# Patient Record
Sex: FEMALE | Race: WHITE | Employment: UNEMPLOYED | URBAN - METROPOLITAN AREA
[De-identification: names, ages, dates, MRNs, and addresses within clinical notes are randomized per-mention and may not be internally consistent; named-entity substitution may affect disease eponyms.]

---

## 2017-01-20 ENCOUNTER — OFFICE VISIT (OUTPATIENT)
Dept: FAMILY MEDICINE | Facility: CLINIC | Age: 52
End: 2017-01-20
Payer: COMMERCIAL

## 2017-01-20 VITALS
WEIGHT: 109.2 LBS | DIASTOLIC BLOOD PRESSURE: 64 MMHG | HEART RATE: 83 BPM | BODY MASS INDEX: 17.36 KG/M2 | SYSTOLIC BLOOD PRESSURE: 116 MMHG | TEMPERATURE: 98 F

## 2017-01-20 DIAGNOSIS — M79.604 PAIN OF RIGHT LOWER EXTREMITY: ICD-10-CM

## 2017-01-20 DIAGNOSIS — M54.89 OTHER BACK PAIN: Primary | ICD-10-CM

## 2017-01-20 DIAGNOSIS — M79.662 PAIN OF LEFT LOWER LEG: ICD-10-CM

## 2017-01-20 DIAGNOSIS — F54 PSYCHOSOMATIC FACTOR IN PHYSICAL CONDITION: ICD-10-CM

## 2017-01-20 PROCEDURE — 99354 ZZC PROLONGED SERV,OFFICE,1ST HR: CPT | Mod: 25 | Performed by: FAMILY MEDICINE

## 2017-01-20 PROCEDURE — 99215 OFFICE O/P EST HI 40 MIN: CPT | Performed by: FAMILY MEDICINE

## 2017-01-20 NOTE — MR AVS SNAPSHOT
After Visit Summary   1/20/2017    Amira Scherer    MRN: [id number]           Patient Information     Date Of Birth          1965        Visit Information        Provider Department      1/20/2017 4:30 PM Alana Carbajal MD [facility]        Today's Diagnoses     Other back pain    -  1     Pain of right lower extremity         Pain of left lower leg            Follow-ups after your visit        Additional Services     NEUROLOGY ADULT REFERRAL       Your provider has referred you to: [facility]: [facility] [place] [phone]   [email or web address]    Reason for Referral: Consult  Patient has had back pain for the last 2 years it has now changed . MRI is negative and pain is unusual.     Please be aware that coverage of these services is subject to the terms and limitations of your health insurance plan.  Call member services at your health plan with any benefit or coverage questions.      Please bring the following with you to your appointment:    (1) Any X-Rays, CTs or MRIs which have been performed.  Contact the facility where they were done to arrange for  prior to your scheduled appointment.    (2) List of current medications  (3) This referral request   (4) Any documents/labs given to you for this referral                  Who to contact     Normal or non-critical lab and imaging results will be communicated to you by MyChart, letter or phone within 4 business days after the clinic has received the results. If you do not hear from us within 7 days, please contact the clinic through MyChart or phone. If you have a critical or abnormal lab result, we will notify you by phone as soon as possible.  Submit refill requests through [employer] or call your pharmacy and they will forward the refill request to us. Please allow 3 business days for your refill to be completed.          If you need to speak with a  for additional  "information , please call: [phone]             Additional Information About Your Visit        MyChart Information     [employer]zinghart lets you send messages to your doctor, view your test results, renew your prescriptions, schedule appointments and more. To sign up, go to [email or web address] . Click on \"Log in\" on the left side of the screen, which will take you to the Welcome page. Then click on \"Sign up Now\" on the right side of the page.     You will be asked to enter the access code listed below, as well as some personal information. Please follow the directions to create your username and password.     Your access code is: [email or web address]  Expires: 2017  5:57 PM     Your access code will  in 90 days. If you need help or a new code, please call your [facility] clinic or [phone].        Care EveryWhere ID     This is your Care EveryWhere ID. This could be used by other organizations to access your [facility] medical records  [id number]        Your Vitals Were     Pulse Temperature                83 98  F (36.7  C) (Tympanic)           Blood Pressure from Last 3 Encounters:   17 116/64   08/18/15 98/65   04/07/15 103/67    Weight from Last 3 Encounters:   17 109 lb 3.2 oz (49.533 kg)   08/18/15 107 lb (48.535 kg)   04/07/15 109 lb (49.442 kg)              We Performed the Following     NEUROLOGY ADULT REFERRAL          Today's Medication Changes          These changes are accurate as of: 17  5:57 PM.  If you have any questions, ask your nurse or doctor.               These medicines have changed or have updated prescriptions.        Dose/Directions    adapalene 0.1 % cream   Commonly known as:  DIFFERIN   This may have changed:  Another medication with the same name was removed. Continue taking this medication, and follow the directions you see here.   Used for:  Acne   Changed by:  Soniya Quinonez PA-C        Apply topically at bedtime   Quantity:  45 g   Refills:  3       "   Stop taking these medicines if you haven't already. Please contact your care team if you have questions.     ciprofloxacin-dexamethasone otic suspension   Commonly known as:  CIPRODEX   Stopped by:  Alana Carbajal MD           DULoxetine 30 MG EC capsule   Commonly known as:  CYMBALTA   Stopped by:  Alana Carbajal MD           escitalopram 5 MG tablet   Commonly known as:  LEXAPRO   Stopped by:  Alana Carbajal MD           nicotine 14 MG/24HR 24 hr patch   Commonly known as:  NICODERM CQ   Stopped by:  Alana Carbajal MD           nicotine 21 MG/24HR 24 hr patch   Commonly known as:  NICODERM CQ   Stopped by:  Alana Carbajal MD           nicotine 7 MG/24HR 24 hr patch   Commonly known as:  NICODERM CQ   Stopped by:  Alana Carbajal MD           nicotine polacrilex 2 MG gum   Commonly known as:  NICORETTE   Stopped by:  Alana Carbajal MD           nicotine polacrilex 2 MG lozenge   Commonly known as:  EQL NICOTINE POLACRILEX   Stopped by:  Alana Carbajal MD           tiZANidine 2 MG tablet   Commonly known as:  ZANAFLEX   Stopped by:  Alana Carbajal MD           varenicline 0.5 MG X 11 & 1 MG X 42 tablet   Commonly known as:  CHANTIX STARTING MONTH MARKO   Stopped by:  Alana Carbajal MD                    Primary Care Provider Office Phone # Fax #    Alana Carbajal [phone]       [address] [address]        Thank you!     Thank you for choosing [facility]  for your care. Our goal is always to provide you with excellent care. Hearing back from our patients is one way we can continue to improve our services. Please take a few minutes to complete the written survey that you may receive in the mail after your visit with us. Thank you!             Your Updated Medication List - Protect others around you: Learn how to safely use, store and throw away your medicines at www.disposemymeds.org.          This list is accurate as  of: 1/20/17  5:57 PM.  Always use your most recent med list.                   Brand Name Dispense Instructions for use    adapalene 0.1 % cream    DIFFERIN    45 g    Apply topically at bedtime

## 2017-01-20 NOTE — PROGRESS NOTES
SUBJECTIVE:                                                    Amira Scherer is a 52 year old female who presents to clinic today for the following health issues:    Back Pain      Duration: 2 years        Specific cause: Sleeping on air bed for several months and shoveling 2 years ago     Description:   Location of pain: low back, middle of back  and upper back   Character of pain: sharp and stabbing  Pain radiation:radiates into the right leg and radiates into the left leg  New numbness or weakness in legs, not attributed to pain:  YES- Bilateral legs    Intensity: severe    History:   Pain interferes with job: Not working at this time  History of back problems: no prior back problems before 2 years ago  Any previous MRI or X-rays: Yes- at [place].  Date 8/2015  Sees a specialist for back pain:  No  Therapies tried without relief: Physical Therapy    Alleviating factors:   Improved by: Nothing      Precipitating factors:  Worsened by: Lifting, Bending, Standing, Sitting, Lying Flat, Walking and Coughing    Functional and Psychosocial Screen (Nickolas STarT Back):      Not performed today       Accompanying Signs & Symptoms:  Risk of Fracture:    Risk of Cauda Equina:    Risk of Infection:    Risk of Cancer:    Risk of Ankylosing Spondylitis:  Onset at age <35, male, AND morning back stiffness.                SUBJECTIVE:                                                    Amira Scherer is a 52 year old female who presents to clinic today for the following health issues:    Back Pain      Duration: 2 years        Specific cause: Sleeping on air bed for several months and shoveling 2 years ago     Description:   Location of pain: low back, middle of back  and upper back   Character of pain: sharp and stabbing  Pain radiation:radiates into the right leg and radiates into the left leg  New numbness or weakness in legs, not attributed to pain:  YES- Bilateral legs    Intensity: severe    History:   Pain interferes  "with job: Not working at this time  History of back problems: no prior back problems before 2 years ago  Any previous MRI or X-rays: Yes- at [place].  Date 8/2015  Sees a specialist for back pain:  No  Therapies tried without relief: Physical Therapy    Alleviating factors:   Improved by: Nothing      Precipitating factors:  Worsened by: Lifting, Bending, Standing, Sitting, Lying Flat, Walking and Coughing    Functional and Psychosocial Screen (Nickolas STarT Back):      Not performed today       Accompanying Signs & Symptoms:  Risk of Fracture:    Risk of Cauda Equina:    Risk of Infection:    Risk of Cancer:    Risk of Ankylosing Spondylitis:  Onset at age <35, male, AND morning back stiffness.                    Problem list and histories reviewed & adjusted, as indicated.  Additional history: she has the same back pain for 2 years.  She had an MRI that showed no abnormality in the spine except for the mild curviture of the spine. She is al over the placewith complaints about how she saw Dr. Brooks and how the physical therapy she did there and the adjustments made her worse. She kept talking about how she is unable to travel anywhere and because his office is near she went to him instead. She talks about her weakness and then procedes to pace about the room. She attributes everything to the time that she spent sleeping on the air mattress on the floor a few years back. She states her back has never been the same since that time and then she shoveled and she is now nearly disabled due to the pain.    Problem list and histories reviewed & adjusted, as indicated.  Additional history: she has the same back pain for 2 years.  She had an MRI that showed no abnormality in the spine except for the mild curviture of the spine. She has been to Dr. Allen and she spent 10 minutes where I could not even get a word in talking about how his adjustments had \"ruined her back\" she went there because it is easy for her to " "get there. She states that her told her that he was a back specialist. She has been doing physical therapy there and she is worse instead of better. She emphatically stated a minimum of 10 times through out the 75 minute office visit that she had \"done everything we had asked her to do and I am even worse than I was\". She neglected to say that she has not followed up in the office, nor has she seen the consultants that were recommended and she decided to see a chiropracter . She denies depression or any form of mental illness but in review of her chart she has a history of  Focusing on health issues to the point of obsession. She refuses any type of psychological assessment and will not take any antidepressants or any medication that is for nerve pain. Her MRI does not reveal much nor does her physical exam  Her level of psychological distress is quite high.   She states she has stopped leaving thte house or doing any social activities. Her friend drove her today and she stated she felt bad about inconvieniencing her but every time I tried tp pwrap up the visit and brought up her waiting friend she completely ignored this.   I asked her about what she does for a livign and she did not answer and changed the subject.      BP Readings from Last 3 Encounters:   01/20/17 116/64   08/18/15 98/65   04/07/15 103/67    Wt Readings from Last 3 Encounters:   01/20/17 109 lb 3.2 oz (49.533 kg)   08/18/15 107 lb (48.535 kg)   04/07/15 109 lb (49.442 kg)                    ROS:  She answers yes to everything chest pain  Shortness of breath change in bowel habits she has a long story about a history of  Urinary frequency in the past she does not have vaginal bleeding yes t to hot flashes weight loss chills undocumented fever nausea , leg swelling no one else can see, intermittent change in vision. The list goes on. All of these have been positive for greater than 2 years     OBJECTIVE:                                              "       /64 mmHg  Pulse 83  Temp(Src) 98  F (36.7  C) (Tympanic)  Ht   Wt 109 lb 3.2 oz (49.533 kg) Body mass index is 17.36 kg/(m^2).   GENERAL APPEARANCE: alert, mild distress and Nourishment underweight   NECK: no adenopathy, no asymmetry, masses, or scars and thyroid normal to palpation  RESP: lungs clear to auscultation - no rales, rhonchi or wheezes  CV: regular rates and rhythm, normal S1 S2, no S3 or S4 and no murmur, click or rub  ABDOMEN: soft, nontender, without hepatosplenomegaly or masses and bowel sounds normal  MS: extremities normal- no gross deformities noted  ORTHO: Lumber/Thoracic Spine Exam: Tender:  right para lumbar muscles  Non-tender:  thoracic spinous processes, thoracic facet joints, lumbar facet joints, left pars articularis , right pars articularis , left SI joint, right SI joint  Range of Motion:  rom normal but painful dramtically so   Strength:  able to heel walk, able to toe walk  Special tests:  negative straight leg raises, normal alignment on Boris's forward bending test    Hip Exam: Hip ROM full           ASSESSMENT/PLAN:                                                    1. Other back pain  - NEUROLOGY ADULT REFERRAL    2. Pain of right lower extremity    - NEUROLOGY ADULT REFERRAL    3. Pain of left lower leg  She is experiencing exyreme distress from this. Will r/o neurological causes. Did not discuss MS suspect  - NEUROLOGY ADULT REFERRAL    4. Psychosomatic factor in physical condition  That this is a large part of her illness but will pursue other diagnoses. She so rarely comes in that it is hard to make any accurate diagnosis she was instructed to stop smoking as this would not be beneficial for any of her disease states.   Her extreme inability to solve any of her own issues an her need for reassurance suggest that there is a deeper psychological issue that unless this is dealth with , she will not be free of her perceived health problems.      reports that she has  been smoking Cigarettes.  She has a 10 pack-year smoking history. She has never used smokeless tobacco.  Tobacco Cessation Action Plan: Information offered: Patient not interested at this time        Alana Carbajal M.D.  I spent 75 minutes face to face from 4:55 pm to 6:10pm with this patient greater that 50 % in review care planning and counseling.  [facility]

## 2017-01-20 NOTE — NURSING NOTE
"Chief Complaint   Patient presents with     Asthma     Initial /64 mmHg  Pulse 83  Temp(Src) 98  F (36.7  C) (Tympanic)  Ht   Wt 109 lb 3.2 oz (49.533 kg) Estimated body mass index is 17.36 kg/(m^2) as calculated from the following:    Height as of 8/18/15: 5' 6.5\" (1.689 m).    Weight as of this encounter: 109 lb 3.2 oz (49.533 kg).  BP completed using cuff size: regular - right arm.  Becky Benites LPN  "

## 2017-01-23 ENCOUNTER — TELEPHONE (OUTPATIENT)
Dept: FAMILY MEDICINE | Facility: CLINIC | Age: 52
End: 2017-01-23

## 2017-01-23 NOTE — TELEPHONE ENCOUNTER
Dr. Carbajal: I am pretty sure that Amira is going to ask where, who, and how far?   Thank you.  Frank Verduzco RN

## 2017-01-23 NOTE — TELEPHONE ENCOUNTER
Amira mauro.  She states that she had an appointment with Dr. Carbajal on 1/20/17 and forgot to tell her a few things and did to tell her what's happening to her now.  Please call and assess. Thank you..Annamarie Dias

## 2017-01-23 NOTE — TELEPHONE ENCOUNTER
"FYI to Dr. Carbajal: Amira reports, \"The soonest available is February 22, 2017. Amira said that if there is anything that can be done for her to be  seen sooner; I would appreciate that.\" I did reassure her as to the issues below and that you were informed of each one and gave the following info:  1. Liver spots---benign  2. Menopause symptoms were normal for menopause  3. Leg pain is the reason to see neurology.   Frank Verduzco RN    "

## 2017-01-23 NOTE — TELEPHONE ENCOUNTER
"Dr. Carbajal:   1. Spots on the liver: Patient called and said that she this really bugging me , 2 things. When she saw me on Friday, she had pulled up on the screen that I had MRI 2-3  years ago in . ; not the one that I brought one. Years ago I had my colon removed and she know that I have intestinal issues. I remember him telling me that I have spots on my liver; something he saw on the MRI. The intestinal thing -I am able to control myself. He told me that I needed to have ultrasound which I did. I forgot that this happened with Dr. Merritt----it scares me. It may or may not mean anything.  2. Menopause. My legs and back are awful but back MRI says that I have a great back. The last few years were night sweats and I felt frozen--I mean extreme tummy bloatting and extreme heavy periods for me. The last couple of years were torture. I just thought it was menopause. Not intestinal ----abdominal; Cramping with my period to the point of shakes.  It got to be irregular periods. The extreme pain and belly bloating. I got to thinking to my self and wondered if there is something going on there. Uterine cancer or ovarian cancer? I don't have those issues anymore.  3. Leg pain.  She said that \"she did not have leg pain until she tried Physical Therapy starting in January. Compression socks to the the thighs help.\" She said that legs were swelling on  but did not hurt until 2016.   Frank Verduzco RN    "

## 2017-01-24 NOTE — TELEPHONE ENCOUNTER
Patient notified of other neuro referral offer. She declined for now and said that she would keep calling to [place] to see if there is a cancel to be seen sooner than Feb, 22, 2017.  Frank Verduzco RN

## 2017-02-14 ENCOUNTER — TELEPHONE (OUTPATIENT)
Dept: FAMILY MEDICINE | Facility: CLINIC | Age: 52
End: 2017-02-14

## 2017-02-14 DIAGNOSIS — R20.2 PARESTHESIAS: Primary | ICD-10-CM

## 2017-02-14 NOTE — TELEPHONE ENCOUNTER
"Dr. Carbajal: Patient notified of lab orders to address her concern for protein deficiency. \"I still want to talk to her at a telephone encounter. Can I talk to her even before I get the lab?  I will get the lab on the same day that I see the neurologist on Feb. 22nd.\"  I advised her to get the labs and then make the  telephone encounter so that Dr. Carbajal would have the results of the lab work so that there would be some definite results rather than just speculating. Amira said, \"I understand but I want her to know that I have bloating that has been going on since before I injured my back.\" \"I know that I am being stubborn but I really want to talk to her. Can I make a telephone appointment?\" Again I tried to get her to get the lab first and then have the telephone appointment. I told her that Dr. Carbajal would not have much advice to give until she had some lab result numbers to go by. Amira insists on wanting to talk to Dr. Carbajal on a telephone appointment. How do you advise? Thank you.  Frank Verduzco, RN    "

## 2017-02-14 NOTE — TELEPHONE ENCOUNTER
"Amira says. \"this is driving me crazy; I have so many questions. I was really hoping to speak with Dr. Carbajal; possible to have a telephone appointment?\" \"I went to chiropractor Alejandro --and he said that it could be a protein deficiency.\" Telephone call made for this afternoon. She wants to discuss protein deficiency.   Frank Verduzco RN    "

## 2017-02-14 NOTE — TELEPHONE ENCOUNTER
Amira has a visit with Dr. Carbajal on 1/20/17 and she has a few more questions that she wanted to speak to Dr. Carbajal about.  What she would like to do is have a 'telephone' appointment that would be an extension of her visit of 1/20/17 if possible.  Doesn't really  Want another appointment to actually come in.  Would like to speak with RN about it.  Please call and assess. Thank you..Annamarie Dias

## 2017-02-16 ENCOUNTER — TELEPHONE (OUTPATIENT)
Dept: FAMILY MEDICINE | Facility: CLINIC | Age: 52
End: 2017-02-16

## 2017-02-16 DIAGNOSIS — N95.0 POST-MENOPAUSAL BLEEDING: Primary | ICD-10-CM

## 2017-02-16 NOTE — TELEPHONE ENCOUNTER
Amira left message:    She would like a telphone call!  Question:  Pain from not enough protein?  Ok'd to leave a message.  Wanting a telephone visit.  Please call and assess. Thank you..Annamarie Dias

## 2017-02-16 NOTE — TELEPHONE ENCOUNTER
"Patient wants to know:  1) x-ray vs. MRI; would the xray that was taken on 8/18/15 show if there was a curvature that could have caused her pain. Would she have needed an MRI? Are the 2 images showing the same thing?   2) can the pain she is feeling in her back be caused from a lack of protein.  3) Patient would like to know if she can get a \"finger stick\" for blood work for a lab to check just her protein.  4) requesting thi high compression stockings, not knee high.    Spent 43 minutes trying to talk with patient. She continues to talk in circles and cannot get a word in edgewise. Explained lab draws and foods she can eat to increase her protein, Explained prices for telephone visits and what could be talked about vs an office visit due to patient's insurance. Patient would like \"simple\" answers for above questions.  Yeimy Mcnamara RN    "

## 2017-02-17 NOTE — TELEPHONE ENCOUNTER
Patient called back again demanding that I call her back TODAY. She is expecting me to call her before I leave for the weekend. Spoke with Dr. Carbajal. She advised that I not call her back as she herself will be contacting her later today. Patient continues to cross personal boundaries, and is non compliant with numerous treatment plans that have been laid out for her. After every plan she has verbalized understanding, but does not follow through.   Yeimy Mcnamara RN

## 2017-02-20 ENCOUNTER — TELEPHONE (OUTPATIENT)
Dept: NURSING | Facility: CLINIC | Age: 52
End: 2017-02-20

## 2017-02-20 NOTE — TELEPHONE ENCOUNTER
Call Type: Triage Call    Presenting Problem: Patient calling requesting to know list of foods  that contain Vitamin B12. Denies symptoms.  Reviewed per [employer] list  of foods that contain Vit B12. Patient requesting further  information on percentage contained in each food. Per [employer] Vitamin  B12 is an essential vitamin. This means that the body requires  vitamin B12 to work properly. Vitamin B12 can be found in foods such  as meat, fish, and dairy products.  Triage Note:  Guideline Title: Information Only Call; No Symptom Triage (Adult)  Recommended Disposition: Provide Information or Advice Only  Original Inclination: Did not know what to do  Override Disposition:  Intended Action: Follow advice given  Physician Contacted: No  Health information question; person denies any symptoms, no triage required.  Information provided from approved references or clinical experience. ?  YES  Requesting regular office appointment ? NO  Sign(s) or symptom(s) associated with a diagnosed condition or with a new illness  ? NO  Requesting information about provider, services or community resources ? NO  Call back to complete assessment/clarification of information from prior caller to  complete triage ? NO  Requesting information and provider is best resource; no triage required. ? NO  Caller not with patient and is unable to provide clinical information about  patient to facilitate triage. ? NO  Requesting provider information for recently scheduled test, procedure; no triage  required. Needed information not available per approved resources or clinical  experience. ? NO  Requesting information not available per approved reference or clinical  experience; no triage required. ? NO  Requesting information regarding scheduled exam, test or procedure; no triage  required. Information provided from approved resources or clinical experience. ?  NO  Physician Instructions:  Care Advice:

## 2017-02-21 NOTE — TELEPHONE ENCOUNTER
Call to patient. She has had some light menses has not had any bleeding for 18 months.  Now has had a 3 day period. She has not had any other bleeding. Will get u/s she needs to get her labs and follow up with neurology Alana Carbajal M.D.

## 2017-02-22 ENCOUNTER — OFFICE VISIT (OUTPATIENT)
Dept: NEUROLOGY | Facility: CLINIC | Age: 52
End: 2017-02-22
Payer: COMMERCIAL

## 2017-02-22 ENCOUNTER — HOSPITAL ENCOUNTER (OUTPATIENT)
Dept: ULTRASOUND IMAGING | Facility: CLINIC | Age: 52
Discharge: HOME | End: 2017-02-22
Attending: FAMILY MEDICINE | Admitting: FAMILY MEDICINE
Payer: COMMERCIAL

## 2017-02-22 ENCOUNTER — TELEPHONE (OUTPATIENT)
Dept: NEUROLOGY | Facility: CLINIC | Age: 52
End: 2017-02-22

## 2017-02-22 VITALS
BODY MASS INDEX: 17.27 KG/M2 | WEIGHT: 110 LBS | DIASTOLIC BLOOD PRESSURE: 74 MMHG | SYSTOLIC BLOOD PRESSURE: 107 MMHG | HEIGHT: 67 IN

## 2017-02-22 DIAGNOSIS — G89.29 CHRONIC MIDLINE LOW BACK PAIN WITH SCIATICA, SCIATICA LATERALITY UNSPECIFIED: ICD-10-CM

## 2017-02-22 DIAGNOSIS — N95.0 POST-MENOPAUSAL BLEEDING: ICD-10-CM

## 2017-02-22 DIAGNOSIS — M79.605 BILATERAL LOWER EXTREMITY PAIN: Primary | ICD-10-CM

## 2017-02-22 DIAGNOSIS — M79.604 BILATERAL LOWER EXTREMITY PAIN: Primary | ICD-10-CM

## 2017-02-22 DIAGNOSIS — M54.40 CHRONIC MIDLINE LOW BACK PAIN WITH SCIATICA, SCIATICA LATERALITY UNSPECIFIED: ICD-10-CM

## 2017-02-22 DIAGNOSIS — R20.2 PARESTHESIAS: ICD-10-CM

## 2017-02-22 LAB
ALBUMIN SERPL-MCNC: 4 G/DL (ref 3.4–5)
ALP SERPL-CCNC: 64 U/L (ref 40–150)
ALT SERPL W P-5'-P-CCNC: 23 U/L (ref 0–50)
ANION GAP SERPL CALCULATED.3IONS-SCNC: 8 MMOL/L (ref 3–14)
AST SERPL W P-5'-P-CCNC: 17 U/L (ref 0–45)
BILIRUB SERPL-MCNC: 0.7 MG/DL (ref 0.2–1.3)
BUN SERPL-MCNC: 11 MG/DL (ref 7–30)
CALCIUM SERPL-MCNC: 9.1 MG/DL (ref 8.5–10.1)
CHLORIDE SERPL-SCNC: 101 MMOL/L (ref 94–109)
CO2 SERPL-SCNC: 29 MMOL/L (ref 20–32)
CREAT SERPL-MCNC: 0.55 MG/DL (ref 0.52–1.04)
ERYTHROCYTE [DISTWIDTH] IN BLOOD BY AUTOMATED COUNT: 14.2 % (ref 10–15)
GFR SERPL CREATININE-BSD FRML MDRD: ABNORMAL ML/MIN/1.7M2
GLUCOSE SERPL-MCNC: 78 MG/DL (ref 70–99)
HCT VFR BLD AUTO: 50.2 % (ref 35–47)
HGB BLD-MCNC: 16.8 G/DL (ref 11.7–15.7)
MCH RBC QN AUTO: 33.8 PG (ref 26.5–33)
MCHC RBC AUTO-ENTMCNC: 33.5 G/DL (ref 31.5–36.5)
MCV RBC AUTO: 101 FL (ref 78–100)
PLATELET # BLD AUTO: 209 10E9/L (ref 150–450)
POTASSIUM SERPL-SCNC: 3.3 MMOL/L (ref 3.4–5.3)
PROT SERPL-MCNC: 7.8 G/DL (ref 6.8–8.8)
RBC # BLD AUTO: 4.97 10E12/L (ref 3.8–5.2)
SODIUM SERPL-SCNC: 138 MMOL/L (ref 133–144)
TSH SERPL DL<=0.005 MIU/L-ACNC: 0.89 MU/L (ref 0.4–4)
VIT B12 SERPL-MCNC: 210 PG/ML (ref 193–986)
WBC # BLD AUTO: 8.2 10E9/L (ref 4–11)

## 2017-02-22 PROCEDURE — 84443 ASSAY THYROID STIM HORMONE: CPT | Performed by: FAMILY MEDICINE

## 2017-02-22 PROCEDURE — 82607 VITAMIN B-12: CPT | Performed by: FAMILY MEDICINE

## 2017-02-22 PROCEDURE — 80053 COMPREHEN METABOLIC PANEL: CPT | Performed by: FAMILY MEDICINE

## 2017-02-22 PROCEDURE — 85027 COMPLETE CBC AUTOMATED: CPT | Performed by: FAMILY MEDICINE

## 2017-02-22 PROCEDURE — 99204 OFFICE O/P NEW MOD 45 MIN: CPT | Performed by: PSYCHIATRY & NEUROLOGY

## 2017-02-22 PROCEDURE — 36415 COLL VENOUS BLD VENIPUNCTURE: CPT | Performed by: FAMILY MEDICINE

## 2017-02-22 PROCEDURE — 76830 TRANSVAGINAL US NON-OB: CPT

## 2017-02-22 NOTE — PROGRESS NOTES
INITIAL NEUROLOGY CONSULTATION    DATE OF VISIT: 2/22/2017  MRN: [id number]  PATIENT NAME: Amira Scherer  YOB: 1965    REFERRING PROVIDER: No ref. provider found    Chief Complaint   Patient presents with     Consult     Back Pain       SUBJECTIVE:                                                      HPI:   Amira Scherer is a 52 year old female who presents for back pain and leg pain.     In October of 2015, the patient awoke with back pain one morning. She had been sleeping on an air mattress prior to this. A few days later she was shoveling snow and felt an increased pain in the back. She has since done physical therapy and chiropractic care with fluctuating pain but then she developed leg swelling and weakness with swelling in 2016. The patient tells me that she can hardly walk anymore. Her back and legs hurt constantly. She says that she now has trouble with her right arm because of aggressive physical therapy. She does not really have shooting pain, but just general pain in the legs that radiates from the back. She is not sure if the legs are weak. The patient did have a lumbar MRI done at [facility] and shows me the report. We will have to upload the disc into our system to view it. The study was reportedly unremarkable. Her primary care provider sent her to neurology for possible neurologic condition to explain her symptoms. It is noted that the provider feels that her symptoms are largely psychosomatic.     Patient talks the entire time of the visit, leaving little room for questions. I am not sure I have an accurate or complete history.    No past medical history on file.  Past Surgical History   Procedure Laterality Date     Colon surgery  1987 and 89     Colon removed          Current Outpatient Prescriptions on File Prior to Visit:  adapalene (DIFFERIN) 0.1 % cream Apply topically at bedtime     No current facility-administered medications on file prior to visit.   No Known Allergies      "Problem (# of Occurrences) Relation (Name,Age of Onset)    Arthritis (1) Mother    Breast Cancer (1) Sister    Hypertension (1) Mother    Thyroid Disease (1) Sister        Social History   Substance Use Topics     Smoking status: Current Some Day Smoker     Packs/day: 0.50     Years: 20.00     Types: Cigarettes     Smokeless tobacco: Never Used     Alcohol use Yes      Comment: Red wine 2-3 times per week       REVIEW OF SYSTEMS:                                                      10-point review of systems is negative except as mentioned above in HPI.     EXAM:                                                      Physical Exam:   Vitals: /74 (BP Location: Left arm, Cuff Size: Adult Regular)  Ht 5' 6.5\" (1.689 m)  Wt 110 lb (49.9 kg)  BMI 17.49 kg/m2  BMI= Body mass index is 17.49 kg/(m^2).  GENERAL: NAD.   Neurologic:  MENTAL STATUS: Alert, attentive. Speech is fluent. Pressured at times, tangential. Normal comprehension. Normal concentration. Adequate fund of knowledge.   CRANIAL NERVES: Visual fields intact to confrontation. Pupils equally, round and reactive to light. Facial sensation and movement normal. EOM full. Hearing intact to conversation. Trapezius strength intact. Palate moves symmetrically. Tongue midline.  MOTOR: Give-way weakness and limited exam due to pain. Objectively, strength appears full. Tone and bulk normal.   DTRs: Intact and symmetric. Trace ankle jerks. Babinski down-going bilaterally.   SENSATION: Normal light touch and pinprick throughout. Intact proprioception. Vibration: Normal at both ankles.   COORDINATION: Normal finger nose finger. Finger tapping normal. Knee heel shin normal.  STATION AND GAIT: Romberg negative. Tandem normal.  CV: RRR. S1, S2.   NECK: No bruits.    ASSESSMENT and PLAN:                                                      Assessment and Plan:     ICD-10-CM    1. Bilateral lower extremity pain M79.604 EMG    M79.605    2. Chronic midline low back pain with " sciatica, sciatica laterality unspecified M54.40 EMG    G89.29        Ms. Scherer is a 51 yo woman with back and leg pain for 1-2 years duration. It seems that the patient was going to be referred to orthopedics, however she was redirected to me instead. Certainly some of the pain does sound consistent with radicular pain, however the lumbar MRI report from [facility] does not indicate foraminal stenosis. One way to double check for radiculopathy would be electrodiagnostic testing. If this does not provide an explanation, the patient might benefit from pain consultation for a multidisciplinary approach. Given my discussion with the patient and her overall demeanor (and without an alternative explanation), I do think that it is very likely that her symptoms are psychogenic.This would be best addressed through a mental health provider. I would not do additional imaging in this patient, though if there is question of MS, one could check brain/cervical spine MRI. This I will defer to her primary care provider.     Patient Instructions:  Nerve conduction studies / electromyogram   I recommend a referral to Orthopedics if the electromyogram is abnormal, but will defer this decision to Dr. Carbajal.  If the nerve conduction studies are normal, I would recommend you consult with pain clinic.     Total Time: 45 minutes were spent with the patient. More than 50% of the time spent on counseling (as described above in Assessment and Plan) /coordinating the care.    Debo Heck MD  Neurology    CC: Alana Carbajal MD

## 2017-02-22 NOTE — NURSING NOTE
"Chief Complaint   Patient presents with     Consult     Back Pain       Initial /74 (BP Location: Left arm, Cuff Size: Adult Regular)  Ht 5' 6.5\" (1.689 m)  Wt 110 lb (49.9 kg)  BMI 17.49 kg/m2 Estimated body mass index is 17.49 kg/(m^2) as calculated from the following:    Height as of this encounter: 5' 6.5\" (1.689 m).    Weight as of this encounter: 110 lb (49.9 kg).  Medication Reconciliation: complete   Genie Rice LPN   2/22/2017       "

## 2017-02-22 NOTE — MR AVS SNAPSHOT
After Visit Summary   2/22/2017    Amira Scherer    MRN: [id number]           Patient Information     Date Of Birth          1965        Visit Information        Provider Department      2/22/2017 2:15 PM Debo Heck MD [facility]        Today's Diagnoses     Bilateral lower extremity pain    -  1    Chronic midline low back pain with sciatica, sciatica laterality unspecified          Care Instructions    Plan:  Nerve conduction studies / electromyogram   I recommend a referral to Orthopedics if the electromyogram is abnormal, but will defer this decision to Dr. Carbajal.  If the nerve conduction studies are normal, I would recommend you consult with pain clinic.         Follow-ups after your visit        Your next 10 appointments already scheduled     Feb 22, 2017  4:20 PM CST   US PELVIC COMPLETE W TRANSVAGINAL with [id number]   [facility]ound ([facility])    [address] [address]   [phone]           Please bring a list of your medicines (including vitamins, minerals and over-the-counter drugs). Also, tell your doctor about any allergies you may have. Wear comfortable clothes and leave your valuables at home.  Adults: Drink four 8-ounce glasses of fluid one hour before your exam. Do NOT empty your bladder.  If you need to empty your bladder before your exam, try to release only a little bit of urine. Then, drink another 8oz glass of fluid.  Children: Children who are potty trained should drink at least 4 cups (32 oz) of liquid 45 minutes to one hour prior to the exam. The child s bladder must be full in order to achieve a diagnostic exam. If your child is very uncomfortable or has an urgent need to pee, please notify a technologist; they will try to find out how much longer the wait may be and provide instructions to help relieve the pressure. Occasionally it is medically necessary to insert a urinary catheter to fill the  "bladder.  Please call the Imaging Department at your exam site with any questions.              Future tests that were ordered for you today     Open Future Orders        Priority Expected Expires Ordered    EMG Routine  2018            Who to contact     If you have questions or need follow up information about today's clinic visit or your schedule please contact [facility] directly at [phone].  Normal or non-critical lab and imaging results will be communicated to you by MyChart, letter or phone within 4 business days after the clinic has received the results. If you do not hear from us within 7 days, please contact the clinic through [employer] or phone. If you have a critical or abnormal lab result, we will notify you by phone as soon as possible.  Submit refill requests through [employer] or call your pharmacy and they will forward the refill request to us. Please allow 3 business days for your refill to be completed.          Additional Information About Your Visit        [employer] Information     [employer] lets you send messages to your doctor, view your test results, renew your prescriptions, schedule appointments and more. To sign up, go to [email or web address] . Click on \"Log in\" on the left side of the screen, which will take you to the Welcome page. Then click on \"Sign up Now\" on the right side of the page.     You will be asked to enter the access code listed below, as well as some personal information. Please follow the directions to create your username and password.     Your access code is: [email or web address]  Expires: 2017  5:57 PM     Your access code will  in 90 days. If you need help or a new code, please call your [place] clinic or [phone].        Care EveryWhere ID     This is your Care EveryWhere ID. This could be used by other organizations to access your [place] medical records  [id number]        Your Vitals Were     Height BMI (Body Mass Index)       " "         5' 6.5\" (1.689 m) 17.49 kg/m2           Blood Pressure from Last 3 Encounters:   02/22/17 107/74   01/20/17 116/64   08/18/15 98/65    Weight from Last 3 Encounters:   02/22/17 110 lb (49.9 kg)   01/20/17 109 lb 3.2 oz (49.5 kg)   08/18/15 107 lb (48.5 kg)               Primary Care Provider Office Phone # Fax #    Alana Carbajal [phone]       [facility] [address] [facility] [address] [facility] [address]        Thank you!     Thank you for choosing [facility]  for your care. Our goal is always to provide you with excellent care. Hearing back from our patients is one way we can continue to improve our services. Please take a few minutes to complete the written survey that you may receive in the mail after your visit with us. Thank you!             Your Updated Medication List - Protect others around you: Learn how to safely use, store and throw away your medicines at www.disposemymeds.org.          This list is accurate as of: 2/22/17  3:21 PM.  Always use your most recent med list.                   Brand Name Dispense Instructions for use    adapalene 0.1 % cream    DIFFERIN    45 g    Apply topically at bedtime         "

## 2017-02-22 NOTE — TELEPHONE ENCOUNTER
Ms. Scherer asked that Dr. Heck be given additional information about her condition.  She states that she is having continued feeling of fullness and swelling of her legs bilaterally, from hips to toes.  She feels pain that is worse when she tries to sit in regular chairs; instead must sit on the edge of something, or even must cross her right leg over the left to relieve the pain somewhat.  The pain can sometimes be alleviated by laying flat, but that doesn't always cause relief.      She wanted to ask if you've seen anything like this before or have any ideas what's going on.

## 2017-02-22 NOTE — PATIENT INSTRUCTIONS
Plan:  Nerve conduction studies / electromyogram   I recommend a referral to Orthopedics if the electromyogram is abnormal, but will defer this decision to Dr. Carbajal.  If the nerve conduction studies are normal, I would recommend you consult with pain clinic.

## 2017-02-28 ENCOUNTER — TELEPHONE (OUTPATIENT)
Dept: FAMILY MEDICINE | Facility: CLINIC | Age: 52
End: 2017-02-28

## 2017-02-28 NOTE — TELEPHONE ENCOUNTER
Amira notified of 1000 mcg vitamin b 12 daily; not 1000 mg vitamin b 12 daily.  Frank Verduzco RN

## 2017-03-01 ENCOUNTER — TELEPHONE (OUTPATIENT)
Dept: OTHER | Facility: CLINIC | Age: 52
End: 2017-03-01

## 2017-03-01 NOTE — TELEPHONE ENCOUNTER
3/1/2017    Call Regarding Onboarding [employer]    Attempt 1    Message     Comments: patient has declined to onboard at this time and states that she has everything she needs.          Outreach   joseph

## 2017-03-07 ENCOUNTER — TELEPHONE (OUTPATIENT)
Dept: FAMILY MEDICINE | Facility: CLINIC | Age: 52
End: 2017-03-07

## 2017-03-07 NOTE — TELEPHONE ENCOUNTER
S-(situation): Patient would like to know if provider would like her to do the EMG that the neurologist ordered on 2/22/17    B-(background): Patient injured her back a couple of years ago and has not been the same since.    A-(assessment): Patient reports the the pain is getting worse in her legs.  Patient reports the it takes all her might to just stand and hold herself up.  Patient also reports she has been taking her vitamins and increased fish in her diet.      R-(recommendations): Patient's EMG is scheduled for 3/16/17 she will try to make it there if her provider thinks it is necessary.  Please review and advise.  Thank you  Karin TURNER RN      OV notes:  Patient Instructions:  Nerve conduction studies / electromyogram   I recommend a referral to Orthopedics if the electromyogram is abnormal, but will defer this decision to Dr. Carbajal.  If the nerve conduction studies are normal, I would recommend you consult with pain clinic.

## 2017-03-07 NOTE — TELEPHONE ENCOUNTER
With her many complaints, this is what the neurologist ordered. I would like for her to follow through and have the test. Alana Carbajal M.D.'

## 2017-03-07 NOTE — TELEPHONE ENCOUNTER
Patient would like me to make sure you are aware that it is very difficult for her to get to the test.  Patient would like to know if you would like any other tests or labs done because of her symptoms.    Thank you  Karin TURNER RN

## 2017-03-23 NOTE — TELEPHONE ENCOUNTER
I called pt with the message.  She said that she has been doing Vitamin B 12 1000 mcg daily.  She has been this for 3 weeks now.  Her legs are having a little less pain but she is not sure.  She will continue the Vitamin B 12.  The US was over $500.00 out of her pocket.  Any questions about the Vitamin B 12 or anything else that she should be doing to let her know.  Petrona Ortiz

## 2017-03-28 ENCOUNTER — TELEPHONE (OUTPATIENT)
Dept: FAMILY MEDICINE | Facility: CLINIC | Age: 52
End: 2017-03-28

## 2017-03-28 NOTE — TELEPHONE ENCOUNTER
Patient calling Mon 3/27 to state she has been taking Vit B-12 for a month and is not feeling that it is working. She is wondering if she should up the dosage. Please advise.  Meseret Chavez  CSS

## 2017-03-28 NOTE — TELEPHONE ENCOUNTER
Dr. Carbajal: Please read Meseret's note below. Do you want me to triage Amira or do you have enough information? Thank you.  Frank Verduzco RN

## 2017-03-28 NOTE — TELEPHONE ENCOUNTER
Patient calling regarding her B12 again, I informed her of her your note, to go a head and double, I did read back to her, what she told me yesterday, and she did not like how I worded it.  She stated to me that she doesn't think its working, not that it does not work. If she chooses to double it, should she take half in the morning and half in the evening. Currently she takes her  1,000 mg in the am. Also she has been eating a lot of [place] or fish, for the B 12 , can you possibly eat too much fish. She eats a smaller portion like 3 oz. Just wondering if eating fish daily or a lot,   Is bad or good?  pls call and advise regarding the medication issue please.

## 2017-03-29 NOTE — TELEPHONE ENCOUNTER
Patient advised to take vitamin B12 twice a day and to eat salmon/fish in moderation. She had further questions regarding fish intake. I offered her a referral to the nutritionist. She declined. She said that she would make a lab only appointment to have her Vitamin B12 checked.  Frank Verduzco RN

## 2017-04-04 ENCOUNTER — TELEPHONE (OUTPATIENT)
Dept: FAMILY MEDICINE | Facility: CLINIC | Age: 52
End: 2017-04-04

## 2017-04-04 NOTE — TELEPHONE ENCOUNTER
Reason for Call:  Other call back    Detailed comments: She is taking Vitamin B 12 1000 mcg BID.  She is wondering if she can take 2000 mcg BID?  Please advise.    Phone Number Patient can be reached at: Home number on file [phone] (home)    Best Time: any    Can we leave a detailed message on this number? YES    Call taken on 4/4/2017 at 1:55 PM by Petrona Ortiz

## 2017-04-05 NOTE — TELEPHONE ENCOUNTER
Patient notified of OK to take vitamin B12; 2000 mcg daily or 1000 mcg twice a day.   Frank Verduzco RN

## 2017-04-13 ENCOUNTER — TELEPHONE (OUTPATIENT)
Dept: NEUROLOGY | Facility: CLINIC | Age: 52
End: 2017-04-13

## 2017-04-13 NOTE — TELEPHONE ENCOUNTER
I called Imaging to see where the CD went after I sent it on 2/22/17.  I was told that CD just arrived at their department.  Unsure why the delay.  But I was able to ask them to be sure to return the CD to the patient after being uploaded.  Ms Scherer also wanted reassurance that she would not be charged for the upload and re-read, if there was one.      She asked where Dr. Heck was as far as follow-up.  I reminded Ms Scherer that she is to complete the EMG, and we would go from there.  She has that scheduled.

## 2017-04-13 NOTE — TELEPHONE ENCOUNTER
Reason for Call:  Patient is calling in states that she was in and requested that her CD from [facility] be returned to her via US Mail as of today she hasn;t received it     Detailed comments: please advise and return call to patient    Phone Number Patient can be reached at: Home number on file [phone] (home)    Best Time: any    Can we leave a detailed message on this number? YES    Call taken on 4/13/2017 at 1:43 PM by Nilam Morales

## 2017-04-26 ENCOUNTER — TELEPHONE (OUTPATIENT)
Dept: FAMILY MEDICINE | Facility: CLINIC | Age: 52
End: 2017-04-26

## 2017-04-26 NOTE — TELEPHONE ENCOUNTER
Panel Management Review      Patient has the following on her problem list:     Anxiety review  PHQ-9 SCORE 5/21/2012 10/6/2014   Total Score 3 4      Patient is due for:  CARLA      Composite cancer screening  Chart review shows that this patient is due/due soon for the following Pap Smear and Mammogram  Summary:    Patient is due/failing the following:   MAMMOGRAM, PAP and PHYSICAL    Action needed:   Patient needs office visit for PEPAP. and Patient needs referral/order: Mammogram     Type of outreach:    Sent letter.    Questions for provider review:    None                                                                                                                                    Maddie Long CMA

## 2017-04-26 NOTE — LETTER
[facility]  [address] [place]  [address]  Phone: [phone]      April 26, 2017      Amira Scherer  [address] [facility] [address]              Dear Amira,    In order to ensure we are providing the best quality care, Dr. Carbajal and I have reviewed your chart and see that you are due for a yearly Physical including a Pap and a Mammogram.     Please call the clinic to set up an office visit at [phone] at your earliest convenience.    Please call one of the following numbers to schedule your mammogram:  [facility] [phone]  [facility] [phone]  [facility] [phone]    We greatly appreciate the opportunity to serve you. Thank you for trusting us with your health care.    Sincerely,    CURTIS García M.D.

## 2017-05-09 ENCOUNTER — TELEPHONE (OUTPATIENT)
Dept: FAMILY MEDICINE | Facility: CLINIC | Age: 52
End: 2017-05-09

## 2017-05-09 NOTE — TELEPHONE ENCOUNTER
Amira is calling as she just now received a bill from [facility] for her 1/20/17 office visit.  She doesn't understand why it took that long to receive a bill.  She wanted an explanation as to why it was over $300.00 and I spoke with Dr. Carbajal and received the details, which I passed on to her.  She doesn't think it's fair that she was billed for a longer appointment.  Dr. Carbajal spent 1 hour and 10 minutes with her.  I recommended that she call Alana Harris to discuss further, as we do not have anything to do with when the Central Business Office sends out the bills.  She believes that something happened between January 20th appointment and when she received the bill.  I spend 11 minutes on the phone with Amira, trying to recommend her to call Alana Harris to discuss.  Number was given to her.  Thank you..Annamarie Dias

## 2017-06-15 ENCOUNTER — TELEPHONE (OUTPATIENT)
Dept: FAMILY MEDICINE | Facility: CLINIC | Age: 52
End: 2017-06-15

## 2017-06-15 DIAGNOSIS — N95.0 POSTMENOPAUSAL BLEEDING: Primary | ICD-10-CM

## 2017-06-15 NOTE — TELEPHONE ENCOUNTER
Ultrasound done 4 months ago had normal endometrial stripe. With her lack of menses before this for 18 months would recommend gyn consult at [place] for EMB. Alana Carbajal M.D.;'

## 2017-06-15 NOTE — TELEPHONE ENCOUNTER
Reason for call:  Patient reporting a symptom    Symptom or request: Amira is reporting that she got her menstrual period again on 6/13/17.  Still has it today.  It started out pretty light and today it's still light, but a bit heavier.  What is her next step?  She also wanted you to know that the B12 seems to be working.  She can tell the difference.  It took 3 months of taking it, but she can tell the different and is feeling better.  Please review and advise. Thank you..Annamarie Dias      Duration (how long have symptoms been present): Since 6/13/17    Have you been treated for this before? Yes    Additional comments: none    Phone Number patient can be reached at:  Home number on file [phone] (home)    Best Time:  anytime    Can we leave a detailed message on this number:  YES    Call taken on 6/15/2017 at 2:20 PM by Annamarie Dias

## 2017-06-16 ENCOUNTER — TELEPHONE (OUTPATIENT)
Dept: OBGYN | Facility: CLINIC | Age: 52
End: 2017-06-16

## 2017-06-16 NOTE — TELEPHONE ENCOUNTER
Return call to patient.  Reviewed procedure for EMB and indications for.   Patient has appointment scheduled for 7/18.  Offered patient sooner appointment.  Patient is considering and will call back if desired.    All questions answered.  Patient reports understanding.    Laura Hawkins   [facility]yn Clinic  RN

## 2017-06-16 NOTE — TELEPHONE ENCOUNTER
Patient notified and referral place. Patient was given phone number to make an appointment.  Yeimy Mcnamara RN

## 2017-06-16 NOTE — TELEPHONE ENCOUNTER
New patient coming in on 7/16/17 for a EMB.  Pt is calling to discuss what this will entail?  Why does she need to have it?  Dr. Carbajal referred her to have it.    Please call-     Cathleen Heredia  Clinic Station

## 2017-07-14 DIAGNOSIS — E53.8 VITAMIN B12 DEFICIENCY (NON ANEMIC): ICD-10-CM

## 2017-07-14 LAB — VIT B12 SERPL-MCNC: 3812 PG/ML (ref 193–986)

## 2017-07-14 PROCEDURE — 82607 VITAMIN B-12: CPT | Performed by: FAMILY MEDICINE

## 2017-07-14 PROCEDURE — 36415 COLL VENOUS BLD VENIPUNCTURE: CPT | Performed by: FAMILY MEDICINE

## 2017-07-15 ENCOUNTER — TELEPHONE (OUTPATIENT)
Dept: FAMILY MEDICINE | Facility: CLINIC | Age: 52
End: 2017-07-15

## 2017-07-15 NOTE — TELEPHONE ENCOUNTER
Patient called requesting results for a lab recently done at [place]. She also has some concerns as she took the vitamin pills a month longer than what she was told. She would like a call back on Monday, 7/17/17.    Víctor Chaudhari

## 2017-07-17 NOTE — TELEPHONE ENCOUNTER
"Patient called and told to stop the Vit B12 per note below. States she will not stop taking the Vit B 12 until she hears from Dr. Carbajal tomorrow, advised again to stop it per note below and patient is adamant and wants to hear what PCP will say. Have spoken to Arina Wood to notify that patient is refusing provider's recommendation and told it was \"ok\". Routing to Dr. Carbajal to review and patient would like to hear from PCP tomorrow when the provider returns.  ROXANNE Sanchez    "

## 2017-07-17 NOTE — TELEPHONE ENCOUNTER
Patient calling this morning to let Dr. Carbajal know that she went over the 3 month grant for recheck of the Vit B12 lab. Patient states she has been taking the VIt B12 for four months twice a day and could not get in sooner than recently due to difficulty with getting a ride, wondering what the next steps will be for her. Told the patient PCP out today so will have another provider review this result. Routing to provider for review. Please see current Vit B12 Lab and advise.  ROXANNE Sanchez

## 2017-07-17 NOTE — TELEPHONE ENCOUNTER
Vitamin B12 is high, can stop vitamin B12 supplement for now.  With her symptoms will see if Dr. Carbajal has further recommendations when she is in the office for going forward.  Arina Wood PA-C

## 2017-07-21 ENCOUNTER — TELEPHONE (OUTPATIENT)
Dept: FAMILY MEDICINE | Facility: CLINIC | Age: 52
End: 2017-07-21

## 2017-07-21 NOTE — TELEPHONE ENCOUNTER
Per Dr. Carbajal,  it is ok to take the dose she was taking before, b12 2000mcg. Patient notified and is in agreement.  Yeimy Mcnamara RN

## 2017-07-21 NOTE — TELEPHONE ENCOUNTER
Reason for call:  Patient reporting a symptom    Symptom or request: Amira is reporting that since she cut back on the B12 (Monday 7/17/17) her legs have been aching so bad.  She states that she is nearly in tears every day.  What can she do?  Does she need to increase the B12 or what?  She has been in pain all week.  Please call and assess. Thank you..Annamarie Dias    Duration (how long have symptoms been present): all week    Have you been treated for this before? Yes    Additional comments: none    Phone Number patient can be reached at:  Home number on file [phone] (home)    Best Time:  anytime    Can we leave a detailed message on this number:  YES    Call taken on 7/21/2017 at 3:13 PM by Annamarie Dias

## 2017-07-21 NOTE — TELEPHONE ENCOUNTER
Patient reports that that her leg pain is worse since Wednesday. She has decreased her vit b12 to 1000 mcg daily starting last Monday. She thinks that this may have something to do with her increase in pain. How do you advise?  Yeimy Mcnamara RN

## 2017-08-12 ENCOUNTER — HEALTH MAINTENANCE LETTER (OUTPATIENT)
Age: 52
End: 2017-08-12

## 2017-10-30 ENCOUNTER — TELEPHONE (OUTPATIENT)
Dept: FAMILY MEDICINE | Facility: CLINIC | Age: 52
End: 2017-10-30

## 2017-10-30 DIAGNOSIS — L70.0 ACNE VULGARIS: ICD-10-CM

## 2017-10-30 RX ORDER — ADAPALENE 0.1 G/100G
CREAM TOPICAL
Qty: 45 G | Refills: 1 | Status: SHIPPED | OUTPATIENT
Start: 2017-10-30 | End: 2018-02-23

## 2017-10-30 NOTE — TELEPHONE ENCOUNTER
Happy to hear she is moving forward. The b12 is good. I send in the adapalene. Alana Carbajal M.D.;

## 2017-10-30 NOTE — TELEPHONE ENCOUNTER
Pt left msg requesting to speak with nurse regarding medication and pt states she has an update. No additional info given.    Chantell Warner, [place]

## 2017-10-30 NOTE — TELEPHONE ENCOUNTER
"1. FYI to Dr. Carbajal:  \"I wanted to let Dr. Carbajal know that I have made baby step improvement since I saw her 2017. I can still really do nothing. I try to do the 90 to 90 position thing twice a day and walk twice a day. I could not even walk in January but now I can.  I am trying hard every day. I continue to take 1000 mcg twice a day. It hurt worse when I took it only once a day. I think the vitamin b 12 has helped. Right leg pain is still there but it is better since taking the vitamin B12.   2. Amira is requesting refill of adapolene for \"my acne; I only use it very sparingly. She said that she only uses it  occasionally because I sometimes get acne on my face. I have only used one tube since it was ordered about 2 years ago. I picked up a 2nd tube but it has .\" How do you advise adapolene refill request?   rFank Verduzco RN    "

## 2017-10-31 ENCOUNTER — TELEPHONE (OUTPATIENT)
Dept: FAMILY MEDICINE | Facility: CLINIC | Age: 52
End: 2017-10-31

## 2017-10-31 NOTE — TELEPHONE ENCOUNTER
Message left on patient's vm with all of Dr. Carbajal's message as noted below.  Frank Verduzco RN

## 2018-02-23 ENCOUNTER — TELEPHONE (OUTPATIENT)
Dept: FAMILY MEDICINE | Facility: CLINIC | Age: 53
End: 2018-02-23

## 2018-02-23 DIAGNOSIS — L70.0 ACNE VULGARIS: ICD-10-CM

## 2018-02-23 NOTE — TELEPHONE ENCOUNTER
Routing refill request to provider for review/approval because:  Patient needs to be seen because it has been more than 1 year since last office visit.    Yady Wilkins RN

## 2018-03-02 NOTE — TELEPHONE ENCOUNTER
Pt calling frustrated that she hasn't heard from anyone. Would like acne med refilled to [facility].    Chantell Warner, [place]

## 2018-03-05 ENCOUNTER — TELEPHONE (OUTPATIENT)
Dept: FAMILY MEDICINE | Facility: CLINIC | Age: 53
End: 2018-03-05

## 2018-03-05 RX ORDER — ADAPALENE 0.1 G/100G
CREAM TOPICAL
Qty: 45 G | Refills: 0 | Status: SHIPPED | OUTPATIENT
Start: 2018-03-05 | End: 2022-02-06

## 2018-03-05 NOTE — TELEPHONE ENCOUNTER
Patient needed a refill. See refill encounter. Patient spent 50+ minutes talking about her back, leg pain, and personal issues. Explained that she needs to come in for an office visit as it has been well over one year since being seen.  Yeimy Mcnamara RN

## 2018-03-05 NOTE — TELEPHONE ENCOUNTER
Reason for Call:  Other call back    Detailed comments: Amira LEFT MESSAGE:  Please call back.  No reason left on message, just to call her back.  Please call and assess.  Thank you..Annamarie Dias    Phone Number Patient can be reached at: Home number on file [phone] (home)    Call taken on 3/5/2018 at 8:01 AM by Annamarie Dias

## 2018-05-18 ENCOUNTER — TELEPHONE (OUTPATIENT)
Dept: FAMILY MEDICINE | Facility: CLINIC | Age: 53
End: 2018-05-18

## 2018-05-18 NOTE — TELEPHONE ENCOUNTER
Patient has been taking 2 advil in the evenings for the past few months for leg pain. She is wondering if she is taking too much. She does not want to be constipated. Advised she could try to switch it up with aleeve and could alternate. Will route to Dr. Carbajal for FYI.  Yeimy Mcnamara RN

## 2018-05-18 NOTE — TELEPHONE ENCOUNTER
Reason for call:  Patient reporting a symptom    Symptom or request:   Amira has a few questions about medication she is taking.  Please call at your convenience.  Thank you..Annamarie Dias    Duration (how long have symptoms been present): not known    Have you been treated for this before? Not known    Phone Number patient can be reached at:  Home number on file [phone] (home)    Best Time:  Any time    Can we leave a detailed message on this number:  YES    Call taken on 5/18/2018 at 2:41 PM by Annamarie Dias

## 2018-07-20 ENCOUNTER — TELEPHONE (OUTPATIENT)
Dept: FAMILY MEDICINE | Facility: CLINIC | Age: 53
End: 2018-07-20

## 2018-07-20 DIAGNOSIS — L70.0 ACNE VULGARIS: ICD-10-CM

## 2018-07-20 RX ORDER — ADAPALENE 0.1 G/100G
CREAM TOPICAL
Qty: 45 G | Refills: 0 | Status: CANCELLED | OUTPATIENT
Start: 2018-07-20

## 2018-07-20 NOTE — TELEPHONE ENCOUNTER
Reason for Call:  Other call back    Detailed comments: Amira LEFT MESSAGE:  She would like to speak to RN and give an update from last conservation as how she is doing.  Please call and assess. Thank you..Annamarie Dias    Phone Number Patient can be reached at: Home number on file [phone] (home)    Best Time: any time    Can we leave a detailed message on this number? YES    Call taken on 7/20/2018 at 1:06 PM by Annamarie Dias       Patient

## 2018-07-20 NOTE — TELEPHONE ENCOUNTER
Since she has such a hard time getting in have her try the otc differin. This may work as well for her and then she will not need to involve the office at all. Alana Carbajal M.D.;

## 2018-07-20 NOTE — TELEPHONE ENCOUNTER
Patient calls wondering if she can get her Differin cream refilled as she cannot get to the clinic to be seen due to her horrible back and leg pain that she suffers from. She cannot leave her house and [facility] delivers her meds to her. Or if you will not refill if the Differin OTC gel would be ok to use? She prefers the cream but understands if you will not fill because it has been over a year since seen in clinic. She also wanted Dr. Carbajal to know that her back has kind of turned a corner and her legs are getting better to the point she can stand better now. Thank you!    Alayna Quinn RN

## 2021-05-28 ENCOUNTER — RECORDS - HEALTHEAST (OUTPATIENT)
Dept: ADMINISTRATIVE | Facility: CLINIC | Age: 56
End: 2021-05-28

## 2021-05-29 ENCOUNTER — RECORDS - HEALTHEAST (OUTPATIENT)
Dept: ADMINISTRATIVE | Facility: CLINIC | Age: 56
End: 2021-05-29

## 2021-07-21 ENCOUNTER — RECORDS - HEALTHEAST (OUTPATIENT)
Dept: ADMINISTRATIVE | Facility: CLINIC | Age: 56
End: 2021-07-21

## 2022-02-06 ENCOUNTER — APPOINTMENT (OUTPATIENT)
Dept: GENERAL RADIOLOGY | Facility: CLINIC | Age: 57
DRG: 956 | End: 2022-02-06
Attending: EMERGENCY MEDICINE
Payer: COMMERCIAL

## 2022-02-06 ENCOUNTER — HOSPITAL ENCOUNTER (INPATIENT)
Facility: CLINIC | Age: 57
LOS: 7 days | Discharge: HOME HEALTH | DRG: 956 | End: 2022-02-13
Attending: EMERGENCY MEDICINE | Admitting: ORTHOPAEDIC SURGERY
Payer: COMMERCIAL

## 2022-02-06 DIAGNOSIS — S72.091A OTH FRACTURE OF HEAD AND NECK OF RIGHT FEMUR, INIT (H): ICD-10-CM

## 2022-02-06 DIAGNOSIS — R20.2 PARESTHESIAS: Primary | ICD-10-CM

## 2022-02-06 DIAGNOSIS — S72.001A CLOSED FRACTURE OF RIGHT HIP, INITIAL ENCOUNTER (H): ICD-10-CM

## 2022-02-06 DIAGNOSIS — T79.6XXA: ICD-10-CM

## 2022-02-06 DIAGNOSIS — Z11.52 ENCOUNTER FOR SCREENING LABORATORY TESTING FOR SEVERE ACUTE RESPIRATORY SYNDROME CORONAVIRUS 2 (SARS-COV-2): ICD-10-CM
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
## 2022-02-06 DIAGNOSIS — T79.6XXA TRAUMATIC RHABDOMYOLYSIS, INITIAL ENCOUNTER (H): ICD-10-CM

## 2022-02-06 LAB
ALBUMIN SERPL-MCNC: 3.6 G/DL (ref 3.4–5)
ALBUMIN UR-MCNC: 100 MG/DL
ALP SERPL-CCNC: 78 U/L (ref 40–150)
ALT SERPL W P-5'-P-CCNC: 98 U/L (ref 0–50)
ANION GAP SERPL CALCULATED.3IONS-SCNC: 5 MMOL/L (ref 3–14)
APPEARANCE UR: ABNORMAL
AST SERPL W P-5'-P-CCNC: 282 U/L (ref 0–45)
BACTERIA #/AREA URNS HPF: ABNORMAL /HPF
BASOPHILS # BLD AUTO: 0 10E3/UL (ref 0–0.2)
BASOPHILS NFR BLD AUTO: 0 %
BILIRUB SERPL-MCNC: 1.5 MG/DL (ref 0.2–1.3)
BILIRUB UR QL STRIP: NEGATIVE
BUN SERPL-MCNC: 18 MG/DL (ref 7–30)
CALCIUM SERPL-MCNC: 9.4 MG/DL (ref 8.5–10.1)
CHLORIDE BLD-SCNC: 104 MMOL/L (ref 94–109)
CK SERPL-CCNC: 5966 U/L (ref 30–225)
CK SERPL-CCNC: 6443 U/L (ref 30–225)
CO2 SERPL-SCNC: 29 MMOL/L (ref 20–32)
COLOR UR AUTO: ABNORMAL
CREAT SERPL-MCNC: 0.52 MG/DL (ref 0.52–1.04)
EOSINOPHIL # BLD AUTO: 0 10E3/UL (ref 0–0.7)
EOSINOPHIL NFR BLD AUTO: 0 %
ERYTHROCYTE [DISTWIDTH] IN BLOOD BY AUTOMATED COUNT: 12.1 % (ref 10–15)
ETHANOL SERPL-MCNC: <0.01 G/DL
GFR SERPL CREATININE-BSD FRML MDRD: >90 ML/MIN/1.73M2
GLUCOSE BLD-MCNC: 161 MG/DL (ref 70–99)
GLUCOSE UR STRIP-MCNC: NEGATIVE MG/DL
HCT VFR BLD AUTO: 41.5 % (ref 35–47)
HGB BLD-MCNC: 13.9 G/DL (ref 11.7–15.7)
HGB UR QL STRIP: ABNORMAL
HOLD SPECIMEN: NORMAL
HYALINE CASTS: 33 /LPF
IMM GRANULOCYTES # BLD: 0.1 10E3/UL
IMM GRANULOCYTES NFR BLD: 1 %
KETONES UR STRIP-MCNC: 160 MG/DL
LACTATE SERPL-SCNC: 2 MMOL/L (ref 0.7–2)
LEUKOCYTE ESTERASE UR QL STRIP: NEGATIVE
LIPASE SERPL-CCNC: 42 U/L (ref 73–393)
LYMPHOCYTES # BLD AUTO: 0.6 10E3/UL (ref 0.8–5.3)
LYMPHOCYTES NFR BLD AUTO: 5 %
MCH RBC QN AUTO: 34.7 PG (ref 26.5–33)
MCHC RBC AUTO-ENTMCNC: 33.5 G/DL (ref 31.5–36.5)
MCV RBC AUTO: 104 FL (ref 78–100)
MONOCYTES # BLD AUTO: 1.7 10E3/UL (ref 0–1.3)
MONOCYTES NFR BLD AUTO: 13 %
MUCOUS THREADS #/AREA URNS LPF: PRESENT /LPF
NEUTROPHILS # BLD AUTO: 10.1 10E3/UL (ref 1.6–8.3)
NEUTROPHILS NFR BLD AUTO: 81 %
NITRATE UR QL: NEGATIVE
NRBC # BLD AUTO: 0 10E3/UL
NRBC BLD AUTO-RTO: 0 /100
PH UR STRIP: 6 [PH] (ref 5–7)
PLATELET # BLD AUTO: 216 10E3/UL (ref 150–450)
POTASSIUM BLD-SCNC: 2.9 MMOL/L (ref 3.4–5.3)
POTASSIUM BLD-SCNC: 3 MMOL/L (ref 3.4–5.3)
PROT SERPL-MCNC: 7.7 G/DL (ref 6.8–8.8)
RBC # BLD AUTO: 4.01 10E6/UL (ref 3.8–5.2)
RBC URINE: 6 /HPF
SARS-COV-2 RNA RESP QL NAA+PROBE: NEGATIVE
SODIUM SERPL-SCNC: 138 MMOL/L (ref 133–144)
SP GR UR STRIP: 1.03 (ref 1–1.03)
SQUAMOUS EPITHELIAL: 1 /HPF
UROBILINOGEN UR STRIP-MCNC: NORMAL MG/DL
WBC # BLD AUTO: 12.4 10E3/UL (ref 4–11)
WBC URINE: 5 /HPF

## 2022-02-06 PROCEDURE — 73030 X-RAY EXAM OF SHOULDER: CPT | Mod: RT

## 2022-02-06 PROCEDURE — 99285 EMERGENCY DEPT VISIT HI MDM: CPT | Performed by: EMERGENCY MEDICINE

## 2022-02-06 PROCEDURE — 99222 1ST HOSP IP/OBS MODERATE 55: CPT | Mod: AI | Performed by: FAMILY MEDICINE

## 2022-02-06 PROCEDURE — 96376 TX/PRO/DX INJ SAME DRUG ADON: CPT | Performed by: EMERGENCY MEDICINE

## 2022-02-06 PROCEDURE — 82550 ASSAY OF CK (CPK): CPT | Performed by: FAMILY MEDICINE

## 2022-02-06 PROCEDURE — 258N000003 HC RX IP 258 OP 636: Performed by: EMERGENCY MEDICINE

## 2022-02-06 PROCEDURE — 250N000013 HC RX MED GY IP 250 OP 250 PS 637: Performed by: FAMILY MEDICINE

## 2022-02-06 PROCEDURE — 36415 COLL VENOUS BLD VENIPUNCTURE: CPT | Performed by: EMERGENCY MEDICINE

## 2022-02-06 PROCEDURE — 83605 ASSAY OF LACTIC ACID: CPT | Performed by: EMERGENCY MEDICINE

## 2022-02-06 PROCEDURE — 96375 TX/PRO/DX INJ NEW DRUG ADDON: CPT | Performed by: EMERGENCY MEDICINE

## 2022-02-06 PROCEDURE — 250N000011 HC RX IP 250 OP 636: Performed by: FAMILY MEDICINE

## 2022-02-06 PROCEDURE — 82550 ASSAY OF CK (CPK): CPT | Performed by: EMERGENCY MEDICINE

## 2022-02-06 PROCEDURE — C9803 HOPD COVID-19 SPEC COLLECT: HCPCS | Performed by: EMERGENCY MEDICINE

## 2022-02-06 PROCEDURE — 80053 COMPREHEN METABOLIC PANEL: CPT | Performed by: EMERGENCY MEDICINE

## 2022-02-06 PROCEDURE — 96365 THER/PROPH/DIAG IV INF INIT: CPT | Performed by: EMERGENCY MEDICINE

## 2022-02-06 PROCEDURE — 85025 COMPLETE CBC W/AUTO DIFF WBC: CPT | Performed by: EMERGENCY MEDICINE

## 2022-02-06 PROCEDURE — 36415 COLL VENOUS BLD VENIPUNCTURE: CPT | Performed by: FAMILY MEDICINE

## 2022-02-06 PROCEDURE — 250N000011 HC RX IP 250 OP 636: Performed by: EMERGENCY MEDICINE

## 2022-02-06 PROCEDURE — 84132 ASSAY OF SERUM POTASSIUM: CPT | Performed by: FAMILY MEDICINE

## 2022-02-06 PROCEDURE — 72100 X-RAY EXAM L-S SPINE 2/3 VWS: CPT

## 2022-02-06 PROCEDURE — 51702 INSERT TEMP BLADDER CATH: CPT | Performed by: EMERGENCY MEDICINE

## 2022-02-06 PROCEDURE — 82077 ASSAY SPEC XCP UR&BREATH IA: CPT | Performed by: FAMILY MEDICINE

## 2022-02-06 PROCEDURE — 96361 HYDRATE IV INFUSION ADD-ON: CPT | Performed by: EMERGENCY MEDICINE

## 2022-02-06 PROCEDURE — 81001 URINALYSIS AUTO W/SCOPE: CPT | Performed by: EMERGENCY MEDICINE

## 2022-02-06 PROCEDURE — 96366 THER/PROPH/DIAG IV INF ADDON: CPT | Performed by: EMERGENCY MEDICINE

## 2022-02-06 PROCEDURE — 87635 SARS-COV-2 COVID-19 AMP PRB: CPT | Performed by: EMERGENCY MEDICINE

## 2022-02-06 PROCEDURE — 99285 EMERGENCY DEPT VISIT HI MDM: CPT | Mod: 25 | Performed by: EMERGENCY MEDICINE

## 2022-02-06 PROCEDURE — 120N000001 HC R&B MED SURG/OB

## 2022-02-06 PROCEDURE — 73502 X-RAY EXAM HIP UNI 2-3 VIEWS: CPT

## 2022-02-06 PROCEDURE — 83690 ASSAY OF LIPASE: CPT | Performed by: EMERGENCY MEDICINE

## 2022-02-06 PROCEDURE — HZ2ZZZZ DETOXIFICATION SERVICES FOR SUBSTANCE ABUSE TREATMENT: ICD-10-PCS | Performed by: FAMILY MEDICINE

## 2022-02-06 RX ORDER — FENTANYL CITRATE 50 UG/ML
100 INJECTION, SOLUTION INTRAMUSCULAR; INTRAVENOUS ONCE
Status: DISCONTINUED | OUTPATIENT
Start: 2022-02-06 | End: 2022-02-06

## 2022-02-06 RX ORDER — POTASSIUM CHLORIDE 7.45 MG/ML
10 INJECTION INTRAVENOUS ONCE
Status: COMPLETED | OUTPATIENT
Start: 2022-02-06 | End: 2022-02-06

## 2022-02-06 RX ORDER — POTASSIUM CHLORIDE 1.5 G/1.58G
40 POWDER, FOR SOLUTION ORAL ONCE
Status: COMPLETED | OUTPATIENT
Start: 2022-02-06 | End: 2022-02-06

## 2022-02-06 RX ORDER — LORAZEPAM 1 MG/1
1-2 TABLET ORAL EVERY 30 MIN PRN
Status: DISCONTINUED | OUTPATIENT
Start: 2022-02-06 | End: 2022-02-07

## 2022-02-06 RX ORDER — POLYETHYLENE GLYCOL 3350 17 G/17G
17 POWDER, FOR SOLUTION ORAL DAILY PRN
Status: DISCONTINUED | OUTPATIENT
Start: 2022-02-06 | End: 2022-02-13 | Stop reason: HOSPADM

## 2022-02-06 RX ORDER — NALOXONE HYDROCHLORIDE 0.4 MG/ML
0.4 INJECTION, SOLUTION INTRAMUSCULAR; INTRAVENOUS; SUBCUTANEOUS
Status: DISCONTINUED | OUTPATIENT
Start: 2022-02-06 | End: 2022-02-13 | Stop reason: HOSPADM

## 2022-02-06 RX ORDER — HYDROMORPHONE HCL IN WATER/PF 6 MG/30 ML
0.3 PATIENT CONTROLLED ANALGESIA SYRINGE INTRAVENOUS ONCE
Status: COMPLETED | OUTPATIENT
Start: 2022-02-06 | End: 2022-02-06

## 2022-02-06 RX ORDER — AMOXICILLIN 250 MG
1 CAPSULE ORAL 2 TIMES DAILY PRN
Status: DISCONTINUED | OUTPATIENT
Start: 2022-02-06 | End: 2022-02-13 | Stop reason: HOSPADM

## 2022-02-06 RX ORDER — LORAZEPAM 2 MG/ML
1-2 INJECTION INTRAMUSCULAR EVERY 30 MIN PRN
Status: DISCONTINUED | OUTPATIENT
Start: 2022-02-06 | End: 2022-02-07

## 2022-02-06 RX ORDER — NALOXONE HYDROCHLORIDE 0.4 MG/ML
0.2 INJECTION, SOLUTION INTRAMUSCULAR; INTRAVENOUS; SUBCUTANEOUS
Status: DISCONTINUED | OUTPATIENT
Start: 2022-02-06 | End: 2022-02-13 | Stop reason: HOSPADM

## 2022-02-06 RX ORDER — MULTIPLE VITAMINS W/ MINERALS TAB 9MG-400MCG
1 TAB ORAL DAILY
Status: DISCONTINUED | OUTPATIENT
Start: 2022-02-07 | End: 2022-02-11

## 2022-02-06 RX ORDER — ONDANSETRON 4 MG/1
4 TABLET, ORALLY DISINTEGRATING ORAL EVERY 6 HOURS PRN
Status: DISCONTINUED | OUTPATIENT
Start: 2022-02-06 | End: 2022-02-13

## 2022-02-06 RX ORDER — FENTANYL CITRATE 50 UG/ML
100 INJECTION, SOLUTION INTRAMUSCULAR; INTRAVENOUS ONCE
Status: COMPLETED | OUTPATIENT
Start: 2022-02-06 | End: 2022-02-06

## 2022-02-06 RX ORDER — SODIUM CHLORIDE 9 MG/ML
1000 INJECTION, SOLUTION INTRAVENOUS CONTINUOUS
Status: DISCONTINUED | OUTPATIENT
Start: 2022-02-06 | End: 2022-02-06

## 2022-02-06 RX ORDER — ONDANSETRON 2 MG/ML
4 INJECTION INTRAMUSCULAR; INTRAVENOUS EVERY 6 HOURS PRN
Status: DISCONTINUED | OUTPATIENT
Start: 2022-02-06 | End: 2022-02-13

## 2022-02-06 RX ORDER — FOLIC ACID 1 MG/1
1 TABLET ORAL DAILY
Status: DISCONTINUED | OUTPATIENT
Start: 2022-02-07 | End: 2022-02-11

## 2022-02-06 RX ORDER — IBUPROFEN 600 MG/1
600 TABLET, FILM COATED ORAL EVERY 6 HOURS PRN
Status: DISCONTINUED | OUTPATIENT
Start: 2022-02-06 | End: 2022-02-13 | Stop reason: HOSPADM

## 2022-02-06 RX ORDER — BISACODYL 10 MG
10 SUPPOSITORY, RECTAL RECTAL DAILY PRN
Status: DISCONTINUED | OUTPATIENT
Start: 2022-02-06 | End: 2022-02-13

## 2022-02-06 RX ORDER — SODIUM CHLORIDE 9 MG/ML
1000 INJECTION, SOLUTION INTRAVENOUS CONTINUOUS
Status: DISCONTINUED | OUTPATIENT
Start: 2022-02-06 | End: 2022-02-08

## 2022-02-06 RX ORDER — ACETAMINOPHEN 325 MG/1
650 TABLET ORAL EVERY 4 HOURS PRN
Status: DISCONTINUED | OUTPATIENT
Start: 2022-02-06 | End: 2022-02-06

## 2022-02-06 RX ORDER — FLUMAZENIL 0.1 MG/ML
0.2 INJECTION, SOLUTION INTRAVENOUS
Status: DISCONTINUED | OUTPATIENT
Start: 2022-02-06 | End: 2022-02-12

## 2022-02-06 RX ORDER — HYDROMORPHONE HCL IN WATER/PF 6 MG/30 ML
.3-.5 PATIENT CONTROLLED ANALGESIA SYRINGE INTRAVENOUS
Status: DISCONTINUED | OUTPATIENT
Start: 2022-02-06 | End: 2022-02-09

## 2022-02-06 RX ORDER — KETOROLAC TROMETHAMINE 15 MG/ML
15 INJECTION, SOLUTION INTRAMUSCULAR; INTRAVENOUS ONCE
Status: COMPLETED | OUTPATIENT
Start: 2022-02-06 | End: 2022-02-06

## 2022-02-06 RX ADMIN — KETOROLAC TROMETHAMINE 15 MG: 15 INJECTION, SOLUTION INTRAMUSCULAR; INTRAVENOUS at 09:56

## 2022-02-06 RX ADMIN — HYDROMORPHONE HYDROCHLORIDE 0.5 MG: 0.2 INJECTION, SOLUTION INTRAMUSCULAR; INTRAVENOUS; SUBCUTANEOUS at 21:01

## 2022-02-06 RX ADMIN — SODIUM CHLORIDE 500 ML: 9 INJECTION, SOLUTION INTRAVENOUS at 16:06

## 2022-02-06 RX ADMIN — POTASSIUM CHLORIDE 40 MEQ: 1.5 POWDER, FOR SOLUTION ORAL at 21:00

## 2022-02-06 RX ADMIN — SODIUM CHLORIDE 1000 ML: 9 INJECTION, SOLUTION INTRAVENOUS at 17:52

## 2022-02-06 RX ADMIN — HYDROMORPHONE HYDROCHLORIDE 0.5 MG: 0.2 INJECTION, SOLUTION INTRAMUSCULAR; INTRAVENOUS; SUBCUTANEOUS at 16:07

## 2022-02-06 RX ADMIN — SODIUM CHLORIDE 500 ML: 9 INJECTION, SOLUTION INTRAVENOUS at 09:05

## 2022-02-06 RX ADMIN — FENTANYL CITRATE 100 MCG: 50 INJECTION, SOLUTION INTRAMUSCULAR; INTRAVENOUS at 12:08

## 2022-02-06 RX ADMIN — POTASSIUM CHLORIDE 10 MEQ: 7.46 INJECTION, SOLUTION INTRAVENOUS at 09:59

## 2022-02-06 RX ADMIN — HYDROMORPHONE HYDROCHLORIDE 0.3 MG: 0.2 INJECTION, SOLUTION INTRAMUSCULAR; INTRAVENOUS; SUBCUTANEOUS at 09:03

## 2022-02-06 RX ADMIN — HYDROMORPHONE HYDROCHLORIDE 0.3 MG: 0.2 INJECTION, SOLUTION INTRAMUSCULAR; INTRAVENOUS; SUBCUTANEOUS at 09:35

## 2022-02-06 RX ADMIN — SODIUM CHLORIDE 1000 ML: 9 INJECTION, SOLUTION INTRAVENOUS at 16:39

## 2022-02-06 ASSESSMENT — ACTIVITIES OF DAILY LIVING (ADL)
WEAR_GLASSES_OR_BLIND: NO
DRESSING/BATHING_DIFFICULTY: YES
DOING_ERRANDS_INDEPENDENTLY_DIFFICULTY: YES
ADLS_ACUITY_SCORE: 9
HEARING_DIFFICULTY_OR_DEAF: NO
WALKING_OR_CLIMBING_STAIRS_DIFFICULTY: YES
FALL_HISTORY_WITHIN_LAST_SIX_MONTHS: NO
ADLS_ACUITY_SCORE: 9
DIFFICULTY_EATING/SWALLOWING: NO
TOILETING_ISSUES: YES
ADLS_ACUITY_SCORE: 9
ADLS_ACUITY_SCORE: 9
DIFFICULTY_COMMUNICATING: NO
CONCENTRATING,_REMEMBERING_OR_MAKING_DECISIONS_DIFFICULTY: NO
ADLS_ACUITY_SCORE: 9

## 2022-02-06 ASSESSMENT — MIFFLIN-ST. JEOR: SCORE: 1098.75

## 2022-02-06 NOTE — H&P
[facility] History and Physical    Amira Scherer MRN# [id number]   Age: 57 year old YOB: 1965     Date of Admission:  2/6/2022      Primary care provider: Alana Carbajal          Assessment and Plan:   Assessment & Plan         Closed fracture of right hip, initial encounter (H)  Unusual history, with no reported fall and then of being down for a couple of days but being clean when EMS arrived.  However, does have elevated CK as below which fits.  Regardless, has what appears to be an acute fracture with an unknown mechanism.  Patient has dilaudid and ibuprofen prn for pain.     NPO at midnight.  Patient anxious about constipation with pain medications, prn constipation medications ordered.    Chronic back/leg pain  Patient reports a history of chronic back and leg pain, which she thinks has been the hip fracture, but has been present since 2015.  No acute change in this besides the hip pain.  Patient to follow-up with primary care provider prn once recovered from her hip fracture.        Traumatic rhabdomyolysis, initial encounter (H)  This is consistent with her story of being down for a couple of days.  Given bolus in ER, CK improving, creatinine normal, follow.       Hypokalemia  Suspect due to poor intake, perhaps due to being down as above   Started on replacement protocol on admission.      ? Alcohol  Use/abuse  Patient adamantly reports drinking 1.5 glasses of wine daily, never more.  However, hepatic panel looks like injury due to alcohol and patient is quite defensive and focused on this.   Started on CIWAs, although if patient report is correct of having been down since Fri can likely stop this in 1-2 days if doing well.      Abnormal liver enzymes/bili  Pattern fits with alcohol abuse -see above.  Following, if remains up would recommend cutting back on alcohol even if only drinking as reported.  Consider US, but patient anxious and does not want this today.      Surgery  Clearance and RCRI Risk Assessment:   Anesthesia issues: no  Baseline Activity: 4  METS (1 self-care, 4 flight of stairs, >4 heavy house work)  Chest Pain: no  Shortness of breath: no  Cardiac Risk Factors/Assessment:                High Risk Surgery: no (Ex: vascular, open intraperitoneal, intrathoracic)              History Ischemic Heart Disease: no (MI, +stress, nitrate use, current CP thought to be ischemia, ECG with pathological Qs)              History of Congestive Heart Failure: no              History of CVA: no              Preoperative Treatment with Insulin: no              Preoperative Creatinine greater than 2.0: no              Total Number of Points: 0 = 0.5% risk of major cardiac event  0 = 0.5; 1 = 1.3%; 2 = 3.6%; 3+ = 9.1%.    - EKG ordered and pending     No modifiable risks for likely surgery tomorrow         Anxiety  Patient is quite anxious, not on any medications.        S/P colon resection  For prolapse.  At baseline.       Asymptomatic COVID swab negative   Does not appear vaccinated, not yet addressed     Prophylaxis  None for now- post-op per ortho    Lines  PIV    Diet  Orders Placed This Encounter      NPO per Anesthesia Guidelines for Procedure/Surgery Except for: Meds      Code Status  Full         Disposition  Admit to inpatient, anticipate at least 3 days, likely will need TCU on discharge.                Chief Complaint:   Hip pain - right   History is obtained from the patient          History of Present Illness:   This patient is a 57 year old  female with a significant past medical history of anxiety, colon resection for prolapse and long-standing leg/back pain who presents with right sided leg/hip pain.  She's a rather challenging historian, being fairly tangential and hard to pin down.  She says she's had chronic pain in her legs and back since 2015 and initially said this was just the same, but then on further questioning said her developed pain in her right hip 2  days ago that was severe enough that she had to ease herself to the ground and has not been able to get around since then.  She has been lying on the floor and today was finally able to reach her house phone and to call EMS.  She says currently she feels somewhat achy all over, but the hip pain isn't too bad as long as she doesn't move it.    No other focal areas of pain including no chest pain, dyspnea or other extremity pain.  Just the right hip and her long-standing back pain.  Which by report appears baseline although again hard to pin down.    No numbness no weakness.      Not on any medications prior to admission per her report.    Says she does drink alcohol weekly and is a wine aficionado - but says she always drinks 1 glass with dinner and then 1/2 glass later at night.  No history of withdrawal but doesn't miss any days.    Patient rather adamantly and defensively says she is not an alcoholic well before any concern was expressed about her drinking.    Denies any illicit drug use.      Good exercise tolerance beyond her pain.  No changes in this.  No chest pain.  No other changes.  No trouble with anesthesia.     Overall her story is unusual, she was apparently clean despite report of being down for > 2 days.    Was discussed with Dr. Mariee by ER provider who says [facility] will see her tomorrow with plan for likely surgery tomorrow.                  Past Medical History:   I have reviewed this patient's past medical history.  Patient Active Problem List    Diagnosis Date Noted     Closed right hip fracture (H) 02/06/2022     Priority: Medium     Closed fracture of right hip, initial encounter (H) 02/06/2022     Priority: Medium     Traumatic rhabdomyolysis, initial encounter (H) 02/06/2022     Priority: Medium     Other plastic surgery for unacceptable cosmetic appearance 04/07/2015     Priority: Medium     CARDIOVASCULAR SCREENING; LDL GOAL LESS THAN 160 01/22/2014     Priority: Medium     S/P colon  "resection 2013     Priority: Medium     Had colon prolapse - had at least partial removal of her colon done at that time.         Liver lesion 2013     Priority: Medium     Anxiety 2012     Priority: Medium     Paresthesias 2012     Priority: Medium     Acne vulgaris 2012     Priority: Medium     Health Care Home 2013     Priority: Low     *See Letters for [facility] Care Plan: My Access Plan                      Past Surgical History:   I have reviewed this patient's past surgical history   Past Surgical History:   Procedure Laterality Date     COLECTOMY WITHOUT COLOSTOMY       COLON SURGERY   and     Colon removed              Social History:   I have reviewed this patient's social history   Social History     Tobacco Use     Smoking status: Current Some Day Smoker     Packs/day: 0.50     Years: 20.00     Pack years: 10.00     Types: Cigarettes     Smokeless tobacco: Never Used   Substance Use Topics     Alcohol use: Yes     Comment: Red wine 2-3 times per week             Family History:   I have reviewed this patient's family history   Family History   Problem Relation Age of Onset     Hypertension Mother      Arthritis Mother      Breast Cancer Sister      Thyroid Disease Sister              Immunizations:   Immunizations are current          Allergies:   No Known Allergies          Medications:     No medications prior to admission.             Review of Systems:    ROS: 10 point ROS neg other than the symptoms noted above in the HPI.           Physical Exam:   Blood pressure 101/63, pulse 67, temperature 98.9  F (37.2  C), temperature source Oral, resp. rate 16, height 1.676 m (5' 6\"), weight 50.2 kg (110 lb 10.7 oz), SpO2 98 %.  Temperatures:  Current - Temp: 98.1  F (36.7  C); Max - Temp  Av.1  F (36.7  C)  Min: 98.1  F (36.7  C)  Max: 98.1  F (36.7  C)  Respiration range: No data recorded  Pulse range: Pulse  Av.3  Min: 63  Max: 84  Blood pressure range: " Systolic (24hrs), Av , Min:109 , Max:154   ; Diastolic (24hrs), Av, Min:71, Max:117    Pulse oximetry range: SpO2  Av.9 %  Min: 94 %  Max: 99 %    Intake/Output Summary (Last 24 hours) at 2022 1542  Last data filed at 2022 1219  Gross per 24 hour   Intake 600 ml   Output --   Net 600 ml     EXAM:  General: awake and alert, NAD, oriented x 3  Head: normocephalic  Neck: unremarkable, no lymphadenopathy   HEENT: oropharynx pink and moist    Heart: Regular rate and rhythm, no murmurs, rubs, or gallops  Lungs: clear to auscultation bilaterally with good air movement throughout  Abdomen: soft, non-tender, no masses or organomegaly  Extremities: no edema in lower extremities   Skin unremarkable.             Data:     Results for orders placed or performed during the hospital encounter of 22 (from the past 24 hour(s))   Alcohol ethyl   Result Value Ref Range    Alcohol ethyl <0.01 <=0.01 g/dL   [place] Draw    Narrative    The following orders were created for panel order [place] Draw.  Procedure                               Abnormality         Status                     ---------                               -----------         ------                     Extra Blue Top Tube[[id number]]                              Final result               Extra Red Top Tube[[id number]]                               Final result               Extra Green Top (Lithium...[[id number]]                      Final result               Extra Purple Top Tube[[id number]]                            Final result                 Please view results for these tests on the individual orders.   Extra Blue Top Tube   Result Value Ref Range    Hold Specimen JIC    Extra Red Top Tube   Result Value Ref Range    Hold Specimen JIC    Extra Green Top (Lithium Heparin) Tube   Result Value Ref Range    Hold Specimen JIC    Extra Purple Top Tube   Result Value Ref Range    Hold Specimen JIC    CBC with platelets differential     Narrative    The following orders were created for panel order CBC with platelets differential.  Procedure                               Abnormality         Status                     ---------                               -----------         ------                     CBC with platelets and d...[[id number]]  Abnormal            Final result                 Please view results for these tests on the individual orders.   Comprehensive metabolic panel   Result Value Ref Range    Sodium 138 133 - 144 mmol/L    Potassium 3.0 (L) 3.4 - 5.3 mmol/L    Chloride 104 94 - 109 mmol/L    Carbon Dioxide (CO2) 29 20 - 32 mmol/L    Anion Gap 5 3 - 14 mmol/L    Urea Nitrogen 18 7 - 30 mg/dL    Creatinine 0.52 0.52 - 1.04 mg/dL    Calcium 9.4 8.5 - 10.1 mg/dL    Glucose 161 (H) 70 - 99 mg/dL    Alkaline Phosphatase 78 40 - 150 U/L     (H) 0 - 45 U/L    ALT 98 (H) 0 - 50 U/L    Protein Total 7.7 6.8 - 8.8 g/dL    Albumin 3.6 3.4 - 5.0 g/dL    Bilirubin Total 1.5 (H) 0.2 - 1.3 mg/dL    GFR Estimate >90 >60 mL/min/1.73m2   Lipase   Result Value Ref Range    Lipase 42 (L) 73 - 393 U/L   CK total   Result Value Ref Range    CK 6,443 (HH) 30 - 225 U/L   CBC with platelets and differential   Result Value Ref Range    WBC Count 12.4 (H) 4.0 - 11.0 10e3/uL    RBC Count 4.01 3.80 - 5.20 10e6/uL    Hemoglobin 13.9 11.7 - 15.7 g/dL    Hematocrit 41.5 35.0 - 47.0 %     (H) 78 - 100 fL    MCH 34.7 (H) 26.5 - 33.0 pg    MCHC 33.5 31.5 - 36.5 g/dL    RDW 12.1 10.0 - 15.0 %    Platelet Count 216 150 - 450 10e3/uL    % Neutrophils 81 %    % Lymphocytes 5 %    % Monocytes 13 %    % Eosinophils 0 %    % Basophils 0 %    % Immature Granulocytes 1 %    NRBCs per 100 WBC 0 <1 /100    Absolute Neutrophils 10.1 (H) 1.6 - 8.3 10e3/uL    Absolute Lymphocytes 0.6 (L) 0.8 - 5.3 10e3/uL    Absolute Monocytes 1.7 (H) 0.0 - 1.3 10e3/uL    Absolute Eosinophils 0.0 0.0 - 0.7 10e3/uL    Absolute Basophils 0.0 0.0 - 0.2 10e3/uL    Absolute Immature  Granulocytes 0.1 <=0.4 10e3/uL    Absolute NRBCs 0.0 10e3/uL   Lactic acid whole blood   Result Value Ref Range    Lactic Acid 2.0 0.7 - 2.0 mmol/L   Lumbar spine XR, 2-3 views    Narrative    EXAM: XR LUMBAR SPINE 2-3 VIEWS  LOCATION: [facility]  DATE/TIME: 02/06/2022, 9:48 AM    INDICATION: Lower back pain.  COMPARISON: MRI lumbar spine 10/31/2016.  TECHNIQUE: CR Lumbar Spine.      Impression    IMPRESSION: Five nonrib-bearing lumbar-type vertebral bodies. Mild convex left lumbar curvature. Normal lumbar lordosis. Vertebral body heights and intervertebral disc space heights are preserved. Facets are unremarkable. Visualized osseous pelvis is   unremarkable. Normal bowel gas pattern. Mild aortic atherosclerotic calcifications.     XR Pelvis w Hip Right 1 View    Narrative    EXAM: XR PELVIS AND HIP RIGHT 1 VIEW  LOCATION: [facility]  DATE/TIME: 2/6/2022 9:53 AM    INDICATION: R hip pain  COMPARISON: None.      Impression    IMPRESSION: Acute moderately displaced subcapital fracture of the right femur with anterior superior displacement of the femoral shaft.   Asymptomatic COVID-19 Virus (Coronavirus) by PCR Nasopharyngeal    Specimen: Nasopharyngeal; Swab   Result Value Ref Range    SARS CoV2 PCR Negative Negative    Narrative    Testing was performed using the will  SARS-CoV-2 & Influenza A/B Assay on the will  [employer]  System.  This test should be ordered for the detection of SARS-COV-2 in individuals who meet SARS-CoV-2 clinical and/or epidemiological criteria. Test performance is unknown in asymptomatic patients.  This test is for in vitro diagnostic use under the FDA EUA for laboratories certified under CLIA to perform moderate and/or high complexity testing. This test has not been FDA cleared or approved.  A negative test does not rule out the presence of PCR inhibitors in the specimen or target RNA in concentration below the limit of detection for the  assay. The possibility of a false negative should be considered if the patient's recent exposure or clinical presentation suggests COVID-19.  [facility] are certified under the Clinical Laboratory Improvement Amendments of 1988 (CLIA-88) as qualified to perform moderate and/or high complexity laboratory testing.   XR Shoulder Right Port G/E 2 Views    Narrative    EXAM: XR SHOULDER RIGHT PORT G/E 2 VIEWS  LOCATION: [facility]  DATE/TIME: 2/6/2022 11:22 AM    INDICATION: R shoulder pain  COMPARISON: None.      Impression    IMPRESSION: No fracture. Osteopenia. No degenerative changes.    UA with Microscopic reflex to Culture    Specimen: Urine, Catheter   Result Value Ref Range    Color Urine Dark Yellow (A) Colorless, Straw, Light Yellow, Yellow    Appearance Urine Slightly Cloudy (A) Clear    Glucose Urine Negative Negative mg/dL    Bilirubin Urine Negative Negative    Ketones Urine 160  (A) Negative mg/dL    Specific Gravity Urine 1.030 1.003 - 1.035    Blood Urine Large (A) Negative    pH Urine 6.0 5.0 - 7.0    Protein Albumin Urine 100  (A) Negative mg/dL    Urobilinogen Urine Normal Normal, 2.0 mg/dL    Nitrite Urine Negative Negative    Leukocyte Esterase Urine Negative Negative    Bacteria Urine Moderate (A) None Seen /HPF    Mucus Urine Present (A) None Seen /LPF    RBC Urine 6 (H) <=2 /HPF    WBC Urine 5 <=5 /HPF    Squamous Epithelials Urine 1 <=1 /HPF    Hyaline Casts Urine 33 (H) <=2 /LPF    Narrative    Urine Culture not indicated   CK total   Result Value Ref Range    CK 5,966 (HH) 30 - 225 U/L       All imaging studies reviewed by me.    Attestation:  I have reviewed today's vital signs, notes, medications, labs and imaging.  Amount of time performed on this history and physical: 70.        Golden Merritt MD

## 2022-02-06 NOTE — ED NOTES
Assumed care of pt, report received.  Pt with c/o pain and was medicated for comfort.  Pt with many, many, requests and concerns.  Pt informed that we have all the personal supplies that she will need, but not her special requests.  Pt also informed that she will meed Ortho and they will be able to address all her surgical questions and concerns.      Admitting orders released as we wait for bed placement.  Lopes discontinue, and will use Purewick when needed.   IV fluids started per orders.    PLAN:  Continue to monitor, reassesses pain, and wait for bed placement.

## 2022-02-06 NOTE — ED TRIAGE NOTES
"Pt has been having lower back pain since 2015 and has been seen by PT. About 6-8 months after back pain, pt started feeling BLE pain. She states that her legs started swelling and was seen by MD but nothing was done for this.     Today pt's BLE pain primary concern. Pt rates extreme leg pain 10/10, with RLE worse. Pt states that pain is hard to describe. Pt denies falling but states that she lowered herself to the ground because of \"having no strength whatsoever\". She reports having been on the floor since Friday afternoon. Pt denies hitting head. Pt crawled today to get house phone to call EMS.      "

## 2022-02-06 NOTE — ED PROVIDER NOTES
History     Chief Complaint   Patient presents with     Leg Pain     primarily RLE     HPI  Amira Scherer is a 57 year old female with a past medical history significant for colonic prolapse with colonic resection section anxiety paresthesias who presents emergency department complaining of right-sided leg pain.  Patient states she has chronic pain dating back to 2015 and has been dealing with it on her own a lot.  She states on Friday morning around 11:00 occur back and leg began to hurt significantly and she eased herself to the ground and has not been able to get around her house since that time.  She finally was able to get to her house phone this morning and call for help.  She denies any fevers or chills has not had any significant trauma.  She denies any headache visual changes has not had neck pain she denies any chest pain or shortness of breath.  She is not had any abdominal pain or back pain denies any bowel or bladder dysfunction.  She rates her pain an 8 out of 10 with any movements in her right leg pain radiates from her right posterior lower back into the right groin region and the lateral aspect of the leg she has significant pain with any movements denies any focal numbness in extremity she has not had calf swelling or pain denies rash.    Allergies:  No Known Allergies    Problem List:    Patient Active Problem List    Diagnosis Date Noted     Closed right hip fracture (H) 02/06/2022     Priority: Medium     Closed fracture of right hip, initial encounter (H) 02/06/2022     Priority: Medium     Traumatic rhabdomyolysis, initial encounter (H) 02/06/2022     Priority: Medium     Other plastic surgery for unacceptable cosmetic appearance 04/07/2015     Priority: Medium     CARDIOVASCULAR SCREENING; LDL GOAL LESS THAN 160 01/22/2014     Priority: Medium     S/P colon resection 04/25/2013     Priority: Medium     Had colon prolapse - had at least partial removal of her colon done at that time.          Liver lesion 04/25/2013     Priority: Medium     Anxiety 06/29/2012     Priority: Medium     Paresthesias 05/21/2012     Priority: Medium     Acne vulgaris 05/21/2012     Priority: Medium     Health Care Home 04/09/2013     Priority: Low     *See Letters for [facility] Care Plan: My Access Plan                Past Medical History:    No past medical history on file.    Past Surgical History:    Past Surgical History:   Procedure Laterality Date     COLECTOMY WITHOUT COLOSTOMY       COLON SURGERY  1987 and 91    Colon removed        Family History:    Family History   Problem Relation Age of Onset     Hypertension Mother      Arthritis Mother      Breast Cancer Sister      Thyroid Disease Sister        Social History:  Marital Status:   [4]  Social History     Tobacco Use     Smoking status: Current Some Day Smoker     Packs/day: 0.50     Years: 20.00     Pack years: 10.00     Types: Cigarettes     Smokeless tobacco: Never Used   Substance Use Topics     Alcohol use: Yes     Comment: Red wine 2-3 times per week     Drug use: No        Medications:    No current outpatient medications on file.        Review of Systems  All systems reviewed and other than pertinent positives and negatives in HPI all other systems are negative.  Physical Exam   BP: (!) 140/93  Pulse: 84  Temp: 98.1  F (36.7  C)  Weight: 47.6 kg (105 lb) (stated)  SpO2: 97 %      Physical Exam  Vitals and nursing note reviewed.   Constitutional:       Appearance: Normal appearance. She is ill-appearing. She is not diaphoretic.      Comments: Thin frail female mild distress secondary to hip pain.   HENT:      Head: Normocephalic and atraumatic.      Nose: Nose normal.      Mouth/Throat:      Mouth: Mucous membranes are moist.      Pharynx: Oropharynx is clear.   Eyes:      Conjunctiva/sclera: Conjunctivae normal.   Cardiovascular:      Rate and Rhythm: Regular rhythm. Tachycardia present.      Pulses: Normal pulses.      Heart sounds: Normal heart  sounds. No murmur heard.      Pulmonary:      Effort: Pulmonary effort is normal.      Breath sounds: Normal breath sounds. No wheezing or rhonchi.   Abdominal:      General: Abdomen is flat. Bowel sounds are normal. There is no distension.      Palpations: Abdomen is soft.      Tenderness: There is no abdominal tenderness. There is no right CVA tenderness or left CVA tenderness.   Musculoskeletal:      Cervical back: Normal range of motion and neck supple.      Right lower leg: No edema.      Comments: There is no midline back pain.  Tenderness with palpation to the anterior right hip region and lateral and posterior aspects of the right hip.  Patient has pain with any movements of the right leg and prefers to keep the leg bent when I try to straighten she does not want this done.  No distal thigh tenderness knee pain calf pain.  Pulses sensation symmetrical.   Skin:     General: Skin is warm and dry.      Capillary Refill: Capillary refill takes less than 2 seconds.      Findings: No rash.   Neurological:      General: No focal deficit present.      Mental Status: She is alert and oriented to person, place, and time. Mental status is at baseline.      Cranial Nerves: No cranial nerve deficit.      Coordination: Coordination normal.   Psychiatric:         Mood and Affect: Mood normal.         ED Course                 Procedures              Critical Care time:  none               Results for orders placed or performed during the hospital encounter of 02/06/22 (from the past 24 hour(s))   [place] Draw    Narrative    The following orders were created for panel order [place] Draw.  Procedure                               Abnormality         Status                     ---------                               -----------         ------                     Extra Blue Top Tube[[id number]]                              Final result               Extra Red Top Tube[[id number]]                               Final result                Extra Green Top (Lithium...[[id number]]                      Final result               Extra Purple Top Tube[[id number]]                            Final result                 Please view results for these tests on the individual orders.   Extra Blue Top Tube   Result Value Ref Range    Hold Specimen JIC    Extra Red Top Tube   Result Value Ref Range    Hold Specimen JIC    Extra Green Top (Lithium Heparin) Tube   Result Value Ref Range    Hold Specimen JIC    Extra Purple Top Tube   Result Value Ref Range    Hold Specimen JIC    CBC with platelets differential    Narrative    The following orders were created for panel order CBC with platelets differential.  Procedure                               Abnormality         Status                     ---------                               -----------         ------                     CBC with platelets and d...[[id number]]  Abnormal            Final result                 Please view results for these tests on the individual orders.   Comprehensive metabolic panel   Result Value Ref Range    Sodium 138 133 - 144 mmol/L    Potassium 3.0 (L) 3.4 - 5.3 mmol/L    Chloride 104 94 - 109 mmol/L    Carbon Dioxide (CO2) 29 20 - 32 mmol/L    Anion Gap 5 3 - 14 mmol/L    Urea Nitrogen 18 7 - 30 mg/dL    Creatinine 0.52 0.52 - 1.04 mg/dL    Calcium 9.4 8.5 - 10.1 mg/dL    Glucose 161 (H) 70 - 99 mg/dL    Alkaline Phosphatase 78 40 - 150 U/L     (H) 0 - 45 U/L    ALT 98 (H) 0 - 50 U/L    Protein Total 7.7 6.8 - 8.8 g/dL    Albumin 3.6 3.4 - 5.0 g/dL    Bilirubin Total 1.5 (H) 0.2 - 1.3 mg/dL    GFR Estimate >90 >60 mL/min/1.73m2   Lipase   Result Value Ref Range    Lipase 42 (L) 73 - 393 U/L   CK total   Result Value Ref Range    CK 6,443 (HH) 30 - 225 U/L   CBC with platelets and differential   Result Value Ref Range    WBC Count 12.4 (H) 4.0 - 11.0 10e3/uL    RBC Count 4.01 3.80 - 5.20 10e6/uL    Hemoglobin 13.9 11.7 - 15.7 g/dL    Hematocrit 41.5 35.0 - 47.0 %    MCV  104 (H) 78 - 100 fL    MCH 34.7 (H) 26.5 - 33.0 pg    MCHC 33.5 31.5 - 36.5 g/dL    RDW 12.1 10.0 - 15.0 %    Platelet Count 216 150 - 450 10e3/uL    % Neutrophils 81 %    % Lymphocytes 5 %    % Monocytes 13 %    % Eosinophils 0 %    % Basophils 0 %    % Immature Granulocytes 1 %    NRBCs per 100 WBC 0 <1 /100    Absolute Neutrophils 10.1 (H) 1.6 - 8.3 10e3/uL    Absolute Lymphocytes 0.6 (L) 0.8 - 5.3 10e3/uL    Absolute Monocytes 1.7 (H) 0.0 - 1.3 10e3/uL    Absolute Eosinophils 0.0 0.0 - 0.7 10e3/uL    Absolute Basophils 0.0 0.0 - 0.2 10e3/uL    Absolute Immature Granulocytes 0.1 <=0.4 10e3/uL    Absolute NRBCs 0.0 10e3/uL   Lactic acid whole blood   Result Value Ref Range    Lactic Acid 2.0 0.7 - 2.0 mmol/L   Lumbar spine XR, 2-3 views    Narrative    EXAM: XR LUMBAR SPINE 2-3 VIEWS  LOCATION: [facility]  DATE/TIME: 02/06/2022, 9:48 AM    INDICATION: Lower back pain.  COMPARISON: MRI lumbar spine 10/31/2016.  TECHNIQUE: CR Lumbar Spine.      Impression    IMPRESSION: Five nonrib-bearing lumbar-type vertebral bodies. Mild convex left lumbar curvature. Normal lumbar lordosis. Vertebral body heights and intervertebral disc space heights are preserved. Facets are unremarkable. Visualized osseous pelvis is   unremarkable. Normal bowel gas pattern. Mild aortic atherosclerotic calcifications.     XR Pelvis w Hip Right 1 View    Narrative    EXAM: XR PELVIS AND HIP RIGHT 1 VIEW  LOCATION: [facility]  DATE/TIME: 2/6/2022 9:53 AM    INDICATION: R hip pain  COMPARISON: None.      Impression    IMPRESSION: Acute moderately displaced subcapital fracture of the right femur with anterior superior displacement of the femoral shaft.   Asymptomatic COVID-19 Virus (Coronavirus) by PCR Nasopharyngeal    Specimen: Nasopharyngeal; Swab   Result Value Ref Range    SARS CoV2 PCR Negative Negative    Narrative    Testing was performed using the will  SARS-CoV-2 & Influenza A/B  Assay on the will  Susana  System.  This test should be ordered for the detection of SARS-COV-2 in individuals who meet SARS-CoV-2 clinical and/or epidemiological criteria. Test performance is unknown in asymptomatic patients.  This test is for in vitro diagnostic use under the FDA EUA for laboratories certified under CLIA to perform moderate and/or high complexity testing. This test has not been FDA cleared or approved.  A negative test does not rule out the presence of PCR inhibitors in the specimen or target RNA in concentration below the limit of detection for the assay. The possibility of a false negative should be considered if the patient's recent exposure or clinical presentation suggests COVID-19.  [facility] are certified under the Clinical Laboratory Improvement Amendments of 1988 (CLIA-88) as qualified to perform moderate and/or high complexity laboratory testing.   XR Shoulder Right Port G/E 2 Views    Narrative    EXAM: XR SHOULDER RIGHT PORT G/E 2 VIEWS  LOCATION: [facility]  DATE/TIME: 2/6/2022 11:22 AM    INDICATION: R shoulder pain  COMPARISON: None.      Impression    IMPRESSION: No fracture. Osteopenia. No degenerative changes.    UA with Microscopic reflex to Culture    Specimen: Urine, Catheter   Result Value Ref Range    Color Urine Dark Yellow (A) Colorless, Straw, Light Yellow, Yellow    Appearance Urine Slightly Cloudy (A) Clear    Glucose Urine Negative Negative mg/dL    Bilirubin Urine Negative Negative    Ketones Urine 160  (A) Negative mg/dL    Specific Gravity Urine 1.030 1.003 - 1.035    Blood Urine Large (A) Negative    pH Urine 6.0 5.0 - 7.0    Protein Albumin Urine 100  (A) Negative mg/dL    Urobilinogen Urine Normal Normal, 2.0 mg/dL    Nitrite Urine Negative Negative    Leukocyte Esterase Urine Negative Negative    Bacteria Urine Moderate (A) None Seen /HPF    Mucus Urine Present (A) None Seen /LPF    RBC Urine 6 (H) <=2 /HPF    WBC  Urine 5 <=5 /HPF    Squamous Epithelials Urine 1 <=1 /HPF    Hyaline Casts Urine 33 (H) <=2 /LPF    Narrative    Urine Culture not indicated     Results for orders placed or performed during the hospital encounter of 02/06/22   Lumbar spine XR, 2-3 views    Narrative    EXAM: XR LUMBAR SPINE 2-3 VIEWS  LOCATION: [facility]  DATE/TIME: 02/06/2022, 9:48 AM    INDICATION: Lower back pain.  COMPARISON: MRI lumbar spine 10/31/2016.  TECHNIQUE: CR Lumbar Spine.      Impression    IMPRESSION: Five nonrib-bearing lumbar-type vertebral bodies. Mild convex left lumbar curvature. Normal lumbar lordosis. Vertebral body heights and intervertebral disc space heights are preserved. Facets are unremarkable. Visualized osseous pelvis is   unremarkable. Normal bowel gas pattern. Mild aortic atherosclerotic calcifications.     XR Pelvis w Hip Right 1 View    Narrative    EXAM: XR PELVIS AND HIP RIGHT 1 VIEW  LOCATION: [facility]  DATE/TIME: 2/6/2022 9:53 AM    INDICATION: R hip pain  COMPARISON: None.      Impression    IMPRESSION: Acute moderately displaced subcapital fracture of the right femur with anterior superior displacement of the femoral shaft.   XR Shoulder Right Port G/E 2 Views    Narrative    EXAM: XR SHOULDER RIGHT PORT G/E 2 VIEWS  LOCATION: [facility]  DATE/TIME: 2/6/2022 11:22 AM    INDICATION: R shoulder pain  COMPARISON: None.      Impression    IMPRESSION: No fracture. Osteopenia. No degenerative changes.        Medications   0.9% sodium chloride BOLUS (has no administration in time range)   sodium chloride 0.9% infusion (has no administration in time range)   acetaminophen (TYLENOL) tablet 650 mg (has no administration in time range)   naloxone (NARCAN) injection 0.2 mg (has no administration in time range)     Or   naloxone (NARCAN) injection 0.4 mg (has no administration in time range)     Or   naloxone (NARCAN) injection 0.2 mg  (has no administration in time range)     Or   naloxone (NARCAN) injection 0.4 mg (has no administration in time range)   HYDROmorphone (DILAUDID) injection 0.3-0.5 mg (has no administration in time range)   ondansetron (ZOFRAN-ODT) ODT tab 4 mg (has no administration in time range)     Or   ondansetron (ZOFRAN) injection 4 mg (has no administration in time range)   0.9% sodium chloride BOLUS (0 mLs Intravenous Stopped 2/6/22 1011)   HYDROmorphone (DILAUDID) injection 0.3 mg (0.3 mg Intravenous Given 2/6/22 0903)   potassium chloride 10 mEq in 100 mL sterile water intermittent infusion (premix) (0 mEq Intravenous Stopped 2/6/22 1219)   HYDROmorphone (DILAUDID) injection 0.3 mg (0.3 mg Intravenous Given 2/6/22 0935)   ketorolac (TORADOL) injection 15 mg (15 mg Intravenous Given 2/6/22 0956)   fentaNYL (PF) (SUBLIMAZE) injection 100 mcg (100 mcg Intravenous Given 2/6/22 1208)       Assessments & Plan (with Medical Decision Making) records were reviewed.  Labs were obtained.  Patient was given IV fluids and Dilaudid for pain.  His white count was 12.4 hemoglobin 13.9 platelet count 216.  Comprehensive metabolic panel significant for potassium of 3.0 glucose was 161 AST and ALT were elevated at 282 and 98 respectively with a bilirubin 1.5 her CK total was significantly elevated at 6443.   lactic acid was 2.0 Covid negative UA was eventually obtained and revealed 160 ketones large blood 100 protein moderate bacteria 6 RBCs present.  IV fluids were continued.  Complained of right shoulder pain now and on examination she had tenderness to palpation without erythema or swelling.  X-rays of the lumbar spine pelvis and shoulder were obtained shoulder x-ray was unremarkable.  Pelvic x-ray revealed a acute moderately subcapital fracture of the right femur with anterior superior displacement of the femoral shaft.  X-ray lumbar spine without significant abnormality.  Patient again denies falling.  She states she did not hit her  head or lose consciousness.  She states she drinks Pino[place] noir  1 glass night and is not an alcoholic.  She denies any history of breast cancer or other cancer denies any known history of osteoporosis or osteopenia.  Case was discussed with Dr. Prince with [facility] and he is agree with having the patient admitted for further evaluation and he advised me to have patient admitted to the hospital service made n.p.o. at night for surgery tomorrow. Discussed with Dr. Golden Aguirre with hospitalist service and he is in agreement with excepting the patient in admission. Orders are written for the patient.     I have reviewed the nursing notes.    I have reviewed the findings, diagnosis, plan and need for follow up with the patient.       There are no discharge medications for this patient.  Diagnosis  R hip fracture  Rhabdomyalisis          2/6/2022   [facility] EMERGENCY DEPT     Sage Peralta MD  02/07/22 1041

## 2022-02-07 ENCOUNTER — APPOINTMENT (OUTPATIENT)
Dept: GENERAL RADIOLOGY | Facility: CLINIC | Age: 57
DRG: 956 | End: 2022-02-07
Attending: ORTHOPAEDIC SURGERY
Payer: COMMERCIAL

## 2022-02-07 ENCOUNTER — ANESTHESIA EVENT (OUTPATIENT)
Dept: SURGERY | Facility: CLINIC | Age: 57
DRG: 956 | End: 2022-02-07
Payer: COMMERCIAL

## 2022-02-07 ENCOUNTER — ANESTHESIA (OUTPATIENT)
Dept: SURGERY | Facility: CLINIC | Age: 57
DRG: 956 | End: 2022-02-07
Payer: COMMERCIAL
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
## 2022-02-07 LAB
ABO/RH(D): NORMAL
ANION GAP SERPL CALCULATED.3IONS-SCNC: 7 MMOL/L (ref 3–14)
ANTIBODY SCREEN: NEGATIVE
BASOPHILS # BLD AUTO: 0 10E3/UL (ref 0–0.2)
BASOPHILS NFR BLD AUTO: 0 %
BUN SERPL-MCNC: 15 MG/DL (ref 7–30)
CALCIUM SERPL-MCNC: 8 MG/DL (ref 8.5–10.1)
CHLORIDE BLD-SCNC: 111 MMOL/L (ref 94–109)
CK SERPL-CCNC: 5669 U/L (ref 30–225)
CO2 SERPL-SCNC: 23 MMOL/L (ref 20–32)
CREAT SERPL-MCNC: 0.44 MG/DL (ref 0.52–1.04)
EOSINOPHIL # BLD AUTO: 0 10E3/UL (ref 0–0.7)
EOSINOPHIL NFR BLD AUTO: 0 %
ERYTHROCYTE [DISTWIDTH] IN BLOOD BY AUTOMATED COUNT: 12.1 % (ref 10–15)
GFR SERPL CREATININE-BSD FRML MDRD: >90 ML/MIN/1.73M2
GLUCOSE BLD-MCNC: 89 MG/DL (ref 70–99)
HCT VFR BLD AUTO: 32.4 % (ref 35–47)
HGB BLD-MCNC: 10.7 G/DL (ref 11.7–15.7)
IMM GRANULOCYTES # BLD: 0 10E3/UL
IMM GRANULOCYTES NFR BLD: 0 %
LYMPHOCYTES # BLD AUTO: 1.3 10E3/UL (ref 0.8–5.3)
LYMPHOCYTES NFR BLD AUTO: 17 %
MAGNESIUM SERPL-MCNC: 1.8 MG/DL (ref 1.8–2.6)
MCH RBC QN AUTO: 34.5 PG (ref 26.5–33)
MCHC RBC AUTO-ENTMCNC: 33 G/DL (ref 31.5–36.5)
MCV RBC AUTO: 105 FL (ref 78–100)
MONOCYTES # BLD AUTO: 1.1 10E3/UL (ref 0–1.3)
MONOCYTES NFR BLD AUTO: 14 %
NEUTROPHILS # BLD AUTO: 5.1 10E3/UL (ref 1.6–8.3)
NEUTROPHILS NFR BLD AUTO: 69 %
NRBC # BLD AUTO: 0 10E3/UL
NRBC BLD AUTO-RTO: 0 /100
PHOSPHATE SERPL-MCNC: 2.2 MG/DL (ref 2.5–4.5)
PLATELET # BLD AUTO: 159 10E3/UL (ref 150–450)
POTASSIUM BLD-SCNC: 3.4 MMOL/L (ref 3.4–5.3)
POTASSIUM BLD-SCNC: 3.5 MMOL/L (ref 3.4–5.3)
RBC # BLD AUTO: 3.1 10E6/UL (ref 3.8–5.2)
SODIUM SERPL-SCNC: 141 MMOL/L (ref 133–144)
SPECIMEN EXPIRATION DATE: NORMAL
WBC # BLD AUTO: 7.5 10E3/UL (ref 4–11)

## 2022-02-07 PROCEDURE — 250N000011 HC RX IP 250 OP 636: Performed by: NURSE ANESTHETIST, CERTIFIED REGISTERED

## 2022-02-07 PROCEDURE — 258N000003 HC RX IP 258 OP 636: Performed by: ORTHOPAEDIC SURGERY

## 2022-02-07 PROCEDURE — 73502 X-RAY EXAM HIP UNI 2-3 VIEWS: CPT

## 2022-02-07 PROCEDURE — 82550 ASSAY OF CK (CPK): CPT | Performed by: FAMILY MEDICINE

## 2022-02-07 PROCEDURE — 272N000001 HC OR GENERAL SUPPLY STERILE: Performed by: ORTHOPAEDIC SURGERY

## 2022-02-07 PROCEDURE — 85025 COMPLETE CBC W/AUTO DIFF WBC: CPT | Performed by: FAMILY MEDICINE

## 2022-02-07 PROCEDURE — 278N000051 HC OR IMPLANT GENERAL: Performed by: ORTHOPAEDIC SURGERY

## 2022-02-07 PROCEDURE — 86850 RBC ANTIBODY SCREEN: CPT | Performed by: PHYSICIAN ASSISTANT

## 2022-02-07 PROCEDURE — 80048 BASIC METABOLIC PNL TOTAL CA: CPT | Performed by: FAMILY MEDICINE

## 2022-02-07 PROCEDURE — 250N000011 HC RX IP 250 OP 636: Performed by: ORTHOPAEDIC SURGERY

## 2022-02-07 PROCEDURE — 250N000011 HC RX IP 250 OP 636: Performed by: PHYSICIAN ASSISTANT

## 2022-02-07 PROCEDURE — 250N000011 HC RX IP 250 OP 636: Performed by: FAMILY MEDICINE

## 2022-02-07 PROCEDURE — 86901 BLOOD TYPING SEROLOGIC RH(D): CPT | Performed by: PHYSICIAN ASSISTANT

## 2022-02-07 PROCEDURE — 250N000013 HC RX MED GY IP 250 OP 250 PS 637: Performed by: ORTHOPAEDIC SURGERY

## 2022-02-07 PROCEDURE — 36415 COLL VENOUS BLD VENIPUNCTURE: CPT | Performed by: PHYSICIAN ASSISTANT

## 2022-02-07 PROCEDURE — 258N000003 HC RX IP 258 OP 636: Performed by: FAMILY MEDICINE

## 2022-02-07 PROCEDURE — 710N000009 HC RECOVERY PHASE 1, LEVEL 1, PER MIN: Performed by: ORTHOPAEDIC SURGERY

## 2022-02-07 PROCEDURE — 370N000017 HC ANESTHESIA TECHNICAL FEE, PER MIN: Performed by: ORTHOPAEDIC SURGERY

## 2022-02-07 PROCEDURE — 120N000001 HC R&B MED SURG/OB

## 2022-02-07 PROCEDURE — 250N000009 HC RX 250: Performed by: NURSE ANESTHETIST, CERTIFIED REGISTERED

## 2022-02-07 PROCEDURE — 258N000001 HC RX 258: Performed by: ORTHOPAEDIC SURGERY

## 2022-02-07 PROCEDURE — 250N000009 HC RX 250: Performed by: ORTHOPAEDIC SURGERY

## 2022-02-07 PROCEDURE — 250N000011 HC RX IP 250 OP 636

## 2022-02-07 PROCEDURE — 360N000077 HC SURGERY LEVEL 4, PER MIN: Performed by: ORTHOPAEDIC SURGERY

## 2022-02-07 PROCEDURE — 250N000013 HC RX MED GY IP 250 OP 250 PS 637: Performed by: PHYSICIAN ASSISTANT

## 2022-02-07 PROCEDURE — 36415 COLL VENOUS BLD VENIPUNCTURE: CPT | Performed by: FAMILY MEDICINE

## 2022-02-07 PROCEDURE — 93005 ELECTROCARDIOGRAM TRACING: CPT

## 2022-02-07 PROCEDURE — 83735 ASSAY OF MAGNESIUM: CPT | Performed by: FAMILY MEDICINE

## 2022-02-07 PROCEDURE — 84100 ASSAY OF PHOSPHORUS: CPT | Performed by: FAMILY MEDICINE

## 2022-02-07 PROCEDURE — 99233 SBSQ HOSP IP/OBS HIGH 50: CPT

## 2022-02-07 PROCEDURE — 250N000013 HC RX MED GY IP 250 OP 250 PS 637: Performed by: FAMILY MEDICINE

## 2022-02-07 PROCEDURE — 84132 ASSAY OF SERUM POTASSIUM: CPT | Performed by: FAMILY MEDICINE

## 2022-02-07 PROCEDURE — C1776 JOINT DEVICE (IMPLANTABLE): HCPCS | Performed by: ORTHOPAEDIC SURGERY

## 2022-02-07 PROCEDURE — 0SRR019 REPLACEMENT OF RIGHT HIP JOINT, FEMORAL SURFACE WITH METAL SYNTHETIC SUBSTITUTE, CEMENTED, OPEN APPROACH: ICD-10-PCS | Performed by: ORTHOPAEDIC SURGERY

## 2022-02-07 PROCEDURE — 999N000141 HC STATISTIC PRE-PROCEDURE NURSING ASSESSMENT: Performed by: ORTHOPAEDIC SURGERY

## 2022-02-07 DEVICE — IMP HEAD FEMORAL DEPUY COBALT 28MM +5MM 1365-12-000: Type: IMPLANTABLE DEVICE | Site: HIP | Status: FUNCTIONAL

## 2022-02-07 DEVICE — IMPLANTABLE DEVICE: Type: IMPLANTABLE DEVICE | Site: HIP | Status: FUNCTIONAL

## 2022-02-07 DEVICE — BONE CEMENT SIMPLEX W/TOBRAMYCIN 6197-9-001: Type: IMPLANTABLE DEVICE | Site: HIP | Status: FUNCTIONAL

## 2022-02-07 DEVICE — IMP STEM CENTRALIZER DEPUY 8.5MM 1376-46-000: Type: IMPLANTABLE DEVICE | Site: HIP | Status: FUNCTIONAL

## 2022-02-07 RX ORDER — CEFAZOLIN SODIUM 2 G/100ML
2 INJECTION, SOLUTION INTRAVENOUS SEE ADMIN INSTRUCTIONS
Status: DISCONTINUED | OUTPATIENT
Start: 2022-02-07 | End: 2022-02-07 | Stop reason: HOSPADM

## 2022-02-07 RX ORDER — ONDANSETRON 2 MG/ML
4 INJECTION INTRAMUSCULAR; INTRAVENOUS EVERY 30 MIN PRN
Status: DISCONTINUED | OUTPATIENT
Start: 2022-02-07 | End: 2022-02-07 | Stop reason: HOSPADM

## 2022-02-07 RX ORDER — ONDANSETRON 2 MG/ML
INJECTION INTRAMUSCULAR; INTRAVENOUS PRN
Status: DISCONTINUED | OUTPATIENT
Start: 2022-02-07 | End: 2022-02-07

## 2022-02-07 RX ORDER — ONDANSETRON 4 MG/1
4 TABLET, ORALLY DISINTEGRATING ORAL EVERY 6 HOURS PRN
Status: DISCONTINUED | OUTPATIENT
Start: 2022-02-07 | End: 2022-02-13 | Stop reason: HOSPADM

## 2022-02-07 RX ORDER — PROPOFOL 10 MG/ML
INJECTION, EMULSION INTRAVENOUS CONTINUOUS PRN
Status: DISCONTINUED | OUTPATIENT
Start: 2022-02-07 | End: 2022-02-07

## 2022-02-07 RX ORDER — LIDOCAINE 40 MG/G
CREAM TOPICAL
Status: DISCONTINUED | OUTPATIENT
Start: 2022-02-07 | End: 2022-02-13 | Stop reason: HOSPADM

## 2022-02-07 RX ORDER — HYDROMORPHONE HCL IN WATER/PF 6 MG/30 ML
0.4 PATIENT CONTROLLED ANALGESIA SYRINGE INTRAVENOUS EVERY 5 MIN PRN
Status: DISCONTINUED | OUTPATIENT
Start: 2022-02-07 | End: 2022-02-07 | Stop reason: HOSPADM

## 2022-02-07 RX ORDER — LIDOCAINE HYDROCHLORIDE 10 MG/ML
INJECTION, SOLUTION INFILTRATION; PERINEURAL PRN
Status: DISCONTINUED | OUTPATIENT
Start: 2022-02-07 | End: 2022-02-07

## 2022-02-07 RX ORDER — CEFAZOLIN SODIUM 1 G/50ML
1 INJECTION, SOLUTION INTRAVENOUS EVERY 8 HOURS
Status: COMPLETED | OUTPATIENT
Start: 2022-02-07 | End: 2022-02-08

## 2022-02-07 RX ORDER — SODIUM CHLORIDE, SODIUM LACTATE, POTASSIUM CHLORIDE, CALCIUM CHLORIDE 600; 310; 30; 20 MG/100ML; MG/100ML; MG/100ML; MG/100ML
INJECTION, SOLUTION INTRAVENOUS CONTINUOUS
Status: DISCONTINUED | OUTPATIENT
Start: 2022-02-07 | End: 2022-02-10

## 2022-02-07 RX ORDER — MAGNESIUM SULFATE HEPTAHYDRATE 40 MG/ML
INJECTION, SOLUTION INTRAVENOUS PRN
Status: DISCONTINUED | OUTPATIENT
Start: 2022-02-07 | End: 2022-02-07

## 2022-02-07 RX ORDER — BISACODYL 10 MG
10 SUPPOSITORY, RECTAL RECTAL DAILY PRN
Status: DISCONTINUED | OUTPATIENT
Start: 2022-02-07 | End: 2022-02-13 | Stop reason: HOSPADM

## 2022-02-07 RX ORDER — OXYCODONE HYDROCHLORIDE 5 MG/1
5 TABLET ORAL EVERY 4 HOURS PRN
Status: DISCONTINUED | OUTPATIENT
Start: 2022-02-07 | End: 2022-02-08

## 2022-02-07 RX ORDER — GLYCOPYRROLATE 0.2 MG/ML
INJECTION, SOLUTION INTRAMUSCULAR; INTRAVENOUS PRN
Status: DISCONTINUED | OUTPATIENT
Start: 2022-02-07 | End: 2022-02-07

## 2022-02-07 RX ORDER — POLYETHYLENE GLYCOL 3350 17 G/17G
17 POWDER, FOR SOLUTION ORAL DAILY
Status: DISCONTINUED | OUTPATIENT
Start: 2022-02-08 | End: 2022-02-11

## 2022-02-07 RX ORDER — LORAZEPAM 0.5 MG/1
0.5 TABLET ORAL EVERY 4 HOURS PRN
Status: DISCONTINUED | OUTPATIENT
Start: 2022-02-07 | End: 2022-02-12

## 2022-02-07 RX ORDER — DIMENHYDRINATE 50 MG/ML
25 INJECTION, SOLUTION INTRAMUSCULAR; INTRAVENOUS
Status: DISCONTINUED | OUTPATIENT
Start: 2022-02-07 | End: 2022-02-07 | Stop reason: HOSPADM

## 2022-02-07 RX ORDER — HYDROMORPHONE HCL IN WATER/PF 6 MG/30 ML
0.2 PATIENT CONTROLLED ANALGESIA SYRINGE INTRAVENOUS
Status: DISCONTINUED | OUTPATIENT
Start: 2022-02-07 | End: 2022-02-09

## 2022-02-07 RX ORDER — TRANEXAMIC ACID 650 MG/1
1950 TABLET ORAL ONCE
Status: COMPLETED | OUTPATIENT
Start: 2022-02-07 | End: 2022-02-07

## 2022-02-07 RX ORDER — METHOCARBAMOL 750 MG/1
750 TABLET, FILM COATED ORAL EVERY 6 HOURS PRN
Status: DISCONTINUED | OUTPATIENT
Start: 2022-02-07 | End: 2022-02-09

## 2022-02-07 RX ORDER — DEXAMETHASONE SODIUM PHOSPHATE 4 MG/ML
INJECTION, SOLUTION INTRA-ARTICULAR; INTRALESIONAL; INTRAMUSCULAR; INTRAVENOUS; SOFT TISSUE PRN
Status: DISCONTINUED | OUTPATIENT
Start: 2022-02-07 | End: 2022-02-07

## 2022-02-07 RX ORDER — AMOXICILLIN 250 MG
1 CAPSULE ORAL 2 TIMES DAILY
Status: DISCONTINUED | OUTPATIENT
Start: 2022-02-07 | End: 2022-02-11

## 2022-02-07 RX ORDER — PROCHLORPERAZINE MALEATE 5 MG
10 TABLET ORAL EVERY 6 HOURS PRN
Status: DISCONTINUED | OUTPATIENT
Start: 2022-02-07 | End: 2022-02-13 | Stop reason: HOSPADM

## 2022-02-07 RX ORDER — ACETAMINOPHEN 325 MG/1
650 TABLET ORAL EVERY 4 HOURS PRN
Status: DISCONTINUED | OUTPATIENT
Start: 2022-02-10 | End: 2022-02-13 | Stop reason: HOSPADM

## 2022-02-07 RX ORDER — CEFAZOLIN SODIUM 2 G/100ML
2 INJECTION, SOLUTION INTRAVENOUS
Status: DISCONTINUED | OUTPATIENT
Start: 2022-02-07 | End: 2022-02-07 | Stop reason: HOSPADM

## 2022-02-07 RX ORDER — ONDANSETRON 2 MG/ML
4 INJECTION INTRAMUSCULAR; INTRAVENOUS EVERY 6 HOURS PRN
Status: DISCONTINUED | OUTPATIENT
Start: 2022-02-07 | End: 2022-02-13 | Stop reason: HOSPADM

## 2022-02-07 RX ORDER — HYDROXYZINE HYDROCHLORIDE 25 MG/1
25 TABLET, FILM COATED ORAL EVERY 6 HOURS PRN
Status: DISCONTINUED | OUTPATIENT
Start: 2022-02-07 | End: 2022-02-07 | Stop reason: HOSPADM

## 2022-02-07 RX ORDER — OXYCODONE HYDROCHLORIDE 5 MG/1
10 TABLET ORAL EVERY 4 HOURS PRN
Status: DISCONTINUED | OUTPATIENT
Start: 2022-02-07 | End: 2022-02-08

## 2022-02-07 RX ORDER — HYDROXYZINE HYDROCHLORIDE 25 MG/1
25 TABLET, FILM COATED ORAL EVERY 6 HOURS PRN
Status: DISCONTINUED | OUTPATIENT
Start: 2022-02-07 | End: 2022-02-08

## 2022-02-07 RX ORDER — ACETAMINOPHEN 325 MG/1
975 TABLET ORAL EVERY 8 HOURS
Status: COMPLETED | OUTPATIENT
Start: 2022-02-07 | End: 2022-02-10

## 2022-02-07 RX ORDER — HYDROMORPHONE HCL IN WATER/PF 6 MG/30 ML
0.4 PATIENT CONTROLLED ANALGESIA SYRINGE INTRAVENOUS
Status: DISCONTINUED | OUTPATIENT
Start: 2022-02-07 | End: 2022-02-09

## 2022-02-07 RX ORDER — SODIUM CHLORIDE, SODIUM LACTATE, POTASSIUM CHLORIDE, CALCIUM CHLORIDE 600; 310; 30; 20 MG/100ML; MG/100ML; MG/100ML; MG/100ML
INJECTION, SOLUTION INTRAVENOUS CONTINUOUS
Status: DISCONTINUED | OUTPATIENT
Start: 2022-02-07 | End: 2022-02-07 | Stop reason: HOSPADM

## 2022-02-07 RX ORDER — KETAMINE HYDROCHLORIDE 10 MG/ML
INJECTION, SOLUTION INTRAMUSCULAR; INTRAVENOUS PRN
Status: DISCONTINUED | OUTPATIENT
Start: 2022-02-07 | End: 2022-02-07

## 2022-02-07 RX ORDER — FENTANYL CITRATE 50 UG/ML
50 INJECTION, SOLUTION INTRAMUSCULAR; INTRAVENOUS EVERY 5 MIN PRN
Status: DISCONTINUED | OUTPATIENT
Start: 2022-02-07 | End: 2022-02-07 | Stop reason: HOSPADM

## 2022-02-07 RX ORDER — KETOROLAC TROMETHAMINE 30 MG/ML
INJECTION, SOLUTION INTRAMUSCULAR; INTRAVENOUS PRN
Status: DISCONTINUED | OUTPATIENT
Start: 2022-02-07 | End: 2022-02-07

## 2022-02-07 RX ORDER — ONDANSETRON 4 MG/1
4 TABLET, ORALLY DISINTEGRATING ORAL EVERY 30 MIN PRN
Status: DISCONTINUED | OUTPATIENT
Start: 2022-02-07 | End: 2022-02-07 | Stop reason: HOSPADM

## 2022-02-07 RX ORDER — PROPOFOL 10 MG/ML
INJECTION, EMULSION INTRAVENOUS PRN
Status: DISCONTINUED | OUTPATIENT
Start: 2022-02-07 | End: 2022-02-07

## 2022-02-07 RX ORDER — FENTANYL CITRATE 50 UG/ML
INJECTION, SOLUTION INTRAMUSCULAR; INTRAVENOUS PRN
Status: DISCONTINUED | OUTPATIENT
Start: 2022-02-07 | End: 2022-02-07

## 2022-02-07 RX ORDER — OXYCODONE HYDROCHLORIDE 5 MG/1
5 TABLET ORAL EVERY 4 HOURS PRN
Status: DISCONTINUED | OUTPATIENT
Start: 2022-02-07 | End: 2022-02-07 | Stop reason: HOSPADM

## 2022-02-07 RX ADMIN — GLYCOPYRROLATE 0.2 MG: 0.2 INJECTION, SOLUTION INTRAMUSCULAR; INTRAVENOUS at 14:49

## 2022-02-07 RX ADMIN — HYDROMORPHONE HYDROCHLORIDE 0.5 MG: 1 INJECTION, SOLUTION INTRAMUSCULAR; INTRAVENOUS; SUBCUTANEOUS at 14:24

## 2022-02-07 RX ADMIN — FOLIC ACID 1 MG: 1 TABLET ORAL at 08:43

## 2022-02-07 RX ADMIN — DEXAMETHASONE SODIUM PHOSPHATE 10 MG: 4 INJECTION, SOLUTION INTRA-ARTICULAR; INTRALESIONAL; INTRAMUSCULAR; INTRAVENOUS; SOFT TISSUE at 13:56

## 2022-02-07 RX ADMIN — SODIUM CHLORIDE, POTASSIUM CHLORIDE, SODIUM LACTATE AND CALCIUM CHLORIDE: 600; 310; 30; 20 INJECTION, SOLUTION INTRAVENOUS at 12:42

## 2022-02-07 RX ADMIN — OXYCODONE HYDROCHLORIDE 5 MG: 5 TABLET ORAL at 18:34

## 2022-02-07 RX ADMIN — SODIUM CHLORIDE, POTASSIUM CHLORIDE, SODIUM LACTATE AND CALCIUM CHLORIDE: 600; 310; 30; 20 INJECTION, SOLUTION INTRAVENOUS at 15:52

## 2022-02-07 RX ADMIN — PROPOFOL 100 MCG/KG/MIN: 10 INJECTION, EMULSION INTRAVENOUS at 14:00

## 2022-02-07 RX ADMIN — ROCURONIUM BROMIDE 50 MG: 50 INJECTION, SOLUTION INTRAVENOUS at 13:51

## 2022-02-07 RX ADMIN — HYDROMORPHONE HYDROCHLORIDE 0.5 MG: 0.2 INJECTION, SOLUTION INTRAMUSCULAR; INTRAVENOUS; SUBCUTANEOUS at 01:25

## 2022-02-07 RX ADMIN — FENTANYL CITRATE 50 MCG: 50 INJECTION, SOLUTION INTRAMUSCULAR; INTRAVENOUS at 16:50

## 2022-02-07 RX ADMIN — HYDROMORPHONE HYDROCHLORIDE 0.2 MG: 0.2 INJECTION, SOLUTION INTRAMUSCULAR; INTRAVENOUS; SUBCUTANEOUS at 21:17

## 2022-02-07 RX ADMIN — SODIUM CHLORIDE 1000 ML: 9 INJECTION, SOLUTION INTRAVENOUS at 08:18

## 2022-02-07 RX ADMIN — CEFAZOLIN SODIUM 2 G: 2 INJECTION, SOLUTION INTRAVENOUS at 13:45

## 2022-02-07 RX ADMIN — OXYCODONE HYDROCHLORIDE 5 MG: 5 TABLET ORAL at 22:51

## 2022-02-07 RX ADMIN — KETOROLAC TROMETHAMINE 30 MG: 30 INJECTION, SOLUTION INTRAMUSCULAR at 15:26

## 2022-02-07 RX ADMIN — ONDANSETRON 4 MG: 2 INJECTION INTRAMUSCULAR; INTRAVENOUS at 15:19

## 2022-02-07 RX ADMIN — ACETAMINOPHEN 975 MG: 325 TABLET, FILM COATED ORAL at 18:34

## 2022-02-07 RX ADMIN — FENTANYL CITRATE 50 MCG: 50 INJECTION, SOLUTION INTRAMUSCULAR; INTRAVENOUS at 14:15

## 2022-02-07 RX ADMIN — SODIUM CHLORIDE 1000 ML: 9 INJECTION, SOLUTION INTRAVENOUS at 01:23

## 2022-02-07 RX ADMIN — FENTANYL CITRATE 50 MCG: 50 INJECTION, SOLUTION INTRAMUSCULAR; INTRAVENOUS at 16:22

## 2022-02-07 RX ADMIN — FENTANYL CITRATE 50 MCG: 50 INJECTION, SOLUTION INTRAMUSCULAR; INTRAVENOUS at 16:31

## 2022-02-07 RX ADMIN — HYDROMORPHONE HYDROCHLORIDE 0.5 MG: 0.2 INJECTION, SOLUTION INTRAMUSCULAR; INTRAVENOUS; SUBCUTANEOUS at 10:18

## 2022-02-07 RX ADMIN — HYDROMORPHONE HYDROCHLORIDE 0.5 MG: 0.2 INJECTION, SOLUTION INTRAMUSCULAR; INTRAVENOUS; SUBCUTANEOUS at 06:30

## 2022-02-07 RX ADMIN — CEFAZOLIN SODIUM 1 G: 1 INJECTION, SOLUTION INTRAVENOUS at 21:16

## 2022-02-07 RX ADMIN — TRANEXAMIC ACID 1950 MG: 650 TABLET ORAL at 12:07

## 2022-02-07 RX ADMIN — HYDROMORPHONE HYDROCHLORIDE 1 MG: 1 INJECTION, SOLUTION INTRAMUSCULAR; INTRAVENOUS; SUBCUTANEOUS at 14:40

## 2022-02-07 RX ADMIN — THIAMINE HCL TAB 100 MG 100 MG: 100 TAB at 08:43

## 2022-02-07 RX ADMIN — KETAMINE HYDROCHLORIDE 10 MG: 10 INJECTION INTRAMUSCULAR; INTRAVENOUS at 14:15

## 2022-02-07 RX ADMIN — LIDOCAINE HYDROCHLORIDE 50 MG: 10 INJECTION, SOLUTION INFILTRATION; PERINEURAL at 13:51

## 2022-02-07 RX ADMIN — KETAMINE HYDROCHLORIDE 20 MG: 10 INJECTION INTRAMUSCULAR; INTRAVENOUS at 13:59

## 2022-02-07 RX ADMIN — FENTANYL CITRATE 50 MCG: 50 INJECTION, SOLUTION INTRAMUSCULAR; INTRAVENOUS at 13:49

## 2022-02-07 RX ADMIN — MAGNESIUM SULFATE HEPTAHYDRATE 2 G: 40 INJECTION, SOLUTION INTRAVENOUS at 14:05

## 2022-02-07 RX ADMIN — HYDROMORPHONE HYDROCHLORIDE 0.5 MG: 1 INJECTION, SOLUTION INTRAMUSCULAR; INTRAVENOUS; SUBCUTANEOUS at 14:56

## 2022-02-07 RX ADMIN — MIDAZOLAM 4 MG: 1 INJECTION INTRAMUSCULAR; INTRAVENOUS at 13:49

## 2022-02-07 RX ADMIN — Medication 1 TABLET: at 08:43

## 2022-02-07 RX ADMIN — KETAMINE HYDROCHLORIDE 20 MG: 10 INJECTION INTRAMUSCULAR; INTRAVENOUS at 14:30

## 2022-02-07 RX ADMIN — PROPOFOL 100 MG: 10 INJECTION, EMULSION INTRAVENOUS at 13:51

## 2022-02-07 RX ADMIN — SENNOSIDES AND DOCUSATE SODIUM 1 TABLET: 50; 8.6 TABLET ORAL at 20:18

## 2022-02-07 ASSESSMENT — ACTIVITIES OF DAILY LIVING (ADL)
ADLS_ACUITY_SCORE: 11
ADLS_ACUITY_SCORE: 11
ADLS_ACUITY_SCORE: 12
ADLS_ACUITY_SCORE: 11
ADLS_ACUITY_SCORE: 12
ADLS_ACUITY_SCORE: 11
ADLS_ACUITY_SCORE: 12
ADLS_ACUITY_SCORE: 11
ADLS_ACUITY_SCORE: 12
ADLS_ACUITY_SCORE: 12
ADLS_ACUITY_SCORE: 11

## 2022-02-07 ASSESSMENT — LIFESTYLE VARIABLES: TOBACCO_USE: 1

## 2022-02-07 NOTE — ANESTHESIA PREPROCEDURE EVALUATION
Anesthesia Pre-Procedure Evaluation    Patient: Amira Scherer   MRN: [id number] : 1965        Preoperative Diagnosis: Hip fracture (H) [S72.009A]    Procedure : Procedure(s):  Bipolar Hemiarthroplasty Right Hip          No past medical history on file.   Past Surgical History:   Procedure Laterality Date     COLECTOMY WITHOUT COLOSTOMY       COLON SURGERY   and     Colon removed       No Known Allergies   Social History     Tobacco Use     Smoking status: Current Some Day Smoker     Packs/day: 0.50     Years: 20.00     Pack years: 10.00     Types: Cigarettes     Smokeless tobacco: Never Used   Substance Use Topics     Alcohol use: Yes     Comment: Red wine 2-3 times per week      Wt Readings from Last 1 Encounters:   22 50.2 kg (110 lb 10.7 oz)        Anesthesia Evaluation   Pt has had prior anesthetic.         ROS/MED HX  ENT/Pulmonary:     (+) tobacco use, Past use,     Neurologic:     (+) peripheral neuropathy,  : Parasthesias.   Cardiovascular:  - neg cardiovascular ROS   (+) -----Previous cardiac testing   Echo: Date: Results:    Stress Test: Date: Results:    ECG Reviewed: Date: 2022 Results:  Sinus Rhythm   Low voltage in limb leads.  Cath: Date: Results:      METS/Exercise Tolerance:     Hematologic:  - neg hematologic  ROS     Musculoskeletal: Comment: Traumatic rhabdomyolysis  (+) fracture, Fracture location: RLE,     GI/Hepatic: Comment: Liver lesion, possible d/t ETOH.  S/p colon resection    (+) liver disease,     Renal/Genitourinary:  - neg Renal ROS     Endo:  - neg endo ROS     Psychiatric/Substance Use: Comment: Pt states 1 drink 2-3 times/week; liver enzymes suggest possible alcohol injury    (+) psychiatric history anxiety alcohol abuse     Infectious Disease:  - neg infectious disease ROS     Malignancy:  - neg malignancy ROS     Other:      (+) , H/O Chronic Pain,        Physical Exam    Airway  airway exam normal      Mallampati: III   TM distance: > 3 FB   Neck  ROM: full   Mouth opening: > 3 cm    Respiratory Devices and Support         Dental  no notable dental history     (+) chipped    B=Bridge, C=Chipped, L=Loose, M=Missing    Cardiovascular   cardiovascular exam normal          Pulmonary   pulmonary exam normal                OUTSIDE LABS:  CBC:   Lab Results   Component Value Date    WBC 7.5 02/07/2022    WBC 12.4 (H) 02/06/2022    HGB 10.7 (L) 02/07/2022    HGB 13.9 02/06/2022    HCT 32.4 (L) 02/07/2022    HCT 41.5 02/06/2022     02/07/2022     02/06/2022     BMP:   Lab Results   Component Value Date     02/07/2022     02/06/2022    POTASSIUM 3.4 02/07/2022    POTASSIUM 3.5 02/07/2022    CHLORIDE 111 (H) 02/07/2022    CHLORIDE 104 02/06/2022    CO2 23 02/07/2022    CO2 29 02/06/2022    BUN 15 02/07/2022    BUN 18 02/06/2022    CR 0.44 (L) 02/07/2022    CR 0.52 02/06/2022    GLC 89 02/07/2022     (H) 02/06/2022     COAGS: No results found for: PTT, INR, FIBR  POC: No results found for: BGM, HCG, HCGS  HEPATIC:   Lab Results   Component Value Date    ALBUMIN 3.6 02/06/2022    PROTTOTAL 7.7 02/06/2022    ALT 98 (H) 02/06/2022     (H) 02/06/2022    ALKPHOS 78 02/06/2022    BILITOTAL 1.5 (H) 02/06/2022     OTHER:   Lab Results   Component Value Date    LACT 2.0 02/06/2022    MAIRA 8.0 (L) 02/07/2022    PHOS 2.2 (L) 02/07/2022    MAG 1.8 02/07/2022    LIPASE 42 (L) 02/06/2022    AMYLASE 81 03/12/2013    TSH 0.89 02/22/2017       Anesthesia Plan    ASA Status:  3   NPO Status:  NPO Appropriate    Anesthesia Type: General.     - Airway: ETT   Induction: Intravenous.   Maintenance: Balanced.   Techniques and Equipment:     - Airway: Video-Laryngoscope         Consents    Anesthesia Plan(s) and associated risks, benefits, and realistic alternatives discussed. Questions answered and patient/representative(s) expressed understanding.     - Discussed: Risks, Benefits and Alternatives for BOTH SEDATION and the PROCEDURE were discussed     -  Discussed with:  Patient      - Extended Intubation/Ventilatory Support Discussed: No.      - Patient is DNR/DNI Status: No    Use of blood products discussed: No .     Postoperative Care    Pain management: IV analgesics, Oral pain medications, Multi-modal analgesia.   PONV prophylaxis: Ondansetron (or other 5HT-3), Dexamethasone or Solumedrol, Background Propofol Infusion     Comments:                Denny Danielle

## 2022-02-07 NOTE — PLAN OF CARE
Alert to self.  CIWA have been 0-4.  VSS.  Denies pain.  Attempted to get out of bed a few times, bed alarm on.  Yelling out for her mom and daughter at times, and says that they are just across the [place].  Otherwise has been cooperative.  Zyprexa given once.  Complained of heart burn and PRN tums given.    Up with SBA to bathroom.

## 2022-02-07 NOTE — ANESTHESIA CARE TRANSFER NOTE
Patient: Amira Scherer    Procedure: Procedure(s):  Bipolar Hemiarthroplasty Right Hip       Diagnosis: Hip fracture (H) [S72.009A]  Diagnosis Additional Information: No value filed.    Anesthesia Type:   General     Note:    Oropharynx: oropharynx clear of all foreign objects and spontaneously breathing  Level of Consciousness: drowsy  Oxygen Supplementation: face mask  Level of Supplemental Oxygen (L/min / FiO2): 6  Independent Airway: airway patency satisfactory and stable  Dentition: dentition unchanged  Vital Signs Stable: post-procedure vital signs reviewed and stable  Report to RN Given: handoff report given  Patient transferred to: PACU    Handoff Report: Identifed the Patient, Identified the Reponsible Provider, Reviewed the pertinent medical history, Discussed the surgical course, Reviewed Intra-OP anesthesia mangement and issues during anesthesia, Set expectations for post-procedure period and Allowed opportunity for questions and acknowledgement of understanding      Vitals:  Vitals Value Taken Time   BP     Temp     Pulse     Resp     SpO2         Electronically Signed By: VONDA Malhotra CRNA  February 7, 2022  4:09 PM

## 2022-02-07 NOTE — PROGRESS NOTES
"[facility] Medicine Progress Note  Date of Service: 02/07/2022    Assessment & Plan            Closed fracture of right hip, initial encounter (H)  Unusual history: Adamantly denies that she fell, instead describing that she had a right hip pain two days prior to admission that she had to ease herself to the floor, and was not able to get up again.  X-ray pelvis and right hip showed an acute, moderately displaced subcapital fracture of the right femur with anterior superior displacement of the femoral shaft.  She has been evaluated by orthopedics, and operative repair is planned this afternoon at 1300.  She reports inadequate pain control  from hydromorphone 0.5 mg IV that she is receiving roughly every 4 hours.  Her right hip not only hurts, but she also describes \"head to toe\" pain, including left elbow, left lateral chest, shoulder girdle muscles, and the muscles of both legs.  -Remains NPO.  -Will defer analgesia to the orthopedic team.  -DVT prophylaxis will be deferred to the orthopedic team.    Surgery Clearance and RCRI Risk Assessment:   Anesthesia issues: no  Baseline Activity: 4  METS (1 self-care, 4 flight of stairs, >4 heavy house work)  Chest Pain: no  Shortness of breath: no  Cardiac Risk Factors/Assessment:                High Risk Surgery: no (Ex: vascular, open intraperitoneal, intrathoracic)              History Ischemic Heart Disease: no (MI, +stress, nitrate use, current CP thought to be ischemia, ECG with pathological Qs)              History of Congestive Heart Failure: no              History of CVA: no              Preoperative Treatment with Insulin: no              Preoperative Creatinine greater than 2.0: no              Total Number of Points: 0 = 0.5% risk of major cardiac event.  I do not find an EKG; will order.  Providing EKG is unremarkable, the patient has no modifiable risks for planned surgery, and she will be cleared for the " OR.     Chronic back/leg pain  Patient reports a history of chronic back and leg pain since 2015.    She is not able to describe the nature of the pain very well, and is unable to describe what work-up, if any, has been performed.  -She may benefit from pain evaluation after discharge.        Traumatic rhabdomyolysis, initial encounter (H)  Total CK on ED arrival 6443 --> 5966 --> 5669 (0218 this morning).  This finding is consistent with her report of being down on the floor for nearly two continuous days.  She received a total of 1 L IV normal saline in ED, and has been on normal saline continuous infusion at 150 mL/h since admission.  Renal function is normal despite rhabdomyolysis.   -Continue maintenance IV fluid at 150 mL/h until total CK is normal or nearly so.  -Follow renal function.        Hypokalemia  Admission potassium 3.0, then 2.9 on recheck.  The patient had no oral intake for nearly 2 days, which is the likely etiology.  KCl was replaced orally, K now 3.5.  -Resolved.        Possible alcohol use/abuse    Elevated LFTs  On admission, the patient reported drinking 1.5 glasses of wine daily, never more.  However, hepatic panel shows transaminitis, and she has an unexplained macrocytic anemia, both of which could be secondary to excess alcohol intake.  When this topic was explored on admission by Dr. Merritt, the patient became defensive and perseverated on the topic.  As result, I did not bring this topic up on rounds today.  The patient was started on CIWA protocol; scores have been 3, 2, and 3, mostly because of anxiety which may be a chronic problem entirely unrelated to alcohol use.    -I am going to discontinue CIWA monitoring, but will make lorazepam 0.5 mg p.o. every 4 hours as needed available to treat anxiety.  We may need to use a higher dose if anxiety is poorly controlled.  The patient has pressured speech and tangentiality which makes it difficult to obtain a history from her, and she is  having a difficult time relaxing, expressing fear about any possible eventuality of this hospital stay, surgery, her postoperative course, and even what might happen at discharge.   -We will follow LFTs over time.  Consider ultrasound if LFTs rise.       Anxiety   The patient denies any prior history of mood disorder, including anxiety.  She does not consider herself an anxious individual.  She was taking no medications prior to admission.  However, her mood has been highly anxious here, with pressured speech, tangentiality, and emotional lability.  -Lorazepam 0.5 mg p.o. every 4 hours as needed anxiety, increase dose as needed.       S/P colon resection for prolapse  The patient says that she has no problems or symptoms residual to this as long as she adheres to a very specific diet, focusing on fish and raw vegetables.  She says that she does not want to stool softeners to be used here, though also expresses concern that she will become constipated off of her home diet.  -I suggested senna as a natural option for a laxative.  She would be willing to use this.     Asymptomatic COVID swab negative   Unvaccinated.     Prophylaxis  Deferred to orthopedics.     Lines  Peripheral.    Urinary catheter  PeriWick devices in place.  The patient has the sensation that she is unable to void, and requests a Lopes catheter.     Diet   NPO per Anesthesia Guidelines for Procedure/Surgery Except for: Meds        Code Status  Full       Discussion: Pending EKG, she is cleared for OR.  I think that we are going to need to offer her some anxiolytics, as I think it will otherwise be hard for her to tolerate some of the uncertainties related to this hospital stay.    Disposition: Anticipate discharge on POD #2 or 3.     Attestation:  I have reviewed today's vital signs, notes, medications, labs and imaging.  Amount of time performed on this hospital visit: 40 minutes.    Dayron Vang MD   [facility]      Interval History  "  History is difficult to obtain, as the patient has pressured speech and rapidly changes topics.  She has no respiratory problems.  She has had no exertional chest pain, and has had no chest pain here.  Bringing up pain allowed her to tell me about her chronic musculoskeletal pain, which has been worked up in the past, details not provided, but no etiology has been found.  She says that she has \"head to toe pain\" right now, poorly treated with hydromorphone IV.  I asked her if she could narrow down the locations of the majority of her pain, at which point she said that her left elbow hurts, and her left lateral chest wall.    Physical Exam   Temp:  [98  F (36.7  C)-98.9  F (37.2  C)] 98.3  F (36.8  C)  Pulse:  [63-81] 76  Resp:  [16-18] 16  BP: ()/() 112/65  SpO2:  [94 %-99 %] 96 %    Weights:   Vitals:    02/06/22 0802 02/06/22 1713   Weight: 47.6 kg (105 lb) 50.2 kg (110 lb 10.7 oz)    Body mass index is 17.86 kg/m .    GENERAL: Slender woman, lying in bed with a hooded sweatshirt since around her face, and wearing a paper mask.  She appears anxious but in no distress.  EYES: Eyes grossly normal to inspection, extraocular movements intact  HENT: Exam deferred as the patient was wearing a surgical mask.  NECK: Trachea midline, no stridor.    RESP: No accessory muscle use.  There is some, mild reproducible chest discomfort to palpation of the left lateral thoracic wall.  Lungs clear throughout on inspiration and expiration.  Expiration not prolonged, no wheeze.  CV: Regular rate and rhythm, non-tachycardic.  Normal S1 S2, no murmur or extra sound.  No lower extremity edema.  ABDOMEN: Flat, soft, non-tender, no guarding.  Liver and spleen not enlarged, no masses palpable.  Bowel sounds positive.  MS: No bony deformities noted.  No red or inflamed joints.  Wrapping not removed from the right lower extremity.  SKIN: Warm and dry, no rashes.  NEURO: Alert, oriented, conversant.  Cranial nerves III - XII " grossly intact.  No gross motor or sensory deficits.   PSYCH: Alert, conversant.  Able to articulate logical thoughts, but had some flight of ideas and tangentiality.  There were no expressed hallucinations or delusions.  Affect overtly anxious.        Data   Recent Labs   Lab 02/07/22 0218 02/06/22 2022 02/06/22  0832   WBC 7.5  --  12.4*   HGB 10.7*  --  13.9   *  --  104*     --  216     --  138   POTASSIUM 3.5  3.4 2.9* 3.0*   CHLORIDE 111*  --  104   CO2 23  --  29   BUN 15  --  18   CR 0.44*  --  0.52   ANIONGAP 7  --  5   MAIRA 8.0*  --  9.4   GLC 89  --  161*   ALBUMIN  --   --  3.6   PROTTOTAL  --   --  7.7   BILITOTAL  --   --  1.5*   ALKPHOS  --   --  78   ALT  --   --  98*   AST  --   --  282*   LIPASE  --   --  42*       Recent Labs   Lab 02/07/22 0218 02/06/22  0832   GLC 89 161*        Unresulted Labs Ordered in the Past 30 Days of this Admission     No orders found for last 31 day(s).           Imaging  Recent Results (from the past 24 hour(s))   Lumbar spine XR, 2-3 views    Narrative    EXAM: XR LUMBAR SPINE 2-3 VIEWS  LOCATION: [facility]  DATE/TIME: 02/06/2022, 9:48 AM    INDICATION: Lower back pain.  COMPARISON: MRI lumbar spine 10/31/2016.  TECHNIQUE: CR Lumbar Spine.      Impression    IMPRESSION: Five nonrib-bearing lumbar-type vertebral bodies. Mild convex left lumbar curvature. Normal lumbar lordosis. Vertebral body heights and intervertebral disc space heights are preserved. Facets are unremarkable. Visualized osseous pelvis is   unremarkable. Normal bowel gas pattern. Mild aortic atherosclerotic calcifications.     XR Pelvis w Hip Right 1 View    Narrative    EXAM: XR PELVIS AND HIP RIGHT 1 VIEW  LOCATION: [facility]  DATE/TIME: 2/6/2022 9:53 AM    INDICATION: R hip pain  COMPARISON: None.      Impression    IMPRESSION: Acute moderately displaced subcapital fracture of the right femur with anterior superior  displacement of the femoral shaft.   XR Shoulder Right Port G/E 2 Views    Narrative    EXAM: XR SHOULDER RIGHT PORT G/E 2 VIEWS  LOCATION: [facility]  DATE/TIME: 2/6/2022 11:22 AM    INDICATION: R shoulder pain  COMPARISON: None.      Impression    IMPRESSION: No fracture. Osteopenia. No degenerative changes.         I reviewed all new labs and imaging results over the last 24 hours. I personally reviewed no images or EKG's today.    Medications     sodium chloride 1,000 mL (02/07/22 0818)       folic acid  1 mg Oral Daily     multivitamin w/minerals  1 tablet Oral Daily     thiamine  100 mg Oral Daily       Dayron Vang MD    [facility]

## 2022-02-07 NOTE — ANESTHESIA POSTPROCEDURE EVALUATION
Patient: Amira Scherer    Procedure: Procedure(s):  Bipolar Hemiarthroplasty Right Hip       Diagnosis:Hip fracture (H) [S72.009A]  Diagnosis Additional Information: No value filed.    Anesthesia Type:  General    Note:  Disposition: Inpatient   Postop Pain Control: Uneventful            Sign Out: Well controlled pain   PONV: No   Neuro/Psych: Uneventful            Sign Out: Acceptable/Baseline neuro status   Airway/Respiratory: Uneventful            Sign Out: Acceptable/Baseline resp. status   CV/Hemodynamics: Uneventful            Sign Out: Acceptable CV status; No obvious hypovolemia; No obvious fluid overload   Other NRE: NONE   DID A NON-ROUTINE EVENT OCCUR? No           Last vitals:  Vitals Value Taken Time   /78 02/07/22 1700   Temp 36.6  C (97.9  F) 02/07/22 1601   Pulse 64 02/07/22 1710   Resp 8 02/07/22 1710   SpO2 97 % 02/07/22 1710   Vitals shown include unvalidated device data.    Electronically Signed By: Lit Luis CRNA, APRN CRNA  February 7, 2022  5:49 PM

## 2022-02-07 NOTE — CONSULTS
[facility] Consultation    Consultation - [facility]  Level of consult: Consult, follow and place orders    Amira Scherer,  1965, MRN [id number]     Admitting Dx: Fall, initial encounter [W19.XXXA]  Closed fracture of right hip, initial encounter (H) [S72.001A]  Traumatic rhabdomyolysis, initial encounter (H) [T79.6XXA]     PCP: No Ref-Primary, Physician, None     Code status:  Full Code     No emergency contact information on file.     Assessment: Right femoral neck hip fracture    Plan:  Bipolar hemiarthroplasty    The patient consented to proceed with the above procedure.  They understand the potential for pain, incomplete healing, decreased range of motion, infection, wound healing problems, limited ambulatory ability, and a decreased quality of life.  We discussed the alternatives.  They consented to proceed with surgery.    Principal Problem:    Closed fracture of right hip, initial encounter (H)  Active Problems:    Anxiety    S/P colon resection    Closed right hip fracture (H)    Traumatic rhabdomyolysis, initial encounter (H)       Chief Complaint  Right hip pain     HPI  We have been requested by Dr. Merritt to evaluate Amira Scherer who is a 57 year old year old female for evaluation of right hip pain.  The patient complains of severe achy pain in the right hip aggravated with motion and improved at rest.  The pain started Saturday.  The patient denies falling and says she has had 6 years of back pain.    History is obtained from the patient     Past Medical History  No past medical history on file.    Surgical History  Past Surgical History:   Procedure Laterality Date     COLECTOMY WITHOUT COLOSTOMY       COLON SURGERY   and     Colon removed         Social History  Social History     Socioeconomic History     Marital status:      Spouse name: Tanner Scherer     Number of children: 2     Years of education: 12     Highest education level: Not on  file   Occupational History     Employer: [employer]   Tobacco Use     Smoking status: Current Some Day Smoker     Packs/day: 0.50     Years: 20.00     Pack years: 10.00     Types: Cigarettes     Smokeless tobacco: Never Used   Substance and Sexual Activity     Alcohol use: Yes     Comment: Red wine 2-3 times per week     Drug use: No     Sexual activity: Yes     Partners: Male   Other Topics Concern     Parent/sibling w/ CABG, MI or angioplasty before 65F 55M? No   Social History Narrative     Not on file     Social Determinants of Health     Financial Resource Strain: Not on file   Food Insecurity: Not on file   Transportation Needs: Not on file   Physical Activity: Not on file   Stress: Not on file   Social Connections: Not on file   Intimate Partner Violence: Not on file   Housing Stability: Not on file       Family History  Family History   Problem Relation Age of Onset     Hypertension Mother      Arthritis Mother      Breast Cancer Sister      Thyroid Disease Sister         Allergies:  Patient has no known allergies.      Current Medications:  Current Facility-Administered Medications   Medication     [Auto Hold] bisacodyl (DULCOLAX) Suppository 10 mg     ceFAZolin (ANCEF) intermittent infusion 2 g in 100 mL dextrose PRE-MIX     [Auto Hold] flumazenil (ROMAZICON) injection 0.2 mg     [Auto Hold] folic acid (FOLVITE) tablet 1 mg     [Auto Hold] HYDROmorphone (DILAUDID) injection 0.3-0.5 mg     [Auto Hold] ibuprofen (ADVIL/MOTRIN) tablet 600 mg     lactated ringers infusion     [Auto Hold] LORazepam (ATIVAN) tablet 0.5 mg     [Auto Hold] melatonin tablet 5 mg     [Auto Hold] multivitamin w/minerals (THERA-VIT-M) tablet 1 tablet     [Auto Hold] naloxone (NARCAN) injection 0.2 mg    Or     [Auto Hold] naloxone (NARCAN) injection 0.4 mg    Or     [Auto Hold] naloxone (NARCAN) injection 0.2 mg    Or     [Auto Hold] naloxone (NARCAN) injection 0.4 mg     [Auto Hold] ondansetron (ZOFRAN-ODT) ODT tab 4 mg    Or      [Auto Hold] ondansetron (ZOFRAN) injection 4 mg     [Auto Hold] polyethylene glycol (MIRALAX) Packet 17 g     povidone-iodine (Betadine) 0.28% in NaCl 0.9% 500 mL irrigation     [Auto Hold] senna-docusate (SENOKOT-S/PERICOLACE) 8.6-50 MG per tablet 1 tablet     sodium chloride 0.9% (bag) irrigation     sodium chloride 0.9% infusion     [Auto Hold] thiamine (B-1) tablet 100 mg     Facility-Administered Medications Ordered in Other Encounters   Medication     dexamethasone (DECADRON) injection     fentaNYL (PF) (SUBLIMAZE) injection     glycopyrrolate (ROBINUL) injection     HYDROmorphone (DILAUDID) injection     ketamine (KETALAR) injection     ketorolac (TORADOL) injection     lidocaine 1 % injection     magnesium sulfate 2 g in water intermittent infusion     midazolam (VERSED) injection     ondansetron (ZOFRAN) injection     propofol (DIPRIVAN) injection 10 mg/mL vial     propofol (DIPRIVAN) injection 10 mg/mL vial     rocuronium injection       Review of Systems:  CONSTITUTIONAL: NEGATIVE for fever, chills, change in weight  ENT/MOUTH: NEGATIVE for ear, mouth and throat problems  RESP: NEGATIVE for significant cough or SOB  CV: NEGATIVE for chest pain, palpitations or peripheral edema    Physical Exam:  Temp:  [98  F (36.7  C)-98.9  F (37.2  C)] 98.3  F (36.8  C)  Pulse:  [67-81] 76  Resp:  [16-18] 16  BP: (101-133)/(63-83) 109/68  SpO2:  [96 %-99 %] 97 %    The patient lying in the hospital bed in no acute distress.  The patient's breathing in unlabored.  The patient is awake.    Examination of the right hip reveals:   Pain with attempted range of motion   The skin over the hip is intact   Range of motion in flexion and rotation is severely limited due to pain   Strength of hip flexion is limited by pain    Examination of the left hip reveals   No pain with attempted range of motion   The skin over the hip is intact   Range of motion is essentially normal, although examination is limited by the patient's  condition.   Strength is essentially normal, although the examination is limited by the patient's condition.    Dorsalis pedis pulses are palpable bilaterally  Ankle flexion and extension and EHL and FHL are intact bilaterally       Pertinent Labs  Lab Results: personally reviewed.  Lab Results   Component Value Date    WBC 7.5 02/07/2022    HGB 10.7 (L) 02/07/2022    HCT 32.4 (L) 02/07/2022     (H) 02/07/2022     02/07/2022     No results for input(s): INR in the last 168 hours.    Pertinent Radiology  Radiology Results: images and radiology report reviewed  There is a displace femoral neck fracture of the right hip.    Attestation:  I have reviewed today's vital signs, notes, medications, labs and imaging.     Kb Lizarraga MD

## 2022-02-07 NOTE — PLAN OF CARE
"[place] [facility] [place]    Patient admitted to room 2304 at approximately 1713 via cart from emergency room. Patient was accompanied by transport tech.     Verbal SBAR report received from ROXANNE Marie prior to patient arrival.     Patient trasferred to bed via air brandi. Patient alert and oriented X 3. Pain is controlled with current analgesics.  Medication(s) being used: narcotic analgesics including hydromorphone (Dilaudid).  . Admission vital signs: Blood pressure 101/63, pulse 67, temperature 98.9  F (37.2  C), temperature source Oral, resp. rate 16, height 1.676 m (5' 6\"), weight 50.2 kg (110 lb 10.7 oz), SpO2 98 %. Patient was oriented to plan of care, call light, bed controls, tv, telephone, bathroom, and visiting hours.     Risk Assessment    The following safety risks were identified during admission: fall and skin. Yellow risk band applied: YES.     Skin Initial Assessment    This writer admitted this patient and completed a full skin assessment and Brando score in the Adult PCS flowsheet. Appropriate interventions initiated as needed.     Secondary skin check completed by Justine Thomas RN.         Education    Patient has a Trail to Observation order: No  Observation education completed and documented: No      Shantell Thomason RN      "

## 2022-02-07 NOTE — PLAN OF CARE
[facility] TRANSPORT NOTE  Data:   Reason for Transport:  To surgery.    Amira Shcerer was transported from Med/Surg Room 2304 via bed at 1150.  Patient was accompanied by Registered Nurse and Nursing Assistant. Equipment used for transport: None. Family was aware of reason for transport: Patient declines.    Action:  Report: given to Nuris QUEVEDO RN.    Response:  Patient's condition when transferred off unit was stable.    Patient was bladder scanned at 1030 for 447 ml.  Straight catheterized at 1130 for 400 ml.  Patient is very anxious; have provided reassurance and active listening.  Patient declined to take ativan when offered for anxiety.  Did agree to take dilaudid 0.5 mg IV at 1018 for pain.  Patient is alert and oriented x 4.    Arielle Torrez RN

## 2022-02-07 NOTE — PLAN OF CARE
Pure wick external catheter in place.  Bladder scan for 278 at 0630.  No void.  PRN order to straight cath when scan is greater than 400.

## 2022-02-07 NOTE — ANESTHESIA PROCEDURE NOTES
Airway       Patient location during procedure: OR       Procedure Start/Stop Times: 2/7/2022 1:53 PM  Staff -        CRNA: Gina Magaña APRN CRNA       Other Anesthesia Staff: Denny Danielle       Performed By: ELLE and CRNA  Consent for Airway        Urgency: elective  Indications and Patient Condition       Indications for airway management: sulaiman-procedural       Induction type:intravenous       Mask difficulty assessment: 0 - not attempted    Final Airway Details       Final airway type: endotracheal airway       Successful airway: ETT - single  Endotracheal Airway Details        ETT size (mm): 7.0       Cuffed: yes       Cuff volume (mL): 6       Successful intubation technique: video laryngoscopy       VL Blade Size: Banks 3       Grade View of Cords: 1       Position: Right       Measured from: lips       Secured at (cm): 20       Bite block used: None    Post intubation assessment        Placement verified by: capnometry, equal breath sounds and chest rise        Number of attempts at approach: 1       Number of other approaches attempted: 0       Secured with: silk tape       Ease of procedure: easy       Dentition: Intact and Unchanged

## 2022-02-07 NOTE — BRIEF OP NOTE
Orthopaedics  Brief Operative Note      Pre-operative diagnosis: Hip fracture (H) [S72.009A]   Post-operative diagnosis: Same   Procedure: Bipolar hemiarthroplasty of right hip   Surgeon: Kb Lizarraga MD     Assistant(s): DAVIDA Peres   Anesthesia: General endotracheal anesthesia   Estimated blood loss: Minimal       Drains: None   Specimens: None   Findings: See full dictated operative note for details   Complications: None known     Condition: Stable   Weight bearing status: NWB on operative extremity     Follow up plan                          Follow up in clinic in 10-14 days

## 2022-02-07 NOTE — PLAN OF CARE
Alert and oriented.  VSS.  Complains of right groin pain, IV Dilaudid given for comfort.  Have gently repositioned and weight shifted over night.  I able to move hips independently.  Has been NPO since MN.

## 2022-02-08 ENCOUNTER — APPOINTMENT (OUTPATIENT)
Dept: OCCUPATIONAL THERAPY | Facility: CLINIC | Age: 57
DRG: 956 | End: 2022-02-08
Payer: COMMERCIAL

## 2022-02-08 ENCOUNTER — APPOINTMENT (OUTPATIENT)
Dept: PHYSICAL THERAPY | Facility: CLINIC | Age: 57
DRG: 956 | End: 2022-02-08
Payer: COMMERCIAL

## 2022-02-08 LAB
ALBUMIN SERPL-MCNC: 2.3 G/DL (ref 3.4–5)
ALP SERPL-CCNC: 47 U/L (ref 40–150)
ALT SERPL W P-5'-P-CCNC: 99 U/L (ref 0–50)
ANION GAP SERPL CALCULATED.3IONS-SCNC: 5 MMOL/L (ref 3–14)
AST SERPL W P-5'-P-CCNC: 270 U/L (ref 0–45)
BILIRUB SERPL-MCNC: 0.8 MG/DL (ref 0.2–1.3)
BUN SERPL-MCNC: 12 MG/DL (ref 7–30)
CALCIUM SERPL-MCNC: 7.9 MG/DL (ref 8.5–10.1)
CHLORIDE BLD-SCNC: 106 MMOL/L (ref 94–109)
CK SERPL-CCNC: 4786 U/L (ref 30–225)
CO2 SERPL-SCNC: 25 MMOL/L (ref 20–32)
CREAT SERPL-MCNC: 0.45 MG/DL (ref 0.52–1.04)
ERYTHROCYTE [DISTWIDTH] IN BLOOD BY AUTOMATED COUNT: 11.9 % (ref 10–15)
GFR SERPL CREATININE-BSD FRML MDRD: >90 ML/MIN/1.73M2
GLUCOSE BLD-MCNC: 100 MG/DL (ref 70–99)
HCT VFR BLD AUTO: 28.2 % (ref 35–47)
HGB BLD-MCNC: 9.3 G/DL (ref 11.7–15.7)
MCH RBC QN AUTO: 34.7 PG (ref 26.5–33)
MCHC RBC AUTO-ENTMCNC: 33 G/DL (ref 31.5–36.5)
MCV RBC AUTO: 105 FL (ref 78–100)
PLATELET # BLD AUTO: 153 10E3/UL (ref 150–450)
POTASSIUM BLD-SCNC: 3.6 MMOL/L (ref 3.4–5.3)
PROT SERPL-MCNC: 5.5 G/DL (ref 6.8–8.8)
RBC # BLD AUTO: 2.68 10E6/UL (ref 3.8–5.2)
SARS-COV-2 RNA RESP QL NAA+PROBE: NEGATIVE
SODIUM SERPL-SCNC: 136 MMOL/L (ref 133–144)
WBC # BLD AUTO: 3.2 10E3/UL (ref 4–11)

## 2022-02-08 PROCEDURE — 120N000001 HC R&B MED SURG/OB

## 2022-02-08 PROCEDURE — 85027 COMPLETE CBC AUTOMATED: CPT

## 2022-02-08 PROCEDURE — 250N000011 HC RX IP 250 OP 636: Performed by: ORTHOPAEDIC SURGERY

## 2022-02-08 PROCEDURE — 80053 COMPREHEN METABOLIC PANEL: CPT

## 2022-02-08 PROCEDURE — 82550 ASSAY OF CK (CPK): CPT

## 2022-02-08 PROCEDURE — 97161 PT EVAL LOW COMPLEX 20 MIN: CPT | Mod: GP | Performed by: PHYSICAL THERAPIST

## 2022-02-08 PROCEDURE — 97535 SELF CARE MNGMENT TRAINING: CPT | Mod: GO

## 2022-02-08 PROCEDURE — 250N000013 HC RX MED GY IP 250 OP 250 PS 637: Performed by: ORTHOPAEDIC SURGERY

## 2022-02-08 PROCEDURE — 99232 SBSQ HOSP IP/OBS MODERATE 35: CPT

## 2022-02-08 PROCEDURE — 36415 COLL VENOUS BLD VENIPUNCTURE: CPT

## 2022-02-08 PROCEDURE — 250N000013 HC RX MED GY IP 250 OP 250 PS 637: Performed by: FAMILY MEDICINE

## 2022-02-08 PROCEDURE — 97165 OT EVAL LOW COMPLEX 30 MIN: CPT | Mod: GO

## 2022-02-08 PROCEDURE — 87635 SARS-COV-2 COVID-19 AMP PRB: CPT | Performed by: INTERNAL MEDICINE

## 2022-02-08 PROCEDURE — 250N000013 HC RX MED GY IP 250 OP 250 PS 637: Performed by: PHYSICIAN ASSISTANT

## 2022-02-08 PROCEDURE — 97530 THERAPEUTIC ACTIVITIES: CPT | Mod: GP | Performed by: PHYSICAL THERAPIST

## 2022-02-08 PROCEDURE — 97116 GAIT TRAINING THERAPY: CPT | Mod: GP | Performed by: PHYSICAL THERAPIST

## 2022-02-08 RX ORDER — CYCLOBENZAPRINE HCL 5 MG
5-10 TABLET ORAL 3 TIMES DAILY
Status: DISCONTINUED | OUTPATIENT
Start: 2022-02-08 | End: 2022-02-13 | Stop reason: HOSPADM

## 2022-02-08 RX ORDER — AMOXICILLIN 250 MG
1 CAPSULE ORAL 2 TIMES DAILY PRN
Qty: 30 TABLET | Refills: 0 | Status: SHIPPED | OUTPATIENT
Start: 2022-02-08 | End: 2022-07-26

## 2022-02-08 RX ORDER — POLYETHYLENE GLYCOL 3350 17 G/17G
17 POWDER, FOR SOLUTION ORAL DAILY PRN
Qty: 510 G | Refills: 0 | Status: SHIPPED | OUTPATIENT
Start: 2022-02-08 | End: 2022-03-31

## 2022-02-08 RX ORDER — HYDROXYZINE HYDROCHLORIDE 25 MG/1
25-50 TABLET, FILM COATED ORAL EVERY 6 HOURS PRN
Status: DISCONTINUED | OUTPATIENT
Start: 2022-02-08 | End: 2022-02-13 | Stop reason: HOSPADM

## 2022-02-08 RX ORDER — OXYCODONE HYDROCHLORIDE 5 MG/1
5 TABLET ORAL
Status: DISCONTINUED | OUTPATIENT
Start: 2022-02-08 | End: 2022-02-13 | Stop reason: HOSPADM

## 2022-02-08 RX ORDER — OXYCODONE HYDROCHLORIDE 5 MG/1
5-10 TABLET ORAL EVERY 4 HOURS PRN
Qty: 40 TABLET | Refills: 0 | Status: SHIPPED | OUTPATIENT
Start: 2022-02-08 | End: 2022-03-31

## 2022-02-08 RX ORDER — ACETAMINOPHEN 325 MG/1
975 TABLET ORAL EVERY 8 HOURS
Qty: 90 TABLET | Refills: 0 | Status: SHIPPED | OUTPATIENT
Start: 2022-02-09 | End: 2022-03-18

## 2022-02-08 RX ORDER — HYDROXYZINE HYDROCHLORIDE 25 MG/1
25-50 TABLET, FILM COATED ORAL EVERY 6 HOURS PRN
Qty: 30 TABLET | Refills: 1 | Status: SHIPPED | OUTPATIENT
Start: 2022-02-08 | End: 2022-03-31

## 2022-02-08 RX ORDER — OXYCODONE HYDROCHLORIDE 5 MG/1
10 TABLET ORAL EVERY 4 HOURS PRN
Status: DISCONTINUED | OUTPATIENT
Start: 2022-02-08 | End: 2022-02-13 | Stop reason: HOSPADM

## 2022-02-08 RX ORDER — ASPIRIN 325 MG
325 TABLET, DELAYED RELEASE (ENTERIC COATED) ORAL DAILY
Qty: 42 TABLET | Refills: 0 | Status: SHIPPED | OUTPATIENT
Start: 2022-02-08 | End: 2022-03-31

## 2022-02-08 RX ORDER — CYCLOBENZAPRINE HCL 5 MG
5-10 TABLET ORAL 3 TIMES DAILY PRN
Qty: 30 TABLET | Refills: 0 | Status: SHIPPED | OUTPATIENT
Start: 2022-02-08 | End: 2022-03-31

## 2022-02-08 RX ADMIN — ACETAMINOPHEN 975 MG: 325 TABLET, FILM COATED ORAL at 10:18

## 2022-02-08 RX ADMIN — ACETAMINOPHEN 975 MG: 325 TABLET, FILM COATED ORAL at 03:03

## 2022-02-08 RX ADMIN — OXYCODONE HYDROCHLORIDE 10 MG: 5 TABLET ORAL at 23:38

## 2022-02-08 RX ADMIN — CEFAZOLIN SODIUM 1 G: 1 INJECTION, SOLUTION INTRAVENOUS at 04:49

## 2022-02-08 RX ADMIN — ACETAMINOPHEN 975 MG: 325 TABLET, FILM COATED ORAL at 18:57

## 2022-02-08 RX ADMIN — OXYCODONE HYDROCHLORIDE 10 MG: 5 TABLET ORAL at 11:27

## 2022-02-08 RX ADMIN — OXYCODONE HYDROCHLORIDE 10 MG: 5 TABLET ORAL at 15:26

## 2022-02-08 RX ADMIN — CYCLOBENZAPRINE HYDROCHLORIDE 5 MG: 5 TABLET, FILM COATED ORAL at 19:31

## 2022-02-08 RX ADMIN — CYCLOBENZAPRINE HYDROCHLORIDE 5 MG: 5 TABLET, FILM COATED ORAL at 14:53

## 2022-02-08 RX ADMIN — OXYCODONE HYDROCHLORIDE 5 MG: 5 TABLET ORAL at 04:19

## 2022-02-08 RX ADMIN — ENOXAPARIN SODIUM 40 MG: 100 INJECTION SUBCUTANEOUS at 10:19

## 2022-02-08 RX ADMIN — OXYCODONE HYDROCHLORIDE 10 MG: 5 TABLET ORAL at 19:32

## 2022-02-08 RX ADMIN — OXYCODONE HYDROCHLORIDE 10 MG: 5 TABLET ORAL at 07:36

## 2022-02-08 RX ADMIN — SENNOSIDES AND DOCUSATE SODIUM 1 TABLET: 50; 8.6 TABLET ORAL at 19:32

## 2022-02-08 RX ADMIN — OXYCODONE HYDROCHLORIDE 5 MG: 5 TABLET ORAL at 03:03

## 2022-02-08 RX ADMIN — CYCLOBENZAPRINE HYDROCHLORIDE 5 MG: 5 TABLET, FILM COATED ORAL at 09:11

## 2022-02-08 RX ADMIN — IBUPROFEN 600 MG: 600 TABLET ORAL at 14:53

## 2022-02-08 ASSESSMENT — ACTIVITIES OF DAILY LIVING (ADL)
ADLS_ACUITY_SCORE: 12
ADLS_ACUITY_SCORE: 8
ADLS_ACUITY_SCORE: 14
ADLS_ACUITY_SCORE: 14
ADLS_ACUITY_SCORE: 12
ADLS_ACUITY_SCORE: 8
ADLS_ACUITY_SCORE: 10
ADLS_ACUITY_SCORE: 8
ADLS_ACUITY_SCORE: 14
ADLS_ACUITY_SCORE: 8
ADLS_ACUITY_SCORE: 8
ADLS_ACUITY_SCORE: 12
ADLS_ACUITY_SCORE: 14
ADLS_ACUITY_SCORE: 12
ADLS_ACUITY_SCORE: 12
ADLS_ACUITY_SCORE: 10
DEPENDENT_IADLS:: INDEPENDENT
ADLS_ACUITY_SCORE: 8
ADLS_ACUITY_SCORE: 10
ADLS_ACUITY_SCORE: 8
ADLS_ACUITY_SCORE: 14
ADLS_ACUITY_SCORE: 8
ADLS_ACUITY_SCORE: 14

## 2022-02-08 NOTE — OP NOTE
Procedure Date: 02/07/2022    PREOPERATIVE DIAGNOSIS:  Right hip femoral neck fracture.    POSTOPERATIVE DIAGNOSIS:  Right hip femoral neck fracture.    PROCEDURE PERFORMED:  Bipolar hemiarthroplasty, right hip.    SURGEON:  Kb Lizarraga MD.    ASSISTANTS:  Jackie Head PA-C.  A first assistant was necessary in this case to assist with patient positioning, maintaining leg positioning during surgery, surgical exposure, incision, irrigation and closure, dressing application, and to assure safe discharge of the patient from the operating room following the procedure.    ANESTHESIA:  General anesthesia.    SPECIMENS:  None.    DRAINS:  None.    COMPLICATIONS:  None known.    INDICATION:  The patient had very osteoporotic and eggshell thin cortical bone that belied her age.    PROCEDURE NOTE:  After discussing risks, benefits and alternatives to the procedure with the patient, the patient consented to proceed with the surgery described below.  She understands the potential for neurovascular injury, infection, wound healing problems, hardware failure, decreased range of motion, persistent pain and decreased quality of life.    We brought the patient to the operating room and placed the patient on the operating table in the left lateral decubitus position after induction of anesthesia. We padded bony prominences and placed an axillary roll with within the left axillary region and elevated the beanbag to hold her in the left lateral decubitus position.  We prepped the right leg with DuraPrep and draped it in the usual sterile fashion.    We performed a posterior approach to the right hip through a curvilinear incision centered over the tip of the greater trochanter.  We dissected subcutaneous tissue and incised the tensor fascia jd.  We elevated the short external rotators off the posterior greater trochanter and made a T-shaped incision through the joint capsule.    We identified the patient's fracture and removed  the humeral head.  It measured 46 mm.  We then cut the neck 1 fingerbreadth proximal to the lesser trochanter, using a cutting template for the DePuy Wellston stem system.    We performed a reaming and sequential broaching.  The patient had very thin cortical bone, even within the calcar region.  I decided to stop at a size 2 broach, given my intention to cement the stem, in order to prevent further damage to the cortical bone and avoid potential splitting of the proximal femur.    We inserted a size 2 broach and templated +1.5 and a +0.5 neck with a 46 mm bipolar head.  The latter neck appeared to provide the best fit, leg length, and stability when testing in position of sleep and extension and external rotation.    We removed the components and inserted a cement restrictor.  We thoroughly irrigated the canal and inserted a size 2 cemented DePuy Wellston stem.  We inserted a +5 head and a size 46 mm bipolar head.  We confirmed satisfactory reduction and stability following implant insertion.    We again thoroughly irrigated the incision, reapproximated the joint capsule with #1 Ethibond suture and reapproximated the short external rotators to the greater trochanter with #1 Ethibond suture.  We reapproximated the tensor fascia jd with #1 Ethibond suture and reapproximated the skin with 2-0 Monocryl suture and skin staples.  We applied an Aquacel dressing to the right hip.  We applied an abduction pillow and the patient was awakened from anesthesia and was taken to the recovery room in good condition.    The patient may begin weightbearing immediately.  She will be on Lovenox until discharge from the hospital, at which time we will discharge her on aspirin 325 mg for 6 weeks.  She will return in 2 weeks for dressing removal and staple removal.    Kb Lizarraga MD        D: 2022   T: 2022   MT: [id number]    Name:     HOMA MATSON  MRN:      [id number]        Account:        [id number]   :       1965           Procedure Date: 02/07/2022     Document: [id number]

## 2022-02-08 NOTE — PROGRESS NOTES
Patient refused to have Capnography on and oxygen saturation monitor.  Both were educated on to patient and then removed.  Patient was complaining of discomfort in operative leg, requested for the wedge to be removed for a short time.  Patient was educated on the reason we have it in place, wedge removed but was being observed.  Patient then dangled legs and ambulated to the commode to void with walker and assist x2.  Did complain of pain, but it was manageable.

## 2022-02-08 NOTE — PROGRESS NOTES
"[facility] Medicine Progress Note  Date of Service: 02/08/2022    Assessment & Plan            Closed fracture of right hip, initial encounter (H)  Unusual history: Adamantly denies that she fell, instead describing that she had a right hip pain two days prior to admission that she had to ease herself to the floor, and was not able to get up again.  X-ray pelvis and right hip showed an acute, moderately displaced subcapital fracture of the right femur with anterior superior displacement of the femoral shaft.  The patient was taken to the OR 2/7/2022 by Dr. Lizarraga, who performed a bipolar hemiarthroplasty of the right hip under general anesthesia.  Today, POD #1, the patient's only complaints are with regard to right hip and leg pain.  Despite my telling her that I am not the physician that is managing her pain medications, she thoroughly explained that oxycodone 5 mg and acetaminophen 975 mg is insufficient analgesia.  About an hour ago, she was given oxycodone 10 mg as a single dose, still says that she is having \"excruciating\" pain, but also told me that she is getting drowsy.  -I explained again that only one caregiver team should be prescribing pain medications, and here that is going to be the orthopedic team.  She expressed understanding.  -DVT prophylaxis per orthopedic team is enoxaparin 40 mg subcu daily for the duration of the hospital stay, then aspirin 325 mg daily at discharge for 42 days.    -The patient is permitted to weight-bear as tolerated on the right leg.    -Orthopedics has ordered PT and OT.       Perioperative anemia  Admission hemoglobin was normal at 13.9 --> 10.7 pre-op --> 9.3 on POD#1.  MCV is macrocytic, and has been for at least the last 8 years.  I suspect that the initial hemoglobin drop was due to some blood loss into the fracture site, and the further decline probably relates to perioperative blood loss.  -We will follow " hemoglobin.     Chronic back/leg pain  On admission patient reported a history of chronic back and leg pain since 2015.    She does not describe what work-up, if any, has been performed.  -She may benefit from pain evaluation after discharge.        Traumatic rhabdomyolysis, initial encounter (H)  Total CK on ED arrival 6443 --> 5966 --> 5669 --> 4786 this morning.  This finding is consistent with her report of being down on the floor for nearly two continuous days.  She received a total of 1 L IV normal saline in ED, and is now on LR continuous infusion at 100 mL/h.  Renal function is normal despite rhabdomyolysis.   -Continue  mL/h until CK is closer to normal.  -Follow renal function.        Hypokalemia  Admission potassium 3.0, then 2.9 on recheck.  The patient had no oral intake for nearly 2 days, which was the likely etiology.  KCl was replaced orally, K normal since then.  -Resolved.        Possible alcohol use/abuse    Elevated LFTs  On admission, the patient reported drinking 1.5 glasses of wine daily, never more.  However, hepatic panel shows transaminitis, and she has an unexplained macrocytic anemia, both of which could be secondary to excess alcohol intake.  When this topic was explored on admission by Dr. Merritt, the patient became defensive and perseverated on the topic.  As result, I did not bring this topic up on rounds today.  The patient was started on CIWA protocol; initial scores were 3, 2, and 3, mostly because of anxiety which may be a chronic problem entirely unrelated to alcohol use.  As result, I discontinued CIWA monitoring, but may lorazepam 0.5 mg p.o. every 4 hours as needed available to treat anxiety.  She has not received any doses thus far.  There is no clinical evidence to suggest acute ethanol withdrawal.  ALT 98 --> 99   --> 270  -Continue to monitor for signs/symptoms of withdrawal.  -We will follow LFTs over time.  Consider ultrasound if LFTs rise.       Anxiety    "The patient denies any prior history of mood disorder, including anxiety.  She does not consider herself an anxious individual.  She was taking no medications prior to admission.  However, her mood has been highly anxious here, with pressured speech, tangentiality, and emotional lability.  This anxiety persists even after operative repair of her fracture  -Lorazepam 0.5 mg p.o. every 4 hours as needed anxiety is available, increase dose as needed.       S/P colon resection for prolapse  The patient says that she has no problems or symptoms residual to this as long as she adheres to a very specific diet, focusing on fish and raw vegetables.  She says that she does not want to stool softeners to be used here, though also expresses concern that she will become constipated off of her home diet.  -I suggested senna as a natural option for a laxative.  She would be willing to use this.  Senna/docusate 1 p.o. twice daily is ordered.     Asymptomatic COVID swab negative   Unvaccinated.     Prophylaxis  Per orthopedics, enoxaparin 40 mg subcu daily until hospital discharge, then aspirin 325 mg daily for 42 days     Lines  Peripheral.    Urinary catheter  None.     Diet   Regular diet.        Code Status  Full       Discussion: Objectively she is doing well on POD #1.  Her only complaints and questions surround the topic of analgesia.    Disposition: Anticipate discharge on POD #2 or 3.     Attestation:  I have reviewed today's vital signs, notes, medications, labs and imaging.  Amount of time performed on this hospital visit: 25 minutes.    Dayron Vang MD   [facility] Medicine      Interval History   \"Excruciating, excruciating pain\".  Her only pain is from the area of the right femur, radiating distally toward the foot but not including the foot.  About an hour before my visit she received oxycodone is a 10 mg dose for the first time; although she says that it is in adequate analgesia, she also says that she is " getting drowsy.  She denies dyspnea or cough.  Denies nausea, vomiting, or diarrhea.    Physical Exam   Temp:  [97.4  F (36.3  C)-99.2  F (37.3  C)] 99.2  F (37.3  C)  Pulse:  [62-97] 80  Resp:  [10-20] 16  BP: (102-160)/() 113/70  SpO2:  [87 %-99 %] 98 %    Weights:   Vitals:    02/06/22 0802 02/06/22 1713   Weight: 47.6 kg (105 lb) 50.2 kg (110 lb 10.7 oz)    Body mass index is 17.86 kg/m .    GENERAL: Slender woman, lying in bed, appears very anxious.  EYES: Eyes grossly normal to inspection, extraocular movements intact  HENT: Exam deferred as the patient was wearing a surgical mask.  NECK: Trachea midline, no stridor.    RESP: No accessory muscle use.  Lungs clear throughout on inspiration and expiration.  Expiration not prolonged, no wheeze.  CV: Regular rate and rhythm, non-tachycardic.  Normal S1 S2, no murmur or extra sound.  No lower extremity edema.  ABDOMEN: Flat, soft, non-tender, no guarding.  Liver and spleen not enlarged, no masses palpable.  Bowel sounds positive.  MS: No bony deformities noted.  No red or inflamed joints.  Wrapping not removed from the right lower extremity.  SKIN: Warm and dry, no rashes.  NEURO: Alert, oriented, conversant.  Cranial nerves III - XII grossly intact.  No gross motor or sensory deficits.   PSYCH: Alert, conversant.  Able to articulate logical thoughts, but continues to have some tangentiality.  Speech remains rapid to the point of being pressured.  There were no expressed hallucinations or delusions.  Affect overtly anxious.        Data   Recent Labs   Lab 02/08/22  0543 02/07/22  0218 02/06/22 2022 02/06/22  0832   WBC 3.2* 7.5  --  12.4*   HGB 9.3* 10.7*  --  13.9   * 105*  --  104*    159  --  216    141  --  138   POTASSIUM 3.6 3.5  3.4 2.9* 3.0*   CHLORIDE 106 111*  --  104   CO2 25 23  --  29   BUN 12 15  --  18   CR 0.45* 0.44*  --  0.52   ANIONGAP 5 7  --  5   MAIRA 7.9* 8.0*  --  9.4   * 89  --  161*   ALBUMIN 2.3*  --   --   3.6   PROTTOTAL 5.5*  --   --  7.7   BILITOTAL 0.8  --   --  1.5*   ALKPHOS 47  --   --  78   ALT 99*  --   --  98*   *  --   --  282*   LIPASE  --   --   --  42*       Recent Labs   Lab 02/08/22  0543 02/07/22  0218 02/06/22  0832   * 89 161*        Unresulted Labs Ordered in the Past 30 Days of this Admission     No orders found from 1/7/2022 to 2/7/2022.           Imaging  Recent Results (from the past 24 hour(s))   XR Pelvis w Hip Port Right 1 View    Narrative    PELVIS AND HIP PORTABLE RIGHT ONE VIEW   2/7/2022 4:36 PM     HISTORY: Status post hip replacement.    COMPARISON: X-ray from 2/6/2022.      Impression    IMPRESSION: Right hip hemiarthroplasty in place. No evidence of  complication. There are multiple nondilated gas-filled loops of small  bowel. This is slightly more prominent than that seen preoperatively.    EFE GAMBLE MD         SYSTEM ID:  [id number]        I reviewed all new labs and imaging results over the last 24 hours. I personally reviewed no images or EKG's today.    Medications     lactated ringers 100 mL/hr at 02/07/22 1740     sodium chloride Stopped (02/07/22 1138)       acetaminophen  975 mg Oral Q8H     cyclobenzaprine  5-10 mg Oral TID     enoxaparin ANTICOAGULANT  40 mg Subcutaneous Q24H     folic acid  1 mg Oral Daily     multivitamin w/minerals  1 tablet Oral Daily     polyethylene glycol  17 g Oral Daily     senna-docusate  1 tablet Oral BID     sodium chloride (PF)  3 mL Intracatheter Q8H     thiamine  100 mg Oral Daily       Dayron Vang MD    [facility] Medicine

## 2022-02-08 NOTE — PROGRESS NOTES
02/08/22 1254   Quick Adds   Type of Visit Initial Occupational Therapy Evaluation   Living Environment   People in home alone   Current Living Arrangements house   Home Accessibility stairs to enter home   Living Environment Comments stairs to enter rambler. walk-in shower    Self-Care   Equipment Currently Used at Home none   Disability/Function   Hearing Difficulty or Deaf no   Wear Glasses or Blind no   Concentrating, Remembering or Making Decisions Difficulty no   Difficulty Communicating no   Difficulty Eating/Swallowing no   Walking or Climbing Stairs Difficulty no   Dressing/Bathing Difficulty no   Toileting issues no   Doing Errands Independently Difficulty (such as shopping) no   Fall history within last six months yes   General Information   Onset of Illness/Injury or Date of Surgery 02/06/22   Referring Physician Kb Lizarraga MD   Patient/Family Therapy Goal Statement (OT) to go to TCU    Additional Occupational Profile Info/Pertinent History of Current Problem Bipolar Hemiarthroplasty Right Hip   Right Lower Extremity (Weight-bearing Status) weight-bearing as tolerated (WBAT)   Cognitive Status Examination   Orientation Status orientation to person, place and time   Affect/Mental Status (Cognitive) anxious   Cognitive Status Comments Very anxious needs cues to redirect.    Pain Assessment   Patient Currently in Pain Yes, see Vital Sign flowsheet   Range of Motion Comprehensive   Comment, General Range of Motion B UE ROM: WNL   Strength Comprehensive (MMT)   Comment, General Manual Muscle Testing (MMT) Assessment B UE strength; WNL   Transfers   Transfer Comments standing with PT upon arrival. Declines further OOB activity. stands talking with OT for ~3 minutes while PT provided CGA and FWW    Activities of Daily Living   BADL Assessment upper body dressing;lower body dressing;toileting   Upper Body Dressing Assessment   [place] Level (Upper Body Dressing) set up   Lower Body Dressing  Assessment   [place] Level (Lower Body Dressing) moderate assist (50% patient effort)   Toileting   Comment (Toileting) Pt just returned from commode- see PT note for details.    Clinical Impression   Criteria for Skilled Therapeutic Interventions Met (OT) yes;skilled treatment is necessary   OT Diagnosis decreased independence with ADLs and functional mobility    OT Problem List-Impairments impacting ADL balance;pain;post-surgical precautions   Assessment of Occupational Performance 5 or more Performance Deficits   Identified Performance Deficits dressing, toileting, showering, functional mobility, home mangement tasks    Planned Therapy Interventions (OT) ADL retraining;strengthening;progressive activity/exercise   Clinical Decision Making Complexity (OT) low complexity   Therapy Frequency (OT) Daily   Predicted Duration of Therapy 2-3 days   Anticipated Equipment Needs Upon Discharge (OT)   (TBD at TCU)   Risk & Benefits of therapy have been explained evaluation/treatment results reviewed;care plan/treatment goals reviewed;risks/benefits reviewed;current/potential barriers reviewed;participants voiced agreement with care plan;participants included;patient   OT Discharge Planning    OT Discharge Recommendation (DC Rec) Transitional Care Facility   OT Rationale for DC Rec decreased independence with ADLs and functional mobility    OT Brief overview of current status  needs assist x1 for all ADLs    Total Evaluation Time (Minutes)   Total Evaluation Time (Minutes) 8

## 2022-02-08 NOTE — PROGRESS NOTES
"[facility] Orthopaedics Progress Note      Post-operative Day: 1 Day Post-Op    Procedure(s):  Bipolar Hemiarthroplasty Right Hip      Subjective:  No past medical history on file.  Pain: moderate .  Denies shooting pain, parasthesias, numbness and weakness.   denies Chest pain, SOB, O2 required: None  denies nausea/ emesis  denies lightheadedness, dizziness and weakness  BM -, passing gas +. Urinating +, Lpoes in place: no.       Objective:  Blood pressure 113/63, pulse 74, temperature 99.2  F (37.3  C), temperature source Oral, resp. rate 20, height 1.676 m (5' 6\"), weight 50.2 kg (110 lb 10.7 oz), SpO2 99 %.    Patient Vitals for the past 24 hrs:   BP Temp Temp src Pulse Resp SpO2   02/08/22 0732 -- -- -- -- 20 --   02/08/22 0716 113/63 99.2  F (37.3  C) Oral 74 20 99 %   02/08/22 0303 110/63 97.4  F (36.3  C) Oral 69 16 99 %   02/07/22 2217 102/58 98.4  F (36.9  C) Oral 75 18 97 %   02/07/22 1900 106/63 -- -- 64 -- --   02/07/22 1845 129/73 -- -- 62 -- --   02/07/22 1830 116/66 -- -- 64 10 96 %   02/07/22 1815 (!) 148/81 -- -- 63 -- --   02/07/22 1800 131/74 98.2  F (36.8  C) Oral 64 10 98 %   02/07/22 1745 129/76 -- -- 63 -- 98 %   02/07/22 1730 134/78 -- -- 62 -- 98 %   02/07/22 1729 -- -- -- -- -- 97 %   02/07/22 1726 -- -- -- -- -- (!) 87 %   02/07/22 1721 129/78 -- -- 64 -- 96 %   02/07/22 1710 -- -- -- -- -- 96 %   02/07/22 1700 130/78 -- -- 75 13 96 %   02/07/22 1645 122/76 -- -- 80 14 98 %   02/07/22 1630 (!) 160/96 -- -- 97 13 98 %   02/07/22 1615 (!) 157/104 -- -- 86 16 99 %   02/07/22 1601 (!) 151/94 97.9  F (36.6  C) Axillary -- 14 98 %   02/07/22 1119 109/68 -- -- 76 16 97 %       Wt Readings from Last 4 Encounters:   02/06/22 50.2 kg (110 lb 10.7 oz)   02/22/17 49.9 kg (110 lb)   01/20/17 49.5 kg (109 lb 3.2 oz)   08/18/15 48.5 kg (107 lb)     General: Alert/Oriented x3. No apparent distress. Non-labored breathing. Appropriate affect.     MSK: RLE: dressing Clean, dry, and intact. Skin intact, yes " ecchymosis, no erythema. nontender to palpation to incision site. ROM appropriate for post op.     NV: Distal neurovascularly intact. Compartments soft and non-tender. No calf pain. Homans negative. PF/DF and EHL intact.       Pertinent Labs   Lab Results: personally reviewed.     Recent Labs   Lab Test 02/08/22  0543 02/07/22  0218 02/06/22  0832   HGB 9.3* 10.7* 13.9   HCT 28.2* 32.4* 41.5   * 105* 104*    159 216    141 138         Procedure(s):  Bipolar Hemiarthroplasty Right Hip  Plan: Anticoagulation protocol: scoanticoagulationlist2: Lovenox inpatient and then  mg daily at discharge  x 42  days            Pain medications: oxycodone/will change freq, tylenol and vistaril, add cyclobenzaprine            Weight bearing status:  WBAT            Dressing Change: Aquacel dressing to be left intact until follow up.            Disposition:   It is medically necessary for this patient to remain hospitalized due to inadequately controlled pain post operatively.  Inpatient therapy is medically necessary, the patient has not yet met goals for a safe discharge.            Follow up: 2 week with FRANKLIN            Continue cares and rehabilitation. May need TCU pending progress with PT. Lives independently in [place].  to consult.     Report completed by:  Joy Mcdermott PA-C, [facility]  Date: 2/8/2022  Time: 8:01 AM

## 2022-02-08 NOTE — PLAN OF CARE
[facility] TRANSPORT NOTE  Data:   Reason for Transport:  Return to Med/Surg Room 2304.    Amira Scherer was transported from PACU via bed at 1715.  Patient was accompanied by Registered Nurse X 2. Equipment used for transport: None. Family was aware of reason for transport: N/A per patient.    Action:  Report: received from [facility] PACU.    Response:  Patient's condition upon return was stable.    Patient is alert and oriented x 4; sleepy, arouses easily.  Very hesitant for any activity.  Patient did log roll for skin assessment.  Aquacel on Right hip is clean, dry and intact.  SCD on LEFT LOWER EXTREMITY; Abductor wedge in place, ice to right hip.  Patient took 2 bites of applesauce and then agreed to take scheduled tylenol and as needed oxycodone.  Capnography is on.  Room air oxygen saturation went down to 87 % when asleep and snoring.  Placed on oxygen 2 LPM via NC; oxygen saturation up to 98 %.  Refused to do Incentive Spirometer.    Arielle Torrez RN

## 2022-02-08 NOTE — PLAN OF CARE
Patient stated right hip pain 10/10, Ortho revised and added alternative medications, which have decreased hip pain slightly.  Ice to area, dressing dry and intact, up with 2 assist to bedside commode.  Patient very anxious with any interaction or movement,  multiple requests, declined Ativan.  Will monitor.

## 2022-02-08 NOTE — CONSULTS
Care Management Initial Consult    General Information  Assessment completed with: Patient,    Type of CM/SW Visit: Initial Assessment    Primary Care Provider verified and updated as needed: Yes   Readmission within the last 30 days: no previous admission in last 30 days   Reason for Consult: discharge planning        Communication Assessment  Patient's communication style: spoken language (English or Bilingual)    Hearing Difficulty or Deaf: no   Wear Glasses or Blind: no    Cognitive  Cognitive/Neuro/Behavioral: mood/behavior,.WDL except  Level of Consciousness: alert  Arousal Level: opens eyes spontaneously  Orientation: oriented x 4  Mood/Behavior: anxious  Best Language: 0 - No aphasia  Speech: clear    Living Environment:   People in home: alone     Current living Arrangements: house      Able to return to prior arrangements: yes       Family/Social Support:  Care provided by: self  Provides care for: no one  Marital Status:              Description of Support System: Uninvolved    Support Assessment: Limited social contact and support    Current Resources:   Patient receiving home care services: No     Community Resources: None  Equipment currently used at home: none  Supplies currently used at home: None    Employment/Financial:  Employment Status: unemployed        Financial Concerns: No concerns identified           Lifestyle & Psychosocial Needs:  Social Determinants of Health     Tobacco Use: High Risk     Smoking Tobacco Use: Current Some Day Smoker     Smokeless Tobacco Use: Never Used   Alcohol Use: Not on file   Financial Resource Strain: Not on file   Food Insecurity: Not on file   Transportation Needs: Not on file   Physical Activity: Not on file   Stress: Not on file   Social Connections: Not on file   Intimate Partner Violence: Not on file   Depression: Not on file   Housing Stability: Not on file       Functional Status:  Prior to admission patient needed assistance:   Dependent ADLs::  Independent  Dependent IADLs:: Independent  Assesssment of Functional Status: Not at baseline with ADL Functioning,Not at baseline with mobility,Not at  functional baseline,Needs placement in a SNF/TCF for rehabilitation    Mental Health Status:  Mental Health Status: No Current Concerns       Chemical Dependency Status:  Patient adamantly reports drinking 1.5 glasses of wine daily, never more.             Additional Information:    Writer briefly met w/ patient via bedside and introduced self and role. Per Amira, she lives independently in [place]. She exercises daily. She has no family/friend/neighbor support. She has not worked since an injury that happened in 2015. Per therapy, TCU is recommended.     TCU referrals sent:    [facility] Phone (Main Phone:[phone] Admissions Phone:[phone] Fax: [phone])    [facility] (Phone: [phone] Fax: [phone])    [facility] (Main: [phone] Admissions: [phone] Fax: [phone])    [facility] TCU Phone: (Admissions: [phone] RN Report: [phone] Fax: [phone])     [facility] (Main phone: [phone] Fax: [phone])    [facility] (Main phone: [phone] Fax: [phone])    PLAN: TCU referrals pending.       DONAL Magana  Care Management, [facility]  [phone]

## 2022-02-08 NOTE — PROGRESS NOTES
02/08/22 1034   Quick Adds   Type of Visit Initial PT Evaluation   Living Environment   People in home alone   Current Living Arrangements house   Home Accessibility stairs to enter home   Living Environment Comments stairs to enter, liam   Self-Care   Equipment Currently Used at Home none   General Information   Onset of Illness/Injury or Date of Surgery 02/06/22   Referring Physician Kb Lizarraga MD   Patient/Family Therapy Goals Statement (PT) pt tearful as she knows she can't go home, aware needs TCU   Pertinent History of Current Problem (include personal factors and/or comorbidities that impact the POC) no fall reported, was down a few days then called EMS, R hip Fx s/p bipolar hemiarthroplasty, chronic back pain, anxiety. Pt reports pain since 2015 and cannot sit for any time, will sit on floor at home   Weight-Bearing Status - RLE weight-bearing as tolerated   Cognition   Orientation Status (Cognition) oriented x 4   Affect/Mental Status (Cognition) agitated;anxious;emotionally labile   Follows Commands (Cognition) follows one-step commands   Pain Assessment   Patient Currently in Pain Yes, see Vital Sign flowsheet   Range of Motion (ROM)   ROM Comment not able to fully assess due to pain and pt only wanting to move the way she wants to   Strength   Manual Muscle Testing Quick Adds Deficits observed during functional mobility   Bed Mobility   Comment (Bed Mobility) mod A x1 sit to supine, assist to move and support R LE and at trunk, used rail   Transfers   Transfer Safety Comments min A x 1 with FWW sit<>stand, WBAT R LE   Gait/Stairs (Locomotion)   Comment (Gait/Stairs) min A x 1 with FWW 3 ft x2, decreased R LE stance time, decreased step length, flexed posture   Clinical Impression   Criteria for Skilled Therapeutic Intervention yes, treatment indicated   PT Diagnosis (PT) R hip Fx aftercare   Influenced by the following impairments pain, decreased ROM tolerated, decreased muscle  contraction   Functional limitations due to impairments impaired gait/transfers   Clinical Presentation Stable/Uncomplicated   Clinical Presentation Rationale clinical judgement   Clinical Decision Making (Complexity) low complexity   Therapy Frequency (PT) Daily   Predicted Duration of Therapy Intervention (days/wks) 1 week   Planned Therapy Interventions (PT) balance training;bed mobility training;gait training;strengthening;stair training;transfer training;progressive activity/exercise   Anticipated Equipment Needs at Discharge (PT) walker, rolling   Risk & Benefits of therapy have been explained evaluation/treatment results reviewed;care plan/treatment goals reviewed;risks/benefits reviewed;current/potential barriers reviewed;participants voiced agreement with care plan;patient   PT Discharge Planning    PT Discharge Recommendation (DC Rec) Transitional Care Facility;home with assist;home with home care physical therapy   PT Rationale for DC Rec TCU, to return home needs to be able to do stairs which today would not be able to try due to high pain and axiety with minimal movement, would need assist for all mobility at home, likely a commode and walker   PT Brief overview of current status  3 ft x 2 with walker min A x 1, mod A for bed mobility, directs her care   Total Evaluation Time   Total Evaluation Time (Minutes) 10

## 2022-02-08 NOTE — PROGRESS NOTES
Per request by patient order for covid swab received from Vidya Burroughs.  Patient complained of pain consistently overnight.  !0 mg oxycodone given with the most relief.  Patient was able to dangle legs and ambulate x 2 to bedside commode.  Voiding well and passing flatus.  No BM post surgery as of yet.  Tolerating IV fluids and IV ABX.  Patient is non compliant with wedge inbetween legs and refused to put oxygen sensor and Capno back on for monitoring.  When adjusting in bed, patient requires slow and assist x2 for both moving top of body and the lower half.

## 2022-02-09 LAB
GLUCOSE BLDC GLUCOMTR-MCNC: 59 MG/DL (ref 70–99)
GLUCOSE BLDC GLUCOMTR-MCNC: 99 MG/DL (ref 70–99)

## 2022-02-09 PROCEDURE — 250N000013 HC RX MED GY IP 250 OP 250 PS 637: Performed by: PHYSICIAN ASSISTANT

## 2022-02-09 PROCEDURE — 120N000001 HC R&B MED SURG/OB

## 2022-02-09 PROCEDURE — 250N000011 HC RX IP 250 OP 636: Performed by: ORTHOPAEDIC SURGERY

## 2022-02-09 PROCEDURE — 250N000011 HC RX IP 250 OP 636: Performed by: PHYSICIAN ASSISTANT

## 2022-02-09 PROCEDURE — 99232 SBSQ HOSP IP/OBS MODERATE 35: CPT | Performed by: INTERNAL MEDICINE

## 2022-02-09 PROCEDURE — 250N000013 HC RX MED GY IP 250 OP 250 PS 637: Performed by: ORTHOPAEDIC SURGERY

## 2022-02-09 RX ORDER — KETOROLAC TROMETHAMINE 15 MG/ML
15 INJECTION, SOLUTION INTRAMUSCULAR; INTRAVENOUS ONCE
Status: COMPLETED | OUTPATIENT
Start: 2022-02-09 | End: 2022-02-09

## 2022-02-09 RX ADMIN — ACETAMINOPHEN 975 MG: 325 TABLET, FILM COATED ORAL at 02:31

## 2022-02-09 RX ADMIN — OXYCODONE HYDROCHLORIDE 10 MG: 5 TABLET ORAL at 16:38

## 2022-02-09 RX ADMIN — OXYCODONE HYDROCHLORIDE 10 MG: 5 TABLET ORAL at 08:37

## 2022-02-09 RX ADMIN — KETOROLAC TROMETHAMINE 15 MG: 15 INJECTION, SOLUTION INTRAMUSCULAR; INTRAVENOUS at 07:47

## 2022-02-09 RX ADMIN — HYDROXYZINE HYDROCHLORIDE 25 MG: 25 TABLET, FILM COATED ORAL at 07:50

## 2022-02-09 RX ADMIN — ACETAMINOPHEN 975 MG: 325 TABLET, FILM COATED ORAL at 18:46

## 2022-02-09 RX ADMIN — CYCLOBENZAPRINE HYDROCHLORIDE 10 MG: 5 TABLET, FILM COATED ORAL at 14:02

## 2022-02-09 RX ADMIN — OXYCODONE HYDROCHLORIDE 10 MG: 5 TABLET ORAL at 04:26

## 2022-02-09 RX ADMIN — ENOXAPARIN SODIUM 40 MG: 100 INJECTION SUBCUTANEOUS at 10:57

## 2022-02-09 RX ADMIN — CYCLOBENZAPRINE HYDROCHLORIDE 5 MG: 5 TABLET, FILM COATED ORAL at 20:39

## 2022-02-09 RX ADMIN — OXYCODONE HYDROCHLORIDE 10 MG: 5 TABLET ORAL at 12:30

## 2022-02-09 RX ADMIN — BENZOCAINE AND MENTHOL 1 LOZENGE: 15; 3.6 LOZENGE ORAL at 18:45

## 2022-02-09 RX ADMIN — OXYCODONE HYDROCHLORIDE 10 MG: 5 TABLET ORAL at 20:39

## 2022-02-09 RX ADMIN — ACETAMINOPHEN 975 MG: 325 TABLET, FILM COATED ORAL at 10:56

## 2022-02-09 RX ADMIN — CYCLOBENZAPRINE HYDROCHLORIDE 10 MG: 5 TABLET, FILM COATED ORAL at 07:49

## 2022-02-09 RX ADMIN — SENNOSIDES AND DOCUSATE SODIUM 1 TABLET: 50; 8.6 TABLET ORAL at 20:39

## 2022-02-09 RX ADMIN — SENNOSIDES AND DOCUSATE SODIUM 1 TABLET: 50; 8.6 TABLET ORAL at 07:50

## 2022-02-09 ASSESSMENT — ACTIVITIES OF DAILY LIVING (ADL)
ADLS_ACUITY_SCORE: 5
ADLS_ACUITY_SCORE: 6
ADLS_ACUITY_SCORE: 5
ADLS_ACUITY_SCORE: 5
ADLS_ACUITY_SCORE: 6
ADLS_ACUITY_SCORE: 5
ADLS_ACUITY_SCORE: 6
ADLS_ACUITY_SCORE: 5

## 2022-02-09 NOTE — PROGRESS NOTES
"Care Management Follow Up    Length of Stay (days): 3    Expected Discharge Date: 02/10/2022     Concerns to be Addressed: discharge planning    Patient plan of care discussed at interdisciplinary rounds: Yes    Anticipated Discharge Disposition: Transitional Care vs Home Care     Anticipated Discharge Services: RN, PT/OT  Anticipated Discharge DME: None    Patient/family educated on Medicare website which has current facility and service quality ratings: yes  Education Provided on the Discharge Plan:  yes  Patient/Family in Agreement with the Plan: yes    Referrals Placed by CM/SW: Internal Clinic Care Coordination,Post Acute Facilities  Private pay costs discussed: insurance costs out of pocket expenses    Additional Information:  Update received during IDT rounds. Informed that pt is medically stable to transfer to a TCU once a bed is secured. CM unable to facilitate placement into a TCU today.   Avoidable days started 2/9/22    She has no family/friend/neighbor support. She has not worked since an injury that happened in 2015. Per therapy, TCU is recommended.     Received update on Pt's TCU insurance coverage:  For In-network facilities: has a 70/30 plan. Out-of-network facilities would be covered at 50/50. Pt could be looking at paying roughly $300-$400/day private pay for out of pocket expenses for TCU daily charges.     CM met with the Pt to discuss the anticipated out of pocket expenses that are planned should she admit to a TCU.     Pt was very upset with her insurance benefits. Pt stated she is NOT able to afford to pay out of pocket.   CM discussed options to return home with home care services. Pt became very upset-\"I am not strong enough to return home. I have no one to help me, I am all alone.\" Pt is convinced she is not safe to discharge home with services.    CM offered to reach out to the  Financial Counselor-Yola Edwards to discuss options to apply for Medical Assistance. Pt agreed to talk with " Yola.       TCU referrals sent:     [facility] referral to Carrie Webb Patient Transition Liaison, (phone: [phone]/Fax: [phone]) - serves as referral for 15 TCU facilites in the [facility] area. 2/9-CM spoke with Tracey to assess referral. Would like to know if Pt is willing to accept out of expense knowing limited insurance coverage.     [facility] Phone (Main Phone:[phone] Admissions Phone:[phone] Fax: [phone])     [facility] (Phone: [phone] Fax: [phone])     [facility] TCU Phone: (Admissions: [phone] RN Report: [phone] Fax: [phone])        PLAN: TCU referrals pending vs Return home with Home Care    CM to reach out to  Financial Counselor to speak with Pt regarding insurance coverage for TCU      DONAL Kendrick

## 2022-02-09 NOTE — PROGRESS NOTES
Patient allowed NST to try glucose test, not enough blood sample produced.  Refused a second check and is continuing to refuse lab draws

## 2022-02-09 NOTE — PROGRESS NOTES
"[facility]    Hospitalist Progress Note    Date of Service (when I saw the patient): 02/09/2022    Assessment & Plan   Amira Scherer is a 57 year old female who was admitted on 2/6/2022 with a right hip fracture after a fall at home.    Closed fracture of right hip, initial encounter  Unusual history: Adamantly denies that she fell, instead describing that she had a right hip pain two days prior to admission that she had to ease herself to the floor, and was not able to get up again.  X-ray pelvis and right hip showed an acute, moderately displaced subcapital fracture of the right femur with anterior superior displacement of the femoral shaft.  The patient was taken to the OR 2/7/2022 by Dr. Lizarraga, who performed a bipolar hemiarthroplasty of the right hip under general anesthesia.  Today, POD #1, the patient's only complaints are with regard to right hip and leg pain.  Despite my telling her that I am not the physician that is managing her pain medications, she thoroughly explained that oxycodone 5 mg and acetaminophen 975 mg is insufficient analgesia.  About an hour ago, she was given oxycodone 10 mg as a single dose, still says that she is having \"excruciating\" pain, but also told me that she is getting drowsy.  -I explained again that only one caregiver team should be prescribing pain medications, and here that is going to be the orthopedic team.  She expressed understanding.  -DVT prophylaxis per orthopedic team is enoxaparin 40 mg subcu daily for the duration of the hospital stay, then aspirin 325 mg daily at discharge for 42 days.    -The patient is permitted to weight-bear as tolerated on the right leg.    -Orthopedics has ordered PT and OT, recommending TCU, awaiting bed opening     Perioperative anemia  Admission hemoglobin was normal at 13.9 --> 10.7 pre-op --> 9.3 on POD#1.  MCV is macrocytic, and has been for at least the last 8 years.  I suspect that the initial " hemoglobin drop was due to some blood loss into the fracture site, and the further decline probably relates to perioperative blood loss.  -We will follow hemoglobin.     Chronic back/leg pain  On admission patient reported a history of chronic back and leg pain since 2015.    She does not describe what work-up, if any, has been performed.  -She may benefit from pain evaluation after discharge.      Traumatic rhabdomyolysis, initial encounter (H)  Total CK on ED arrival 6443 --> 5966 --> 5669 --> 4786 this morning.  This finding is consistent with her report of being down on the floor for nearly two continuous days.  She received a total of 1 L IV normal saline in ED, and is now on LR continuous infusion at 100 mL/h.  Renal function is normal despite rhabdomyolysis.   -Continue  mL/h until CK is closer to normal.  -Follow renal function.      Hypokalemia  Admission potassium 3.0, then 2.9 on recheck.  The patient had no oral intake for nearly 2 days, which was the likely etiology.  KCl was replaced orally, K normal since then.  -Resolved.      Possible alcohol use/abuse  Elevated LFTs  On admission, the patient reported drinking 1.5 glasses of wine daily, never more.  However, hepatic panel shows transaminitis, and she has an unexplained macrocytic anemia, both of which could be secondary to excess alcohol intake.  When this topic was explored on admission by Dr. Merritt, the patient became defensive and perseverated on the topic.  As result, I did not bring this topic up on rounds today.  The patient was started on CIWA protocol; initial scores were 3, 2, and 3, mostly because of anxiety which may be a chronic problem entirely unrelated to alcohol use.  As result, I discontinued CIWA monitoring, but may lorazepam 0.5 mg p.o. every 4 hours as needed available to treat anxiety.  She has not received any doses thus far.  There is no clinical evidence to suggest acute ethanol withdrawal.  ALT 98 --> 99   -->  270  -Continue to monitor for signs/symptoms of withdrawal.  -We will follow LFTs over time.  Consider ultrasound if LFTs rise.     Anxiety   The patient denies any prior history of mood disorder, including anxiety.  She does not consider herself an anxious individual.  She was taking no medications prior to admission.  However, her mood has been highly anxious here, with pressured speech, tangentiality, and emotional lability.  This anxiety persists even after operative repair of her fracture  -Lorazepam 0.5 mg p.o. every 4 hours as needed anxiety is available, increase dose as needed.     S/P colon resection for prolapse  The patient says that she has no problems or symptoms residual to this as long as she adheres to a very specific diet, focusing on fish and raw vegetables.  She says that she does not want to stool softeners to be used here, though also expresses concern that she will become constipated off of her home diet.  -I suggested senna as a natural option for a laxative.  She would be willing to use this.  Senna/docusate 1 p.o. twice daily is ordered.     Asymptomatic COVID swab negative   Unvaccinated.     Prophylaxis  Per orthopedics, enoxaparin 40 mg subcu daily until hospital discharge, then aspirin 325 mg daily for 42 days     Lines  Peripheral.     Urinary catheter  None.     Diet  Regular diet.        Code Status  Full     Rich Zamora    Interval History   The patient states that she had a spell of severe pain earlier this AM, which is now controlled after taking oxycodone.    -Data reviewed today: I reviewed all new labs and imaging results over the last 24 hours. I personally reviewed no images or EKG's today.    Physical Exam   Temp: 98.7  F (37.1  C) Temp src: Oral BP: 112/62 Pulse: 72   Resp: 16 SpO2: 97 % O2 Device: None (Room air)    Vitals:    02/06/22 0802 02/06/22 1713   Weight: 47.6 kg (105 lb) 50.2 kg (110 lb 10.7 oz)     Vital Signs with Ranges  Temp:  [97.7  F (36.5  C)-98.7   F (37.1  C)] 98.7  F (37.1  C)  Pulse:  [69-80] 72  Resp:  [16-18] 16  BP: (103-115)/(62-69) 112/62  SpO2:  [95 %-97 %] 97 %  I/O last 3 completed shifts:  In: -   Out: 600 [Urine:600]    Gen: Well nourished, well developed, alert and oriented x 3, anxious  HEENT: Atraumatic, normocephalic; sclera non-injected, anicterric; oral mucosa moist, no lesion, no exudate  Lungs: Clear to ausculation, no wheezes, no rhonchi, no rales  Heart: Regular rate, regular rhythm, no gallops, no rubs, no murmurs  GI: Bowel sound normal, no hepatosplenomegaly, no masses, non-tender, non-distended, no guarding, no rebound tenderness  Lymph: No lymphadenopathy, no edema  Skin: No rashes, no chronic venous stasis     Medications     lactated ringers 100 mL/hr at 02/07/22 1740       acetaminophen  975 mg Oral Q8H     cyclobenzaprine  5-10 mg Oral TID     enoxaparin ANTICOAGULANT  40 mg Subcutaneous Q24H     folic acid  1 mg Oral Daily     multivitamin w/minerals  1 tablet Oral Daily     polyethylene glycol  17 g Oral Daily     senna-docusate  1 tablet Oral BID     sodium chloride (PF)  3 mL Intracatheter Q8H     thiamine  100 mg Oral Daily       Data   Recent Labs   Lab 02/09/22  0910 02/09/22  0653 02/08/22  0543 02/07/22  0218 02/06/22 2022 02/06/22  0832   WBC  --   --  3.2* 7.5  --  12.4*   HGB  --   --  9.3* 10.7*  --  13.9   MCV  --   --  105* 105*  --  104*   PLT  --   --  153 159  --  216   NA  --   --  136 141  --  138   POTASSIUM  --   --  3.6 3.5  3.4 2.9* 3.0*   CHLORIDE  --   --  106 111*  --  104   CO2  --   --  25 23  --  29   BUN  --   --  12 15  --  18   CR  --   --  0.45* 0.44*  --  0.52   ANIONGAP  --   --  5 7  --  5   MAIRA  --   --  7.9* 8.0*  --  9.4   GLC 99 59* 100* 89  --  161*   ALBUMIN  --   --  2.3*  --   --  3.6   PROTTOTAL  --   --  5.5*  --   --  7.7   BILITOTAL  --   --  0.8  --   --  1.5*   ALKPHOS  --   --  47  --   --  78   ALT  --   --  99*  --   --  98*   AST  --   --  270*  --   --  282*   LIPASE  --    --   --   --   --  42*       No results found for this or any previous visit (from the past 24 hour(s)).

## 2022-02-09 NOTE — PLAN OF CARE
Receiving multiple medications for right hip discomfort.  Appears to moving better with one assist and walker.  Dressing dry and intact.

## 2022-02-09 NOTE — PROGRESS NOTES
"[facility] Orthopaedics Progress Note      Post-operative Day: 2 Days Post-Op    Procedure(s):  Bipolar Hemiarthroplasty Right Hip      Subjective:  Pain had been improving and now worse again this am. Refusing lab draws. Denies calf pain,  numbness or tingling. Voiding, no BM, + flatus. No CP, SOB, n/v, f/c.      Objective:  Blood pressure 112/62, pulse 72, temperature 98.7  F (37.1  C), temperature source Oral, resp. rate 16, height 1.676 m (5' 6\"), weight 50.2 kg (110 lb 10.7 oz), SpO2 97 %.    Patient Vitals for the past 24 hrs:   BP Temp Temp src Pulse Resp SpO2   02/09/22 0625 112/62 98.7  F (37.1  C) Oral 72 16 97 %   02/08/22 2350 115/69 97.7  F (36.5  C) Oral 69 18 96 %   02/08/22 1856 -- -- -- -- 16 --   02/08/22 1826 103/64 97.8  F (36.6  C) Oral 80 16 95 %   02/08/22 1505 -- -- -- -- 18 --   02/08/22 1123 -- -- -- -- 16 --   02/08/22 1117 113/70 -- -- 80 16 98 %   02/08/22 1015 -- -- -- -- 18 --   02/08/22 0732 -- -- -- -- 20 --   02/08/22 0716 113/63 99.2  F (37.3  C) Oral 74 20 99 %       Wt Readings from Last 4 Encounters:   02/06/22 50.2 kg (110 lb 10.7 oz)   02/22/17 49.9 kg (110 lb)   01/20/17 49.5 kg (109 lb 3.2 oz)   08/18/15 48.5 kg (107 lb)     General: Alert/Oriented x3. No apparent distress. Non-labored breathing. Appropriate affect.     MSK: RLE: dressing Clean, dry, and intact. Skin intact, no ecchymosis, no erythema. nontender to palpation to incision site. ROM appropriate for post op.     NV: Distal neurovascularly intact. Compartments soft and non-tender. No calf pain. Homans negative. PF/DF and EHL intact.       Pertinent Labs   Lab Results: personally reviewed.     Recent Labs   Lab Test 02/08/22  0543 02/07/22  0218 02/06/22  0832   HGB 9.3* 10.7* 13.9   HCT 28.2* 32.4* 41.5   * 105* 104*    159 216    141 138       Procedure(s):  Bipolar Hemiarthroplasty Right Hip  Plan: Anticoagulation protocol: scoanticoagulationlist2: Lovenox inpatient and then  mg " daily at discharge  x 42  days            Pain medications: oxycodone, tylenol, vistaril and flexeril. Does not appear to be utilizing vistaril and wound encourage. Discussed risks and benefits of toradol at this stage. Cr has not been elevated, but CK had been trending down as of yesterday, but now refusing any labs. I discussed with hospitalist and patient risks and benefits. She understands it is preferable to know Cr and CK at this stage in order to best treat her. She understands and still wishes to try half dose of toradol with knowledge it could cause further injury. Will hold ibuprofen in meantime.             Weight bearing status:  WBAT            Dressing Change:  Aquacel dressing to be left intact until follow up.            Disposition:   Inpatient therapy is medically necessary, the patient has not yet met goals for a safe discharge. Appears TCU recommended yesterday by PT, but would see if home care appropriate based on her progress. Ok to discharge when appropriate from PT standpoint and approved by hospitalist.             Follow up: 2 week with FRANKLIN            Continue cares and rehabilitation.    Report completed by:  Joy Mcdermott PA-C  Date: 2/9/2022  Time: 7:00 AM

## 2022-02-09 NOTE — PROGRESS NOTES
"DATE & TIME: 7P-7A   2/8-2/9                 Cognitive Concerns/ Orientation : A/O x4    BEHAVIOR & AGGRESSION: Some anxiousness on shift, refused lab draws even with reaproach    ABNL VS/O2: VSS on RA    MOBILITY: Assist x1 to commode and to toilet.  Able to ambulate to commode x3 and to bathroom x1    PAIN MANAGEMENT: PRN oxycodone Q4, scheduled flexeril, PRN Ibuprophen.  Staggered seems to be helping control pain and allows her to ambulate and get out of bed    DIET: regular, wants to have prune juice added for bowel health    BOWEL/BLADDER: Continent of Bladder and bowel.  No BM on shift, passing flatus     ABNL LAB/BG: Refusing lab draws     DRAIN/DEVICES: PIV patent and saline locked    Temp: 97.7  F (36.5  C) Temp src: Oral BP: 115/69 Pulse: 69   Resp: 18 SpO2: 96 % Height: 167.6 cm (5' 6\") Weight: 50.2 kg (110 lb 10.7 oz)  O2 Device: None (Room air) at    "

## 2022-02-10 ENCOUNTER — APPOINTMENT (OUTPATIENT)
Dept: PHYSICAL THERAPY | Facility: CLINIC | Age: 57
DRG: 956 | End: 2022-02-10
Payer: COMMERCIAL

## 2022-02-10 ENCOUNTER — APPOINTMENT (OUTPATIENT)
Dept: OCCUPATIONAL THERAPY | Facility: CLINIC | Age: 57
DRG: 956 | End: 2022-02-10
Payer: COMMERCIAL

## 2022-02-10 LAB
ALBUMIN SERPL-MCNC: 2.3 G/DL (ref 3.4–5)
ALP SERPL-CCNC: 52 U/L (ref 40–150)
ALT SERPL W P-5'-P-CCNC: 85 U/L (ref 0–50)
ANION GAP SERPL CALCULATED.3IONS-SCNC: 3 MMOL/L (ref 3–14)
AST SERPL W P-5'-P-CCNC: 169 U/L (ref 0–45)
BILIRUB SERPL-MCNC: 0.5 MG/DL (ref 0.2–1.3)
BUN SERPL-MCNC: 7 MG/DL (ref 7–30)
CALCIUM SERPL-MCNC: 8.2 MG/DL (ref 8.5–10.1)
CHLORIDE BLD-SCNC: 107 MMOL/L (ref 94–109)
CO2 SERPL-SCNC: 29 MMOL/L (ref 20–32)
CREAT SERPL-MCNC: 0.45 MG/DL (ref 0.52–1.04)
ERYTHROCYTE [DISTWIDTH] IN BLOOD BY AUTOMATED COUNT: 11.7 % (ref 10–15)
GFR SERPL CREATININE-BSD FRML MDRD: >90 ML/MIN/1.73M2
GLUCOSE BLD-MCNC: 91 MG/DL (ref 70–99)
HCT VFR BLD AUTO: 28 % (ref 35–47)
HGB BLD-MCNC: 9.1 G/DL (ref 11.7–15.7)
MCH RBC QN AUTO: 34.3 PG (ref 26.5–33)
MCHC RBC AUTO-ENTMCNC: 32.5 G/DL (ref 31.5–36.5)
MCV RBC AUTO: 106 FL (ref 78–100)
PLATELET # BLD AUTO: 195 10E3/UL (ref 150–450)
POTASSIUM BLD-SCNC: 3.6 MMOL/L (ref 3.4–5.3)
PROT SERPL-MCNC: 5.5 G/DL (ref 6.8–8.8)
RBC # BLD AUTO: 2.65 10E6/UL (ref 3.8–5.2)
SODIUM SERPL-SCNC: 139 MMOL/L (ref 133–144)
WBC # BLD AUTO: 4.8 10E3/UL (ref 4–11)

## 2022-02-10 PROCEDURE — 85027 COMPLETE CBC AUTOMATED: CPT | Performed by: INTERNAL MEDICINE

## 2022-02-10 PROCEDURE — 97530 THERAPEUTIC ACTIVITIES: CPT | Mod: GP | Performed by: PHYSICAL THERAPIST

## 2022-02-10 PROCEDURE — 250N000013 HC RX MED GY IP 250 OP 250 PS 637: Performed by: ORTHOPAEDIC SURGERY

## 2022-02-10 PROCEDURE — 97116 GAIT TRAINING THERAPY: CPT | Mod: GP | Performed by: PHYSICAL THERAPIST

## 2022-02-10 PROCEDURE — 80053 COMPREHEN METABOLIC PANEL: CPT | Performed by: INTERNAL MEDICINE

## 2022-02-10 PROCEDURE — 82040 ASSAY OF SERUM ALBUMIN: CPT | Performed by: INTERNAL MEDICINE

## 2022-02-10 PROCEDURE — 250N000013 HC RX MED GY IP 250 OP 250 PS 637: Performed by: FAMILY MEDICINE

## 2022-02-10 PROCEDURE — 120N000001 HC R&B MED SURG/OB

## 2022-02-10 PROCEDURE — 99232 SBSQ HOSP IP/OBS MODERATE 35: CPT | Performed by: FAMILY MEDICINE

## 2022-02-10 PROCEDURE — 250N000013 HC RX MED GY IP 250 OP 250 PS 637: Performed by: PHYSICIAN ASSISTANT

## 2022-02-10 PROCEDURE — 97535 SELF CARE MNGMENT TRAINING: CPT | Mod: GO

## 2022-02-10 PROCEDURE — 36415 COLL VENOUS BLD VENIPUNCTURE: CPT | Performed by: INTERNAL MEDICINE

## 2022-02-10 RX ORDER — ASPIRIN 325 MG
325 TABLET ORAL DAILY
Status: DISCONTINUED | OUTPATIENT
Start: 2022-02-10 | End: 2022-02-10

## 2022-02-10 RX ORDER — ASPIRIN 325 MG
325 TABLET ORAL DAILY
Status: DISCONTINUED | OUTPATIENT
Start: 2022-02-10 | End: 2022-02-13 | Stop reason: HOSPADM

## 2022-02-10 RX ADMIN — OXYCODONE HYDROCHLORIDE 10 MG: 5 TABLET ORAL at 17:34

## 2022-02-10 RX ADMIN — OXYCODONE HYDROCHLORIDE 10 MG: 5 TABLET ORAL at 22:21

## 2022-02-10 RX ADMIN — HYDROXYZINE HYDROCHLORIDE 50 MG: 25 TABLET, FILM COATED ORAL at 10:27

## 2022-02-10 RX ADMIN — HYDROXYZINE HYDROCHLORIDE 25 MG: 25 TABLET, FILM COATED ORAL at 22:21

## 2022-02-10 RX ADMIN — ACETAMINOPHEN 650 MG: 325 TABLET, FILM COATED ORAL at 19:59

## 2022-02-10 RX ADMIN — CYCLOBENZAPRINE HYDROCHLORIDE 10 MG: 5 TABLET, FILM COATED ORAL at 19:59

## 2022-02-10 RX ADMIN — OXYCODONE HYDROCHLORIDE 10 MG: 5 TABLET ORAL at 13:14

## 2022-02-10 RX ADMIN — CYCLOBENZAPRINE HYDROCHLORIDE 10 MG: 5 TABLET, FILM COATED ORAL at 13:16

## 2022-02-10 RX ADMIN — OXYCODONE HYDROCHLORIDE 10 MG: 5 TABLET ORAL at 04:49

## 2022-02-10 RX ADMIN — ASPIRIN 325 MG ORAL TABLET 325 MG: 325 PILL ORAL at 10:27

## 2022-02-10 RX ADMIN — ACETAMINOPHEN 975 MG: 325 TABLET, FILM COATED ORAL at 03:02

## 2022-02-10 RX ADMIN — CYCLOBENZAPRINE HYDROCHLORIDE 10 MG: 5 TABLET, FILM COATED ORAL at 08:37

## 2022-02-10 RX ADMIN — ACETAMINOPHEN 975 MG: 325 TABLET, FILM COATED ORAL at 10:27

## 2022-02-10 RX ADMIN — OXYCODONE HYDROCHLORIDE 10 MG: 5 TABLET ORAL at 09:03

## 2022-02-10 RX ADMIN — OXYCODONE HYDROCHLORIDE 10 MG: 5 TABLET ORAL at 00:51

## 2022-02-10 ASSESSMENT — ACTIVITIES OF DAILY LIVING (ADL)
ADLS_ACUITY_SCORE: 8
ADLS_ACUITY_SCORE: 6
ADLS_ACUITY_SCORE: 8
ADLS_ACUITY_SCORE: 6
ADLS_ACUITY_SCORE: 5
ADLS_ACUITY_SCORE: 6
ADLS_ACUITY_SCORE: 5
ADLS_ACUITY_SCORE: 6
ADLS_ACUITY_SCORE: 5
ADLS_ACUITY_SCORE: 8
ADLS_ACUITY_SCORE: 5
ADLS_ACUITY_SCORE: 8
ADLS_ACUITY_SCORE: 8
ADLS_ACUITY_SCORE: 5
ADLS_ACUITY_SCORE: 8
ADLS_ACUITY_SCORE: 8
ADLS_ACUITY_SCORE: 5
ADLS_ACUITY_SCORE: 5
ADLS_ACUITY_SCORE: 6
ADLS_ACUITY_SCORE: 5
ADLS_ACUITY_SCORE: 6
ADLS_ACUITY_SCORE: 5

## 2022-02-10 NOTE — PROGRESS NOTES
"Patient alert and oriented x 4, cooperative but anxious. Assist x 1 with gait belt and walker for ambulation. Patient reports pain in right hip is well controlled with PRN Oxycodone. Patient reports stress and anxiety over the cost of care and possible TCU placement.     /72 (BP Location: Left arm)   Pulse 75   Temp 98.5  F (36.9  C) (Oral)   Resp 17   Ht 1.676 m (5' 6\")   Wt 50.2 kg (110 lb 10.7 oz)   SpO2 98%   BMI 17.86 kg/m      Minerva Hendricks RN on 2/9/2022 at 10:56 PM    "

## 2022-02-10 NOTE — PLAN OF CARE
Right hip discomfort appears to be slightly improved with Oxycodone 10 mg every 4 hours, scheduled Tylenol and scheduled Flexeril.  Up with one assist and walker, lives alone and has no one to assist with cares at home.  Wanting TCU placement.

## 2022-02-10 NOTE — PROGRESS NOTES
[facility]    Hospitalist Progress Note    Date of Service (when I saw the patient): 02/10/2022    Assessment & Plan   Amira Scherer is a 57 year old female who was admitted on 2/6/2022 with a right hip fracture after a fall at home.    Closed fracture of right hip, initial encounter  Unusual history: Adamantly denies that she fell, instead describing that she had a right hip pain two days prior to admission that she had to ease herself to the floor, and was not able to get up again.  X-ray pelvis and right hip showed an acute, moderately displaced subcapital fracture of the right femur with anterior superior displacement of the femoral shaft. The patient was taken to the OR 2/7/2022 by Dr. Lizarraga, who performed a bipolar hemiarthroplasty of the right hip under general anesthesia. POD #1, the patient's only complaints were with regard to right hip and leg pain.  Long, complex discussion with patient about pain management options at that time, though we agreed that orthopedics would be primarily managing them  -DVT prophylaxis per orthopedic team is enoxaparin 40 mg subcu daily for the duration of the hospital stay, then aspirin 325 mg daily at discharge for 42 days.    -The patient is permitted to weight-bear as tolerated on the right leg.    -Orthopedics has ordered PT and OT, recommending TCU, awaiting bed opening  -Complex discharge situation, as patient is unable to afford TCU and also has no social support     Perioperative anemia -stable  Admission hemoglobin was normal at 13.9 --> 10.7 pre-op --> 9.3 on POD#1.  MCV is macrocytic, and has been for at least the last 8 years.  I suspect that the initial hemoglobin drop was due to some blood loss into the fracture site, and the further decline probably relates to perioperative blood loss.  -Discontinue hemoglobin trend     Chronic back/leg pain  On admission patient reported a history of chronic back and leg pain since 2015. She  does not describe what work-up, if any, has been performed.  -Recommend consideration of outpatient chronic pain evaluation     Traumatic rhabdomyolysis, initial encounter (H) -downtrending ck  Total CK on ED arrival 6443 --> 5966 --> 5669 --> 4786 this morning.  This finding is consistent with her report of being down on the floor for nearly two continuous days.  She received a total of 1 L IV normal saline in ED, and is now on LR continuous infusion at 100 mL/h.  Renal function is normal despite rhabdomyolysis.   -Aggressive IV hydration, okay to discontinue  -Can discontinue trend of CK and CR     Hypokalemia -resolved  Admission potassium 3.0, then 2.9 on recheck.  The patient had no oral intake for nearly 2 days, which was the likely etiology.  KCl was replaced orally, K normal since then.     Possible alcohol use/abuse  Elevated LFTs  ALT 98 --> 99   --> 270  On admission, the patient reported drinking 1.5 glasses of wine daily, never more. However, hepatic panel showed transaminitis, and she has an unexplained macrocytic anemia, both of which could be secondary to excess alcohol intake. Patient unable to tolerate discussion of this topic.   -CIWA protocol discontinued due to consistent low numbers  -liver panel trend discontinued  -Continue to monitor for signs/symptoms of withdrawal.     Anxiety   The patient denies any prior history of mood disorder, including anxiety.  She does not consider herself an anxious individual.  She was taking no medications prior to admission.  However, her mood has been highly anxious here, with pressured speech, tangentiality, and emotional lability.  This anxiety persists even after operative repair of her fracture  -Lorazepam 0.5 mg p.o. every 4 hours as needed anxiety is available, increase dose as needed.     S/P colon resection for prolapse  The patient says that she has no problems or symptoms residual to this as long as she adheres to a very specific diet,  focusing on fish and raw vegetables.  She says that she does not want to stool softeners to be used here, though also expresses concern that she will become constipated off of her home diet.  -I suggested senna as a natural option for a laxative.  She would be willing to use this.  Senna/docusate 1 p.o. twice daily is ordered.     Asymptomatic COVID swab negative   Unvaccinated.     Prophylaxis  Per orthopedics, enoxaparin 40 mg subQ daily until hospital discharge, then aspirin 325 mg daily for 42 days     Lines  Peripheral.     Urinary catheter  None.     Diet  Regular diet.     Code Status  Full     Anticipated discharge is 2/11/2022 to home with home cares, however, situation is complicated by patient with no social support and citing inability to care for herself in basic ADLs without 24/7 help    Chantell Waters MD        Interval History   The patient is very upset upon my visit, reporting that she is unable to care for herself at home, and also unable to afford TCU.  Perseverating on unfairness of situation, reporting pain into the system and now not being covered.    Data reviewed today: I reviewed all new labs and imaging results over the last 24 hours. I personally reviewed no images or EKG's today.    Physical Exam   Temp: 98  F (36.7  C) Temp src: Oral BP: 120/72 Pulse: 75   Resp: 18 SpO2: 98 % O2 Device: None (Room air)    Vitals:    02/06/22 0802 02/06/22 1713   Weight: 47.6 kg (105 lb) 50.2 kg (110 lb 10.7 oz)     Vital Signs with Ranges  Temp:  [98  F (36.7  C)-98.5  F (36.9  C)] 98.2  F (36.8  C)  Pulse:  [70-75] 70  Resp:  [17-18] 18  BP: (106-120)/(70-72) 118/70  SpO2:  [98 %] 98 %  I/O last 3 completed shifts:  In: -   Out: 1900 [Urine:550; Other:800; Stool:550]    Gen: Well nourished, well developed, alert and oriented x 3, anxious  HEENT: Atraumatic, normocephalic; sclera non-injected, anicterric; oral mucosa moist, no lesion, no exudate  Lungs: Clear to ausculation, no wheezes, no rhonchi, no  rales  Heart: Regular rate, regular rhythm, no gallops, no rubs, no murmurs  GI: Bowel sound normal, no hepatosplenomegaly, no masses, non-tender, non-distended, no guarding, no rebound tenderness  Lymph: No lymphadenopathy, no edema  Skin: No rashes, no chronic venous stasis     Medications     lactated ringers 100 mL/hr at 02/07/22 1740       aspirin  325 mg Oral Daily     cyclobenzaprine  5-10 mg Oral TID     folic acid  1 mg Oral Daily     multivitamin w/minerals  1 tablet Oral Daily     polyethylene glycol  17 g Oral Daily     senna-docusate  1 tablet Oral BID     sodium chloride (PF)  3 mL Intracatheter Q8H     thiamine  100 mg Oral Daily       Data   Recent Labs   Lab 02/10/22  0530 02/09/22  0910 02/09/22  0653 02/08/22  0543 02/07/22  0218 02/06/22 2022 02/06/22  0832   WBC 4.8  --   --  3.2* 7.5  --  12.4*   HGB 9.1*  --   --  9.3* 10.7*  --  13.9   *  --   --  105* 105*  --  104*     --   --  153 159  --  216     --   --  136 141  --  138   POTASSIUM 3.6  --   --  3.6 3.5  3.4   < > 3.0*   CHLORIDE 107  --   --  106 111*  --  104   CO2 29  --   --  25 23  --  29   BUN 7  --   --  12 15  --  18   CR 0.45*  --   --  0.45* 0.44*  --  0.52   ANIONGAP 3  --   --  5 7  --  5   MAIRA 8.2*  --   --  7.9* 8.0*  --  9.4   GLC 91 99 59* 100* 89  --  161*   ALBUMIN 2.3*  --   --  2.3*  --   --  3.6   PROTTOTAL 5.5*  --   --  5.5*  --   --  7.7   BILITOTAL 0.5  --   --  0.8  --   --  1.5*   ALKPHOS 52  --   --  47  --   --  78   ALT 85*  --   --  99*  --   --  98*   *  --   --  270*  --   --  282*   LIPASE  --   --   --   --   --   --  42*    < > = values in this interval not displayed.

## 2022-02-11 ENCOUNTER — APPOINTMENT (OUTPATIENT)
Dept: PHYSICAL THERAPY | Facility: CLINIC | Age: 57
DRG: 956 | End: 2022-02-11
Payer: COMMERCIAL

## 2022-02-11 ENCOUNTER — APPOINTMENT (OUTPATIENT)
Dept: OCCUPATIONAL THERAPY | Facility: CLINIC | Age: 57
DRG: 956 | End: 2022-02-11
Payer: COMMERCIAL

## 2022-02-11 PROCEDURE — 97116 GAIT TRAINING THERAPY: CPT | Mod: GP | Performed by: PHYSICAL THERAPIST

## 2022-02-11 PROCEDURE — 120N000001 HC R&B MED SURG/OB

## 2022-02-11 PROCEDURE — 97535 SELF CARE MNGMENT TRAINING: CPT | Mod: GO

## 2022-02-11 PROCEDURE — 250N000013 HC RX MED GY IP 250 OP 250 PS 637: Performed by: FAMILY MEDICINE

## 2022-02-11 PROCEDURE — 99232 SBSQ HOSP IP/OBS MODERATE 35: CPT | Performed by: FAMILY MEDICINE

## 2022-02-11 PROCEDURE — 97530 THERAPEUTIC ACTIVITIES: CPT | Mod: GP | Performed by: PHYSICAL THERAPIST

## 2022-02-11 PROCEDURE — 250N000013 HC RX MED GY IP 250 OP 250 PS 637: Performed by: PHYSICIAN ASSISTANT

## 2022-02-11 PROCEDURE — 250N000013 HC RX MED GY IP 250 OP 250 PS 637: Performed by: ORTHOPAEDIC SURGERY

## 2022-02-11 RX ADMIN — OXYCODONE HYDROCHLORIDE 10 MG: 5 TABLET ORAL at 06:42

## 2022-02-11 RX ADMIN — HYDROXYZINE HYDROCHLORIDE 25 MG: 25 TABLET, FILM COATED ORAL at 06:42

## 2022-02-11 RX ADMIN — Medication 1 TABLET: at 08:52

## 2022-02-11 RX ADMIN — OXYCODONE HYDROCHLORIDE 10 MG: 5 TABLET ORAL at 02:48

## 2022-02-11 RX ADMIN — THIAMINE HCL TAB 100 MG 100 MG: 100 TAB at 08:53

## 2022-02-11 RX ADMIN — CYCLOBENZAPRINE HYDROCHLORIDE 10 MG: 5 TABLET, FILM COATED ORAL at 14:42

## 2022-02-11 RX ADMIN — FOLIC ACID 1 MG: 1 TABLET ORAL at 08:53

## 2022-02-11 RX ADMIN — SENNOSIDES AND DOCUSATE SODIUM 1 TABLET: 50; 8.6 TABLET ORAL at 08:53

## 2022-02-11 RX ADMIN — HYDROXYZINE HYDROCHLORIDE 25 MG: 25 TABLET, FILM COATED ORAL at 02:48

## 2022-02-11 RX ADMIN — ASPIRIN 325 MG ORAL TABLET 325 MG: 325 PILL ORAL at 08:52

## 2022-02-11 RX ADMIN — ACETAMINOPHEN 650 MG: 325 TABLET, FILM COATED ORAL at 19:54

## 2022-02-11 RX ADMIN — HYDROXYZINE HYDROCHLORIDE 25 MG: 25 TABLET, FILM COATED ORAL at 12:42

## 2022-02-11 RX ADMIN — CYCLOBENZAPRINE HYDROCHLORIDE 10 MG: 5 TABLET, FILM COATED ORAL at 21:02

## 2022-02-11 RX ADMIN — OXYCODONE HYDROCHLORIDE 10 MG: 5 TABLET ORAL at 20:58

## 2022-02-11 RX ADMIN — OXYCODONE HYDROCHLORIDE 10 MG: 5 TABLET ORAL at 16:36

## 2022-02-11 RX ADMIN — OXYCODONE HYDROCHLORIDE 10 MG: 5 TABLET ORAL at 11:10

## 2022-02-11 RX ADMIN — CYCLOBENZAPRINE HYDROCHLORIDE 10 MG: 5 TABLET, FILM COATED ORAL at 08:52

## 2022-02-11 ASSESSMENT — ACTIVITIES OF DAILY LIVING (ADL)
ADLS_ACUITY_SCORE: 8

## 2022-02-11 NOTE — PLAN OF CARE
Patient alert and orientated, but anxious and rambles. Vital signs stable. On room air. Afebrile.     POD #4. Dressing to right hip is clean, dry, and intact. CMS intact. Pain managed with PRN oxycodone, atarax, and scheduled flexeril. Patient up with stand by assist and a walker; patient asking for assistance with lifting right leg in and out of bed. Tolerating diet and denied any nausea. Voiding spontaneously without issues.    Plan to discharge to TCU.

## 2022-02-11 NOTE — PLAN OF CARE
A&O x4. PIV saline locked. Moods labile. Patient having concerns about what to do at discharge and reports will have no one to help her at home. Assist of 1 with a walker and gait belt, ambulating well to the bathroom. Transferring in and out of bed with minimal assist. Oxycodone 10 mg, Tylenol and Atarax for pain management per MAR and ice applied to right hip.

## 2022-02-11 NOTE — PLAN OF CARE
Right hip pain managed with oxycodone and vistaril.  VSS.  Moving well with walker and gait belt.  Up to bathroom with assist of 1, voiding well.    Continues to verbalized anxiety about discharging home alone.

## 2022-02-11 NOTE — PROGRESS NOTES
Care Management Follow Up    Length of Stay (days): 5    Expected Discharge Date: 2/12/22  Expected Time of Departure: 2/12/22  Concerns to be Addressed: discharge planning     Patient plan of care discussed at interdisciplinary rounds: Yes    Anticipated Discharge Disposition: Transitional Care     Anticipated Discharge Services: Transportation Services  Anticipated Discharge DME: Wheelchair    Patient/family educated on Medicare website which has current facility and service quality ratings: yes  Education Provided on the Discharge Plan:  Yes  Patient/Family in Agreement with the Plan:  N/A    Referrals Placed by CM/SW: Internal Clinic Care Coordination,Post Acute Facilities,Transportation  Private pay costs discussed: transportation costs    Additional Information:    Met with the Patient for discharge planning.  The Patient would like to go to a TCU.  Referral had been placed for [facility], [phone].  Per [facility] The Patient is eligible for MA and has been approved for MA.  MA Case Number: [id number].    Per chart review the Patient would be safe to return home with home care.  She has no family/friend support system.    Pending TCU Referrals:    [facility] -  Carrie Webb Patient Transition Liaison, (phone: [phone]/Fax: [phone]) - No bed available today or over the weekend.     [facility] Phone (Main Phone:[phone] Admissions Phone:[phone] Fax: [phone]) - No bed available today or over the weekend.     [facility] (Phone: [phone] Fax: [phone]) - No bed available today or over the weekend.     [facility] Phone: (Admissions: [phone] RN Report: [phone] Fax: [phone]) - No bed available today or over the weekend.    Plan:  TCU        Brianna Cook RN

## 2022-02-11 NOTE — PROGRESS NOTES
2/11/22 1627-Patient is up to the bathroom, patient stated that she was having pain to her right hip 8/10. Patient is alert and oriented X4 able to make needs known. Patient is a SBA assit X1 with walker for ambulation. Patient has a saline lock to her right wrist, there is no infiltration and erythema. Op site to dry and intact. Patient stated that getting into the bed is easier than getting out of the bed, patient was able to get into the bed with no assist.

## 2022-02-11 NOTE — PROGRESS NOTES
[facility]    Hospitalist Progress Note    Date of Service (when I saw the patient): 02/11/2022    Assessment & Plan   Amira Scherer is a 57 year old female who was admitted on 2/6/2022 with a right hip fracture after a fall at home.    Closed fracture of right hip, initial encounter  Unusual history: Adamantly denies that she fell, instead describing that she had a right hip pain two days prior to admission that she had to ease herself to the floor, and was not able to get up again.  X-ray pelvis and right hip showed an acute, moderately displaced subcapital fracture of the right femur with anterior superior displacement of the femoral shaft. The patient was taken to the OR 2/7/2022 by Dr. Lizarraga, who performed a bipolar hemiarthroplasty of the right hip under general anesthesia. POD #1, the patient's only complaints were with regard to right hip and leg pain.  Long, complex discussion with patient about pain management options at that time, though we agreed that orthopedics would be primarily managing them  -DVT prophylaxis per orthopedic team is enoxaparin 40 mg subcu daily for the duration of the hospital stay, then aspirin 325 mg daily at discharge for 42 days.    -The patient is permitted to weight-bear as tolerated on the right leg.    -Orthopedics has ordered PT and OT, recommending TCU, awaiting bed opening  -Complex discharge situation, as patient is unable to afford TCU and also has no social support, see under dispo  -Removed scheduled bowel meds per pt request     Perioperative anemia -stable  Admission hemoglobin was normal at 13.9 --> 10.7 pre-op --> 9.3 on POD#1.  MCV is macrocytic, and has been for at least the last 8 years.  I suspect that the initial hemoglobin drop was due to some blood loss into the fracture site, and the further decline probably relates to perioperative blood loss.  -Discontinue hemoglobin trend     Chronic back/leg pain  On admission patient  reported a history of chronic back and leg pain since 2015. She does not describe what work-up, if any, has been performed.  -Recommend consideration of outpatient chronic pain evaluation     Traumatic rhabdomyolysis, initial encounter (H) -downtrending ck  Total CK on ED arrival 6443 --> 5966 --> 5669 --> 4786 this morning.  This finding is consistent with her report of being down on the floor for nearly two continuous days.  She received a total of 1 L IV normal saline in ED, and is now on LR continuous infusion at 100 mL/h.  Renal function is normal despite rhabdomyolysis.   -Aggressive IV hydration, okay to discontinue  -Can discontinue trend of CK and CR     Hypokalemia -resolved  Admission potassium 3.0, then 2.9 on recheck.  The patient had no oral intake for nearly 2 days, which was the likely etiology.  KCl was replaced orally, K normal since then.     Possible alcohol use/abuse  Elevated LFTs  ALT 98 --> 99   --> 270  On admission, the patient reported drinking 1.5 glasses of wine daily, never more. However, hepatic panel showed transaminitis, and she has an unexplained macrocytic anemia, both of which could be secondary to excess alcohol intake. Patient unable to tolerate discussion of this topic.   -CIWA protocol discontinued due to consistent low numbers  -liver panel trend discontinued  -Removed thiamine, multivitmain, folate per patient request     Anxiety   The patient denies any prior history of mood disorder, including anxiety.  She does not consider herself an anxious individual.  She was taking no medications prior to admission.  However, her mood has been highly anxious here, with pressured speech, tangentiality, and emotional lability.  This anxiety persists even after operative repair of her fracture  -Lorazepam 0.5 mg p.o. every 4 hours as needed anxiety is available, increase dose as needed.     S/P colon resection for prolapse  The patient says that she has no problems or symptoms  residual to this as long as she adheres to a very specific diet, focusing on fish and raw vegetables.  She says that she does not want to stool softeners to be used here, though also expresses concern that she will become constipated off of her home diet.  -removed scheduled laxatives per pt request - she still has PRNs    Asymptomatic COVID swab negative   Unvaccinated.     Prophylaxis  Per orthopedics, enoxaparin 40 mg subQ daily until hospital discharge, then aspirin 325 mg daily for 42 days     Lines  Peripheral.     Urinary catheter  None.     Diet  Regular diet.     Code Status  Full     Disposition Patient is medically stable for discharge but needs ongoing PT. Anticipated discharge is TBD, 2/12/2022 or later, due to need for TCU due to zero social support - could go home with home services but cites literally zero social contacts. Patient was unable to afford TCU but they are arranging MA for her which will cover it    Chantell Waters MD        Interval History   Patient is difficult to interview due to perseveration on unfairness of things including not knowing previously about the services that our staff have been figuring out for her. wants various meds removed and I did this - see A/P. No fevers, chills, nausea, vomiting, diarrhea, constipation. Still reports a lot of pain from her hip and feels unable to care for self    Data reviewed today: I reviewed all new labs and imaging results over the last 24 hours. I personally reviewed no images or EKG's today.    Physical Exam   Temp: 98.4  F (36.9  C) Temp src: Oral BP: 112/73 Pulse: 76   Resp: 18 SpO2: 97 % O2 Device: None (Room air)    Vitals:    02/06/22 0802 02/06/22 1713   Weight: 47.6 kg (105 lb) 50.2 kg (110 lb 10.7 oz)     Vital Signs with Ranges  Temp:  [98.2  F (36.8  C)-98.4  F (36.9  C)] 98.4  F (36.9  C)  Pulse:  [70-76] 76  Resp:  [16-18] 18  BP: (100-118)/(58-73) 112/73  SpO2:  [95 %-98 %] 97 %  I/O last 3 completed shifts:  In: 400  [P.O.:400]  Out: 800 [Other:800]    Gen: Overall well, alert and oriented x 3, sitting at bedside armchair  HEENT: Atraumatic, normocephalic; sclera non-injected, anicterric; oral mucosa moist, no lesion, no exudate  Lungs: Clear to ausculation, no wheezes, no rhonchi, no rales  Heart: Regular rate, regular rhythm, no gallops, no rubs, no murmurs  GI: Bowel sound normal, no hepatosplenomegaly, no masses, non-tender, non-distended, no guarding, no rebound tenderness  Lymph: No lymphadenopathy, no edema  Skin: No rashes, no chronic venous stasis     Medications       aspirin  325 mg Oral Daily     cyclobenzaprine  5-10 mg Oral TID     sodium chloride (PF)  3 mL Intracatheter Q8H       Data   Recent Labs   Lab 02/10/22  0530 02/09/22  0910 02/09/22  0653 02/08/22  0543 02/07/22  0218 02/06/22 2022 02/06/22  0832   WBC 4.8  --   --  3.2* 7.5  --  12.4*   HGB 9.1*  --   --  9.3* 10.7*  --  13.9   *  --   --  105* 105*  --  104*     --   --  153 159  --  216     --   --  136 141  --  138   POTASSIUM 3.6  --   --  3.6 3.5  3.4   < > 3.0*   CHLORIDE 107  --   --  106 111*  --  104   CO2 29  --   --  25 23  --  29   BUN 7  --   --  12 15  --  18   CR 0.45*  --   --  0.45* 0.44*  --  0.52   ANIONGAP 3  --   --  5 7  --  5   MAIRA 8.2*  --   --  7.9* 8.0*  --  9.4   GLC 91 99 59* 100* 89  --  161*   ALBUMIN 2.3*  --   --  2.3*  --   --  3.6   PROTTOTAL 5.5*  --   --  5.5*  --   --  7.7   BILITOTAL 0.5  --   --  0.8  --   --  1.5*   ALKPHOS 52  --   --  47  --   --  78   ALT 85*  --   --  99*  --   --  98*   *  --   --  270*  --   --  282*   LIPASE  --   --   --   --   --   --  42*    < > = values in this interval not displayed.

## 2022-02-12 ENCOUNTER — APPOINTMENT (OUTPATIENT)
Dept: PHYSICAL THERAPY | Facility: CLINIC | Age: 57
DRG: 956 | End: 2022-02-12
Payer: COMMERCIAL

## 2022-02-12 ENCOUNTER — APPOINTMENT (OUTPATIENT)
Dept: OCCUPATIONAL THERAPY | Facility: CLINIC | Age: 57
DRG: 956 | End: 2022-02-12
Payer: COMMERCIAL

## 2022-02-12 PROBLEM — F60.9 PERSONALITY DISORDER IN ADULT (H): Status: ACTIVE | Noted: 2022-02-12

## 2022-02-12 LAB
ALBUMIN UR-MCNC: NEGATIVE MG/DL
APPEARANCE UR: CLEAR
BILIRUB UR QL STRIP: NEGATIVE
COLOR UR AUTO: YELLOW
GLUCOSE UR STRIP-MCNC: NEGATIVE MG/DL
HGB UR QL STRIP: NEGATIVE
KETONES UR STRIP-MCNC: NEGATIVE MG/DL
LEUKOCYTE ESTERASE UR QL STRIP: NEGATIVE
NITRATE UR QL: NEGATIVE
PH UR STRIP: 7 [PH] (ref 5–7)
RBC URINE: <1 /HPF
SP GR UR STRIP: 1.01 (ref 1–1.03)
SQUAMOUS EPITHELIAL: 1 /HPF
UROBILINOGEN UR STRIP-MCNC: NORMAL MG/DL
WBC URINE: 1 /HPF

## 2022-02-12 PROCEDURE — 97535 SELF CARE MNGMENT TRAINING: CPT | Mod: GO

## 2022-02-12 PROCEDURE — 97116 GAIT TRAINING THERAPY: CPT | Mod: GP

## 2022-02-12 PROCEDURE — 250N000013 HC RX MED GY IP 250 OP 250 PS 637: Performed by: FAMILY MEDICINE

## 2022-02-12 PROCEDURE — 120N000001 HC R&B MED SURG/OB

## 2022-02-12 PROCEDURE — 99232 SBSQ HOSP IP/OBS MODERATE 35: CPT | Performed by: FAMILY MEDICINE

## 2022-02-12 PROCEDURE — 250N000013 HC RX MED GY IP 250 OP 250 PS 637: Performed by: PHYSICIAN ASSISTANT

## 2022-02-12 PROCEDURE — 99207 PR CDG-MDM COMPONENT: MEETS MODERATE - DOWN CODED: CPT | Performed by: FAMILY MEDICINE

## 2022-02-12 PROCEDURE — 97110 THERAPEUTIC EXERCISES: CPT | Mod: GP

## 2022-02-12 PROCEDURE — 81001 URINALYSIS AUTO W/SCOPE: CPT | Performed by: INTERNAL MEDICINE

## 2022-02-12 RX ADMIN — OXYCODONE HYDROCHLORIDE 10 MG: 5 TABLET ORAL at 11:07

## 2022-02-12 RX ADMIN — CYCLOBENZAPRINE HYDROCHLORIDE 10 MG: 5 TABLET, FILM COATED ORAL at 08:28

## 2022-02-12 RX ADMIN — CYCLOBENZAPRINE HYDROCHLORIDE 10 MG: 5 TABLET, FILM COATED ORAL at 14:31

## 2022-02-12 RX ADMIN — OXYCODONE HYDROCHLORIDE 10 MG: 5 TABLET ORAL at 06:24

## 2022-02-12 RX ADMIN — HYDROXYZINE HYDROCHLORIDE 25 MG: 25 TABLET, FILM COATED ORAL at 00:40

## 2022-02-12 RX ADMIN — OXYCODONE HYDROCHLORIDE 10 MG: 5 TABLET ORAL at 20:01

## 2022-02-12 RX ADMIN — OXYCODONE HYDROCHLORIDE 10 MG: 5 TABLET ORAL at 00:39

## 2022-02-12 RX ADMIN — OXYCODONE HYDROCHLORIDE 10 MG: 5 TABLET ORAL at 15:47

## 2022-02-12 RX ADMIN — ASPIRIN 325 MG ORAL TABLET 325 MG: 325 PILL ORAL at 08:28

## 2022-02-12 RX ADMIN — CYCLOBENZAPRINE HYDROCHLORIDE 10 MG: 5 TABLET, FILM COATED ORAL at 20:01

## 2022-02-12 ASSESSMENT — ACTIVITIES OF DAILY LIVING (ADL)
ADLS_ACUITY_SCORE: 6
ADLS_ACUITY_SCORE: 6
ADLS_ACUITY_SCORE: 8
ADLS_ACUITY_SCORE: 6
ADLS_ACUITY_SCORE: 8
ADLS_ACUITY_SCORE: 6
ADLS_ACUITY_SCORE: 8
ADLS_ACUITY_SCORE: 6
ADLS_ACUITY_SCORE: 8
ADLS_ACUITY_SCORE: 6
ADLS_ACUITY_SCORE: 8
ADLS_ACUITY_SCORE: 6
ADLS_ACUITY_SCORE: 6
ADLS_ACUITY_SCORE: 8

## 2022-02-12 NOTE — DISCHARGE SUMMARY
[facility] [place]  Hospitalist Discharge Summary      Date of Admission:  2/6/2022  Date of Discharge:  2/13/2022  Discharging Provider: Chantell Waters MD  Discharge Service: Hospitalist Service    Discharge Diagnoses   Principal Problem:    Closed fracture of right hip, initial encounter (H)  Active Problems:    Anxiety    S/P colon resection    Closed right hip fracture (H)    Traumatic rhabdomyolysis, initial encounter (H)    Fall, initial encounter    Personality disorder in adult, unspecified    Follow-ups Needed After Discharge   Follow-up Appointments     Follow-up and recommended labs and tests       Follow up with Dr. Lizarraga in 2 weeks. [phone] for appointment.         Follow-up and recommended labs and tests       Follow up with primary care provider, Alana Carbajal, within 7 days for   hospital follow- up.  No follow up labs or test are needed.             Discharge Disposition   Discharged to home  Condition at discharge: Good, stable, and continuing to improve    Hospital Course   Amira Scherer is a 57 year old female who was admitted on 2/6/2022 with a right hip fracture after a fall at home.     Closed fracture of right hip, initial encounter  s/p bipolar hemiarthroplasty of the right hip 2/7  Unusual history: Adamantly denied that she fell, instead describing that she had a right hip pain two days prior to admission that she had to ease herself to the floor, and was not able to get up again.  X-ray pelvis and right hip showed an acute, moderately displaced subcapital fracture of the right femur with anterior superior displacement of the femoral shaft.   -DVT prophylaxis per orthopedic team is enoxaparin 40 mg subQ daily for the duration of the hospital stay, then aspirin 325 mg daily at discharge for 42 days.    -The patient is permitted to weight-bear as tolerated on the right leg.    -Orthopedics has ordered PT and OT, recommending TCU, awaiting bed opening  -Complex  discharge situation due to mental health - see under Dispo  -Removed scheduled bowel meds per pt request     Personality disorder NOS  Anxiety  Denies any history of mood disorder, denies worry nor anxiety. On no medications prior to admission and not interested in more medications. Persistent anxiety and worry observed here, in addition to absolutely immutable belief that she cannot thrive at home, and persistent oppositional thinking. Unable to maintain social relationships and has no friends and no family. Denies ability to meet the most basic physical needs at home, despite objectively observed ability by therapies, RN/NA staff, and provider.   - Not amenable to treatment  - Has added exceedingly to complexity of care and staff labor burden due to barriers in participation and cooperation with care.   - lorazepam offered by previous provider - has not been used for days so discontinued.  - hydroxyzine prn     Stable or Chronic problems:  Acute blood loss anemia - stable  blood loss into the fracture site, stabilized after surgery, hemoglobin trend discontinued     Chronic back/leg pain  On admission patient reported a history of chronic back and leg pain since 2015 but very defensive with mentions of these to PT, consider outpatient chronic pain evaluation     Traumatic rhabdomyolysis, initial encounter (H) - improved  Total CK on ED arrival 6443 --> 5966 --> 5669 --> 4786 this morning.  This finding is consistent with her report of being down on the floor for nearly two continuous days.  She received a total of 1 L IV normal saline in ED, and is now on LR continuous infusion at 100 mL/h.  Renal function is normal despite rhabdomyolysis.   -Aggressive IV hydration, okay to discontinue  -Can discontinue trend of CK and CR     Hypokalemia -resolved, 2/2 to being down and poor PO. was replaced and resolved.  Excessive alcohol use - admitted to 1.5 glasses of wine without hx of withdrawal but strongly denied etoh  use disorder. Observed here and did not withdraw.  Alcoholic Transaminitis - trended, stable, liver panel trend discontinued, Removed thiamine, multivitmain, folate per patient request  S/P colon resection for prolapse -removed scheduled laxatives per pt request - she still has PRNs     Asymptomatic COVID swab negative Unvaccinated by choice      Disposition: Discharge has been delayed and complicated by severe patient mental health factors. She repeatedly demonstrated the ability to discharge home with home cares, but we could not find home care service to accept - ultimately she continued to improve and on 2/13 we recommended discharge home with outpatient PT referral. An extradorinary amount of care coordination and staff time was expended in ensuring that this patient could receive all the services she needs to access care, though she continued to insist she would be unable to move at home. I have asked for hospital leadership to advise this situation if patient refuses to leave.    Consultations This Hospital Stay   CARE MANAGEMENT / SOCIAL WORK IP CONSULT  PHYSICAL THERAPY ADULT IP CONSULT  OCCUPATIONAL THERAPY ADULT IP CONSULT    Code Status   Full Code    Time Spent on this Encounter   I, Chantell Waters MD, personally saw the patient today and spent greater than 30 minutes discharging this patient.       Chantell Waters MD  [facility] SURGICAL  [address] [address]  Phone: [phone]  Fax: [phone]  ______________________________________________________________________    Physical Exam   Vital Signs: Temp: 98.4  F (36.9  C) Temp src: Oral BP: 122/80 Pulse: 102   Resp: 14 SpO2: 98 % O2 Device: None (Room air)    Weight: 110 lbs 10.73 oz  Gen: Overall well, alert and oriented x 3, walking through the hallways using walker without assistance  HEENT: Atraumatic, normocephalic; sclera non-injected, anicterric; oral mucosa moist, no lesion, no exudate  Lungs: Clear to  ausculation, no wheezes, no rhonchi, no rales  Heart: Regular rate, regular rhythm, no gallops, no rubs, no murmurs  GI: Bowel sound normal, no hepatosplenomegaly, no masses, non-tender, non-distended, no guarding, no rebound tenderness  Lymph: No lymphadenopathy, no edema  Skin: No rashes, no chronic venous stasis   Psych: anxious affect, psychomotor agitation, pressured speech. Severely oppositional in her reasoning       Primary Care Physician   Alana Carbajal    Discharge Orders      Physical Therapy Referral      Follow-up and recommended labs and tests     Follow up with Dr. Lizarraga in 2 weeks. [phone] for appointment.     Reason for your hospital stay    S/p right hip bipolar hemiarthroplasty, Rhabdomyolysis.     Activity    Your activity upon discharge: activity as tolerated and no driving while on analgesics and cleared by orthopedics.   Weight bear as tolerated right lower extremity.     When to contact your care team    Call [facility] at [phone] if experiencing fever 101F or greater increasing pain, incision changes including redness, streaking or drainage, calf pain, injury, worsening functional limitations or any other concerns.     Wound care and dressings    Instructions to care for your wound at home: Aquacel dressing should be left intact until postop appointment and only changed if >50% saturated. Ok to get dressing wet in shower. Call with instruction for dressing change or any other concerns. [phone]  .     Reason for your hospital stay    Right hip fracture     Follow-up and recommended labs and tests     Follow up with primary care provider, Alana Carbajal, within 7 days for hospital follow- up.  No follow up labs or test are needed.     Activity    Your activity upon discharge: activity as tolerated     Crutches DME    DME Documentation: Describe the reason for need to support medical necessity: Impaired gait status post hip surgery. I, the undersigned,  certify that the above prescribed supplies are medically necessary for this patient and is both reasonable and necessary in reference to accepted standards of medical practice in the treatment of this patient's condition and is not prescribed as a convenience.     Cane DME    DME Documentation: Describe the reason for need to support medical necessity: Impaired gait status post hip surgery. I, the undersigned, certify that the above prescribed supplies are medically necessary for this patient and is both reasonable and necessary in reference to accepted standards of medical practice in the treatment of this patient's condition and is not prescribed as a convenience.     Walker DME    : DME Documentation: Describe the reason for need to support medical necessity: Impaired gait status post hip surgery. I, the undersigned, certify that the above prescribed supplies are medically necessary for this patient and is both reasonable and necessary in reference to accepted standards of medical practice in the treatment of this patient's condition and is not prescribed as a convenience.     Diet    Follow this diet upon discharge: Orders Placed This Encounter      Advance Diet as Tolerated: Regular Diet Adult     Diet    Follow this diet upon discharge: Orders Placed This Encounter      Advance Diet as Tolerated: Regular Diet Adult      Diet       Significant Results and Procedures   Most Recent 3 CBC's:  Recent Labs   Lab Test 02/10/22  0530 02/08/22  0543 02/07/22  0218   WBC 4.8 3.2* 7.5   HGB 9.1* 9.3* 10.7*   * 105* 105*    153 159     Most Recent 3 BMP's:  Recent Labs   Lab Test 02/10/22  0530 02/09/22  0910 02/09/22  0653 02/08/22  0543 02/07/22  0218     --   --  136 141   POTASSIUM 3.6  --   --  3.6 3.5  3.4   CHLORIDE 107  --   --  106 111*   CO2 29  --   --  25 23   BUN 7  --   --  12 15   CR 0.45*  --   --  0.45* 0.44*   ANIONGAP 3  --   --  5 7   MAIRA 8.2*  --   --  7.9* 8.0*   GLC 91 99 59*  100* 89     Most Recent 2 LFT's:  Recent Labs   Lab Test 02/10/22  0530 02/08/22  0543   * 270*   ALT 85* 99*   ALKPHOS 52 47   BILITOTAL 0.5 0.8   ,   Results for orders placed or performed during the hospital encounter of 02/06/22   Lumbar spine XR, 2-3 views    Narrative    EXAM: XR LUMBAR SPINE 2-3 VIEWS  LOCATION: [facility]  DATE/TIME: 02/06/2022, 9:48 AM    INDICATION: Lower back pain.  COMPARISON: MRI lumbar spine 10/31/2016.  TECHNIQUE: CR Lumbar Spine.      Impression    IMPRESSION: Five nonrib-bearing lumbar-type vertebral bodies. Mild convex left lumbar curvature. Normal lumbar lordosis. Vertebral body heights and intervertebral disc space heights are preserved. Facets are unremarkable. Visualized osseous pelvis is   unremarkable. Normal bowel gas pattern. Mild aortic atherosclerotic calcifications.     XR Pelvis w Hip Right 1 View    Narrative    EXAM: XR PELVIS AND HIP RIGHT 1 VIEW  LOCATION: [facility]  DATE/TIME: 2/6/2022 9:53 AM    INDICATION: R hip pain  COMPARISON: None.      Impression    IMPRESSION: Acute moderately displaced subcapital fracture of the right femur with anterior superior displacement of the femoral shaft.   XR Shoulder Right Port G/E 2 Views    Narrative    EXAM: XR SHOULDER RIGHT PORT G/E 2 VIEWS  LOCATION: [facility]  DATE/TIME: 2/6/2022 11:22 AM    INDICATION: R shoulder pain  COMPARISON: None.      Impression    IMPRESSION: No fracture. Osteopenia. No degenerative changes.    XR Pelvis w Hip Port Right 1 View    Narrative    PELVIS AND HIP PORTABLE RIGHT ONE VIEW   2/7/2022 4:36 PM     HISTORY: Status post hip replacement.    COMPARISON: X-ray from 2/6/2022.      Impression    IMPRESSION: Right hip hemiarthroplasty in place. No evidence of  complication. There are multiple nondilated gas-filled loops of small  bowel. This is slightly more prominent than that seen  preoperatively.    EFE GAMBLE MD         SYSTEM ID:  [id number]       Discharge Medications   Current Discharge Medication List      START taking these medications    Details   acetaminophen (TYLENOL) 325 MG tablet Take 3 tablets (975 mg) by mouth every 8 hours  Qty: 90 tablet, Refills: 0    Associated Diagnoses: Traumatic rhabdomyolysis, initial encounter (H); Oth fracture of head and neck of right femur, init (H)      aspirin (ASA) 325 MG EC tablet Take 1 tablet (325 mg) by mouth daily  Qty: 42 tablet, Refills: 0    Associated Diagnoses: Traumatic rhabdomyolysis, initial encounter (H); Oth fracture of head and neck of right femur, init (H)      cyclobenzaprine (FLEXERIL) 5 MG tablet Take 1-2 tablets (5-10 mg) by mouth 3 times daily as needed for muscle spasms  Qty: 30 tablet, Refills: 0    Associated Diagnoses: Traumatic rhabdomyolysis, initial encounter (H); Oth fracture of head and neck of right femur, init (H)      hydrOXYzine (ATARAX) 25 MG tablet Take 1-2 tablets (25-50 mg) by mouth every 6 hours as needed for itching or other (adjuvant pain)  Qty: 30 tablet, Refills: 1    Associated Diagnoses: Traumatic rhabdomyolysis, initial encounter (H); Oth fracture of head and neck of right femur, init (H)      oxyCODONE (ROXICODONE) 5 MG tablet Take 1-2 tablets (5-10 mg) by mouth every 4 hours as needed for moderate to severe pain  Qty: 40 tablet, Refills: 0    Associated Diagnoses: Closed fracture of right hip, initial encounter (H)      polyethylene glycol (MIRALAX) 17 GM/Dose powder Take 17 g by mouth daily as needed for constipation (while on narcotics)  Qty: 510 g, Refills: 0    Associated Diagnoses: Traumatic rhabdomyolysis, initial encounter (H); Oth fracture of head and neck of right femur, init (H)      senna-docusate (SENOKOT-S/PERICOLACE) 8.6-50 MG tablet Take 1 tablet by mouth 2 times daily as needed for constipation  Qty: 30 tablet, Refills: 0    Associated Diagnoses: Traumatic rhabdomyolysis, initial  encounter (H); Oth fracture of head and neck of right femur, init (H)           Allergies   No Known Allergies

## 2022-02-12 NOTE — PROGRESS NOTES
Care Management Follow Up    Length of Stay (days): 6    Expected Discharge Date: 02/14/2022  Expected Time of Departure: 2/12/22  Concerns to be Addressed: discharge planning     Patient plan of care discussed at interdisciplinary rounds: Yes    Anticipated Discharge Disposition: Home Care,Transitional Care     Anticipated Discharge Services: Transportation Services  Anticipated Discharge DME: Eladio    Patient/family educated on Medicare website which has current facility and service quality ratings: yes  Education Provided on the Discharge Plan: yes   Patient/Family in Agreement with the Plan: yes    Referrals Placed by CM/SW: Internal Clinic Care Coordination,Post Acute Facilities,Transportation  Private pay costs discussed: transportation costs    Additional Information:    Per IDT rounds, MD team states that pt is medically stable for discharge today pending safe discharge disposition.     Discharge plan has been TCU, however per therapy notes patient is ambulating 180'  And able to go up 10 steps with CGA. Per therapy she would would be safe to discharge home with home care services.     CM placed referrals to the following HC agencies:    -[facility] (Phone: [phone])- PT unable to accept new patients until the week of 2/21.    -[facility] (phone: [phone] Fax: [phone])- Spoke with Arsalan, they do not accept patients insurance.     -[facility] (phone: [phone] fax: [phone])- Message left    - [facility]  (phone: [phone] Fax: [phone])- Awaiting call back from weekend intake.     - [facility] (Phone: [phone] Fax: [phone])- PT services are out 4 weeks    -[facility] (P: [phone]/ F: [phone])- faxed referral info, intake will review on Monday    -[facility] (Phone: [phone] Fax: [phone])- faxed referral info, intake to review on Monday      -[facility] (Phone: [phone] Fax: [phone])- Faxed referral, intake to review on Monday    TCU referrals remain pending:    [facility] -  Carrie Webb Patient Transition Liaison, (phone: [phone]/Fax: [phone]) - No bed available today or over the weekend.     [facility] Phone (Main Phone:[phone] Admissions Phone:[phone] Fax: [phone]) - No bed available today or over the weekend.     [facility] (Phone: [phone] Fax: [phone]) - No bed available today or over the weekend.     [facility] Phone: (Admissions: [phone] RN Report: [phone] Fax: [phone]) - No bed available today or over the weekend.     Plan:  Home w/HC vs TCU    KAT ALLAN, RN

## 2022-02-12 NOTE — PROGRESS NOTES
[facility]    Hospitalist Progress Note    Date of Service (when I saw the patient): 02/12/2022    Assessment & Plan   Amira Scherer is a 57 year old female who was admitted on 2/6/2022 with a right hip fracture after a fall at home.    Closed fracture of right hip, initial encounter  Unusual history: Adamantly denies that she fell, instead describing that she had a right hip pain two days prior to admission that she had to ease herself to the floor, and was not able to get up again.  X-ray pelvis and right hip showed an acute, moderately displaced subcapital fracture of the right femur with anterior superior displacement of the femoral shaft. The patient was taken to the OR 2/7/2022 by Dr. Lizarraga, who performed a bipolar hemiarthroplasty of the right hip under general anesthesia. POD #1, the patient's only complaints were with regard to right hip and leg pain.  Long, complex discussion with patient about pain management options at that time, though we agreed that orthopedics would be primarily managing them  -DVT prophylaxis per orthopedic team is enoxaparin 40 mg subQ daily for the duration of the hospital stay, then aspirin 325 mg daily at discharge for 42 days.    -The patient is permitted to weight-bear as tolerated on the right leg.    -Orthopedics has ordered PT and OT, recommending TCU, awaiting bed opening  -Complex discharge situation due to mental health - see under Dispo  -Removed scheduled bowel meds per pt request     Personality disorder NOS  Anxiety  denies prior history of mood disorder, including anxiety. On no medications prior to admission. Persistent anxiety and worry observed here. Pt also demonstrated persistent obstinance and oppositionality, and attitude of severe helplessness not consistent with objective ability. Patient unable to maintain social relationships and has no friends and no family.  - Not amenable to treatment due to denying any problem  -  Contributes to complexity of care and staff labor due to barriers in participation and cooperation with care.   - She participates in PT/OT to get better but claims to be unable to do any of the physical activity she is demonstrably able to do  - lorazepam offered by previous provider - has not been used for days so discontinued.  - hydroxyzine prn    Stable or Chronic problems:  Acute blood loss anemia - stable  blood loss into the fracture site, stabilized after surgery, hemoglobin trend discontinued     Chronic back/leg pain  On admission patient reported a history of chronic back and leg pain since 2015 but very defensive with mentions of these to PT, consider outpatient chronic pain evaluation     Traumatic rhabdomyolysis, initial encounter (H) - improved  Total CK on ED arrival 6443 --> 5966 --> 5669 --> 4786 this morning.  This finding is consistent with her report of being down on the floor for nearly two continuous days.  She received a total of 1 L IV normal saline in ED, and is now on LR continuous infusion at 100 mL/h.  Renal function is normal despite rhabdomyolysis.   -Aggressive IV hydration, okay to discontinue  -Can discontinue trend of CK and CR     Hypokalemia -resolved, was replaced  Excessive alcohol use - admitted to 1.5 glasses of wine without hx of withdrawal but strongly denied etoh use disorder  Alcoholic Transaminitis - trended, stable, liver panel trend discontinued, Removed thiamine, multivitmain, folate per patient request  S/P colon resection for prolapse  -removed scheduled laxatives per pt request - she still has PRNs    Asymptomatic COVID swab negative Unvaccinated by choice     Prophylaxis Per orthopedics, enoxaparin 40 mg subQ daily until hospital discharge, then aspirin 325 mg daily for 42 days  Lines Peripheral to be removed  Urinary catheter None  Diet Regular diet.  Code Status Full     Disposition Patient is medically stable for discharge and can go home with home PT. Was  able to discharge 2/12 but no home care service could be found for her that could see her within 1 week. Discharge has been complicated by patient demonstrating ability to perform all her needs at home but denying the ability to care for herself. Unfortunately when home care service can be found, she needs to be told firmly that she is discharged. If patient continues to improve 2/13, may need to discharge home with recommendation for outpatient PT    Chantell Waters MD        Interval History   Patient is exceedingly difficult to interview due to oppositionality around all recommendations and statements of fact. However, she admits to doing better and being significantly improved today. Stronger and able to walk to the bathroom without assistance. But says she can't sit down and get up which is demonstrably false.    Data reviewed today: I reviewed all new labs and imaging results over the last 24 hours. I personally reviewed no images or EKG's today.    Physical Exam   Temp: 98.2  F (36.8  C) Temp src: Oral BP: 105/67 Pulse: 76   Resp: 18 SpO2: 97 % O2 Device: None (Room air)    Vitals:    02/06/22 0802 02/06/22 1713   Weight: 47.6 kg (105 lb) 50.2 kg (110 lb 10.7 oz)     Vital Signs with Ranges  Temp:  [97.5  F (36.4  C)-98.2  F (36.8  C)] 98.2  F (36.8  C)  Pulse:  [64-86] 76  Resp:  [18] 18  BP: (104-122)/(53-73) 105/67  Cuff Mean (mmHg):  [69] 69  SpO2:  [97 %-98 %] 97 %  I/O last 3 completed shifts:  In: 170 [P.O.:170]  Out: 350 [Urine:350]  Gen: Overall well, alert and oriented x 3, sitting upright in bed  HEENT: Atraumatic, normocephalic; sclera non-injected, anicterric; oral mucosa moist, no lesion, no exudate  Lungs: Clear to ausculation, no wheezes, no rhonchi, no rales  Heart: Regular rate, regular rhythm, no gallops, no rubs, no murmurs  GI: Bowel sound normal, no hepatosplenomegaly, no masses, non-tender, non-distended, no guarding, no rebound tenderness  Lymph: No lymphadenopathy, no edema  Skin: No  rashes, no chronic venous stasis   Psych: anxious affect, psychomotor agitation, pressured speech    Medications       aspirin  325 mg Oral Daily     cyclobenzaprine  5-10 mg Oral TID       Data   Recent Labs   Lab 02/10/22  0530 02/09/22  0910 02/09/22  0653 02/08/22  0543 02/07/22  0218 02/06/22 2022 02/06/22  0832   WBC 4.8  --   --  3.2* 7.5  --  12.4*   HGB 9.1*  --   --  9.3* 10.7*  --  13.9   *  --   --  105* 105*  --  104*     --   --  153 159  --  216     --   --  136 141  --  138   POTASSIUM 3.6  --   --  3.6 3.5  3.4   < > 3.0*   CHLORIDE 107  --   --  106 111*  --  104   CO2 29  --   --  25 23  --  29   BUN 7  --   --  12 15  --  18   CR 0.45*  --   --  0.45* 0.44*  --  0.52   ANIONGAP 3  --   --  5 7  --  5   MAIRA 8.2*  --   --  7.9* 8.0*  --  9.4   GLC 91 99 59* 100* 89  --  161*   ALBUMIN 2.3*  --   --  2.3*  --   --  3.6   PROTTOTAL 5.5*  --   --  5.5*  --   --  7.7   BILITOTAL 0.5  --   --  0.8  --   --  1.5*   ALKPHOS 52  --   --  47  --   --  78   ALT 85*  --   --  99*  --   --  98*   *  --   --  270*  --   --  282*   LIPASE  --   --   --   --   --   --  42*    < > = values in this interval not displayed.

## 2022-02-12 NOTE — PLAN OF CARE
"Neuro: A&Ox4. Anxious at times, particular with cares, refusing IV flush. Concerned with getting sick from hospital.   Cardiac:. VSS. Afebrile.   Respiratory: Sating mid 90's on RA.  GI/: Adequate urine output, urgency and frequency after urination. UA sent - negative, sml liq BM pt flushed  Diet/appetite: Tolerating regular diet. Eating well.  Activity:  Assist of 1, up walked in halls x 3   Pain: At acceptable level on current regimen. Vistaril and oxycodone with good control pt slept 4 hours.   Skin:Right hip dressing old scant drainage; small.  LDA's:PIV pt declined flushing, \"she is scared of having anything in it and hates IVs\"    Plan: PT walked several times with assist and walker to bathroom and 3 times down watson, does well, getting self in and out of bed alone. Needs lots of encouragement. Appears that she could tolerate going home but is nervous since she is alone at home. UA sent negative. K+ for 06am pending. Continue with POC. Notify primary team with changes.    "

## 2022-02-12 NOTE — PROGRESS NOTES
"Writer witnessed  approach pt and explained to her what lab work needed to be completed this morning. Pt stated \"No. I'm going to skip on the morning labs again\".     Writer approached pt and explained the purpose of the lab work and why it is need. Pt stated \"I know, but I'm just going to follow my gut especially since I know I can refuse to have it done\". Writer again re-educated pt on purpose of lab work and how it can effect her progress during her hospitalization and pt again stated \"I'm going to refuse. Thank you\".    "

## 2022-02-12 NOTE — PROGRESS NOTES
2/12/22 @ 1014 Patient remains alert and oriented to person, place and time. Pain is currently around 7.5/10. Patient is eating breakfast at the time of this note.   . Patient is able to verbalize needs. She is post-op X5 days to a right hip fracture. Dressing is  intact an and no blood is visible through the dressing at this time. Breaths sounds were auscultated and are clear, her bowel sounds are present X4 quads. Strength to upper and lower extremities score is 4  (with the exception of her right leg).  PT came and walked with the patient today in the watson, patient tolerated it well.

## 2022-02-13 ENCOUNTER — APPOINTMENT (OUTPATIENT)
Dept: PHYSICAL THERAPY | Facility: CLINIC | Age: 57
DRG: 956 | End: 2022-02-13
Payer: COMMERCIAL

## 2022-02-13 VITALS
BODY MASS INDEX: 17.79 KG/M2 | TEMPERATURE: 98.4 F | HEIGHT: 66 IN | RESPIRATION RATE: 14 BRPM | SYSTOLIC BLOOD PRESSURE: 122 MMHG | DIASTOLIC BLOOD PRESSURE: 80 MMHG | HEART RATE: 102 BPM | WEIGHT: 110.67 LBS | OXYGEN SATURATION: 98 %

## 2022-02-13 PROBLEM — F60.9 PERSONALITY DISORDER IN ADULT (H): Chronic | Status: ACTIVE | Noted: 2022-02-12

## 2022-02-13 PROCEDURE — 250N000013 HC RX MED GY IP 250 OP 250 PS 637: Performed by: PHYSICIAN ASSISTANT

## 2022-02-13 PROCEDURE — 250N000013 HC RX MED GY IP 250 OP 250 PS 637: Performed by: FAMILY MEDICINE

## 2022-02-13 PROCEDURE — 97116 GAIT TRAINING THERAPY: CPT | Mod: GP

## 2022-02-13 PROCEDURE — 99239 HOSP IP/OBS DSCHRG MGMT >30: CPT | Performed by: FAMILY MEDICINE

## 2022-02-13 RX ADMIN — CYCLOBENZAPRINE HYDROCHLORIDE 10 MG: 5 TABLET, FILM COATED ORAL at 07:52

## 2022-02-13 RX ADMIN — OXYCODONE HYDROCHLORIDE 10 MG: 5 TABLET ORAL at 07:53

## 2022-02-13 RX ADMIN — OXYCODONE HYDROCHLORIDE 10 MG: 5 TABLET ORAL at 00:04

## 2022-02-13 RX ADMIN — CYCLOBENZAPRINE HYDROCHLORIDE 5 MG: 5 TABLET, FILM COATED ORAL at 13:42

## 2022-02-13 RX ADMIN — ASPIRIN 325 MG ORAL TABLET 325 MG: 325 PILL ORAL at 07:52

## 2022-02-13 RX ADMIN — OXYCODONE HYDROCHLORIDE 10 MG: 5 TABLET ORAL at 13:41

## 2022-02-13 RX ADMIN — OXYCODONE HYDROCHLORIDE 10 MG: 5 TABLET ORAL at 03:50

## 2022-02-13 ASSESSMENT — ACTIVITIES OF DAILY LIVING (ADL)
ADLS_ACUITY_SCORE: 6

## 2022-02-13 NOTE — PLAN OF CARE
Occupational Therapy Discharge Summary    Reason for therapy discharge:    Discharged to home with home therapy.    Progress towards therapy goal(s). See goals on Care Plan in [facility] electronic health record for goal details.  Goals met    Therapy recommendation(s):    Continued therapy is recommended.  Rationale/Recommendations:  safety assessment of bathroom and higher level IADL assessment..

## 2022-02-13 NOTE — PLAN OF CARE
Physical Therapy Discharge Summary    Reason for therapy discharge:    Discharged to home with outpatient therapy.    Progress towards therapy goal(s). See goals on Care Plan in [facility] electronic health record for goal details.  Goals met    Therapy recommendation(s):    Continued therapy is recommended.  Rationale/Recommendations:  [facility] strengthening and gait training.    Meseret Scherer PT

## 2022-02-13 NOTE — PROGRESS NOTES
Care Management Discharge Note    Discharge Date: 02/13/2022  Expected Time of Departure: 2/12/22    Discharge Disposition: Home,Outpatient Rehab (PT, OT, SLP, Cardiac or Pulmonary)    Discharge Services: Transportation Services    Discharge DME: Walker    Discharge Transportation: agency    Private pay costs discussed: transportation costs    PAS Confirmation Code:    Patient/family educated on Medicare website which has current facility and service quality ratings: yes    Education Provided on the Discharge Plan: Yes   Persons Notified of Discharge Plans: Amira  Patient/Family in Agreement with the Plan: yes    Handoff Referral Completed: Yes    Additional Information:    Per IDT rounds, MD team states that pt is medically stable for discharge today. Spoke with patient at bedside regarding discharge plans. Patient still requesting TCU placement. CM discussed that with patients continued improvement over the last couple days the level of care she is requiring would likely not qualify for TCU. After multiple lengthy and repetitive  Conversations the patient verbalized understanding.     Spoke with patient regarding transportation. Patient expressed that she does not have any friends or family and cannot find a ride. Writer discussed transportation options but patient refused ALL options presented to her and at this point is just assuming that she will stay in the hospital for as long as she wishes. Hospital leadership was called for advice on what to do if patient continues to refuse to leave.      [facility] set up for 3:30PM.     Referral sent to [facility] (Ph: [phone])-  Confirmation: [id number]  PLAN: Home with outpatient PT/OT    KAT ALLAN RN

## 2022-02-13 NOTE — PLAN OF CARE
[facility] BOWSER DISCHARGE NOTE    Patient discharged to home at 3:29 PM via wheel chair. Accompanied by other: [facility] transport and staff. Discharge instructions reviewed with patient, opportunity offered to ask questions. Frequent reassurance required. Prescriptions sent to patients preferred pharmacy. All belongings sent with patient.     Jolynn Chacon RN

## 2022-02-13 NOTE — PLAN OF CARE
"Neuro: A&Ox4. Less anxious and sleeping better than previous night. Did walk in hallway a few times tonight with two nurses. Oxycodone for pain q 4 hours. No nausea. Up to bathroom with assist of one and walker doing 98% of it herself, but still states \"she is doing it but will still need help at home and not sure she can do it.\"  Cardiac: VSS.afebrile.    Respiratory: Sating mid 90's on RA.  GI/: Adequate urine output.   Diet/appetite: Tolerating regular diet. Eating well.  Activity:  Assist of 1, up in watson several times walking.   Pain: At acceptable level on current regimen with oxycodone.   Skin: No new deficits noted. Right hip dressing in place with scant old drainage.   LDA's: No piv pt did not want to continue to have it in place, taken out on days.     Plan: POD #6 walking well in hallway and up in room to bathroom as sba. Does well but still stating shes not sure she can do this at home,continue with POC. Notify primary team with changes.    "

## 2022-02-14 ENCOUNTER — PATIENT OUTREACH (OUTPATIENT)
Dept: CARE COORDINATION | Facility: CLINIC | Age: 57
End: 2022-02-14
Payer: MEDICAID

## 2022-02-14 NOTE — PROGRESS NOTES
Clinic Care Coordination Contact  [facility]: Post-Discharge Note  SITUATION                                                      Admission:    Admission Date: 02/06/22   Reason for Admission: Closed fracture of right hip  Discharge:   Discharge Date: 02/13/22  Discharge Diagnosis: Closed fracture of right hip, initial encounter (H)Active Problems:  Anxiety  S/P colon resection  Closed right hip fracture (H)  Traumatic rhabdomyolysis, initial encounter (H)  Fall, initial encounter  Personality disorder in adult, unspecified    BACKGROUND                                                      Per hospital discharge summary and inpatient provider notes:  Persistent anxiety and worry observed here, in addition to absolutely immutable belief that she cannot thrive at home, and persistent oppositional thinking. Unable to maintain social relationships and has no friends and no family. Denies ability to meet the most basic physical needs at home, despite objectively observed ability by therapies, RN/NA staff, and provider.   - Not amenable to treatment  - Has added exceedingly to complexity of care and staff labor burden due to barriers in participation and cooperation with care.     ASSESSMENT           Discharge Assessment  How are you doing now that you are home?: I am in a lot of pain  How are your symptoms? (Red Flag symptoms escalate to triage hotline per guidelines): Unchanged  Do you feel your condition is stable enough to be safe at home until your provider visit?: No (see comment) (I am in a lot of pain.)  Does the patient have their discharge instructions? : Yes  Does the patient have questions regarding their discharge instructions? : No  Were you started on any new medications or were there changes to any of your previous medications? : Yes  Does the patient have all of their medications?: Yes  Do you have questions regarding any of your medications? : No  Do you have all of your needed medical supplies  or equipment (DME)?  (i.e. oxygen tank, CPAP, cane, etc.): Yes  Discharge follow-up appointment scheduled within 14 calendar days? : Yes  Discharge Follow Up Appointment Date: 02/15/22  Discharge Follow Up Appointment Scheduled with?: Primary Care Provider    Post-op ([facility] Only)  If the patient had a surgery or procedure, do they have any questions for a nurse?: No    Post-op (Clinicians Only)  Did the patient have surgery or a procedure: Yes  Incision:  (unable to determine.  Patient not answering all questions)  Drainage: No  Fever: No  Chills: No  Incision site pain: Yes     Amira c/o severe pain.  Writer reviewed all medications.  She is taking Oxycodone 1-2 tabs every 4 hours, Tylenol 1000 mg every 4 hours, Atarax 25 mg 1 tab every 6 hours and Flexeril 5 mg 1 tab 3 times a day.  Writer encouraged her to continue to ice PRN.          PLAN                                                      Outpatient Plan:  Patient to have a phone visit with Dr. Carbajal tomorrow.  Instructed to call  [facility]  if any additional needs.      Future Appointments   Date Time Provider Department Center   2/15/2022  3:00 PM Alana Carbajal MD [facility]         For any urgent concerns, please contact our 24 hour nurse triage line: [phone] ([phone])         Neelam Malloy RN

## 2022-02-15 ENCOUNTER — VIRTUAL VISIT (OUTPATIENT)
Dept: FAMILY MEDICINE | Facility: CLINIC | Age: 57
End: 2022-02-15
Payer: COMMERCIAL

## 2022-02-15 DIAGNOSIS — F60.9 PERSONALITY DISORDER IN ADULT (H): ICD-10-CM

## 2022-02-15 DIAGNOSIS — F41.1 GAD (GENERALIZED ANXIETY DISORDER): ICD-10-CM

## 2022-02-15 DIAGNOSIS — S72.001D CLOSED FRACTURE OF RIGHT HIP WITH ROUTINE HEALING, SUBSEQUENT ENCOUNTER: Primary | ICD-10-CM

## 2022-02-15 PROCEDURE — 99495 TRANSJ CARE MGMT MOD F2F 14D: CPT | Mod: 95 | Performed by: FAMILY MEDICINE

## 2022-02-15 NOTE — PROGRESS NOTES
Amira Scherer is a 57 year old female who is being evaluated via a billable telephone visit.      What phone number would you like to be contacted at? [phone]  How would you like to obtain your AVS? Mail a copy      Assessment & Plan     Closed fracture of right hip with routine healing, subsequent encounter  Physical therapy ot as needed     CARLA (generalized anxiety disorder)  Refuses assessment or treatment     Personality disorder in adult, unspecified  Not sure which one as she has features of several.            Physical therapy ot     Return in about 3 months (around 5/15/2022) for Routine Visit.    Alana Carbajal MD  [facility]     Amira Scherer is a 57 year old female who presents via phone visit today for the following health issues:    *[facility] [employer] called her but there is not meals on wheels for her.   *In a lot of pain, helps to stretch.  *Call you that her past pain is still a problem and her hip pain.   Maddie Long CMA        Post Discharge Outreach 2/14/2022   Admission Date 2/6/2022   Reason for Admission Closed fracture of right hip   Discharge Date 2/13/2022   Discharge Diagnosis Closed fracture of right hip, initial encounter (H)Active Problems:  Anxiety  S/P colon resection  Closed right hip fracture (H)  Traumatic rhabdomyolysis, initial encounter (H)  Fall, initial encounter  Personality disorder in adult, unspecified   How are you doing now that you are home? I am in a lot of pain   How are your symptoms? (Red Flag symptoms escalate to triage hotline per guidelines) Unchanged   Do you feel your condition is stable enough to be safe at home until your provider visit? No (see comment)   Does the patient have their discharge instructions?  Yes   Does the patient have questions regarding their discharge instructions?  No   Were you started on any new medications or were there changes to any of your previous medications?  Yes   Does the  patient have all of their medications? Yes   Do you have questions regarding any of your medications?  No   Do you have all of your needed medical supplies or equipment (DME)?  (i.e. oxygen tank, CPAP, cane, etc.) Yes   Discharge follow-up appointment scheduled within 14 calendar days?  Yes   Discharge Follow Up Appointment Date 2/15/2022   Discharge Follow Up Appointment Scheduled with? Primary Care Provider       Hospital Follow-up Visit:    Hospital/Nursing Home/IP Rehab Facility: [facility]  Date of Admission: 2/6/22  Date of Discharge: 2/13/22  Reason(s) for Admission:     Principal Problem:    Closed fracture of right hip, initial encounter (H)  Active Problems:    Anxiety    S/P colon resection    Closed right hip fracture (H)    Traumatic rhabdomyolysis, initial encounter (H)    Fall, initial encounter    Personality disorder in adult, unspecified      Was your hospitalization related to COVID-19? No   Problems taking medications regularly:  no  Medication changes since discharge: None  Problems adhering to non-medication therapy:  yes    Summary of hospitalization:  [facility] discharge summary reviewed  Diagnostic Tests/Treatments reviewed.  Follow up needed: physical therapy ot   Other Healthcare Providers Involved in Patient s Care:         Homecare, Surgical follow-up appointment - 1 week and Physical Therapy  Update since discharge: she states worse    Post Discharge Medication Reconciliation: discharge medications reconciled, continue medications without change.  Plan of care communicated with patient          She has been at home in pain . She is focusing on the pain from 7 years ago   Getting up and down out of bed   She is trying to heal  Denies being hard to deal with   She perseverates about the old back pain . States she cannot sit down for the last 7 years.   She is homebound   She c/o not being able to care for herself even before the injury  She  still has been getting her food from delivery       Review of Systems          Objective          Vitals:  No vitals were obtained today due to virtual visit.      Admission on 02/06/2022, Discharged on 02/13/2022   Component Date Value Ref Range Status     Hold Specimen 02/06/2022 JIC   Final     Hold Specimen 02/06/2022 JIC   Final     Hold Specimen 02/06/2022 JIC   Final     Hold Specimen 02/06/2022 JI   Final     Sodium 02/06/2022 138  133 - 144 mmol/L Final     Potassium 02/06/2022 3.0* 3.4 - 5.3 mmol/L Final     Chloride 02/06/2022 104  94 - 109 mmol/L Final     Carbon Dioxide (CO2) 02/06/2022 29  20 - 32 mmol/L Final     Anion Gap 02/06/2022 5  3 - 14 mmol/L Final     Urea Nitrogen 02/06/2022 18  7 - 30 mg/dL Final     Creatinine 02/06/2022 0.52  0.52 - 1.04 mg/dL Final     Calcium 02/06/2022 9.4  8.5 - 10.1 mg/dL Final     Glucose 02/06/2022 161* 70 - 99 mg/dL Final     Alkaline Phosphatase 02/06/2022 78  40 - 150 U/L Final     AST 02/06/2022 282* 0 - 45 U/L Final     ALT 02/06/2022 98* 0 - 50 U/L Final     Protein Total 02/06/2022 7.7  6.8 - 8.8 g/dL Final     Albumin 02/06/2022 3.6  3.4 - 5.0 g/dL Final     Bilirubin Total 02/06/2022 1.5* 0.2 - 1.3 mg/dL Final     GFR Estimate 02/06/2022 >90  >60 mL/min/1.73m2 Final    Effective December 21, 2021 eGFRcr in adults is calculated using the 2021 CKD-EPI creatinine equation which includes age and gender (Rich et al., NEJ, DOI: 10.1056/GGKPfb3607191)     Lipase 02/06/2022 42* 73 - 393 U/L Final     Color Urine 02/06/2022 Dark Yellow* Colorless, Straw, Light Yellow, Yellow Final     Appearance Urine 02/06/2022 Slightly Cloudy* Clear Final     Glucose Urine 02/06/2022 Negative  Negative mg/dL Final     Bilirubin Urine 02/06/2022 Negative  Negative Final     Ketones Urine 02/06/2022 160 * Negative mg/dL Final     Specific Gravity Urine 02/06/2022 1.030  1.003 - 1.035 Final     Blood Urine 02/06/2022 Large* Negative Final     pH Urine 02/06/2022 6.0  5.0 - 7.0  Final     Protein Albumin Urine 02/06/2022 100 * Negative mg/dL Final     Urobilinogen Urine 02/06/2022 Normal  Normal, 2.0 mg/dL Final     Nitrite Urine 02/06/2022 Negative  Negative Final     Leukocyte Esterase Urine 02/06/2022 Negative  Negative Final     Bacteria Urine 02/06/2022 Moderate* None Seen /HPF Final     Mucus Urine 02/06/2022 Present* None Seen /LPF Final     RBC Urine 02/06/2022 6* <=2 /HPF Final     WBC Urine 02/06/2022 5  <=5 /HPF Final     Squamous Epithelials Urine 02/06/2022 1  <=1 /HPF Final     Hyaline Casts Urine 02/06/2022 33* <=2 /LPF Final     Lactic Acid 02/06/2022 2.0  0.7 - 2.0 mmol/L Final     CK 02/06/2022 6,443* 30 - 225 U/L Final     WBC Count 02/06/2022 12.4* 4.0 - 11.0 10e3/uL Final     RBC Count 02/06/2022 4.01  3.80 - 5.20 10e6/uL Final     Hemoglobin 02/06/2022 13.9  11.7 - 15.7 g/dL Final     Hematocrit 02/06/2022 41.5  35.0 - 47.0 % Final     MCV 02/06/2022 104* 78 - 100 fL Final     MCH 02/06/2022 34.7* 26.5 - 33.0 pg Final     MCHC 02/06/2022 33.5  31.5 - 36.5 g/dL Final     RDW 02/06/2022 12.1  10.0 - 15.0 % Final     Platelet Count 02/06/2022 216  150 - 450 10e3/uL Final     % Neutrophils 02/06/2022 81  % Final     % Lymphocytes 02/06/2022 5  % Final     % Monocytes 02/06/2022 13  % Final     % Eosinophils 02/06/2022 0  % Final     % Basophils 02/06/2022 0  % Final     % Immature Granulocytes 02/06/2022 1  % Final     NRBCs per 100 WBC 02/06/2022 0  <1 /100 Final     Absolute Neutrophils 02/06/2022 10.1* 1.6 - 8.3 10e3/uL Final     Absolute Lymphocytes 02/06/2022 0.6* 0.8 - 5.3 10e3/uL Final     Absolute Monocytes 02/06/2022 1.7* 0.0 - 1.3 10e3/uL Final     Absolute Eosinophils 02/06/2022 0.0  0.0 - 0.7 10e3/uL Final     Absolute Basophils 02/06/2022 0.0  0.0 - 0.2 10e3/uL Final     Absolute Immature Granulocytes 02/06/2022 0.1  <=0.4 10e3/uL Final     Absolute NRBCs 02/06/2022 0.0  10e3/uL Final     SARS CoV2 PCR 02/06/2022 Negative  Negative Final    NEGATIVE:  SARS-CoV-2 (COVID-19) RNA not detected, presumed negative.     Alcohol ethyl 02/06/2022 <0.01  <=0.01 g/dL Final     CK 02/06/2022 5,966* 30 - 225 U/L Final     Potassium 02/06/2022 2.9* 3.4 - 5.3 mmol/L Final     Potassium 02/07/2022 3.4  3.4 - 5.3 mmol/L Final     CK 02/07/2022 5,669* 30 - 225 U/L Final     Magnesium 02/07/2022 1.8  1.8 - 2.6 mg/dL Final     Phosphorus 02/07/2022 2.2* 2.5 - 4.5 mg/dL Final     WBC Count 02/07/2022 7.5  4.0 - 11.0 10e3/uL Final     RBC Count 02/07/2022 3.10* 3.80 - 5.20 10e6/uL Final     Hemoglobin 02/07/2022 10.7* 11.7 - 15.7 g/dL Final     Hematocrit 02/07/2022 32.4* 35.0 - 47.0 % Final     MCV 02/07/2022 105* 78 - 100 fL Final     MCH 02/07/2022 34.5* 26.5 - 33.0 pg Final     MCHC 02/07/2022 33.0  31.5 - 36.5 g/dL Final     RDW 02/07/2022 12.1  10.0 - 15.0 % Final     Platelet Count 02/07/2022 159  150 - 450 10e3/uL Final     % Neutrophils 02/07/2022 69  % Final     % Lymphocytes 02/07/2022 17  % Final     % Monocytes 02/07/2022 14  % Final     % Eosinophils 02/07/2022 0  % Final     % Basophils 02/07/2022 0  % Final     % Immature Granulocytes 02/07/2022 0  % Final     NRBCs per 100 WBC 02/07/2022 0  <1 /100 Final     Absolute Neutrophils 02/07/2022 5.1  1.6 - 8.3 10e3/uL Final     Absolute Lymphocytes 02/07/2022 1.3  0.8 - 5.3 10e3/uL Final     Absolute Monocytes 02/07/2022 1.1  0.0 - 1.3 10e3/uL Final     Absolute Eosinophils 02/07/2022 0.0  0.0 - 0.7 10e3/uL Final     Absolute Basophils 02/07/2022 0.0  0.0 - 0.2 10e3/uL Final     Absolute Immature Granulocytes 02/07/2022 0.0  <=0.4 10e3/uL Final     Absolute NRBCs 02/07/2022 0.0  10e3/uL Final     Sodium 02/07/2022 141  133 - 144 mmol/L Final     Potassium 02/07/2022 3.5  3.4 - 5.3 mmol/L Final     Chloride 02/07/2022 111* 94 - 109 mmol/L Final     Carbon Dioxide (CO2) 02/07/2022 23  20 - 32 mmol/L Final     Anion Gap 02/07/2022 7  3 - 14 mmol/L Final     Urea Nitrogen 02/07/2022 15  7 - 30 mg/dL Final     Creatinine  02/07/2022 0.44* 0.52 - 1.04 mg/dL Final     Calcium 02/07/2022 8.0* 8.5 - 10.1 mg/dL Final     Glucose 02/07/2022 89  70 - 99 mg/dL Final     GFR Estimate 02/07/2022 >90  >60 mL/min/1.73m2 Final    Effective December 21, 2021 eGFRcr in adults is calculated using the 2021 CKD-EPI creatinine equation which includes age and gender (Rich et al., NEJ, DOI: 10.1056/ETBLkg6433328)     ABO/RH(D) 02/07/2022 A POS   Final     Antibody Screen 02/07/2022 Negative  Negative Final     SPECIMEN EXPIRATION DATE 02/07/2022 [id number]   Final     Sodium 02/08/2022 136  133 - 144 mmol/L Final     Potassium 02/08/2022 3.6  3.4 - 5.3 mmol/L Final     Chloride 02/08/2022 106  94 - 109 mmol/L Final     Carbon Dioxide (CO2) 02/08/2022 25  20 - 32 mmol/L Final     Anion Gap 02/08/2022 5  3 - 14 mmol/L Final     Urea Nitrogen 02/08/2022 12  7 - 30 mg/dL Final     Creatinine 02/08/2022 0.45* 0.52 - 1.04 mg/dL Final     Calcium 02/08/2022 7.9* 8.5 - 10.1 mg/dL Final     Glucose 02/08/2022 100* 70 - 99 mg/dL Final     Alkaline Phosphatase 02/08/2022 47  40 - 150 U/L Final     AST 02/08/2022 270* 0 - 45 U/L Final     ALT 02/08/2022 99* 0 - 50 U/L Final     Protein Total 02/08/2022 5.5* 6.8 - 8.8 g/dL Final     Albumin 02/08/2022 2.3* 3.4 - 5.0 g/dL Final     Bilirubin Total 02/08/2022 0.8  0.2 - 1.3 mg/dL Final     GFR Estimate 02/08/2022 >90  >60 mL/min/1.73m2 Final    Effective December 21, 2021 eGFRcr in adults is calculated using the 2021 CKD-EPI creatinine equation which includes age and gender (Rich et al., NEJ, DOI: 10.1056/VSEXli9871007)     WBC Count 02/08/2022 3.2* 4.0 - 11.0 10e3/uL Final     RBC Count 02/08/2022 2.68* 3.80 - 5.20 10e6/uL Final     Hemoglobin 02/08/2022 9.3* 11.7 - 15.7 g/dL Final     Hematocrit 02/08/2022 28.2* 35.0 - 47.0 % Final     MCV 02/08/2022 105* 78 - 100 fL Final     MCH 02/08/2022 34.7* 26.5 - 33.0 pg Final     MCHC 02/08/2022 33.0  31.5 - 36.5 g/dL Final     RDW 02/08/2022 11.9  10.0 - 15.0 %  Final     Platelet Count 02/08/2022 153  150 - 450 10e3/uL Final     CK 02/08/2022 4,786* 30 - 225 U/L Final     SARS CoV2 PCR 02/08/2022 Negative  Negative Final    NEGATIVE: SARS-CoV-2 (COVID-19) RNA not detected, presumed negative.     GLUCOSE BY METER POCT 02/09/2022 59* 70 - 99 mg/dL Final     GLUCOSE BY METER POCT 02/09/2022 99  70 - 99 mg/dL Final     Sodium 02/10/2022 139  133 - 144 mmol/L Final     Potassium 02/10/2022 3.6  3.4 - 5.3 mmol/L Final     Chloride 02/10/2022 107  94 - 109 mmol/L Final     Carbon Dioxide (CO2) 02/10/2022 29  20 - 32 mmol/L Final     Anion Gap 02/10/2022 3  3 - 14 mmol/L Final     Urea Nitrogen 02/10/2022 7  7 - 30 mg/dL Final     Creatinine 02/10/2022 0.45* 0.52 - 1.04 mg/dL Final     Calcium 02/10/2022 8.2* 8.5 - 10.1 mg/dL Final     Glucose 02/10/2022 91  70 - 99 mg/dL Final     Alkaline Phosphatase 02/10/2022 52  40 - 150 U/L Final     AST 02/10/2022 169* 0 - 45 U/L Final     ALT 02/10/2022 85* 0 - 50 U/L Final     Protein Total 02/10/2022 5.5* 6.8 - 8.8 g/dL Final     Albumin 02/10/2022 2.3* 3.4 - 5.0 g/dL Final     Bilirubin Total 02/10/2022 0.5  0.2 - 1.3 mg/dL Final     GFR Estimate 02/10/2022 >90  >60 mL/min/1.73m2 Final    Effective December 21, 2021 eGFRcr in adults is calculated using the 2021 CKD-EPI creatinine equation which includes age and gender (Rich et al., NEJM, DOI: 10.1056/EBNJip4733144)     WBC Count 02/10/2022 4.8  4.0 - 11.0 10e3/uL Final     RBC Count 02/10/2022 2.65* 3.80 - 5.20 10e6/uL Final     Hemoglobin 02/10/2022 9.1* 11.7 - 15.7 g/dL Final     Hematocrit 02/10/2022 28.0* 35.0 - 47.0 % Final     MCV 02/10/2022 106* 78 - 100 fL Final     MCH 02/10/2022 34.3* 26.5 - 33.0 pg Final     MCHC 02/10/2022 32.5  31.5 - 36.5 g/dL Final     RDW 02/10/2022 11.7  10.0 - 15.0 % Final     Platelet Count 02/10/2022 195  150 - 450 10e3/uL Final     Color Urine 02/12/2022 Yellow  Colorless, Straw, Light Yellow, Yellow Final     Appearance Urine 02/12/2022 Clear   Clear Final     Glucose Urine 02/12/2022 Negative  Negative mg/dL Final     Bilirubin Urine 02/12/2022 Negative  Negative Final     Ketones Urine 02/12/2022 Negative  Negative mg/dL Final     Specific Gravity Urine 02/12/2022 1.010  1.003 - 1.035 Final     Blood Urine 02/12/2022 Negative  Negative Final     pH Urine 02/12/2022 7.0  5.0 - 7.0 Final     Protein Albumin Urine 02/12/2022 Negative  Negative mg/dL Final     Urobilinogen Urine 02/12/2022 Normal  Normal, 2.0 mg/dL Final     Nitrite Urine 02/12/2022 Negative  Negative Final     Leukocyte Esterase Urine 02/12/2022 Negative  Negative Final     RBC Urine 02/12/2022 <1  <=2 /HPF Final     WBC Urine 02/12/2022 1  <=5 /HPF Final     Squamous Epithelials Urine 02/12/2022 1  <=1 /HPF Final           Phone call duration:  30 minutes  Alana Carbajal M.D.

## 2022-02-18 ENCOUNTER — TELEPHONE (OUTPATIENT)
Dept: FAMILY MEDICINE | Facility: CLINIC | Age: 57
End: 2022-02-18
Payer: MEDICAID

## 2022-02-18 NOTE — TELEPHONE ENCOUNTER
Call placed to patient  No answer; detailed voicemail left as patient identified herself on voicemail     Relayed for patient to follow hospital discharge instructions on AVS  Clinic call back number [phone] provided if questions/concerns - advised that there are after hour triage nurses who can review AVS with her    Sharif Manrique RN

## 2022-02-18 NOTE — TELEPHONE ENCOUNTER
I am so sorry. She is not well mentally. She wouldn't leave the hospital and they had to get administration involved. She has some type of personality disorder. She has no friends, no family. Home health and physical therapy ot are involved . You need to set a 5 minute limit for a phone call. I have had to walk out of the room with her still talking because she will not listen to anything. Read her discharge summary. It pretty much says it all. Alana Carbajal M.D.

## 2022-02-18 NOTE — TELEPHONE ENCOUNTER
Patient's call transferred to author  Patient reports she recently had a right hip surgery and discharged home    Patient states she feels she was discharged too soon - had follow-up visit with Dr. Carbajal virtually     Patient concerned regarding her pain level and her abdominal symptoms   Reports she has had 2 major abdominal surgeries and had her colon removed during one of those surgeries   Reports the Oxycodone has been really irritating her stomach and causing a lot of cramping and discomfort    Patient reports she stopped the Oxycodone last night hoping her stomach upset would improve  Has been taking 3 Tylenol every 8 hours for pain  Patient reported she felt like the Oxycodone was not helping her pain anyways - but later in the conversation reported she has some relief from the Oxycodone    Reporting abdominal bloating, constipating feeling, and abdominal cramping  Symptoms intermittent  Rates the pain an 8/10 on the pain scale  Last bowel movement was yesterday and the consistency was watery - this is not abnormal given her abdominal surgery history  Denies blood in stool  Passing gas    Patient started drinking Prune Juice today - reports in the hospital she was drinking Prune Juice TID with meals and did not have the stomach upset while taking the Oxycodone   Patient reports diminished appetite and fluid intake given pain symptoms - has been eating blander / smaller amounts of food - patient wondering if it is okay to do Prune Juice TID?    Patient states she has increased pain to hip since stopping Oxycodone - wondering if she should re-start or if there is another medication to help with the pain that her stomach would tolerate better  Also wondering if she should be taking the muscle relaxer given her abdominal surgery history     Relayed that Dr. Carbajal is not in clinic today and will forward to her and to another provider in clinic to review     Patient verbalized understanding  No further  questions/concerns    Sharif Manrique RN

## 2022-02-18 NOTE — TELEPHONE ENCOUNTER
AUGUSTUS Carbajal pt wants a different pain medicine, non narcotic. Pharmacy is [facility] in [place].    After talking with her further she decided she is going to try the Tyl order and Flexaril orders that are on her discharge instructions from the hospital.    She will call back if questions.

## 2022-02-21 ENCOUNTER — TELEPHONE (OUTPATIENT)
Dept: FAMILY MEDICINE | Facility: CLINIC | Age: 57
End: 2022-02-21
Payer: MEDICAID

## 2022-02-21 DIAGNOSIS — S72.001D CLOSED FRACTURE OF RIGHT HIP WITH ROUTINE HEALING, SUBSEQUENT ENCOUNTER: Primary | ICD-10-CM

## 2022-02-21 RX ORDER — CELECOXIB 100 MG/1
100 CAPSULE ORAL 2 TIMES DAILY
Qty: 60 CAPSULE | Refills: 0 | Status: SHIPPED | OUTPATIENT
Start: 2022-02-21 | End: 2022-02-22

## 2022-02-21 NOTE — TELEPHONE ENCOUNTER
She can take ibuprofen as long as she is taking it with food if her stomach is bothered she will need to stop that.  Alana Carbajal M.D.

## 2022-02-21 NOTE — TELEPHONE ENCOUNTER
"Call placed to patient  Relayed Dr. Carbajal's message    Patient verbalized understanding  Though, patient is very concerned as [facility] cannot refill medication due to patient's insurance   Patient did not have specifics and kept saying \"I don't know - this is not my area of expertise\"  Patient talking very fast and very pointed that care team needed to fix the issue      Call placed to [facility]   Lizz reports they cannot fill the medication as patient's insurance only covers a specific mail order service - [facility] is considered a competitor     Lizz states on the insurance information that [facility] would be covered; though, would need patient to confirm by calling her insurance company      Call placed to patient  Relayed pharmacists information and requested patient to call her insurance to verify [facility] is the correct mail order pharmacy    Patient states she just laid down and will not be getting back up to call the insurance - again said she has no idea what to do and needs this taken care of by care team   Author informed patient that all she would need to do is call the number on her insurance card to verify - patient reported she couldn't and kept saying this has never happened to her in the past and she needs something for pain     Sharif Manrique RN    "

## 2022-02-21 NOTE — TELEPHONE ENCOUNTER
Call placed to patient  Relayed Dr. Carbajal's message    Patient verbalized understanding  Reviewed medication Celebrex and that prescription was sent to [facility]   Patient asking specific questions on when she would be getting medication as she his having pain     Provided patient with pharmacy number to contact and discuss specific questions regarding delivery and other pharmacy specific questions     Reviewed medications prescribed by hospital provider and relayed medication dosing and medication sig   Patient verbalized understanding  Requesting a refill of the Flexeril and the Hydroxyzine to the [facility] - Hydroxyzine does have a refill available at [facility]     Urgent Care Coordination referral sent to assist with Transportation Resources for appointments as patient has no one to help her with transportation to and from appointments   Patient states due to pain and transportation issues she could not make an appointment with the Ortho work until late this week or early next week - wanted Dr. Carbajal aware    Patient wondering if Ibuprofen is okay to take until the Celebrex arrives     Will forward to Dr. Carbajal to review    Sharif Manrique RN

## 2022-02-21 NOTE — TELEPHONE ENCOUNTER
We need to know where to send it. If she is not willing to participate in her own care, there is nothing that either of us can do. Keep setting boundaries. Alana Carbajal M.D.

## 2022-02-21 NOTE — TELEPHONE ENCOUNTER
I sent in some celebrex. Easier on the stomach. Does she have her appointment with the orthopedics docshe should be seen this week. Alana Carbajal M.D.

## 2022-02-21 NOTE — TELEPHONE ENCOUNTER
Reason for call:  Patient reporting a symptom    Symptom or request: Patient is calling stating that the oxycodone saying that it is not good for her stomach she has not taken it for 2 days. She has been taking tylenol but she needs something else.     Phone Number patient can be reached at:  Home number on file [phone] (home)    Best Time:  any    Can we leave a detailed message on this number:  YES    Call taken on 2/21/2022 at 10:37 AM by Padmaja Roy

## 2022-02-22 ENCOUNTER — PATIENT OUTREACH (OUTPATIENT)
Dept: CARE COORDINATION | Facility: CLINIC | Age: 57
End: 2022-02-22
Payer: MEDICAID

## 2022-02-22 RX ORDER — CELECOXIB 100 MG/1
100 CAPSULE ORAL 2 TIMES DAILY
Qty: 60 CAPSULE | Refills: 0 | Status: SHIPPED | OUTPATIENT
Start: 2022-02-22 | End: 2022-03-02

## 2022-02-22 NOTE — TELEPHONE ENCOUNTER
Patient called back stating clinic staff need to call her insurance and they will relay what pharmacy she can use.  This writer stated she needs to obtain this information, we do not do this.  Patient states she will call and tell insurance what I said.  Yasmine Garcias RN

## 2022-02-22 NOTE — TELEPHONE ENCOUNTER
Amira called back and said that she was able to call her insurance and was told that she could use [facility] in [place].  This writer ordered the celebrex that was pended.  Patient wants to know what is the best medications to take along with celebrex: flexeril and or atarax?   She is scheduled for a telephone appointment on 2/23/22 at 2 PM. Amira wants to know if you are able to answer this question by this encounter and then cancel the 2 PM appointment or does she need to keep the 2 PM appointment to discuss this?   Thank you.    Message left on Medication Assistance vm letting them know that they can disregard my previous message of today as we no longer need their help for this situation.   Frank Verduzco, RN

## 2022-02-22 NOTE — TELEPHONE ENCOUNTER
Patient called back, asking to only speak with Sharif,angry that staff did not call her back before the end of the day yesterday.    Explained to patient staff were assisting other patients.  Relayed need for patient to reach out to her insurance to determine which pharmacy she is covered at so we can send prescription for Celebrex.    Patient continued that she doesn't know anything about this new plan, she has a card and no one explained this to her.  Explained to patient, she needs to participate in her care.  A step in doing this is contacting her insurance company to get the information on what pharmacy she is covered to use.  When we have this information we can send the prescription to help manage her pain.    Patient continued to read the card to this writer.  Interrupted patient to relay again to call the customer service number on her card.  Patient states she will call now and call clinic back with the pharmacy information.  Yasmine Garcias RN

## 2022-02-22 NOTE — TELEPHONE ENCOUNTER
That doesn't sound right .  She should be the one calling to find out where we can send this.  I have never heard of being told to call around and see which pharmacy we can send things to that is solely her responsibility.  If you can just relay that message when she gives us a pharmacy to send it to we can send it there if she continues to talk over you it is okay to say I have other patients to see I am sorry I am I am unable to help you at this time and hang up the phone.  I know this sounds harsh but she will not stop talking.  Alana Carbajal M.D.

## 2022-02-22 NOTE — PROGRESS NOTES
Clinic Care Coordination Contact    CCC Referral received 2/18/22.  CCC RN last spoke with patient 2/14/22.  Patient declining/refusing to answer most questions.      Not a candidate for CCC at this time.

## 2022-02-22 NOTE — TELEPHONE ENCOUNTER
Patient's insurance company said her provider should be calling [phone] medical assistance to find out which pharmacy the celecoxib (CELEBREX) 100 MG capsule can be filled at that does mail order delivery to her house.

## 2022-02-22 NOTE — TELEPHONE ENCOUNTER
Tried calling the number [phone]. It listed options of Providers and members and enrolling. This writer did not know what option to select or what to do at this point. Message left on [facility] [employer] number to call back on the doctor line to assist in this situation.  Frank Verduzco RN

## 2022-02-23 ENCOUNTER — APPOINTMENT (OUTPATIENT)
Dept: FAMILY MEDICINE | Facility: CLINIC | Age: 57
End: 2022-02-23
Payer: MEDICAID

## 2022-02-23 RX ORDER — CELECOXIB 100 MG/1
100 CAPSULE ORAL 2 TIMES DAILY
Qty: 60 CAPSULE | Refills: 3 | Status: SHIPPED | OUTPATIENT
Start: 2022-02-23 | End: 2022-03-31 | Stop reason: SINTOL

## 2022-02-23 NOTE — TELEPHONE ENCOUNTER
She can take the atarax 2 times per day with the celebrex and the flexeril 3 times per day. Alana Carbajal M.D.

## 2022-03-01 ENCOUNTER — PATIENT OUTREACH (OUTPATIENT)
Dept: CARE COORDINATION | Facility: CLINIC | Age: 57
End: 2022-03-01
Payer: MEDICAID

## 2022-03-01 NOTE — TELEPHONE ENCOUNTER
Kathia from outpatient rehab scheduling stating that the patient has called several times [phone] and she is not getting a call back I told Kathia that Neelam has been trying to reach patient.     Padmaja Mcgee PSC on 3/1/2022 at 3:34 PM

## 2022-03-01 NOTE — TELEPHONE ENCOUNTER
Patient called and told me everything from the start till now. I was not able to get a word in. She said Neelam did not leave a full message. She said she tried to call the number that was given on voicemail and she was not able to get anywhere. I gave patient the message from Neelam and she got upset and I gave the patient the message from Catarino spears, told her the number to call.I assured her that Neelam will call again tomorrow and she really wants Neelam to leave a full message with a number that she can reach.     Padmaja Mcgee PSC on 3/1/2022 at 5:06 PM

## 2022-03-01 NOTE — PROGRESS NOTES
"Clinic Care Coordination Contact    Received a VM from patient.  She stated she is in need of transportation and an appointment for her Orthopedist this \"Tuesday, Wed or Thursday.\"  \"Don't tell me to figure it out on my own.  You are going to have to do this for me.  Friday won't work.  I need the appointment this week\".    Writer currently consulting with Community Health Care Worker and [facility] to determine if patient has medical transportation through her insurance.      Patient will ultimately have to schedule her own transportation and her own medical appointment.  Will call back back once transportation resources are determined.    UPDATE- It was determined that patient has Medical Transportation through her insurance.  Patient needs to call  [phone].  This number is also located on the back of her insurance card.  She will need to schedule both medical transportation and her appointment for her Orthopedic Surgeon.  Writer called patient to update her on this and phone rang 8-9 times with no answer and no VM.  Writer will call again tomorrow with update.    "

## 2022-03-02 ENCOUNTER — TELEPHONE (OUTPATIENT)
Dept: FAMILY MEDICINE | Facility: CLINIC | Age: 57
End: 2022-03-02
Payer: MEDICAID

## 2022-03-02 NOTE — TELEPHONE ENCOUNTER
Patient's call transferred to author  Relayed Dr. Carbajal's message    Patient verbalized understanding  No further questions/concerns    Patient again, wanted Dr. Carbajal aware that she does not have a colon and has had many abdominal surgeries prior to doctoring with Dr. Carbajal  Patient states she is well versed when it comes to constipation    Patient did not like the 1/2 dosing of Miralax recommendation - will try the Senna recommendation    Sharif Manrique RN

## 2022-03-02 NOTE — TELEPHONE ENCOUNTER
Reason for Call:  Other prescription    Detailed comments: Pt is now taking celebrex and still has constipation.  She is wondering if there is something else to try?  She just had recent Hip Surgery.  Phar is [facility].    Phone Number Patient can be reached at: Home number on file [phone] (home)    Best Time: any    Can we leave a detailed message on this number? YES    Call taken on 3/2/2022 at 1:49 PM by Gely Bentley

## 2022-03-02 NOTE — PROGRESS NOTES
"Clinic Care Coordination Contact    Writer received 4 VM from this patient and 1 VM from Kathia with outpatient rehab on her behalf.  VM's tangential from patient.      Writer called Amira this morning.  Confirmed she indeed had the phone number for Medical Transportation  [phone].  She stated she called there yesterday and was able to confirm 'how to use' Medical transportation.      Patient again asking for [facility] RN to schedule her Orthopedic Surgeon appointment.  Writer instructing Amira that I am unable to do this for her but that \"I know you can do this\".  Writer provided the phone number Dr. Kb Lizarraga in  [phone] for appointment.  Writer instructed for Amira to take a couple of deep breaths before calling both medical transportation and orthopedic appointment.    Amira asking about Home Care PT as she was told 'she would hear something by now'.  Then changing her mind and stating she would like to wait until after her Orthopedic appointment for Home Care PT.    Writer instructed Amira that [facility] RN checks her VM 1-2 times a day.  Asked that she only leave 1 VM a day unless there is 'something new'.  Patient apologized and stated I don't think I will need you again.  I have everything I need now.      Patient to be redirected, verbal support and encouraged to reach out to Orthopedic, Medical Transportation, and appropriate people involved in her care.  "

## 2022-03-02 NOTE — TELEPHONE ENCOUNTER
"Tried calling patient and she said that she was on the phone with the \"ortho lady.\" She said to hang on. The phone disconnected when she went back to the Ortho call. Need to try a different time.  Frank Verduzco RN    "

## 2022-03-02 NOTE — TELEPHONE ENCOUNTER
"Amira Cui called and said that she is having constipated again.  Last BM was this morning; \"just a little bit.\"  Takes Prune juice 3 times per day.  She stopped taking miralax before starting the celebrex because she was not able to get to the bathroom in time; \"because of my hip pain.\"  Taking senna once a day. She has not take a 2nd one per day as she is worried that she would not be able to make it to the bathroom in time.   She reports that Celebrex is working to take the edge off the pain.  She said that \"oxycodone makes me constipated and now celebrex makes me constipated. Is there a pain medication that I can take that will not make me constipated?  I know I had IV morphine in the hospital before and that did not make me constipated. Can I take a pill form of that?\"  How do you advise?  Thank you.  Frank Verduzco RN      "

## 2022-03-02 NOTE — TELEPHONE ENCOUNTER
Remember to set your 5-minute timer.  She should start taking MiraLAX but only half of the dose.  Or she can take a second senna to see if that helps.  I am not changing her pain medicine at this point she should be off narcotics.Alana Carbajal M.D.

## 2022-03-03 ENCOUNTER — TELEPHONE (OUTPATIENT)
Dept: FAMILY MEDICINE | Facility: CLINIC | Age: 57
End: 2022-03-03
Payer: MEDICAID

## 2022-03-03 DIAGNOSIS — F60.9 PERSONALITY DISORDER IN ADULT (H): Primary | ICD-10-CM

## 2022-03-03 DIAGNOSIS — M54.6 PAIN IN THORACIC SPINE: ICD-10-CM

## 2022-03-03 NOTE — TELEPHONE ENCOUNTER
Reason for Call: Request for an order or referral:    Order or referral being requested: Pt is calling and wanting a order/referral for a back specialist  And she wants to see Dr. Hwang in [place]    Date needed: as soon as possible    Has the patient been seen by the PCP for this problem? Not Applicable    Additional comments: none    Phone number Patient can be reached at:  Cell number on file:    No relevant phone numbers on file. [phone]         Best Time:  Any       Can we leave a detailed message on this number?  YES    Call taken on 3/3/2022 at 11:55 AM by Gely Bentley

## 2022-03-03 NOTE — TELEPHONE ENCOUNTER
Long history of back pain. I will make the referral but I am doubtful that this will solve the problem. Alana Carbajal M.D.

## 2022-03-08 ENCOUNTER — TELEPHONE (OUTPATIENT)
Dept: FAMILY MEDICINE | Facility: CLINIC | Age: 57
End: 2022-03-08
Payer: MEDICAID

## 2022-03-08 NOTE — TELEPHONE ENCOUNTER
"1. Amira called and said she is taking celebrex one twice a day. She says that it takes the edge off of the pain.  2. Amira wants to know \"What about my liver lesions.? Dr. Merritt mentioned them to me when I was in the hospital last month. What does Dr. Carbajal think about them?     Please see 4/28/13 MRI of abdomen results.  How do you advise point #2?   Thank you.   Frank Verduzco RN    "

## 2022-03-08 NOTE — TELEPHONE ENCOUNTER
Reason for Call:  Other call back    Detailed comments: Patient has questions about the medications that she been taking after her surgery.  She was also told she has spots on her liver and wants to know what that means.    Phone Number Patient can be reached at: Home number on file [phone] (home)    Best Time:     Can we leave a detailed message on this number? YES    Call taken on 3/8/2022 at 2:37 PM by Dipika Wu

## 2022-03-09 ENCOUNTER — TRANSFERRED RECORDS (OUTPATIENT)
Dept: HEALTH INFORMATION MANAGEMENT | Facility: CLINIC | Age: 57
End: 2022-03-09
Payer: MEDICAID

## 2022-03-09 NOTE — TELEPHONE ENCOUNTER
Spoke with Amira and advised her of the benign liver lesion and Dr. Carbajal's comments as noted below.  Frank Verduzco RN

## 2022-03-09 NOTE — TELEPHONE ENCOUNTER
No follow up needed it is a benign lesion. Lots of people have them and it has been 9 years since then so not likely to be anything bad. Alana Carbajal M.D.

## 2022-03-10 ENCOUNTER — TELEPHONE (OUTPATIENT)
Dept: FAMILY MEDICINE | Facility: CLINIC | Age: 57
End: 2022-03-10
Payer: MEDICAID

## 2022-03-10 NOTE — TELEPHONE ENCOUNTER
"Received call from patient asking if she should take celebrex and aspirin together.  Explained to patient, typically it is contraindicated to take together for long term use.  Post ortho surgery is okay as prescribed for short term.  Patient asked when she should stop taking the celebrex.  Advised patient to discuss this with her surgeon, next appointment is 4/11/22.    Patient states she is not sleeping \"at all\", can't get comfortable.  Patient is asking if it is okay to drink \"A glass or red wine once in awhile with dinner or before bed\".  Celebrex label advises to consult PCP.  Will forward to Dr Carbajal.  Yasmine Garcias RN     "

## 2022-03-11 ENCOUNTER — TRANSCRIBE ORDERS (OUTPATIENT)
Dept: OTHER | Age: 57
End: 2022-03-11
Payer: MEDICAID

## 2022-03-11 DIAGNOSIS — Z96.641 HISTORY OF RIGHT HIP HEMIARTHROPLASTY: Primary | ICD-10-CM

## 2022-03-18 ENCOUNTER — TELEPHONE (OUTPATIENT)
Dept: FAMILY MEDICINE | Facility: CLINIC | Age: 57
End: 2022-03-18
Payer: MEDICAID

## 2022-03-18 DIAGNOSIS — T79.6XXA TRAUMATIC RHABDOMYOLYSIS, INITIAL ENCOUNTER (H): ICD-10-CM

## 2022-03-18 DIAGNOSIS — S72.091A OTH FRACTURE OF HEAD AND NECK OF RIGHT FEMUR, INIT (H): ICD-10-CM

## 2022-03-18 RX ORDER — ACETAMINOPHEN 325 MG/1
975 TABLET ORAL EVERY 8 HOURS
Qty: 90 TABLET | Refills: 0 | Status: SHIPPED | OUTPATIENT
Start: 2022-03-18 | End: 2022-03-18 | Stop reason: DRUGHIGH

## 2022-03-18 RX ORDER — IBUPROFEN 400 MG/1
400 TABLET, FILM COATED ORAL EVERY 6 HOURS PRN
Qty: 90 TABLET | Refills: 0 | Status: SHIPPED | OUTPATIENT
Start: 2022-03-18 | End: 2022-03-21

## 2022-03-18 RX ORDER — ACETAMINOPHEN 325 MG/1
975 TABLET ORAL EVERY 8 HOURS
Qty: 90 TABLET | Refills: 0 | COMMUNITY
Start: 2022-03-18 | End: 2022-03-31

## 2022-03-18 NOTE — TELEPHONE ENCOUNTER
I sent in tylenol. I reviewed the notes, I only see that the celebrex was sent because it's easier on the stomach. Please confirm - I don't see that patient has a history of gastric bypass, GERD, stomach bleed, PPI use ? Does she tolerate ibuprofen well?  If she doesn't have these concerns I'm fine with her switching to ibuprofen. What dose does she usually take?   Teena Wood PA-C

## 2022-03-18 NOTE — TELEPHONE ENCOUNTER
Call placed to patient     Patient reports she is taking the Celebrex BID as prescribed   Reports she does not like the Celebrex but does take it as she gets relief from the pain - reports she does not get good sleep while taking the medication     Patient wanting to know, when she feels ready to stop the Celebrex as she feels her hip is improving - how Dr. Carbajal would like her to take Tylenol and Ibuprofen   Patient already has a prescription for Tylenol but wondering if that needs to be adjusted with taking Ibuprofen when the time comes     Will need a refill of Tylenol and a new prescription sent to [facility] as they do home deliveries and patient is mostly home bound     Patient requesting call back today with recommendations so she can update the pharmacy before the weekend   Okay with detailed message being left on voicemail     Sharif Manrique RN

## 2022-03-18 NOTE — TELEPHONE ENCOUNTER
Reason for Call:  Other call back    Detailed comments: Patient has questions about  Taking tylenol and how many in a day, she also has questions on other medications.    Phone Number Patient can be reached at: Home number on file [phone] (home)    Best Time:     Can we leave a detailed message on this number? YES    Call taken on 3/18/2022 at 8:33 AM by Dipika Wu

## 2022-03-18 NOTE — TELEPHONE ENCOUNTER
Call placed to patient   Relayed Maldonado's message    Patient verbalized understanding  No further questions/concerns    Sharif Manrique RN

## 2022-03-18 NOTE — TELEPHONE ENCOUNTER
Due to her liver function and recent health issues do not recommend higher dosing. She can try ibuprofen OTC dosing rather than celebrex if she would like this weekend.  Also recommend that we try to get Dr. Carbajal's opinion on Monday as well.  Teena Wood PA-C

## 2022-03-18 NOTE — TELEPHONE ENCOUNTER
Call placed to patient   Relayed Maldonado's message    Patient states she tolerates Ibuprofen historically with no issues   Patient denies previous history of listed medical conditions    Patient wanting to know if Tylenol dosing can be adjusted to reflect taking earlier than every 8 hours     No further questions/concerns    Sharif Manrique RN

## 2022-03-20 NOTE — TELEPHONE ENCOUNTER
she can have the ibuprofen 1 every 6 hours the Tylenol is going to be 3 tablets 3 times a day that is the max.  She should not be taking this with aspirin I will discontinued the Celebrex. Alana Carbajal M.D.

## 2022-03-21 RX ORDER — IBUPROFEN 200 MG
200-400 TABLET ORAL EVERY 6 HOURS PRN
Qty: 90 TABLET | Refills: 0 | Status: SHIPPED | OUTPATIENT
Start: 2022-03-21 | End: 2022-06-02

## 2022-03-21 NOTE — TELEPHONE ENCOUNTER
Okay I switched the ibuprofen order to 200 mg tablets  - 200-400 mg.  She can try taking celebrex at lunch and then wait 12 hours to take ibuprofen before bedtime (I spoke to RN and sounds like she has a late bedtime and this would work).  Teena Wood PA-C

## 2022-03-21 NOTE — TELEPHONE ENCOUNTER
Call placed to patient  Relayed Dr. Carbajal's message    Patient reports she was advised to continue the ASA by Ortho until 4/11/2022  Requesting order for Ibuprofen have a range from 200mg - 400mg as she may only want to take a smaller dosing    Patient does not want to stop the Celebrex   States she started only taking 1 tablet at lunchtime and that has helped her sleep   Wanting to stay on the Celebrex    Please give parameters for Celebrex and Ibuprofen as both are NSAIDs     Sharif Manrique RN

## 2022-03-21 NOTE — TELEPHONE ENCOUNTER
Call placed to patient  Relayed Maldonado's message    Patient reporting frustration  States she would only be taking 1 Celebrex in the afternoon and 1-2 Advil at bedtime  Patient is attempting to not take a 2nd Celebrex at bedtime    Re-relayed Maldonado's message  Patient requesting author forward above message for review    Sharif Manrique RN

## 2022-03-25 ENCOUNTER — TELEPHONE (OUTPATIENT)
Dept: FAMILY MEDICINE | Facility: CLINIC | Age: 57
End: 2022-03-25
Payer: MEDICAID

## 2022-03-25 NOTE — TELEPHONE ENCOUNTER
Reason for Call:  Other call back    Detailed comments: Pateint called and is asking to talk to the nurse about a medication.    Phone Number Patient can be reached at: Home number on file [phone] (home)    Best Time:     Can we leave a detailed message on this number? YES    Call taken on 3/25/2022 at 11:24 AM by Dipika Wu

## 2022-03-25 NOTE — TELEPHONE ENCOUNTER
Call placed to patient    States her Ortho had recommended a medication for pain and was wanting recommendations from Dr. Carbajal and potential refill     Patient states she does not want to proceed with request today - states she will call next week if she changes her mind    Sharif Manrique RN

## 2022-03-29 ENCOUNTER — HOSPITAL ENCOUNTER (OUTPATIENT)
Dept: PHYSICAL THERAPY | Facility: CLINIC | Age: 57
Setting detail: THERAPIES SERIES
Discharge: HOME | End: 2022-03-29
Attending: ORTHOPAEDIC SURGERY
Payer: MEDICAID

## 2022-03-29 PROCEDURE — 97162 PT EVAL MOD COMPLEX 30 MIN: CPT | Mod: GP | Performed by: PHYSICAL THERAPIST

## 2022-03-29 NOTE — PROGRESS NOTES
[facility]    OUTPATIENT PHYSICAL THERAPY ORTHOPEDIC EVALUATION  PLAN OF TREATMENT FOR OUTPATIENT REHABILITATION  (COMPLETE FOR INITIAL CLAIMS ONLY)  Patient's Last Name, First Name, M.I.  YOB: 1965  Amira Scherer    Provider s Name:  [facility]   Medical Record No.  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  Target Date: 06/21/22       Goal Description: Patient will be able to sit for >=30 min without hip pain.  Target Date: 06/07/22       Goal Description: Patient will be able to cut toenails without difficulty.  Target Date: 05/24/22                            Therapy Frequency:  1 time/week  Predicted Duration of Therapy Intervention:  12 weeks    Regi Prado, DEBORAH                 I CERTIFY THE NEED FOR THESE SERVICES FURNISHED UNDER        THIS PLAN OF TREATMENT AND WHILE UNDER MY CARE .             Physician Signature               Date    X_____________________________________________________                             Certification Date From:  03/29/22   Certification Date To:  06/21/22    Referring Provider:  Kb Lizarraga MD    Initial Assessment        See Epic Evaluation Start of Care Date: 03/29/22

## 2022-03-29 NOTE — PROGRESS NOTES
03/29/22 1400   General Information   Type of Visit Initial OP Ortho PT Evaluation   Start of Care Date 03/29/22   Referring Physician Kb Lizarraga MD   Patient/Family Goals Statement heal from right hip surgery, be able to care for herself (cut toe nails)   Orders Evaluate and Treat   Date of Order 03/10/22   Certification Required? 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 Zia MD ([facility]) due to her femur fracturing.   Not sure why this occured, bone density test pending.  Pt reports her pain is mostly now in her right groin and right knee.  Also reports chronic low back pain in which she has not been able to sit down since 2014.     Impairments A. Pain;B. Decreased WB tolerance;D. Decreased ROM;E. Decreased flexibility;F. Decreased strength and endurance;G. Impaired balance;H. Impaired gait   Functional Limitations perform activities of daily living;perform required work activities;perform desired leisure / sports activities   Symptom Location right global hip   How/Where did it occur From Degenerative Joint Disease   Onset date of current episode/exacerbation 02/07/22   Chronicity New   Pain rating (0-10 point scale) Best (/10);Worst (/10)   Best (/10) 0   Worst (/10) 10   Pain quality A. Sharp;B. Dull;C. Aching;E. Shooting   Frequency of pain/symptoms B. Intermittent   Pain/symptoms are: The same all the time   Pain/symptoms exacerbated by A. Sitting;B. Walking;C. Lifting;D. Carrying;G. Certain positions;I. Bending;J. ADL;K. Home tasks   Pain/symptoms eased by B. Walking;C. Rest;D. Nothing;E. Changing positions;F. Certain positions   Progression of symptoms since onset: Improved    Current / Previous Interventions   Diagnostic Tests: X-ray   X-ray Results Results   X-ray results X-ray pelvis and right hip showed an acute, moderately displaced subcapital fracture of the right femur with anterior superior displacement of the femoral shaft.    Prior Level of Function   Prior Level of Function-Mobility Independent   Prior Level of Function-ADLs Independent   Current Level of Function   Current Community Support Family/friend caregiver   Patient role/employment history F. Retired   Living environment House/townhome   Home/community accessibility has stairs, but does not have use.     Fall Risk Screen   Fall screen completed by PT   Have you fallen 2 or more times in the past year? No   Have you fallen and had an injury in the past year? No   Is patient a fall risk? No   Abuse Screen (yes response referral indicated)   Feels Unsafe at Home or Work/School no   Feels Threatened by Someone no   Does Anyone Try to Keep You From Having Contact with Others or Doing Things Outside Your Home? no   Physical Signs of Abuse Present no   Hip Objective Findings   Side (if bilateral, select both right and left) Right   Gait/Locomotion limited to no thoracic rotation, limited arm swing, reduced stance time right  (stairs: step to ascent and descent)   Right Hip Flexion PROM pt demonstrated 110 deg AROM when showing how she cuts her toe nails   Right Hip Flexion Strength 4/5   Right Hip Abduction Strength 2/5   Right Hip Extension Strength 4/5   Right Knee Flexion Strength 4/5   Right Knee Extension Strength 5/5   Planned Therapy Interventions   Planned Therapy Interventions ADL retraining;balance training;gait training;joint mobilization;manual therapy;motor coordination training;neuromuscular re-education;ROM;strengthening;stretching   Planned Modality Interventions   Planned Modality Interventions Cryotherapy   Clinical Impression   Criteria for Skilled Therapeutic Interventions Met yes, treatment indicated    PT Diagnosis s/p right hip hemiarthroplasty following closed femoral fracture   Influenced by the following impairments pain; ROM loss; weakness; pain; impaired gait and proprioception   Functional limitations due to impairments difficulty walking, standing, sitting, performing ADL's   Clinical Presentation Stable/Uncomplicated   Clinical Presentation Rationale (+) motivation; age; hemiarthroplasty (-) multiple body parts; history of unsolved LBP; unknown reason for hip fracture; limited to rides to and from PT - 40 min late to initial eval   Clinical Decision Making (Complexity) Low complexity   Therapy Frequency 1 time/week   Predicted Duration of Therapy Intervention (days/wks) 12 weeks   Risk & Benefits of therapy have been explained Yes   Patient, Family & other staff in agreement with plan of care Yes   Clinical Impression Comments Amira arrives today 7 weeks s/p right hip hemiarthroplasty.  She will benefit from skilled PT to address the above impairments and functional limitations.   Education Assessment   Preferred Learning Style Listening;Demonstration;Pictures/video   Barriers to Learning No barriers   ORTHO GOALS   PT Ortho Eval Goals 1;2;3   Ortho Goal 1   Goal Description Patient will be able to walk community distances without an assistive device.   Target Date 06/21/22   Ortho Goal 2   Goal Description Patient will be able to sit for >=30 min without hip pain.   Target Date 06/07/22   Ortho Goal 3   Goal Description Patient will be able to cut toenails without difficulty.   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

## 2022-03-30 RX ORDER — TRAMADOL HYDROCHLORIDE 50 MG/1
50 TABLET ORAL 2 TIMES DAILY
Qty: 10 TABLET | Refills: 0 | Status: CANCELLED | OUTPATIENT
Start: 2022-03-30 | End: 2022-04-02

## 2022-03-30 NOTE — TELEPHONE ENCOUNTER
Will address at office visit tomorrow.  Is been almost 2 months since she had her surgery I think it is time to be off of the narcotics.Alana Carbajal M.D.

## 2022-03-30 NOTE — TELEPHONE ENCOUNTER
Pt was prescribed tramadol HCL 50mg tablets for hip, told to request from PCP. Pt scheduled for phone visit tomorrow 3/31 with Raven. Uses [facility] pharmacy.      Thank you,    Gloria Warner, ELLIOT Lorenzo

## 2022-03-30 NOTE — TELEPHONE ENCOUNTER
Pt is very anxious because she has one tramadol tablet remaining.  She asks that this refill be addressed today?    Informed that this request has been routed to Dr Carbajal today.    Pt has stopped celebrex because it makes pt stay awake all night long.    Pt has been tramadol, 50 mg, twice daily, from hip surgeon but surgeon says no longer needed for hip pain.  Pt finds that tramadol is helping her back pain and leg pain from a prior back injury.    Maria G Brito RN

## 2022-03-31 ENCOUNTER — VIRTUAL VISIT (OUTPATIENT)
Dept: FAMILY MEDICINE | Facility: CLINIC | Age: 57
End: 2022-03-31
Payer: MEDICAID

## 2022-03-31 DIAGNOSIS — G89.18 POSTOPERATIVE PAIN: Primary | ICD-10-CM

## 2022-03-31 DIAGNOSIS — S72.001D CLOSED FRACTURE OF RIGHT HIP WITH ROUTINE HEALING, SUBSEQUENT ENCOUNTER: ICD-10-CM

## 2022-03-31 PROCEDURE — 99442 PR PHYSICIAN TELEPHONE EVALUATION 11-20 MIN: CPT | Performed by: FAMILY MEDICINE

## 2022-03-31 RX ORDER — CELECOXIB 200 MG/1
200 CAPSULE ORAL DAILY
Qty: 30 CAPSULE | Refills: 3 | Status: SHIPPED | OUTPATIENT
Start: 2022-03-31 | End: 2022-05-04 | Stop reason: ALTCHOICE

## 2022-03-31 RX ORDER — TRAMADOL HYDROCHLORIDE 50 MG/1
TABLET ORAL
COMMUNITY
Start: 2022-03-18 | End: 2022-04-04

## 2022-03-31 NOTE — PROGRESS NOTES
Amira Scherer is a 57 year old female who is being evaluated via a billable telephone visit.      What phone number would you like to be contacted at? [phone]  How would you like to obtain your AVS? Mail a copy      Assessment & Plan     Postoperative pain    - celecoxib (CELEBREX) 200 MG capsule; Take 1 capsule (200 mg) by mouth daily    Closed fracture of right hip with routine healing, subsequent encounter    I reviewed the surgeon's note and while they had given her a tramadol refill they also stated its time to be off the narcotics.  When Amira had been taking the Celebrex she was taking it in the morning in the evening and it was keeping her up at night I explained to her why it showed that 1 specifically and also that she should be able to take both capsules in the morning and see if that does not keep her up at night but gives her some pain relief.    She is continuing with the back pain dr. Hwang has ordered a new updated MRI as her last one is nearly 6 years old.         Return in about 4 weeks (around 4/28/2022) for with consultant as planned.    Alana Carbajal MD  [facility]     Amira Scherer is a 57 year old female who presents via phone visit today for the following health issues:    Tried no pain medication yesterday that did not go well.   Would like a refill of Tramadol   Celebrex keeps her up all night.     Maddie Long CMA      She was     Review of Systems   The pain is improved but the same all back pain that she has had for many years continues.       Objective          Vitals:  No vitals were obtained today due to virtual visit.    healthy, alert and no distress  PSYCH: Alert and oriented times 3; coherent speech, normal   rate and volume, able to articulate logical thoughts, able   to abstract reason, no tangential thoughts, no hallucinations   or delusions  Her affect is normal  RESP: No cough, no audible wheezing, able to talk in  full sentences  Remainder of exam unable to be completed due to telephone visits            Phone call duration:  15 minutes  Alana Carbajal M.D.

## 2022-04-01 ENCOUNTER — TELEPHONE (OUTPATIENT)
Dept: FAMILY MEDICINE | Facility: CLINIC | Age: 57
End: 2022-04-01
Payer: COMMERCIAL

## 2022-04-01 NOTE — TELEPHONE ENCOUNTER
I can make a referral to pain management.  She said that her hip really is not bothering her anymore but she still has the back pain that apparently she has had for many years.  She is seeing Dr. Hwang about her back and will be getting an MRI I do not have anything else to offer her right now. Alana Carbajal M.D.

## 2022-04-01 NOTE — TELEPHONE ENCOUNTER
Call placed to patient  Relayed Dr. Carbajal's message    Patient states her back pain has never been this bad before her hip surgery    Patient tearful and wanting further recommendations   Patient wanting know if she can take an OTC Tylenol or Ibuprofen to help pain  States she has some Oxycodone left and 1 Tramadol - wanting to know if she should take 1 of those for the pain    Advised patient that it is the end of the day and a response is not guaranteed   If pain is so severe then she would need to be evaluated in the ED   Patient states she will not go to the ED     Wanting Dr. Carbajal's recommendation    Sharif Manrique RN

## 2022-04-01 NOTE — TELEPHONE ENCOUNTER
Reason for Call:  Other    Detailed comments: Pt calling because Dr. Carbajal had her start taking Celebrex 200mg capsules once a day, she is still in a lot of pain and wondering if there is something she should do or something else she can take to help with the pain. Hoping to talk to someone as soon as possible about this.     Phone Number Patient can be reached at: Home number on file [phone] (home)    Best Time: anytime    Can we leave a detailed message on this number? YES    Call taken on 4/1/2022 at 3:41 PM by Sharifa Blanchard

## 2022-04-04 ENCOUNTER — VIRTUAL VISIT (OUTPATIENT)
Dept: FAMILY MEDICINE | Facility: CLINIC | Age: 57
End: 2022-04-04
Payer: COMMERCIAL

## 2022-04-04 DIAGNOSIS — F41.9 ANXIETY: ICD-10-CM

## 2022-04-04 DIAGNOSIS — F60.9 PERSONALITY DISORDER IN ADULT (H): ICD-10-CM

## 2022-04-04 DIAGNOSIS — M79.605 PAIN IN BOTH LOWER EXTREMITIES: ICD-10-CM

## 2022-04-04 DIAGNOSIS — M79.604 PAIN IN BOTH LOWER EXTREMITIES: ICD-10-CM

## 2022-04-04 DIAGNOSIS — G89.18 POSTOPERATIVE PAIN: Primary | ICD-10-CM

## 2022-04-04 PROCEDURE — 99214 OFFICE O/P EST MOD 30 MIN: CPT | Mod: 95 | Performed by: FAMILY MEDICINE

## 2022-04-04 RX ORDER — TRAMADOL HYDROCHLORIDE 50 MG/1
TABLET ORAL
Qty: 40 TABLET | Refills: 0 | Status: SHIPPED | OUTPATIENT
Start: 2022-04-04 | End: 2022-06-02

## 2022-04-04 NOTE — PROGRESS NOTES
Amira Scherer is a 57 year old female who is being evaluated via a billable telephone visit.      What phone number would you like to be contacted at? [phone]  How would you like to obtain your AVS? Mail a copy      Assessment & Plan     Postoperative pain  She will be given a small amount of tramadol she will have more pain if she starts physical therapy she is allowed to take an extra 1 for physical therapy otherwise just at bedtime.  - traMADol (ULTRAM) 50 MG tablet; May take 1 tablet before physical therapy and 1 tablet as needed daily this is a 30 day supply    Pain in both lower extremities  As above following up with surgeon next week  - traMADol (ULTRAM) 50 MG tablet; May take 1 tablet before physical therapy and 1 tablet as needed daily this is a 30 day supply    Personality disorder in adult, unspecified  This complicates every single interaction.    Anxiety  The anxiety is very strong but it is part of her personality disorder.           CONSULTATION/REFERRAL to orthopedics next week     No follow-ups on file.    Alana Carbajal MD  [facility]     Amira Scherer is a 57 year old female who presents via phone visit today for the following health issues:    Chief Complaint   Patient presents with     Pain     Follow up - pain in hip and back.      Telephone     [phone]       Took miralax 3 times yesterday.   Drinking prunes Juice  Senna-Docusate twice daily.   Celecoxib worked before and now having exteremly constipated. And keeping her up at night.   Maddie Long CMA  She has no one to help her. She has physical therapy 2 times aweek   She continues to have pain the celebrex is not working for her   She continues to have pain. And she is doing physical therapy she has a hard time doing this with the pain. Taking the 200mg all at once   Physical therapy is 2 times   Sees orthopedics next week   Tramadol 1 daily -2 daily   On narcotics  She does  not want to cont taking these regularly but she finds that she cannot tolerate the pain otherwise she has not started PT yet.      Review of Systems   Constitutional, HEENT, cardiovascular, pulmonary, gi and gu systems are negative, except as otherwise noted.       Objective          Vitals:  No vitals were obtained today due to virtual visit.    alert and no distress  PSYCH: Alert and oriented times 3; coherent speech, normal   rate and volume, able to articulate logical thoughts, able   to abstract reason, no tangential thoughts, no hallucinations   or delusions  Her affect is anxious  RESP: No cough, no audible wheezing, able to talk in full sentences  Remainder of exam unable to be completed due to telephone visits            Phone call duration:  30+ minutes        Alana Carbajal M.D.

## 2022-04-05 ENCOUNTER — HOSPITAL ENCOUNTER (OUTPATIENT)
Dept: PHYSICAL THERAPY | Facility: CLINIC | Age: 57
Setting detail: THERAPIES SERIES
Discharge: HOME | End: 2022-04-05
Attending: ORTHOPAEDIC SURGERY
Payer: COMMERCIAL

## 2022-04-05 PROCEDURE — 97110 THERAPEUTIC EXERCISES: CPT | Mod: GP | Performed by: PHYSICAL THERAPIST

## 2022-04-11 ENCOUNTER — HOSPITAL ENCOUNTER (OUTPATIENT)
Dept: PHYSICAL THERAPY | Facility: CLINIC | Age: 57
Setting detail: THERAPIES SERIES
Discharge: HOME | End: 2022-04-11
Attending: ORTHOPAEDIC SURGERY
Payer: COMMERCIAL

## 2022-04-11 ENCOUNTER — TRANSFERRED RECORDS (OUTPATIENT)
Dept: HEALTH INFORMATION MANAGEMENT | Facility: CLINIC | Age: 57
End: 2022-04-11

## 2022-04-11 PROCEDURE — 97110 THERAPEUTIC EXERCISES: CPT | Mod: GP | Performed by: PHYSICAL THERAPIST

## 2022-04-12 ENCOUNTER — TRANSCRIBE ORDERS (OUTPATIENT)
Dept: OTHER | Age: 57
End: 2022-04-12
Payer: COMMERCIAL

## 2022-04-26 ENCOUNTER — HOSPITAL ENCOUNTER (OUTPATIENT)
Dept: PHYSICAL THERAPY | Facility: CLINIC | Age: 57
Setting detail: THERAPIES SERIES
Discharge: HOME | End: 2022-04-26
Payer: COMMERCIAL

## 2022-04-26 PROCEDURE — 97110 THERAPEUTIC EXERCISES: CPT | Mod: GP | Performed by: PHYSICAL THERAPIST

## 2022-05-04 ENCOUNTER — VIRTUAL VISIT (OUTPATIENT)
Dept: FAMILY MEDICINE | Facility: CLINIC | Age: 57
End: 2022-05-04
Payer: COMMERCIAL

## 2022-05-04 DIAGNOSIS — K59.03 DRUG-INDUCED CONSTIPATION: ICD-10-CM

## 2022-05-04 DIAGNOSIS — S72.091A OTH FRACTURE OF HEAD AND NECK OF RIGHT FEMUR, INIT (H): ICD-10-CM

## 2022-05-04 DIAGNOSIS — L98.8 WRINKLED SKIN: ICD-10-CM

## 2022-05-04 DIAGNOSIS — G89.29 CHRONIC MIDLINE LOW BACK PAIN WITHOUT SCIATICA: ICD-10-CM

## 2022-05-04 DIAGNOSIS — M54.6 PAIN IN THORACIC SPINE: Primary | ICD-10-CM

## 2022-05-04 DIAGNOSIS — M54.50 CHRONIC MIDLINE LOW BACK PAIN WITHOUT SCIATICA: ICD-10-CM

## 2022-05-04 PROCEDURE — 99214 OFFICE O/P EST MOD 30 MIN: CPT | Mod: 95 | Performed by: FAMILY MEDICINE

## 2022-05-04 RX ORDER — IBUPROFEN 200 MG
200-400 TABLET ORAL EVERY 4 HOURS PRN
Qty: 100 TABLET | Refills: 1 | Status: SHIPPED | OUTPATIENT
Start: 2022-05-04 | End: 2022-06-13

## 2022-05-04 RX ORDER — TRAMADOL HYDROCHLORIDE 50 MG/1
50-100 TABLET ORAL 2 TIMES DAILY PRN
Qty: 60 TABLET | Refills: 1 | Status: SHIPPED | OUTPATIENT
Start: 2022-05-04 | End: 2022-07-26

## 2022-05-04 RX ORDER — POLYETHYLENE GLYCOL 3350 17 G/17G
1 POWDER, FOR SOLUTION ORAL DAILY
Qty: 578 G | Refills: 1 | Status: SHIPPED | OUTPATIENT
Start: 2022-05-04 | End: 2022-06-22

## 2022-05-04 RX ORDER — DOCUSATE SODIUM 100 MG/1
100 CAPSULE, LIQUID FILLED ORAL 2 TIMES DAILY PRN
Qty: 60 CAPSULE | Refills: 3 | Status: SHIPPED | OUTPATIENT
Start: 2022-05-04 | End: 2022-09-09

## 2022-05-04 NOTE — PROGRESS NOTES
Amira is a 57 year old who is being evaluated via a billable telephone visit.      What phone number would you like to be contacted at? [phone]  How would you like to obtain your AVS? Mail a copy    Assessment & Plan     Pain in thoracic spine      Oth fracture of head and neck of right femur, init (H)  Cont with pain medications and physical therapy   - traMADol (ULTRAM) 50 MG tablet; Take 1-2 tablets ( mg) by mouth 2 times daily as needed for severe pain  - ibuprofen (ADVIL/MOTRIN) 200 MG tablet; Take 1-2 tablets (200-400 mg) by mouth every 4 hours as needed for mild pain  - polyethylene glycol (MIRALAX) 17 GM/Dose powder; Take 17 g (1 capful) by mouth daily  - docusate sodium (COLACE) 100 MG capsule; Take 1 capsule (100 mg) by mouth 2 times daily as needed for constipation    Drug-induced constipation  Cont senna     Chronic midline low back pain without sciatica      Wrinkled skin  She want to get rid of her wrinkled skin I believe she has been there before but she wants to go back this is cosmetics   - Adult Dermatology Referral; Future         See Patient Instructions    No follow-ups on file.    Alana Carbajal MD  [facility] BRENT Collier is a 57 year old who presents for the following health issues    HPI     Discuss stool softener  Discuss Tramadol   She would like to take the miralax everyday which is fine I sent that in   She has been wanting to see derm  She is still       She saw the orthopedic surgeon   She is having dental problems she chipped her tooth and     Review of Systems   Positive       Objective           Vitals:  No vitals were obtained today due to virtual visit.    Physical Exam   healthy, alert and no distress  PSYCH: Alert and oriented times 3; coherent speech, normal   rate and volume, able to articulate logical thoughts, able   to abstract reason, no tangential thoughts, no hallucinations   or delusions  Her affect is normal  RESP: No cough,  no audible wheezing, able to talk in full sentences  Remainder of exam unable to be completed due to telephone visits                Phone call duration: 25 minutes  Alana Carbajal M.D.

## 2022-05-05 ENCOUNTER — TELEPHONE (OUTPATIENT)
Dept: FAMILY MEDICINE | Facility: CLINIC | Age: 57
End: 2022-05-05
Payer: COMMERCIAL

## 2022-05-05 DIAGNOSIS — S72.001D CLOSED FRACTURE OF RIGHT HIP WITH ROUTINE HEALING, SUBSEQUENT ENCOUNTER: Primary | ICD-10-CM

## 2022-05-05 NOTE — TELEPHONE ENCOUNTER
Reason for Call:  Requesting order for acetaminophen 325mg  100tab    Detailed comments: can she take the tylenol and advil together. Alternately  [facility] in [place]  Please place order for her.  It seems to take the edge off.    Phone Number Patient can be reached at: Home number on file [phone] (home)    Best Time: any    Can we leave a detailed message on this number? YES    Call taken on 5/5/2022 at 5:07 PM by Sandy Kline

## 2022-05-06 RX ORDER — ACETAMINOPHEN 325 MG/1
325-650 TABLET ORAL EVERY 6 HOURS PRN
Qty: 100 TABLET | Refills: 1 | Status: SHIPPED | OUTPATIENT
Start: 2022-05-06 | End: 2022-07-26

## 2022-05-06 NOTE — TELEPHONE ENCOUNTER
Please call Amira and tell her that she can alternate the Tylenol with the Advil.  Sent Tylenol into her also.Alana Carbajal M.D.

## 2022-05-06 NOTE — TELEPHONE ENCOUNTER
Call placed to patient.  Relayed message and dosing per Dr Carbajal.  Patient verbalized understanding and agrees.  Yasmine Garcias RN

## 2022-05-10 ENCOUNTER — HOSPITAL ENCOUNTER (OUTPATIENT)
Dept: PHYSICAL THERAPY | Facility: CLINIC | Age: 57
Setting detail: THERAPIES SERIES
Discharge: HOME | End: 2022-05-10
Attending: ORTHOPAEDIC SURGERY
Payer: COMMERCIAL

## 2022-05-10 PROCEDURE — 97110 THERAPEUTIC EXERCISES: CPT | Mod: GP | Performed by: PHYSICAL THERAPIST

## 2022-06-02 ENCOUNTER — VIRTUAL VISIT (OUTPATIENT)
Dept: FAMILY MEDICINE | Facility: CLINIC | Age: 57
End: 2022-06-02
Payer: COMMERCIAL
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
## 2022-06-02 DIAGNOSIS — Z96.641 STATUS POST TOTAL REPLACEMENT OF RIGHT HIP: ICD-10-CM

## 2022-06-02 DIAGNOSIS — M54.50 CHRONIC MIDLINE LOW BACK PAIN WITHOUT SCIATICA: ICD-10-CM

## 2022-06-02 DIAGNOSIS — G89.29 CHRONIC MIDLINE LOW BACK PAIN WITHOUT SCIATICA: ICD-10-CM

## 2022-06-02 PROCEDURE — 99214 OFFICE O/P EST MOD 30 MIN: CPT | Mod: 95 | Performed by: FAMILY MEDICINE

## 2022-06-02 NOTE — PROGRESS NOTES
Amira is a 57 year old who is being evaluated via a billable telephone visit.      What phone number would you like to be contacted at? [phone]  How would you like to obtain your AVS? Mail a copy    Assessment & Plan     Fall, initial encounter  Doingbetter     Chronic midline low back pain without sciatica  Unknown cause     Status post total replacement of right hip  Moving along              Seeing surgery     No follow-ups on file.    Alana Carbajal MD  [facility] BRENT Collier is a 57 year old who presents for the following health issues:     HPI     Chief Complaint   Patient presents with     Telephone     Hip Pain     Discuss pain. Slipped on Monday with hip pain, in garage. Pain increased.      Medication Request     Discuss refill of the ibuprofen, miralax and stool softener.  Taking 1, 2 or 3 tablets of ibuprofen. 1 tramadol during the day. Discuss when to take tylenol and ibuprofen, alternate?         Slipped in the garage but she is doing better   Had been trying to get the garbage out and she slipped and fell on her hands and knees   She does not have any bruising   She is able to bear weight   She has more back and knee pain   Able to ambulate she is seeing Dr. Johnston   She is taking ibu before bed and taking 1 tramadol during the day   Taking miralax and she is good on the bowels with her current   She is taking 3 ibu at bedtime   She is doing ok   Still doing physical therapy           Review of Systems   Doing ok       Objective           Vitals:  No vitals were obtained today due to virtual visit.    Physical Exam   healthy, alert and no distress  PSYCH: Alert and oriented times 3; coherent speech, normal   rate and volume, able to articulate logical thoughts, able   to abstract reason, no tangential thoughts, no hallucinations   or delusions  Her affect is anxious  RESP: No cough, no audible wheezing, able to talk in full sentences  Remainder of exam unable  to be completed due to telephone visits                Phone call duration: 29 minutes  Alana Carbajal M.D.

## 2022-06-06 ENCOUNTER — TRANSFERRED RECORDS (OUTPATIENT)
Dept: FAMILY MEDICINE | Facility: CLINIC | Age: 57
End: 2022-06-06

## 2022-06-07 ENCOUNTER — TRANSCRIBE ORDERS (OUTPATIENT)
Dept: OTHER | Age: 57
End: 2022-06-07
Payer: COMMERCIAL

## 2022-06-07 DIAGNOSIS — Z96.649 S/P HIP HEMIARTHROPLASTY: Primary | ICD-10-CM

## 2022-06-07 DIAGNOSIS — S72.091A OTH FRACTURE OF HEAD AND NECK OF RIGHT FEMUR, INIT (H): ICD-10-CM

## 2022-06-08 DIAGNOSIS — S72.091A OTH FRACTURE OF HEAD AND NECK OF RIGHT FEMUR, INIT (H): ICD-10-CM

## 2022-06-08 NOTE — TELEPHONE ENCOUNTER
Routing refill request to provider for review/approval because:  Labs out of range:  AST, ALT, & CBC    Sharif Manrique RN

## 2022-06-08 NOTE — TELEPHONE ENCOUNTER
Routing refill request to provider for review/approval because:  Drug not on the [facility] refill protocol   Thank you.  Frank Verduzco RN

## 2022-06-09 RX ORDER — TRAMADOL HYDROCHLORIDE 50 MG/1
50-100 TABLET ORAL 2 TIMES DAILY PRN
Qty: 60 TABLET | Refills: 1 | OUTPATIENT
Start: 2022-06-09

## 2022-06-13 RX ORDER — IBUPROFEN 200 MG/1
TABLET, FILM COATED ORAL
Qty: 100 TABLET | Refills: 1 | Status: SHIPPED | OUTPATIENT
Start: 2022-06-13 | End: 2022-07-26

## 2022-06-21 ENCOUNTER — HOSPITAL ENCOUNTER (OUTPATIENT)
Dept: PHYSICAL THERAPY | Facility: CLINIC | Age: 57
Setting detail: THERAPIES SERIES
Discharge: HOME | End: 2022-06-21
Attending: ORTHOPAEDIC SURGERY
Payer: COMMERCIAL

## 2022-06-21 PROCEDURE — 97110 THERAPEUTIC EXERCISES: CPT | Mod: GP | Performed by: PHYSICAL THERAPIST

## 2022-06-21 NOTE — PROGRESS NOTES
[facility]  Outpatient Physical Therapy Progress Note  Patient: Amira Scherer  : 1965    Beginning/End Dates of Reporting Period:  3/29/22 to 22    Referring Provider: Dr. Zia MD    Therapy Diagnosis: s/p right hip hemiarthroplasty     Client Self Report: Arrives today 15 min late due to bus situation.  Overall improving.    Objective Measurements:  Objective Measure: gait  Details: no deviations  Objective Measure: sitting endurance  Details: 30 min - with LBP, not hip pain                Goals:  Goal Identifier     Goal Description Patient will be able to walk community distances without an assistive device.   Target Date 22   Date Met  22   Progress (detail required for progress note):       Goal Identifier     Goal Description Patient will be able to sit for >=30 min without hip pain.   Target Date 22   Date Met  22   Progress (detail required for progress note): unable due to old injury, not hip     Goal Identifier     Goal Description Patient will be able to cut toenails without difficulty.   Target Date 22   Date Met      Progress (detail required for progress note): some difficulty           Plan:  Changes to therapy plan of care: 1 final session in 4 weeks to progress LE strength and endurance    Discharge:  No      RECERTIFICATION    Amira Scherer  1965    Session Number: 5 since start of care.      Diagnosis: s/p right hip hemiarthroplasty  Onset Date: 22  Start of Care Date: 3/29/22    Reasons for Continuing Treatment:   One final session to address global right hip strength and endurance    Frequency/Duration  1 times per month for 4 weeks for a total of 1 visits.    Recertification Period  22 - 22    Physician Signature:    Date:    X_______________________________________________________    Physician Name: Dr. Lizarraga,  MD ODONNELL certify the need for these services furnished under this plan of treatment and while under my care. Physician co-signature of this document indicates review and certification of the therapy plan.  This signature may be written on paper, or electronically signed within EPIC.

## 2022-06-22 DIAGNOSIS — S72.091A OTH FRACTURE OF HEAD AND NECK OF RIGHT FEMUR, INIT (H): ICD-10-CM

## 2022-06-22 RX ORDER — POLYETHYLENE GLYCOL 3350 17 G/17G
1 POWDER, FOR SOLUTION ORAL DAILY
Qty: 510 G | Refills: 0 | Status: SHIPPED | OUTPATIENT
Start: 2022-06-22 | End: 2022-07-05

## 2022-07-05 ENCOUNTER — TELEPHONE (OUTPATIENT)
Dept: FAMILY MEDICINE | Facility: CLINIC | Age: 57
End: 2022-07-05

## 2022-07-05 DIAGNOSIS — S72.091A OTH FRACTURE OF HEAD AND NECK OF RIGHT FEMUR, INIT (H): ICD-10-CM

## 2022-07-05 RX ORDER — POLYETHYLENE GLYCOL 3350 17 G/17G
1 POWDER, FOR SOLUTION ORAL DAILY
Qty: 510 G | Refills: 0 | Status: SHIPPED | OUTPATIENT
Start: 2022-07-05 | End: 2022-07-26

## 2022-07-26 ENCOUNTER — VIRTUAL VISIT (OUTPATIENT)
Dept: FAMILY MEDICINE | Facility: CLINIC | Age: 57
End: 2022-07-26
Payer: COMMERCIAL

## 2022-07-26 ENCOUNTER — NURSE TRIAGE (OUTPATIENT)
Dept: NURSING | Facility: CLINIC | Age: 57
End: 2022-07-26

## 2022-07-26 ENCOUNTER — TELEPHONE (OUTPATIENT)
Dept: FAMILY MEDICINE | Facility: CLINIC | Age: 57
End: 2022-07-26

## 2022-07-26 DIAGNOSIS — M54.50 CHRONIC MIDLINE LOW BACK PAIN WITHOUT SCIATICA: Primary | ICD-10-CM

## 2022-07-26 DIAGNOSIS — G89.29 CHRONIC MIDLINE LOW BACK PAIN WITHOUT SCIATICA: Primary | ICD-10-CM

## 2022-07-26 DIAGNOSIS — S72.091A OTH FRACTURE OF HEAD AND NECK OF RIGHT FEMUR, INIT (H): ICD-10-CM

## 2022-07-26 DIAGNOSIS — S72.001D CLOSED FRACTURE OF RIGHT HIP WITH ROUTINE HEALING, SUBSEQUENT ENCOUNTER: ICD-10-CM

## 2022-07-26 PROCEDURE — 99214 OFFICE O/P EST MOD 30 MIN: CPT | Mod: 95 | Performed by: FAMILY MEDICINE

## 2022-07-26 RX ORDER — TRAMADOL HYDROCHLORIDE 50 MG/1
50-100 TABLET ORAL 2 TIMES DAILY PRN
Qty: 60 TABLET | Refills: 1 | Status: SHIPPED | OUTPATIENT
Start: 2022-07-26 | End: 2022-10-26

## 2022-07-26 RX ORDER — ACETAMINOPHEN 325 MG/1
325-650 TABLET ORAL EVERY 6 HOURS PRN
Qty: 100 TABLET | Refills: 1 | Status: SHIPPED | OUTPATIENT
Start: 2022-07-26 | End: 2023-07-20

## 2022-07-26 RX ORDER — IBUPROFEN 200 MG
TABLET ORAL
Qty: 100 TABLET | Refills: 3 | Status: SHIPPED | OUTPATIENT
Start: 2022-07-26 | End: 2022-11-20

## 2022-07-26 RX ORDER — POLYETHYLENE GLYCOL 3350 17 G/17G
1 POWDER, FOR SOLUTION ORAL DAILY
Qty: 510 G | Refills: 3 | Status: SHIPPED | OUTPATIENT
Start: 2022-07-26 | End: 2022-08-29

## 2022-07-26 NOTE — PROGRESS NOTES
Amira Scherer is a 57 year old female who is being evaluated via a billable telephone visit.      What phone number would you like to be contacted at? [phone]  How would you like to obtain your AVS? Mail a copy      Assessment & Plan     Oth fracture of head and neck of right femur, init (H)  Cont to rehab  - polyethylene glycol (MIRALAX) 17 GM/Dose powder; Take 17 g (1 capful) by mouth daily  - traMADol (ULTRAM) 50 MG tablet; Take 1-2 tablets ( mg) by mouth 2 times daily as needed for severe pain  - ibuprofen (SM IBUPROFEN) 200 MG tablet; Take 1-2 tablets (200-400 mg) by mouth every 4 hours as needed for mild pain    Chronic midline low back pain without sciatica  Seeing spine surgery     Closed fracture of right hip with routine healing, subsequent encounter    - acetaminophen (TYLENOL) 325 MG tablet; Take 1-2 tablets (325-650 mg) by mouth every 6 hours as needed for mild pain         Please follow up with the back surgeon     Return in about 3 months (around 10/26/2022) for med check.    Alana Carbajal MD  [facility]     Amira Scherer is a 57 year old female who presents via phone visit today for the following health issues:    Discuss Miralax refill for future.   Taking Tramadol once per day - this is helping. - has 45 tablets left after today's dose.   Ibuprofen is helping her get through the evening.   Waiting for MRI, discuss why the MRI will look different from the last.   Discuss why stool softener went from Senokot to Colace.   Maddie Long CMA    She has scheduled the mri and now she is having a hard time getting to her appts due to transportation. She will be following   She has been taking the tramadol really only 1 time per day   She tries to get by without it     She has been trying to get more mobile     She is taking the miralax  Daily and she is doing well with her bm     Review of Systems   Constitutional, HEENT, cardiovascular,  pulmonary, gi and gu systems are negative, except as otherwise noted.       Objective          Vitals:  No vitals were obtained today due to virtual visit.    healthy, alert and no distress  PSYCH: Alert and oriented times 3; coherent speech, normal   rate and volume, able to articulate logical thoughts, able   to abstract reason, no tangential thoughts, no hallucinations   or delusions  Her affect is normal  RESP: No cough, no audible wheezing, able to talk in full sentences  Remainder of exam unable to be completed due to telephone visits            Phone call duration:  23 minutes  spent in chart review medication interactions and phone call and charting  Alana Carbajal M.D.

## 2022-07-26 NOTE — TELEPHONE ENCOUNTER
It has been over 6 years since you have had the MRI. You broke your hip and your body does not work the same as before you broke it. This is why you need an updated one to see what has changed and what to do next  Alana Carbajal M.D.

## 2022-07-26 NOTE — TELEPHONE ENCOUNTER
Dr Carbajal,  Pt says she can't remember what you said when she asked you these questions:    Why is my old injury worse since my new injury?  Why would my MRI look different than before?

## 2022-07-27 NOTE — TELEPHONE ENCOUNTER
Pt calling in regards to:    Covid-19 vaccine  Inquiring about side effects of the vaccine and if it is safe.   This writer referred to the [facility] web site on the timing of the primary series and when to get the booster.     Jessica Live RN  [place] Nurse Advisor  07/26/22  7:42 PM      Reason for Disposition    Requesting COVID-19 vaccine    Protocols used: CORONAVIRUS (COVID-19) VACCINE QUESTIONS AND [id number]-F-GI 1.18.2022

## 2022-08-04 ENCOUNTER — TELEPHONE (OUTPATIENT)
Dept: DERMATOLOGY | Facility: CLINIC | Age: 57
End: 2022-08-04

## 2022-08-04 NOTE — TELEPHONE ENCOUNTER
Spoke to patient and advised she can try and sign up for My chart and can check in that way.     She is not happy with this answer. I did add this to her appointment notes.      She does not want to sign up for My chart and I did advise she can bring her own pen, but we do not use ipads to check patients in,, she can make a photo copy of her insurance card and bring it in to clinic as well so no one needs to touch her insurance card.     Patient verbalized understanding. Stefany Clark RN

## 2022-08-04 NOTE — TELEPHONE ENCOUNTER
[facility] Call Center    Phone Message    May a detailed message be left on voicemail: yes     Reason for Call: Other: The pt is asking if she can register with someone over the phone before coming into the clinic for her appointment on 8.9.22.  She is very afraid of COVID and doesn't want to touch the iPads in the clinic and doesn't use her computer at home. Please call the pt to discuss. Thanks.     Action Taken: Message routed to:  Other: [facility]    Travel Screening: Not Applicable

## 2022-08-08 ENCOUNTER — TELEPHONE (OUTPATIENT)
Dept: FAMILY MEDICINE | Facility: CLINIC | Age: 57
End: 2022-08-08

## 2022-08-08 DIAGNOSIS — H57.9 EYE PROBLEM: Primary | ICD-10-CM

## 2022-08-08 ASSESSMENT — PAIN SCALES - GENERAL: PAINLEVEL: NO PAIN (0)

## 2022-08-08 NOTE — TELEPHONE ENCOUNTER
"Patient reports that she has had \"What seems like floaters in my left eye\"; and watering in left eye; irritation in left eye. I advised her to be seen by ophthamology. She requested a referral to [facility] in [place].  I advised her  that she should call the customer service number on her insurance card to make sure that [facility] is covered by her insurance. I did place a referral for her.   Dr. Cochran notified in Dr. Carbajal's absence.  Frank Verduzco RN    "

## 2022-08-09 ENCOUNTER — OFFICE VISIT (OUTPATIENT)
Dept: DERMATOLOGY | Facility: CLINIC | Age: 57
End: 2022-08-09
Attending: FAMILY MEDICINE
Payer: COMMERCIAL

## 2022-08-09 ENCOUNTER — IMMUNIZATION (OUTPATIENT)
Dept: FAMILY MEDICINE | Facility: CLINIC | Age: 57
End: 2022-08-09
Payer: COMMERCIAL

## 2022-08-09 DIAGNOSIS — L98.8 WRINKLED SKIN: ICD-10-CM

## 2022-08-09 DIAGNOSIS — Z23 HIGH PRIORITY FOR 2019-NCOV VACCINE: Primary | ICD-10-CM

## 2022-08-09 PROCEDURE — 0051A COVID-19,PF,PFIZER (12+ YRS): CPT

## 2022-08-09 PROCEDURE — 91305 COVID-19,PF,PFIZER (12+ YRS): CPT

## 2022-08-09 PROCEDURE — 99242 OFF/OP CONSLTJ NEW/EST SF 20: CPT | Performed by: DERMATOLOGY

## 2022-08-09 PROCEDURE — 99207 PR NO CHARGE NURSE ONLY: CPT

## 2022-08-09 NOTE — LETTER
8/9/2022         RE: Amira cSherer  [address] [place] [address]        Dear Colleague,    Thank you for referring your patient, Amira Scherer, to the [facility]. Please see a copy of my visit note below.    Amira Scherer is an extremely pleasant 57 year old year old female patient I was asked to see by Dr. Carbajal for lines on face.  .   Patient states this has been present for a while.  Patient reports the following symptoms:  lines.  Patient reports the following previous treatments retinol.  These treatments did not work.  Patient reports the following modifying factors none.  Associated symptoms: none.  Patient has no other skin complaints today.  Remainder of the HPI, Meds, PMH, Allergies, FH, and SH was reviewed in chart.    History reviewed. No pertinent past medical history.    Past Surgical History:   Procedure Laterality Date     COLECTOMY WITHOUT COLOSTOMY       COLON SURGERY  1987 and 91    Colon removed      OPEN REDUCTION INTERNAL FIXATION HIP BIPOLAR Right 2/7/2022    Procedure: Bipolar Hemiarthroplasty Right Hip;  Surgeon: Kb Lizarraga MD;  Location: [facility] OR        Family History   Problem Relation Age of Onset     Hypertension Mother      Arthritis Mother      Breast Cancer Sister      Thyroid Disease Sister        Social History     Socioeconomic History     Marital status:      Spouse name: Tanner Scherer     Number of children: 2     Years of education: 12     Highest education level: Not on file   Occupational History     Employer: [employer]OMEM[employer]   Tobacco Use     Smoking status: Former Smoker     Packs/day: 0.50     Years: 20.00     Pack years: 10.00     Types: Cigarettes     Smokeless tobacco: Never Used   Substance and Sexual Activity     Alcohol use: Yes     Comment: Red wine 2-3 times per week     Drug use: No     Sexual activity: Yes     Partners: Male   Other Topics Concern     Parent/sibling w/ CABG, MI or angioplasty before  65F 55M? No   Social History Narrative     Not on file     Social Determinants of Health     Financial Resource Strain: Not on file   Food Insecurity: Not on file   Transportation Needs: Not on file   Physical Activity: Not on file   Stress: Not on file   Social Connections: Not on file   Intimate Partner Violence: Not on file   Housing Stability: Not on file       Outpatient Encounter Medications as of 8/9/2022   Medication Sig Dispense Refill     docusate sodium (COLACE) 100 MG capsule Take 1 capsule (100 mg) by mouth 2 times daily as needed for constipation 60 capsule 3     ibuprofen (SM IBUPROFEN) 200 MG tablet Take 1-2 tablets (200-400 mg) by mouth every 4 hours as needed for mild pain 100 tablet 3     polyethylene glycol (MIRALAX) 17 GM/Dose powder Take 17 g (1 capful) by mouth daily 510 g 3     traMADol (ULTRAM) 50 MG tablet Take 1-2 tablets ( mg) by mouth 2 times daily as needed for severe pain 60 tablet 1     acetaminophen (TYLENOL) 325 MG tablet Take 1-2 tablets (325-650 mg) by mouth every 6 hours as needed for mild pain (Patient not taking: Reported on 8/8/2022) 100 tablet 1     No facility-administered encounter medications on file as of 8/9/2022.             O:   NAD, WDWN, Alert & Oriented, Mood & Affect wnl, Vitals stable   Here today alone    General appearance normal   Vitals stable   Alert, oriented and in no acute distress     Rhytidosis on face      Eyes: Conjunctivae/lids:Normal     ENT: Lips, buccal mucosa, tongue: normal    MSK:Normal    Cardiovascular: peripheral edema none    Pulm: Breathing Normal    Neuro/Psych: Orientation:Alert and Orientedx3 ; Mood/Affect:normal       A/P:  1. Rhytidosis   CO2 laser discussed with patient discussed with patient   Referral to [facility] for laser or ADC derm   Pathophysiology discussed with pateint and information provided   It was a pleasure speaking to Amira Scherer today.  Previous clinic notes and pertinent laboratory tests were reviewed prior to  Amira Scherer's visit.        Again, thank you for allowing me to participate in the care of your patient.        Sincerely,        Kevin Urias MD

## 2022-08-09 NOTE — PROGRESS NOTES
Amira Scherer is an extremely pleasant 57 year old year old female patient I was asked to see by Dr. Carbajal for lines on face.  .   Patient states this has been present for a while.  Patient reports the following symptoms:  lines.  Patient reports the following previous treatments retinol.  These treatments did not work.  Patient reports the following modifying factors none.  Associated symptoms: none.  Patient has no other skin complaints today.  Remainder of the HPI, Meds, PMH, Allergies, FH, and SH was reviewed in chart.    History reviewed. No pertinent past medical history.    Past Surgical History:   Procedure Laterality Date     COLECTOMY WITHOUT COLOSTOMY       COLON SURGERY  1987 and 91    Colon removed      OPEN REDUCTION INTERNAL FIXATION HIP BIPOLAR Right 2/7/2022    Procedure: Bipolar Hemiarthroplasty Right Hip;  Surgeon: Kb Lizarraga MD;  Location: [facility]        Family History   Problem Relation Age of Onset     Hypertension Mother      Arthritis Mother      Breast Cancer Sister      Thyroid Disease Sister        Social History     Socioeconomic History     Marital status:      Spouse name: Tanner Scherer     Number of children: 2     Years of education: 12     Highest education level: Not on file   Occupational History     Employer: [employer]   Tobacco Use     Smoking status: Former Smoker     Packs/day: 0.50     Years: 20.00     Pack years: 10.00     Types: Cigarettes     Smokeless tobacco: Never Used   Substance and Sexual Activity     Alcohol use: Yes     Comment: Red wine 2-3 times per week     Drug use: No     Sexual activity: Yes     Partners: Male   Other Topics Concern     Parent/sibling w/ CABG, MI or angioplasty before 65F 55M? No   Social History Narrative     Not on file     Social Determinants of Health     Financial Resource Strain: Not on file   Food Insecurity: Not on file   Transportation Needs: Not on file   Physical Activity: Not on file   Stress: Not on file    Social Connections: Not on file   Intimate Partner Violence: Not on file   Housing Stability: Not on file       Outpatient Encounter Medications as of 8/9/2022   Medication Sig Dispense Refill     docusate sodium (COLACE) 100 MG capsule Take 1 capsule (100 mg) by mouth 2 times daily as needed for constipation 60 capsule 3     ibuprofen (SM IBUPROFEN) 200 MG tablet Take 1-2 tablets (200-400 mg) by mouth every 4 hours as needed for mild pain 100 tablet 3     polyethylene glycol (MIRALAX) 17 GM/Dose powder Take 17 g (1 capful) by mouth daily 510 g 3     traMADol (ULTRAM) 50 MG tablet Take 1-2 tablets ( mg) by mouth 2 times daily as needed for severe pain 60 tablet 1     acetaminophen (TYLENOL) 325 MG tablet Take 1-2 tablets (325-650 mg) by mouth every 6 hours as needed for mild pain (Patient not taking: Reported on 8/8/2022) 100 tablet 1     No facility-administered encounter medications on file as of 8/9/2022.             O:   NAD, WDWN, Alert & Oriented, Mood & Affect wnl, Vitals stable   Here today alone    General appearance normal   Vitals stable   Alert, oriented and in no acute distress     Rhytidosis on face      Eyes: Conjunctivae/lids:Normal     ENT: Lips, buccal mucosa, tongue: normal    MSK:Normal    Cardiovascular: peripheral edema none    Pulm: Breathing Normal    Neuro/Psych: Orientation:Alert and Orientedx3 ; Mood/Affect:normal       A/P:  1. Rhytidosis   CO2 laser discussed with patient discussed with patient   Referral to [facility] for laser or ADC derm   Pathophysiology discussed with pateint and information provided   It was a pleasure speaking to Amira Scherer today.  Previous clinic notes and pertinent laboratory tests were reviewed prior to Amira Scherer's visit.

## 2022-08-09 NOTE — PATIENT INSTRUCTIONS
CO2 Laser is recommended to help with your concerns.     Here is 2 options for treatment.     [facility]  [address]  [place] [address]  Phone: [phone]  Email: [email or web address]         Clinic Hours:  Updated 7/21/21 Monday: 8:00am - 4:30pm    Tuesday: 7:30am - 4:30pm    Wednesday:  8:30am - 4:30pm    Thursday: 7:30am - 4:30pm    Friday: 8:00am - 4:30pm    Phone Hours:    Monday - Friday: 7:30am - 5:00pm    On the 2nd Wednesday of each month,  ALL Phones will be turned off and  ALL Clinics will be closed AFTER 1pm  for our monthly All-Staff Meeting.    Dr. Hammad Mullen [facility].

## 2022-08-17 ENCOUNTER — HOSPITAL ENCOUNTER (OUTPATIENT)
Dept: MRI IMAGING | Facility: CLINIC | Age: 57
Discharge: HOME | End: 2022-08-17
Attending: ORTHOPAEDIC SURGERY | Admitting: ORTHOPAEDIC SURGERY
Payer: COMMERCIAL

## 2022-08-17 DIAGNOSIS — M54.9 BACK PAIN: ICD-10-CM

## 2022-08-17 PROCEDURE — 72148 MRI LUMBAR SPINE W/O DYE: CPT

## 2022-08-19 ENCOUNTER — TELEPHONE (OUTPATIENT)
Dept: FAMILY MEDICINE | Facility: CLINIC | Age: 57
End: 2022-08-19

## 2022-08-19 DIAGNOSIS — S72.091A OTH FRACTURE OF HEAD AND NECK OF RIGHT FEMUR, INIT (H): ICD-10-CM

## 2022-08-19 DIAGNOSIS — T79.6XXA TRAUMATIC RHABDOMYOLYSIS, INITIAL ENCOUNTER (H): ICD-10-CM

## 2022-08-19 NOTE — TELEPHONE ENCOUNTER
Call placed to patient   Relayed Dr. Monk's message    Patient states there is no way she can get transportation for a visit in clinic or UC for evaluation    Patient wondering if Miralax BID is okay to continue and for how long  Wondering if she can eat feeling constipated and what type of diet to follow  Looking to see if there is another medication she can utilize for constipation     Sharif Manrique RN

## 2022-08-19 NOTE — TELEPHONE ENCOUNTER
It isn't clear what she is looking for from us.If she feels that something is off with her gut, she should be seen as she is a complicated patient.   HUGO Monk MD

## 2022-08-19 NOTE — TELEPHONE ENCOUNTER
"Patient's call transferred to author    Patient reporting concern regarding constipation and abdominal bloating  States she noticed the start of constipation symptoms in early July and thought it would get better but symptoms have not     Patient reporting a small / watery stool x2 today   Has been using Miralax BID instead of daily and is also taking Colace BID as prescribed   Has not noticed improvement in stools with medications - has been drinking a lot of Prune Juice and has noticed more effect from the Prune Juice     Patient states she is having abdominal bloating as well and has discomfort from being bloated  Has been using a warm pack to abdomen with some relief in discomfort   Denies nausea/vomiting  Appetite decreased - taking in good amount of fluids     Patient adamant that her history be reviewed and that she is not \"just constipated\" - states she wants the provider to be aware she has no colon, history of injuries, and recent hip surgery  She is concerned about the bloating and not feeling like she can fully empty her bowels     Sharif Manrique RN    "

## 2022-08-19 NOTE — TELEPHONE ENCOUNTER
Call placed to patient   Relayed Dr. Carbajal's message    Patient verbalized understanding  Patient wanting a virtual (tele) visit with Dr. Carbajal next week   States she had her MRI Wednesday and wanted Dr. Carbajal updated    Patient also states she is concerned about her constipation as this shouldn't occur with her diet and hydration status   Relayed red flag symptoms that would warrant evaluation over the weekend     Patient verbalized   No further questions/concerns    Sharif Manrique RN

## 2022-08-19 NOTE — TELEPHONE ENCOUNTER
You can share this with her   TO TREAT THE CONSTIPATION:  GO TO [email or web address] TO WATCH KIDS VIDEO ON CONSTIPATION.  They have excellent information on prevention and relief of constipation.  And she can eat prune plums pineapple pears/pear juice   miralax 2 times per day is fine. Alana Carbajal M.D.

## 2022-08-22 RX ORDER — POLYETHYLENE GLYCOL 3350 17 G/17G
1 POWDER, FOR SOLUTION ORAL 2 TIMES DAILY
Qty: 578 G | Refills: 1 | Status: SHIPPED | OUTPATIENT
Start: 2022-08-22 | End: 2022-09-09

## 2022-08-22 RX ORDER — AMOXICILLIN 250 MG
1 CAPSULE ORAL 2 TIMES DAILY PRN
Qty: 60 TABLET | Refills: 1 | Status: SHIPPED | OUTPATIENT
Start: 2022-08-22 | End: 2022-08-29

## 2022-08-22 NOTE — TELEPHONE ENCOUNTER
I am unable to accommodate this request. Have her try the 2 times per day miralax and take senna if she needs it. Alana Carbajal M.D.

## 2022-08-22 NOTE — TELEPHONE ENCOUNTER
Call placed to patient.  Relayed message per Dr Carbajal.  Reviewed dosing of Miralax and senna sulaiman colace.  Yasmine Garcias RN

## 2022-08-22 NOTE — TELEPHONE ENCOUNTER
"Patient called back stating she needs a telephone visit with Dr Carbajal today as she was \"left hanging\" over the weekend.  Patient states she is still having constipation.  Patient on hold (declined to have this writer call her back)while this writer reaching out to Dr Carbajal to see if patient can be worked into her schedule today.  After 15 minutes on hold, this writer relayed that Dr Carbajal is with a patient and the care team will call patient back.  Yasmine Garcias RN     "

## 2022-08-22 NOTE — TELEPHONE ENCOUNTER
Call placed to patient.  Relayed message per Dr Carbajal.    Patient is asking Dr Carbajal to send the new order for Miralax BID to [facility].  Patient is also asking if she can stop taking the colace and re-order the senna- docusate she had when she was hospitalized 2/6/22.  Patient wanted Dr Carbajal to know that she has been taking 3 Ibuprofen at [facility] daily for pain.    Patient is asking Dr Carbajal to explain the findings of the MRI to her.  Explained to patient that she can discuss this at her appointment 8/29/22.  Patient states that is too long to wait.  Yasmine Garcias RN

## 2022-08-23 ENCOUNTER — TELEPHONE (OUTPATIENT)
Dept: FAMILY MEDICINE | Facility: CLINIC | Age: 57
End: 2022-08-23

## 2022-08-23 NOTE — TELEPHONE ENCOUNTER
FYI - Status Update    Who is Calling: patient    Update: Patient wants Dr. Carbajal to know that Dr. Hwang, spine specialist, called patient and said the MRI and spine looks good, patient asking why she has excrutiating leg pain. Patient should do EMG next, he ordered EMG. It could periphery nerve damage. If Dr. Carbajal has any other suggestions, let patient know.      Does caller want a call/response back: Yes     Okay to leave a detailed message?: Yes at Home number on file [phone] (home)

## 2022-08-25 ENCOUNTER — TELEPHONE (OUTPATIENT)
Dept: FAMILY MEDICINE | Facility: CLINIC | Age: 57
End: 2022-08-25

## 2022-08-25 NOTE — TELEPHONE ENCOUNTER
Pt called to See if her PCP was able to view her message yet. Pt denies current constipation and wants to increase these to decrease later constipation. Pt reports that you should call in the afternoon please.     Autumn Reyes RN

## 2022-08-25 NOTE — TELEPHONE ENCOUNTER
Patient's call transferred to author    Patient reporting constipation symptoms continue to persist - states she is taking the following as recommended by Dr. Carbajal  Prune Juice  Miralax BID  Senna + daily     Patient states since starting the Senna + she has noticed a potential improvement in constipation symptoms   Wondering she can increase Senna + to BID dosing until she has a stool   If Dr. Carbajal is okay with increasing to BID, patient requests that rx be updated for future refills     Sharif Manrique RN

## 2022-08-26 NOTE — TELEPHONE ENCOUNTER
Patient's call transferred to author  Patient wondering if her message from yesterday was reviewed    Relayed message was sent and that provider is not in clinic today   Patient still would like message re-sent to Dr. Carbajal to review    Sharif Manrique RN

## 2022-08-26 NOTE — TELEPHONE ENCOUNTER
Call placed to patient,relayed message per Dr Carbajal.  Patient did not like the answer, thought if she took 2 senna in the morning and 1 in the evening she would feel better.  Explained rationale per Dr Carbajal,cramping/loose stool.  Patient verbalized frustration on many topics then stated she is feeling a bit better and may cancel the telephone visit for Monday.  Encouraged patient to keep appointment as she has stated numerous times that she has many issues to discuss with Dr Carbajal.  Patient states she will use her judgement regarding the appointment and will not cancel at this time.  Yasmine Garcias RN

## 2022-08-26 NOTE — TELEPHONE ENCOUNTER
I don't recommend that she take the senna 2 times per day.   The miralax 2 times per day is fine but the other has the stimulant in it and I recommend that she stick with 1/day. She may start going the other way if she starts taking too much. Alana Carbajal M.D.

## 2022-08-29 ENCOUNTER — VIRTUAL VISIT (OUTPATIENT)
Dept: FAMILY MEDICINE | Facility: CLINIC | Age: 57
End: 2022-08-29
Payer: COMMERCIAL

## 2022-08-29 DIAGNOSIS — S72.001D CLOSED FRACTURE OF RIGHT HIP WITH ROUTINE HEALING, SUBSEQUENT ENCOUNTER: ICD-10-CM

## 2022-08-29 DIAGNOSIS — K59.01 SLOW TRANSIT CONSTIPATION: Primary | ICD-10-CM

## 2022-08-29 PROCEDURE — 99213 OFFICE O/P EST LOW 20 MIN: CPT | Mod: 95 | Performed by: FAMILY MEDICINE

## 2022-08-29 NOTE — PROGRESS NOTES
Amira Scherer is a 57 year old female who is being evaluated via a billable telephone visit.      What phone number would you like to be contacted at? [phone]  How would you like to obtain your AVS? Mail a copy      Assessment & Plan     Slow transit constipation  We will try the syrup instead since she says that the pills get stuck in her throat.  I told her not to do this more than twice a day and she supposed to stick with the MiraLAX also  - sennosides (SENOKOT) 8.8 MG/5ML syrup; Take 10 mLs by mouth 2 times daily    Closed fracture of right hip with routine healing, subsequent encounter  She is continuing PT and rehabbing she is unsure of why she is not better already.  I told her it takes a lot of time.          Return in about 3 months (around 11/29/2022) for Routine Visit.    Alana Carbajal MD  [facility]     Amira Scherer is a 57 year old female who presents via phone visit today for the following health issues:    She has been very needy lately she is having the constipation. She is very upset about this and is excessively concentrating on this right now. She has been trying a lot of things and she is frustrated   She has scheduled her emg but it is a ways out . She has reviewed the mri with Dr. Hwang who is proceeding with emg   She is having some foot drop   She has had the foot drop for years   Her physical therapy has told her she has foot drop   She very hard to talk to because she just talks right over you and asked the same questions many times.  She is quite obsessed with her bowels and I tried to give her resources and help her make a plan I am not sure how much of it she will do.    Review of Systems   Has many issues feels a bit dizzy at times   She is cotn with the constipation       She is waiting for the alternate of the senna   Pear/prune juice       Objective          Vitals:  No vitals were obtained today due to virtual  visit.    healthy, alert and no distress  PSYCH: Alert and oriented times 3; coherent speech, normal   rate and volume, able to articulate logical thoughts, able   to abstract reason, no tangential thoughts, no hallucinations   or delusions  Her affect is anxious and fearful  RESP: No cough, no audible wheezing, able to talk in full sentences  Remainder of exam unable to be completed due to telephone visits            Phone call duration: 33 minutes spent on the day of service with chart review, telephone call, counseling, and charting. Alana Carbajal M.D.

## 2022-08-30 ENCOUNTER — IMMUNIZATION (OUTPATIENT)
Dept: FAMILY MEDICINE | Facility: CLINIC | Age: 57
End: 2022-08-30
Attending: INTERNAL MEDICINE
Payer: COMMERCIAL

## 2022-08-30 DIAGNOSIS — Z23 HIGH PRIORITY FOR 2019-NCOV VACCINE: Primary | ICD-10-CM

## 2022-08-30 PROCEDURE — 0052A COVID-19,PF,PFIZER (12+ YRS): CPT

## 2022-08-30 PROCEDURE — 91305 COVID-19,PF,PFIZER (12+ YRS): CPT

## 2022-08-30 PROCEDURE — 99207 PR NO CHARGE NURSE ONLY: CPT

## 2022-09-07 DIAGNOSIS — S72.091A OTH FRACTURE OF HEAD AND NECK OF RIGHT FEMUR, INIT (H): ICD-10-CM

## 2022-09-09 RX ORDER — DOCUSATE SODIUM 100 MG/1
100 CAPSULE, LIQUID FILLED ORAL 2 TIMES DAILY PRN
Qty: 60 CAPSULE | Refills: 0 | Status: SHIPPED | OUTPATIENT
Start: 2022-09-09 | End: 2022-10-11

## 2022-09-09 RX ORDER — POLYETHYLENE GLYCOL 3350 17 G/17G
1 POWDER, FOR SOLUTION ORAL 2 TIMES DAILY
Qty: 578 G | Refills: 0 | Status: SHIPPED | OUTPATIENT
Start: 2022-09-09 | End: 2022-10-10

## 2022-09-13 ENCOUNTER — HOSPITAL ENCOUNTER (OUTPATIENT)
Dept: BONE DENSITY | Facility: CLINIC | Age: 57
Discharge: HOME | End: 2022-09-13
Attending: ORTHOPAEDIC SURGERY | Admitting: ORTHOPAEDIC SURGERY
Payer: COMMERCIAL

## 2022-09-13 DIAGNOSIS — M25.559 HIP PAIN: ICD-10-CM

## 2022-09-13 PROCEDURE — 77080 DXA BONE DENSITY AXIAL: CPT

## 2022-09-20 ENCOUNTER — VIRTUAL VISIT (OUTPATIENT)
Dept: FAMILY MEDICINE | Facility: CLINIC | Age: 57
End: 2022-09-20
Payer: COMMERCIAL

## 2022-09-20 ENCOUNTER — TELEPHONE (OUTPATIENT)
Dept: FAMILY MEDICINE | Facility: CLINIC | Age: 57
End: 2022-09-20

## 2022-09-20 DIAGNOSIS — Z12.31 VISIT FOR SCREENING MAMMOGRAM: Primary | ICD-10-CM

## 2022-09-20 DIAGNOSIS — M80.00XS AGE-RELATED OSTEOPOROSIS WITH CURRENT PATHOLOGICAL FRACTURE, SEQUELA: ICD-10-CM

## 2022-09-20 DIAGNOSIS — I83.893 VARICOSE VEINS OF BOTH LEGS WITH EDEMA: ICD-10-CM

## 2022-09-20 DIAGNOSIS — M54.50 CHRONIC MIDLINE LOW BACK PAIN WITHOUT SCIATICA: ICD-10-CM

## 2022-09-20 DIAGNOSIS — K59.01 SLOW TRANSIT CONSTIPATION: ICD-10-CM

## 2022-09-20 DIAGNOSIS — R20.2 PARESTHESIAS: Primary | ICD-10-CM

## 2022-09-20 DIAGNOSIS — G89.29 CHRONIC MIDLINE LOW BACK PAIN WITHOUT SCIATICA: ICD-10-CM

## 2022-09-20 DIAGNOSIS — F41.9 ANXIETY: ICD-10-CM

## 2022-09-20 PROCEDURE — 99215 OFFICE O/P EST HI 40 MIN: CPT | Performed by: FAMILY MEDICINE

## 2022-09-20 RX ORDER — DOCUSATE SODIUM 50MG AND SENNOSIDES 8.6MG 8.6; 5 MG/1; MG/1
TABLET, FILM COATED ORAL DAILY
COMMUNITY
Start: 2022-09-19 | End: 2022-10-11

## 2022-09-20 RX ORDER — CARBOXYMETHYLCELLULOSE SODIUM, GLYCERIN 5; 9 MG/ML; MG/ML
SOLUTION/ DROPS OPHTHALMIC 3 TIMES DAILY
COMMUNITY
Start: 2022-09-19 | End: 2024-01-23

## 2022-09-20 NOTE — PROGRESS NOTES
"{2      Amira Scherer is a 57 year old female who is being evaluated via a billable telephone visit.      What phone number would you like to be contacted at? [phone]  How would you like to obtain your AVS? Mail a copy      Assessment & Plan     Visit for screening mammogram    - MA SCREENING DIGITAL BILAT - Future  (s+30); Future    Age-related osteoporosis with current pathological fracture, sequela  Patient is wary of any treatments . I recommended she at least try them but she declined   - Vitamin D Deficiency; Future  - Parathyroid Hormone Intact; Future    Slow transit constipation    Amira cont to be concerned about this. I truly do not understand if she has o colon, how is she getting constipated?   Anxiety  Ishmael high but she denies this she also has a personality disorder that makes it very hard to treat her     Chronic midline low back pain without sciatica  Her mri was ok. I don't know where this \"horrible\" pain is coming from     Varicose veins of both legs with edema  willl refer   - Vascular Surgery Referral; Future         CONSULTATION/REFERRAL to spine/ortho    No follow-ups on file.    Alana Carbajal MD  [facility]     Amira Scherer is a 57 year old female who presents via phone visit today for the following health issues:    Chief Complaint   Patient presents with     RECHECK     Discuss Hip Pain     Telephone     She has had the dexa and she also had the emg last week she is going to follow up with dr. Hwang   She feels like she is working hard to stretch she has pain with change in position   She feels like her legs are heavy with spider veins   She is wanting to know why she feels the way she does she is declinig osteoporosis treatment at present her mother had the jaw issues from the boniva    Patient required multiple redirects and questioned every single piece of information I tried to give her. This was a very difficult visit.    Review of " Systems   CONSTITUTIONAL:NEGATIVE for fever, chills, change in weight  GI: NEGATIVE for nausea, abdominal pain, heartburn, or change in bowel habits, constipation and poor appetite  MUSCULOSKELETAL: POSITIVE  for back pain   NEURO: has paresthesias await emg         Objective          Vitals:  No vitals were obtained today due to virtual visit.    healthy, alert and no distress  PSYCH: Alert and oriented times 3; pressured speech with rambling some incoherent see.  She is extremely anxious and unable to articulate exactly what she thinks is happening.  She pretty much perseverates about this back injury from 6 years ago.  RESP: No cough, no audible wheezing, able to talk in full sentences  Remainder of exam unable to be completed due to telephone visits    Admission on 02/06/2022, Discharged on 02/13/2022   Component Date Value Ref Range Status     Hold Specimen 02/06/2022 JIC   Final     Hold Specimen 02/06/2022 JIC   Final     Hold Specimen 02/06/2022 JIC   Final     Hold Specimen 02/06/2022 JI   Final     Sodium 02/06/2022 138  133 - 144 mmol/L Final     Potassium 02/06/2022 3.0 (A) 3.4 - 5.3 mmol/L Final     Chloride 02/06/2022 104  94 - 109 mmol/L Final     Carbon Dioxide (CO2) 02/06/2022 29  20 - 32 mmol/L Final     Anion Gap 02/06/2022 5  3 - 14 mmol/L Final     Urea Nitrogen 02/06/2022 18  7 - 30 mg/dL Final     Creatinine 02/06/2022 0.52  0.52 - 1.04 mg/dL Final     Calcium 02/06/2022 9.4  8.5 - 10.1 mg/dL Final     Glucose 02/06/2022 161 (A) 70 - 99 mg/dL Final     Alkaline Phosphatase 02/06/2022 78  40 - 150 U/L Final     AST 02/06/2022 282 (A) 0 - 45 U/L Final     ALT 02/06/2022 98 (A) 0 - 50 U/L Final     Protein Total 02/06/2022 7.7  6.8 - 8.8 g/dL Final     Albumin 02/06/2022 3.6  3.4 - 5.0 g/dL Final     Bilirubin Total 02/06/2022 1.5 (A) 0.2 - 1.3 mg/dL Final     GFR Estimate 02/06/2022 >90  >60 mL/min/1.73m2 Final    Effective December 21, 2021 eGFRcr in adults is calculated using the 2021 CKD-EPI  creatinine equation which includes age and gender (Rich et al., [place], DOI: 10.1056/KSXEpk0947896)     Lipase 02/06/2022 42 (A) 73 - 393 U/L Final     Color Urine 02/06/2022 Dark Yellow (A) Colorless, Straw, Light Yellow, Yellow Final     Appearance Urine 02/06/2022 Slightly Cloudy (A) Clear Final     Glucose Urine 02/06/2022 Negative  Negative mg/dL Final     Bilirubin Urine 02/06/2022 Negative  Negative Final     Ketones Urine 02/06/2022 160  (A) Negative mg/dL Final     Specific Gravity Urine 02/06/2022 1.030  1.003 - 1.035 Final     Blood Urine 02/06/2022 Large (A) Negative Final     pH Urine 02/06/2022 6.0  5.0 - 7.0 Final     Protein Albumin Urine 02/06/2022 100  (A) Negative mg/dL Final     Urobilinogen Urine 02/06/2022 Normal  Normal, 2.0 mg/dL Final     Nitrite Urine 02/06/2022 Negative  Negative Final     Leukocyte Esterase Urine 02/06/2022 Negative  Negative Final     Bacteria Urine 02/06/2022 Moderate (A) None Seen /HPF Final     Mucus Urine 02/06/2022 Present (A) None Seen /LPF Final     RBC Urine 02/06/2022 6 (A) <=2 /HPF Final     WBC Urine 02/06/2022 5  <=5 /HPF Final     Squamous Epithelials Urine 02/06/2022 1  <=1 /HPF Final     Hyaline Casts Urine 02/06/2022 33 (A) <=2 /LPF Final     Lactic Acid 02/06/2022 2.0  0.7 - 2.0 mmol/L Final     CK 02/06/2022 6,443 (A) 30 - 225 U/L Final     WBC Count 02/06/2022 12.4 (A) 4.0 - 11.0 10e3/uL Final     RBC Count 02/06/2022 4.01  3.80 - 5.20 10e6/uL Final     Hemoglobin 02/06/2022 13.9  11.7 - 15.7 g/dL Final     Hematocrit 02/06/2022 41.5  35.0 - 47.0 % Final     MCV 02/06/2022 104 (A) 78 - 100 fL Final     MCH 02/06/2022 34.7 (A) 26.5 - 33.0 pg Final     MCHC 02/06/2022 33.5  31.5 - 36.5 g/dL Final     RDW 02/06/2022 12.1  10.0 - 15.0 % Final     Platelet Count 02/06/2022 216  150 - 450 10e3/uL Final     % Neutrophils 02/06/2022 81  % Final     % Lymphocytes 02/06/2022 5  % Final     % Monocytes 02/06/2022 13  % Final     % Eosinophils 02/06/2022 0  % Final      % Basophils 02/06/2022 0  % Final     % Immature Granulocytes 02/06/2022 1  % Final     NRBCs per 100 WBC 02/06/2022 0  <1 /100 Final     Absolute Neutrophils 02/06/2022 10.1 (A) 1.6 - 8.3 10e3/uL Final     Absolute Lymphocytes 02/06/2022 0.6 (A) 0.8 - 5.3 10e3/uL Final     Absolute Monocytes 02/06/2022 1.7 (A) 0.0 - 1.3 10e3/uL Final     Absolute Eosinophils 02/06/2022 0.0  0.0 - 0.7 10e3/uL Final     Absolute Basophils 02/06/2022 0.0  0.0 - 0.2 10e3/uL Final     Absolute Immature Granulocytes 02/06/2022 0.1  <=0.4 10e3/uL Final     Absolute NRBCs 02/06/2022 0.0  10e3/uL Final     SARS CoV2 PCR 02/06/2022 Negative  Negative Final    NEGATIVE: SARS-CoV-2 (COVID-19) RNA not detected, presumed negative.     Alcohol ethyl 02/06/2022 <0.01  <=0.01 g/dL Final     CK 02/06/2022 5,966 (A) 30 - 225 U/L Final     Potassium 02/06/2022 2.9 (A) 3.4 - 5.3 mmol/L Final     Potassium 02/07/2022 3.4  3.4 - 5.3 mmol/L Final     CK 02/07/2022 5,669 (A) 30 - 225 U/L Final     Magnesium 02/07/2022 1.8  1.8 - 2.6 mg/dL Final     Phosphorus 02/07/2022 2.2 (A) 2.5 - 4.5 mg/dL Final     WBC Count 02/07/2022 7.5  4.0 - 11.0 10e3/uL Final     RBC Count 02/07/2022 3.10 (A) 3.80 - 5.20 10e6/uL Final     Hemoglobin 02/07/2022 10.7 (A) 11.7 - 15.7 g/dL Final     Hematocrit 02/07/2022 32.4 (A) 35.0 - 47.0 % Final     MCV 02/07/2022 105 (A) 78 - 100 fL Final     MCH 02/07/2022 34.5 (A) 26.5 - 33.0 pg Final     MCHC 02/07/2022 33.0  31.5 - 36.5 g/dL Final     RDW 02/07/2022 12.1  10.0 - 15.0 % Final     Platelet Count 02/07/2022 159  150 - 450 10e3/uL Final     % Neutrophils 02/07/2022 69  % Final     % Lymphocytes 02/07/2022 17  % Final     % Monocytes 02/07/2022 14  % Final     % Eosinophils 02/07/2022 0  % Final     % Basophils 02/07/2022 0  % Final     % Immature Granulocytes 02/07/2022 0  % Final     NRBCs per 100 WBC 02/07/2022 0  <1 /100 Final     Absolute Neutrophils 02/07/2022 5.1  1.6 - 8.3 10e3/uL Final     Absolute Lymphocytes  02/07/2022 1.3  0.8 - 5.3 10e3/uL Final     Absolute Monocytes 02/07/2022 1.1  0.0 - 1.3 10e3/uL Final     Absolute Eosinophils 02/07/2022 0.0  0.0 - 0.7 10e3/uL Final     Absolute Basophils 02/07/2022 0.0  0.0 - 0.2 10e3/uL Final     Absolute Immature Granulocytes 02/07/2022 0.0  <=0.4 10e3/uL Final     Absolute NRBCs 02/07/2022 0.0  10e3/uL Final     Sodium 02/07/2022 141  133 - 144 mmol/L Final     Potassium 02/07/2022 3.5  3.4 - 5.3 mmol/L Final     Chloride 02/07/2022 111 (A) 94 - 109 mmol/L Final     Carbon Dioxide (CO2) 02/07/2022 23  20 - 32 mmol/L Final     Anion Gap 02/07/2022 7  3 - 14 mmol/L Final     Urea Nitrogen 02/07/2022 15  7 - 30 mg/dL Final     Creatinine 02/07/2022 0.44 (A) 0.52 - 1.04 mg/dL Final     Calcium 02/07/2022 8.0 (A) 8.5 - 10.1 mg/dL Final     Glucose 02/07/2022 89  70 - 99 mg/dL Final     GFR Estimate 02/07/2022 >90  >60 mL/min/1.73m2 Final    Effective December 21, 2021 eGFRcr in adults is calculated using the 2021 CKD-EPI creatinine equation which includes age and gender (Rich et al., [place], DOI: 10.1056/HYZOmn6582235)     ABO/RH(D) 02/07/2022 A POS   Final     Antibody Screen 02/07/2022 Negative  Negative Final     SPECIMEN EXPIRATION DATE 02/07/2022 [id number]   Final     Sodium 02/08/2022 136  133 - 144 mmol/L Final     Potassium 02/08/2022 3.6  3.4 - 5.3 mmol/L Final     Chloride 02/08/2022 106  94 - 109 mmol/L Final     Carbon Dioxide (CO2) 02/08/2022 25  20 - 32 mmol/L Final     Anion Gap 02/08/2022 5  3 - 14 mmol/L Final     Urea Nitrogen 02/08/2022 12  7 - 30 mg/dL Final     Creatinine 02/08/2022 0.45 (A) 0.52 - 1.04 mg/dL Final     Calcium 02/08/2022 7.9 (A) 8.5 - 10.1 mg/dL Final     Glucose 02/08/2022 100 (A) 70 - 99 mg/dL Final     Alkaline Phosphatase 02/08/2022 47  40 - 150 U/L Final     AST 02/08/2022 270 (A) 0 - 45 U/L Final     ALT 02/08/2022 99 (A) 0 - 50 U/L Final     Protein Total 02/08/2022 5.5 (A) 6.8 - 8.8 g/dL Final     Albumin 02/08/2022 2.3 (A) 3.4 -  5.0 g/dL Final     Bilirubin Total 02/08/2022 0.8  0.2 - 1.3 mg/dL Final     GFR Estimate 02/08/2022 >90  >60 mL/min/1.73m2 Final    Effective December 21, 2021 eGFRcr in adults is calculated using the 2021 CKD-EPI creatinine equation which includes age and gender (Rich et al., [facility], DOI: 10.1056/DHERgp5308534)     WBC Count 02/08/2022 3.2 (A) 4.0 - 11.0 10e3/uL Final     RBC Count 02/08/2022 2.68 (A) 3.80 - 5.20 10e6/uL Final     Hemoglobin 02/08/2022 9.3 (A) 11.7 - 15.7 g/dL Final     Hematocrit 02/08/2022 28.2 (A) 35.0 - 47.0 % Final     MCV 02/08/2022 105 (A) 78 - 100 fL Final     MCH 02/08/2022 34.7 (A) 26.5 - 33.0 pg Final     MCHC 02/08/2022 33.0  31.5 - 36.5 g/dL Final     RDW 02/08/2022 11.9  10.0 - 15.0 % Final     Platelet Count 02/08/2022 153  150 - 450 10e3/uL Final     CK 02/08/2022 4,786 (A) 30 - 225 U/L Final     SARS CoV2 PCR 02/08/2022 Negative  Negative Final    NEGATIVE: SARS-CoV-2 (COVID-19) RNA not detected, presumed negative.     GLUCOSE BY METER POCT 02/09/2022 59 (A) 70 - 99 mg/dL Final     GLUCOSE BY METER POCT 02/09/2022 99  70 - 99 mg/dL Final     Sodium 02/10/2022 139  133 - 144 mmol/L Final     Potassium 02/10/2022 3.6  3.4 - 5.3 mmol/L Final     Chloride 02/10/2022 107  94 - 109 mmol/L Final     Carbon Dioxide (CO2) 02/10/2022 29  20 - 32 mmol/L Final     Anion Gap 02/10/2022 3  3 - 14 mmol/L Final     Urea Nitrogen 02/10/2022 7  7 - 30 mg/dL Final     Creatinine 02/10/2022 0.45 (A) 0.52 - 1.04 mg/dL Final     Calcium 02/10/2022 8.2 (A) 8.5 - 10.1 mg/dL Final     Glucose 02/10/2022 91  70 - 99 mg/dL Final     Alkaline Phosphatase 02/10/2022 52  40 - 150 U/L Final     AST 02/10/2022 169 (A) 0 - 45 U/L Final     ALT 02/10/2022 85 (A) 0 - 50 U/L Final     Protein Total 02/10/2022 5.5 (A) 6.8 - 8.8 g/dL Final     Albumin 02/10/2022 2.3 (A) 3.4 - 5.0 g/dL Final     Bilirubin Total 02/10/2022 0.5  0.2 - 1.3 mg/dL Final     GFR Estimate 02/10/2022 >90  >60 mL/min/1.73m2 Final    Effective  December 21, 2021 eGFRcr in adults is calculated using the 2021 CKD-EPI creatinine equation which includes age and gender (Rich et al., [place], DOI: 10.1056/CLJVme4664818)     WBC Count 02/10/2022 4.8  4.0 - 11.0 10e3/uL Final     RBC Count 02/10/2022 2.65 (A) 3.80 - 5.20 10e6/uL Final     Hemoglobin 02/10/2022 9.1 (A) 11.7 - 15.7 g/dL Final     Hematocrit 02/10/2022 28.0 (A) 35.0 - 47.0 % Final     MCV 02/10/2022 106 (A) 78 - 100 fL Final     MCH 02/10/2022 34.3 (A) 26.5 - 33.0 pg Final     MCHC 02/10/2022 32.5  31.5 - 36.5 g/dL Final     RDW 02/10/2022 11.7  10.0 - 15.0 % Final     Platelet Count 02/10/2022 195  150 - 450 10e3/uL Final     Color Urine 02/12/2022 Yellow  Colorless, Straw, Light Yellow, Yellow Final     Appearance Urine 02/12/2022 Clear  Clear Final     Glucose Urine 02/12/2022 Negative  Negative mg/dL Final     Bilirubin Urine 02/12/2022 Negative  Negative Final     Ketones Urine 02/12/2022 Negative  Negative mg/dL Final     Specific Gravity Urine 02/12/2022 1.010  1.003 - 1.035 Final     Blood Urine 02/12/2022 Negative  Negative Final     pH Urine 02/12/2022 7.0  5.0 - 7.0 Final     Protein Albumin Urine 02/12/2022 Negative  Negative mg/dL Final     Urobilinogen Urine 02/12/2022 Normal  Normal, 2.0 mg/dL Final     Nitrite Urine 02/12/2022 Negative  Negative Final     Leukocyte Esterase Urine 02/12/2022 Negative  Negative Final     RBC Urine 02/12/2022 <1  <=2 /HPF Final     WBC Urine 02/12/2022 1  <=5 /HPF Final     Squamous Epithelials Urine 02/12/2022 1  <=1 /HPF Final           Phone call duration:  45 minutes  Alana Carbajal M.D.   spent on the day of service with chart review, telephone call, counseling, and charting. Alana Carbajal M.D.

## 2022-09-20 NOTE — TELEPHONE ENCOUNTER
I am not going to order a whole leg xray. She can try bringing it up with the orthopedics doctor . I did order the labs that she is in need of. Alana Carbajal M.D.

## 2022-09-20 NOTE — TELEPHONE ENCOUNTER
"Patient sees Dr Carbajal in [place]  Had tele appt with Dr Carbajal this afternoon  Patietn mentioned to Dr Carbajal that her good leg is kind of creaking and making noise and patient said she would keep an eye on it.    Patient is asking if she can have an xray of her left leg and/or hip, the entire left leg.    Patient is having a blood draw for Vit D and is hoping to have her X Ray the same day.    The Rxs all got switched to \"as needed\", she wants to make sure that there wont be a problem filling them when she needs them.     Patient requests a call back after 2pm if tomorrow, but any time today would work too.    Routed to Dr Carbajal:  Can patient have an X Ray of her entire left leg?  Please review the rest.    Neto OLIVEIRA [facility]      "

## 2022-09-21 NOTE — TELEPHONE ENCOUNTER
I was going to call patient but in the RN note below, it says to call after 2 PM today.  Frank Verduzco, RN

## 2022-09-21 NOTE — TELEPHONE ENCOUNTER
"Spoke with Amira and told her about the lab orders and that Dr. Carbajal is not ordering an xray and that she could bring it up with orthopedics.     Amira wanted me to tell Dr. Carbajal:  1. \"The clicking sound is in my left leg where my inner thigh meets my hip/ or pelvis. It only happens whenever I walk upstairs.\"    2. She is asking for her \"as needed\" medications to have refills at [facility] so she does not have to request every month    3. She wants to know if any of these products cause one to be dependent on them: \"Ducosate\" or \"Senna syrup\" or \"Senexon-S\"    How do you advise?  Thank you.  Frank Verduzco RN    "

## 2022-09-22 NOTE — TELEPHONE ENCOUNTER
I cannot change what the prescriptions say because they are all as needed. She will still get her months worth of medications but this is not a scheduled medication. No xray. Alana Carbajal M.D.

## 2022-09-22 NOTE — TELEPHONE ENCOUNTER
Call placed to patient.  Relayed message per Dr Carbajal.  Reviewed many times with patient as she stated she did not understand.  Patient then asked this writer to google [employer] scheduling number, this writer did and gave scheduling number to patient.  Patient then asked this writer to review her med list and tell her what bowel meds are stimulants as she does not want to become dependent.  This writer advised patient to reach out to her p[harmacist to discuss this question.  Patient states she does not agree and wants this writer to review.  Repeated recommendation for pharmacist to assist her.  Patient continued to disagree, this writer relayed that there are other patient's that I need to assist at this time and ended the call.  Yasmine Garcias RN

## 2022-10-03 DIAGNOSIS — S72.091A OTH FRACTURE OF HEAD AND NECK OF RIGHT FEMUR, INIT (H): ICD-10-CM

## 2022-10-04 RX ORDER — IBUPROFEN 400 MG/1
400 TABLET, FILM COATED ORAL EVERY 6 HOURS PRN
Qty: 100 TABLET | Refills: 0 | OUTPATIENT
Start: 2022-10-04

## 2022-10-05 ENCOUNTER — TELEPHONE (OUTPATIENT)
Dept: FAMILY MEDICINE | Facility: CLINIC | Age: 57
End: 2022-10-05

## 2022-10-05 NOTE — TELEPHONE ENCOUNTER
FYI - Status Update    Who is Calling: Patient    Update: Patient is calling [facility] to get her EMG results from 9/13/22, just wants Dr. Carbajal to know this.    Does caller want a call/response back: No

## 2022-10-08 DIAGNOSIS — S72.091A OTH FRACTURE OF HEAD AND NECK OF RIGHT FEMUR, INIT (H): ICD-10-CM

## 2022-10-09 DIAGNOSIS — T79.6XXA: ICD-10-CM

## 2022-10-09 DIAGNOSIS — S72.091A OTH FRACTURE OF HEAD AND NECK OF RIGHT FEMUR, INIT (H): ICD-10-CM

## 2022-10-09 DIAGNOSIS — S72.091A: ICD-10-CM

## 2022-10-10 RX ORDER — POLYETHYLENE GLYCOL 3350 17 G/17G
1 POWDER, FOR SOLUTION ORAL 2 TIMES DAILY
Qty: 578 G | Refills: 1 | Status: SHIPPED | OUTPATIENT
Start: 2022-10-10 | End: 2022-11-02

## 2022-10-10 NOTE — TELEPHONE ENCOUNTER
Routing refill request to provider for review/approval because:  Patient needs to be seen because it has been more than 1 year since last office visit.Patient has only had virtual visits this year. 1/20/17 last in person visit this writer can find.  Yasmine Garcias RN

## 2022-10-11 ENCOUNTER — TELEPHONE (OUTPATIENT)
Dept: FAMILY MEDICINE | Facility: CLINIC | Age: 57
End: 2022-10-11

## 2022-10-11 RX ORDER — DOCUSATE SODIUM 50MG AND SENNOSIDES 8.6MG 8.6; 5 MG/1; MG/1
TABLET, FILM COATED ORAL
Qty: 60 TABLET | Refills: 0 | Status: SHIPPED | OUTPATIENT
Start: 2022-10-11 | End: 2022-12-20

## 2022-10-11 RX ORDER — DOCUSATE SODIUM 100 MG/1
100 CAPSULE, LIQUID FILLED ORAL 2 TIMES DAILY PRN
Qty: 60 CAPSULE | Refills: 0 | Status: SHIPPED | OUTPATIENT
Start: 2022-10-11 | End: 2022-12-20

## 2022-10-11 NOTE — TELEPHONE ENCOUNTER
Amira called and said that she went to the dentist and was told that she has an infection in one of her teeth. She said that he prescribed Amoxicillin 1 capsule; 3 times daily for 10 days. Advised her that she should finish the entire course as ordered. She agreed with plan.  Frank Verduzco RN

## 2022-10-25 DIAGNOSIS — S72.091A OTH FRACTURE OF HEAD AND NECK OF RIGHT FEMUR, INIT (H): ICD-10-CM

## 2022-10-26 RX ORDER — TRAMADOL HYDROCHLORIDE 50 MG/1
50-100 TABLET ORAL 2 TIMES DAILY PRN
Qty: 60 TABLET | Refills: 1 | Status: SHIPPED | OUTPATIENT
Start: 2022-10-26 | End: 2023-04-20

## 2022-10-27 ENCOUNTER — TELEPHONE (OUTPATIENT)
Dept: FAMILY MEDICINE | Facility: CLINIC | Age: 57
End: 2022-10-27

## 2022-11-02 DIAGNOSIS — S72.091A OTH FRACTURE OF HEAD AND NECK OF RIGHT FEMUR, INIT (H): ICD-10-CM

## 2022-11-02 RX ORDER — POLYETHYLENE GLYCOL 3350 17 G/17G
1 POWDER, FOR SOLUTION ORAL 2 TIMES DAILY
Qty: 578 G | Refills: 1 | Status: SHIPPED | OUTPATIENT
Start: 2022-11-02 | End: 2023-02-08

## 2022-11-08 ENCOUNTER — VIRTUAL VISIT (OUTPATIENT)
Dept: FAMILY MEDICINE | Facility: CLINIC | Age: 57
End: 2022-11-08
Payer: COMMERCIAL

## 2022-11-08 DIAGNOSIS — Z90.49 S/P COLON RESECTION: ICD-10-CM

## 2022-11-08 DIAGNOSIS — R20.2 PARESTHESIAS: ICD-10-CM

## 2022-11-08 DIAGNOSIS — M79.604 PAIN IN BOTH LOWER EXTREMITIES: ICD-10-CM

## 2022-11-08 DIAGNOSIS — F41.9 ANXIETY: ICD-10-CM

## 2022-11-08 DIAGNOSIS — M54.50 CHRONIC MIDLINE LOW BACK PAIN WITHOUT SCIATICA: ICD-10-CM

## 2022-11-08 DIAGNOSIS — F60.9 PERSONALITY DISORDER IN ADULT (H): Primary | Chronic | ICD-10-CM

## 2022-11-08 DIAGNOSIS — M79.605 PAIN IN BOTH LOWER EXTREMITIES: ICD-10-CM

## 2022-11-08 DIAGNOSIS — G89.29 CHRONIC MIDLINE LOW BACK PAIN WITHOUT SCIATICA: ICD-10-CM

## 2022-11-08 PROCEDURE — 99214 OFFICE O/P EST MOD 30 MIN: CPT | Mod: 93 | Performed by: FAMILY MEDICINE

## 2022-11-08 NOTE — PROGRESS NOTES
Amira Scherer is a 57 year old female who is being evaluated via a billable telephone visit.      What phone number would you like to be contacted at? [phone]  How would you like to obtain your AVS? Mail a copy      Assessment & Plan     Personality disorder in adult, unspecified  Cont to interfere    S/P colon resection      Paresthesias  Many complaints     Anxiety  Constant     Pain in both lower extremities  Unknown cause     Chronic midline low back pain without sciatica  Mri looks great          CONSULTATION/REFERRAL to ortho    No follow-ups on file.    Alana Carbajal MD  [facility]     Amira Scherer is a 57 year old female who presents via phone visit today for the following health issues:    (Call consisted of her asking me repeatedly why her legs hurt why her back hurts while these things are not she has an good MRI why she is still hurting.  She continues to talk and cannot be interrupted at all requesting a refill of her tramadol and then also incredibly detailed descriptions of how she takes the tramadol and how her constipation is doing.  I was occasionally able to break into her monologue and asked her pertinent questions to make sure that she was not obstructed nor was she infected or need immediate help.  She is instructed to follow-up with orthopedic surgeon that she has been seeing.      Review of Systems   Constitutional, HEENT, cardiovascular, pulmonary, gi and gu systems are negative, except as otherwise noted.       Objective          Vitals:  No vitals were obtained today due to virtual visit.    healthy, alert and no distress  PSYCH: Alert and oriented times 3; coherent speech, normal   rate and volume, able to articulate logical thoughts, able   to abstract reason, no tangential thoughts, no hallucinations   or delusions  Her affect is accelerated   RESP: No cough, no audible wheezing, able to talk in full sentences  Remainder of exam  unable to be completed due to telephone visits            Phone call duration:  30 minutes Alana Carbajal M.D.

## 2022-11-11 ENCOUNTER — TELEPHONE (OUTPATIENT)
Dept: FAMILY MEDICINE | Facility: CLINIC | Age: 57
End: 2022-11-11

## 2022-11-11 DIAGNOSIS — Z87.81 STATUS POST CLOSED FRACTURE OF HIP: Primary | ICD-10-CM

## 2022-11-11 NOTE — TELEPHONE ENCOUNTER
Patient is wondering if she could have the 400mg ibuprofen to be fill as well. In the past she was able to take the 400mg ibuprofen that she takes with her 200mg ibuprofen so it equals 600mg ibuprofen. So she doesn't have to take so many pill to match 600mg. It just save her one pill less. Please see if this is possible thank you.

## 2022-11-14 RX ORDER — IBUPROFEN 400 MG/1
400 TABLET, FILM COATED ORAL EVERY 8 HOURS PRN
Qty: 90 TABLET | Refills: 3 | Status: SHIPPED | OUTPATIENT
Start: 2022-11-14 | End: 2023-06-01 | Stop reason: DRUGHIGH

## 2022-11-14 RX ORDER — IBUPROFEN 600 MG/1
600 TABLET, FILM COATED ORAL EVERY 8 HOURS PRN
Qty: 90 TABLET | Refills: 3 | Status: SHIPPED | OUTPATIENT
Start: 2022-11-14 | End: 2022-11-14

## 2022-11-14 NOTE — TELEPHONE ENCOUNTER
Patient called back, also experiencing itching in the ear, thinks drops may help.    Thank you,    Gloria Warner, ELLIOT [facility]

## 2022-11-14 NOTE — TELEPHONE ENCOUNTER
She has to be seen for the ear. I will send in the 400mg for her she can fill the 600 when she is out of 200 mg . Alana Carbajal M.D.

## 2022-11-14 NOTE — TELEPHONE ENCOUNTER
Called patient and she wants the 400mg because she still has 200mg left.  Patient also wants to know if anything can be done she has right ear pain that hurts but not like an infection        Meseret Wu, ELLIOT [facility]

## 2022-11-14 NOTE — TELEPHONE ENCOUNTER
Please let Amira know that I sent in the 600 mg ibuprofen that way she only has to take 1 instead of 2 or 3. Alana Carbajal M.D.

## 2022-11-18 DIAGNOSIS — S72.091A OTH FRACTURE OF HEAD AND NECK OF RIGHT FEMUR, INIT (H): ICD-10-CM

## 2022-11-18 RX ORDER — POLYETHYLENE GLYCOL 3350 17 G/17G
1 POWDER, FOR SOLUTION ORAL 2 TIMES DAILY
Qty: 578 G | Refills: 1 | OUTPATIENT
Start: 2022-11-18

## 2022-11-20 DIAGNOSIS — S72.091A OTH FRACTURE OF HEAD AND NECK OF RIGHT FEMUR, INIT (H): ICD-10-CM

## 2022-11-20 RX ORDER — IBUPROFEN 200 MG
TABLET ORAL
Qty: 100 TABLET | Refills: 3 | Status: SHIPPED | OUTPATIENT
Start: 2022-11-20 | End: 2023-06-01 | Stop reason: DRUGHIGH

## 2022-12-05 ENCOUNTER — NURSE TRIAGE (OUTPATIENT)
Dept: NURSING | Facility: CLINIC | Age: 57
End: 2022-12-05

## 2022-12-06 NOTE — TELEPHONE ENCOUNTER
Caller inquiring if it is okay to take tramadol if the  ibuprofen  800 mg she took for hip pain is not effective   Caller advied that  medications are in a different class and sre compatable   Further noted  Patient has RX for both   Caller understands and will comply  Hilaria Bailey RN  [facility]    Reason for Disposition    Caller has medicine question only, adult not sick, AND triager answers question    Protocols used: MEDICATION QUESTION CALL-A-AH

## 2022-12-17 DIAGNOSIS — S72.091A: ICD-10-CM

## 2022-12-17 DIAGNOSIS — S72.091A OTH FRACTURE OF HEAD AND NECK OF RIGHT FEMUR, INIT (H): ICD-10-CM

## 2022-12-17 DIAGNOSIS — Z87.81 STATUS POST CLOSED FRACTURE OF HIP: ICD-10-CM

## 2022-12-17 DIAGNOSIS — T79.6XXA: ICD-10-CM

## 2022-12-20 RX ORDER — DOCUSATE SODIUM 50MG AND SENNOSIDES 8.6MG 8.6; 5 MG/1; MG/1
TABLET, FILM COATED ORAL
Qty: 60 TABLET | Refills: 0 | Status: SHIPPED | OUTPATIENT
Start: 2022-12-20 | End: 2023-04-20

## 2022-12-20 RX ORDER — DOCUSATE SODIUM 100 MG/1
100 CAPSULE, LIQUID FILLED ORAL 2 TIMES DAILY PRN
Qty: 60 CAPSULE | Refills: 0 | Status: SHIPPED | OUTPATIENT
Start: 2022-12-20 | End: 2023-04-25

## 2022-12-20 NOTE — TELEPHONE ENCOUNTER
"Routing refill request to provider for review/approval because:  Drug not active on patient's medication list      Requested Prescriptions   Pending Prescriptions Disp Refills     ibuprofen (ADVIL/MOTRIN) 600 MG tablet [Pharmacy Med Name: ibuprofen 600 mg tablet] 90 tablet 3     Sig: Take 1 tablet (600 mg) by mouth every 8 hours as needed for moderate pain (4-6)       NSAID Medications Failed - 12/17/2022  2:56 PM        Failed - Normal ALT on file in past 12 months     Recent Labs   Lab Test 02/10/22  0530   ALT 85*             Failed - Normal AST on file in past 12 months     Recent Labs   Lab Test 02/10/22  0530   *             Failed - Normal CBC on file in past 12 months     Recent Labs   Lab Test 02/10/22  0530   WBC 4.8   RBC 2.65*   HGB 9.1*   HCT 28.0*                    Failed - Normal serum creatinine on file in past 12 months     Recent Labs   Lab Test 02/10/22  0530   CR 0.45*       Ok to refill medication if creatinine is low          Passed - Blood pressure under 140/90 in past 12 months     BP Readings from Last 3 Encounters:   02/13/22 122/80   02/22/17 107/74   01/20/17 116/64                 Passed - Recent (12 mo) or future (30 days) visit within the authorizing provider's specialty     Patient has had an office visit with the authorizing provider or a provider within the authorizing providers department within the previous 12 mos or has a future within next 30 days. See \"Patient Info\" tab in inbasket, or \"Choose Columns\" in Meds & Orders section of the refill encounter.              Passed - Patient is age 6-64 years        Passed - Medication is active on med list        Passed - No active pregnancy on record        Passed - No positive pregnancy test in past 12 months           docusate sodium (COLACE) 100 MG capsule [Pharmacy Med Name: docusate sodium 100 mg capsule] 60 capsule 0     Sig: Take 1 capsule (100 mg) by mouth 2 times daily as needed for constipation       Laxatives " "Protocol Passed - 12/17/2022  2:56 PM        Passed - Patient is age 6 or older        Passed - Recent (12 mo) or future (30 days) visit within the authorizing provider's specialty     Patient has had an office visit with the authorizing provider or a provider within the authorizing providers department within the previous 12 mos or has a future within next 30 days. See \"Patient Info\" tab in inbasket, or \"Choose Columns\" in Meds & Orders section of the refill encounter.              Passed - Medication is active on med list           SENEXON-S 8.6-50 MG tablet [Pharmacy Med Name: Senexon-S 8.6 mg-50 mg tablet] 60 tablet 0     Sig: Take 1 tablet by mouth 2 times daily as needed for constipation       Laxatives Protocol Passed - 12/17/2022  2:56 PM        Passed - Patient is age 6 or older        Passed - Recent (12 mo) or future (30 days) visit within the authorizing provider's specialty     Patient has had an office visit with the authorizing provider or a provider within the authorizing providers department within the previous 12 mos or has a future within next 30 days. See \"Patient Info\" tab in [employer], or \"Choose Columns\" in Meds & Orders section of the refill encounter.              Passed - Medication is active on med list                 Vidya Campos RN 12/20/22 9:12 AM  "

## 2022-12-22 RX ORDER — IBUPROFEN 600 MG/1
600 TABLET, FILM COATED ORAL EVERY 8 HOURS PRN
Qty: 90 TABLET | Refills: 3 | Status: SHIPPED | OUTPATIENT
Start: 2022-12-22 | End: 2023-10-16

## 2023-01-06 DIAGNOSIS — K59.01 SLOW TRANSIT CONSTIPATION: ICD-10-CM

## 2023-01-06 RX ORDER — SENNOSIDES 8.8 MG/5ML
LIQUID ORAL
Qty: 600 ML | Refills: 3 | Status: SHIPPED | OUTPATIENT
Start: 2023-01-06 | End: 2023-03-29

## 2023-01-06 NOTE — TELEPHONE ENCOUNTER
Routing refill request to provider for review/approval because:  Drug not on the [facility] refill protocol   Yasmine Garcias RN

## 2023-01-18 ENCOUNTER — NURSE TRIAGE (OUTPATIENT)
Dept: FAMILY MEDICINE | Facility: CLINIC | Age: 58
End: 2023-01-18
Payer: COMMERCIAL

## 2023-01-18 DIAGNOSIS — S72.091A OTH FRACTURE OF HEAD AND NECK OF RIGHT FEMUR, INIT (H): ICD-10-CM

## 2023-01-18 RX ORDER — TRAMADOL HYDROCHLORIDE 50 MG/1
50-100 TABLET ORAL 2 TIMES DAILY PRN
Qty: 60 TABLET | Refills: 1 | OUTPATIENT
Start: 2023-01-18

## 2023-01-18 NOTE — TELEPHONE ENCOUNTER
Please call Amira.  She just filled her tramadol last week so she should have plenty of that to take I know she usually only takes 2/day at max but she may need to go up to 3 times a day for a day or 2.  If her pain worsens she will need to be seen in urgent care or the ER.  She has had chronic back pain for many many years with negative testing.  I do not believe the numbness is anything new.  Alana Carbajal M.D.

## 2023-01-18 NOTE — TELEPHONE ENCOUNTER
Call placed to Patient  No answer  Left generic message with call back number for Patient to return call  Seb Castro RN

## 2023-01-18 NOTE — TELEPHONE ENCOUNTER
"Nurse Triage SBAR    Is this a 2nd Level Triage? YES, LICENSED PRACTITIONER REVIEW IS REQUIRED    Situation: Pt hurt her lower back shoveling yesterday.        Assessment: Pt has lower , mid back pain.  She was shoveling yesterday.  Her pain is a 10 when trying to move.  No open areas or bruising or swelling.    Protocol Recommended Disposition:   Go To Office Now    Recommendation: Pt refused to make an appt in the office today.  I offered her UC and she refused as she does not have a ride and \"can't do it physically\".     Routed to provider    Does the patient meet one of the following criteria for ADS visit consideration? 16+ years old, with an [facility] PCP     TIP  Providers, please consider if this condition is appropriate for management at one of our Acute and Diagnostic Services sites.     If patient is a good candidate, please use dotphrase <dot>triageresponse and select Refer to ADS to document.      Reason for Disposition    SEVERE back pain (e.g., excruciating, unable to do any normal activities) and not improved after pain medicine and CARE ADVICE    Additional Information    Negative: Back pain not from an injury    Negative: Back pain from overuse (work, exercise, gardening) OR from twisting, lifting, or bending injury    Negative: SEVERE pain in kidney area (flank) that follows a direct blow to that site    Negative: Blood in urine (red, pink, or tea-colored)    Negative: Unable to urinate (or only a few drops) > 4 hours and bladder feels very full (e.g., palpable bladder or strong urge to urinate)    Negative: Loss of bladder or bowel control (urine or bowel incontinence; wetting self, leaking stool) of new-onset    Negative: Numbness (loss of sensation) in groin or rectal area    Negative: Skin is split open or gaping (length > 1/2 inch or 12 mm)    Negative: Puncture wound of back    Negative: Bleeding won't stop after 10 minutes of direct pressure (using correct technique)    Negative: Sounds like a " "serious injury to the triager    Negative: Weakness of a leg or foot (e.g., unable to bear weight, dragging foot)    Negative: Numbness of a leg or foot (i.e., loss of sensation)    Answer Assessment - Initial Assessment Questions  1. MECHANISM: \"How did the injury happen?\" (Consider the possibility of domestic violence or elder abuse)      I shoveled snow   2. ONSET: \"When did the injury happen?\" (Minutes or hours ago)      yesterday  3. LOCATION: \"What part of the back is injured?\"      Lower back.  4. SEVERITY: \"Can you move the back normally?\"      No  5. PAIN: \"Is there any pain?\" If Yes, ask: \"How bad is the pain?\"   (Scale 1-10; or mild, moderate, severe)      Yes, Severe is a 10.  6. CORD SYMPTOMS: Any weakness or numbness of the arms or legs?\"      Legs are numb when I stand up  7. SIZE: For cuts, bruises, or swelling, ask: \"How large is it?\" (e.g., inches or centimeters)      NO  8. TETANUS: For any breaks in the skin, ask: \"When was the last tetanus booster?\"      No  9. OTHER SYMPTOMS: \"Do you have any other symptoms?\" (e.g., abdominal pain, blood in urine)      No  10. PREGNANCY: \"Is there any chance you are pregnant?\" \"When was your last menstrual period?\"        no    Protocols used: BACK INJURY-A-OH      "

## 2023-02-06 ENCOUNTER — TELEPHONE (OUTPATIENT)
Dept: DERMATOLOGY | Facility: CLINIC | Age: 58
End: 2023-02-06
Payer: COMMERCIAL

## 2023-02-06 DIAGNOSIS — L98.8 RHYTIDES: Primary | ICD-10-CM

## 2023-02-06 NOTE — TELEPHONE ENCOUNTER
[facility] Call [facility]    Phone Message    May a detailed message be left on voicemail: yes     Reason for Call: Other: Pt needs ref expiration date to DERM from [facility]. Please call [phone]. Thanks!     Action Taken: Message routed to:  [facility]    Travel Screening: Not Applicable

## 2023-02-06 NOTE — TELEPHONE ENCOUNTER
"Spoke to patient who \"wants to have my ducks in a row in case of bad weather and if I have to cancel my March appt..\"    I did enter another referral at her request.    Patient verbalized understanding. Stefany Clark RN    "

## 2023-02-07 DIAGNOSIS — S72.091A OTH FRACTURE OF HEAD AND NECK OF RIGHT FEMUR, INIT (H): ICD-10-CM

## 2023-02-08 DIAGNOSIS — S72.091A OTH FRACTURE OF HEAD AND NECK OF RIGHT FEMUR, INIT (H): ICD-10-CM

## 2023-02-08 RX ORDER — POLYETHYLENE GLYCOL 3350 17 G/17G
1 POWDER, FOR SOLUTION ORAL 2 TIMES DAILY
Qty: 578 G | Refills: 1 | Status: SHIPPED | OUTPATIENT
Start: 2023-02-08 | End: 2023-04-20

## 2023-02-08 NOTE — TELEPHONE ENCOUNTER
Routing refill request to provider for review/approval because:  PCP to determine refill     Sharif Manrique RN

## 2023-02-09 ENCOUNTER — TELEPHONE (OUTPATIENT)
Dept: DERMATOLOGY | Facility: CLINIC | Age: 58
End: 2023-02-09
Payer: COMMERCIAL

## 2023-02-09 RX ORDER — POLYETHYLENE GLYCOL 3350 17 G/17G
1 POWDER, FOR SOLUTION ORAL 2 TIMES DAILY
Qty: 578 G | Refills: 5 | Status: SHIPPED | OUTPATIENT
Start: 2023-02-09 | End: 2023-04-25

## 2023-02-09 NOTE — TELEPHONE ENCOUNTER
Writer called pt back and switched her appointment to July due to transportation issues. Pt was not aware that this was a consult. Pt aware that co 2 laser is only done at the [place] location.     Minerva Ellis RN on 2/9/2023 at 3:04 PM

## 2023-02-09 NOTE — TELEPHONE ENCOUNTER
[facility] Call [facility]    Phone Message    May a detailed message be left on voicemail: yes     Reason for Call: Other: Pt would like to know what to expect for her upcoming appointment.  Okay to leave a message at [phone]   Best to call after 1:00PM    Action Taken: Message routed to:  Other: [facility]    Travel Screening: Not Applicable

## 2023-03-28 DIAGNOSIS — K59.01 SLOW TRANSIT CONSTIPATION: ICD-10-CM

## 2023-03-28 NOTE — TELEPHONE ENCOUNTER
Routing refill request to provider for review/approval because:  Drug not on the [facility] refill protocol     Suha Dawkins RN on 3/28/2023 at 2:21 PM

## 2023-03-29 RX ORDER — SENNOSIDES 8.8 MG/5ML
LIQUID ORAL
Qty: 600 ML | Refills: 3 | Status: SHIPPED | OUTPATIENT
Start: 2023-03-29 | End: 2023-04-20

## 2023-04-20 ENCOUNTER — VIRTUAL VISIT (OUTPATIENT)
Dept: FAMILY MEDICINE | Facility: CLINIC | Age: 58
End: 2023-04-20
Payer: COMMERCIAL

## 2023-04-20 DIAGNOSIS — K59.01 SLOW TRANSIT CONSTIPATION: ICD-10-CM

## 2023-04-20 DIAGNOSIS — T79.6XXA: ICD-10-CM

## 2023-04-20 DIAGNOSIS — S72.091A OTH FRACTURE OF HEAD AND NECK OF RIGHT FEMUR, INIT (H): ICD-10-CM

## 2023-04-20 DIAGNOSIS — F60.9 PERSONALITY DISORDER IN ADULT (H): ICD-10-CM

## 2023-04-20 DIAGNOSIS — Z87.19 HISTORY OF DENTAL PROBLEMS: Primary | ICD-10-CM

## 2023-04-20 DIAGNOSIS — S72.091A: ICD-10-CM

## 2023-04-20 PROCEDURE — 99214 OFFICE O/P EST MOD 30 MIN: CPT | Mod: 93 | Performed by: FAMILY MEDICINE

## 2023-04-20 RX ORDER — SENNOSIDES 8.8 MG/5ML
LIQUID ORAL
Qty: 1800 ML | Refills: 3 | Status: SHIPPED | OUTPATIENT
Start: 2023-04-20 | End: 2024-03-13

## 2023-04-20 RX ORDER — AMOXICILLIN 250 MG
1 CAPSULE ORAL 2 TIMES DAILY PRN
Qty: 180 TABLET | Refills: 1 | Status: SHIPPED | OUTPATIENT
Start: 2023-04-20 | End: 2024-01-23

## 2023-04-20 RX ORDER — TRAMADOL HYDROCHLORIDE 50 MG/1
50-100 TABLET ORAL 2 TIMES DAILY PRN
Qty: 60 TABLET | Refills: 3 | Status: SHIPPED | OUTPATIENT
Start: 2023-04-20 | End: 2023-06-01

## 2023-04-20 NOTE — PROGRESS NOTES
Amira Scherer is a 58 year old female who is being evaluated via a billable telephone visit.      What phone number would you like to be contacted at? [phone]  How would you like to obtain your AVS? Mail a copy      Assessment & Plan     Slow transit constipation  Continue  - Sennosides (SENNA) 8.8 MG/5ML SYRP; Take 10 mLs by mouth 2 times daily    Other fracture of head and neck of right femur, initial encounter for closed fracture (H)  Continue  - senna-docusate (SENEXON-S) 8.6-50 MG tablet; Take 1 tablet by mouth 2 times daily as needed for constipation  - Primary Care - Care Coordination Referral; Future    Traumatic ischemia of muscle, initial encounter (H)  - senna-docusate (SENEXON-S) 8.6-50 MG tablet; Take 1 tablet by mouth 2 times daily as needed for constipation    Oth fracture of head and neck of right femur, init (H)  She continues to have pain in the area she does use this very sparingly  - traMADol (ULTRAM) 50 MG tablet; Take 1-2 tablets ( mg) by mouth 2 times daily as needed for severe pain    Personality disorder in adult (H)  Her affect makes it harder to offer help to her  - Primary Care - Care Coordination Referral; Future    History of dental problems  She would like to have sedation for her dentistry.  She does not feel like she can lie still long enough with her back pain.  A letter was written for her for the dental insurance to consider covering her sedation.  - Primary Care - Care Coordination Referral; Future         See Patient Instructions    Alana Carbajal MD  [facility]     Amira Scherer is a 58 year old female who presents via phone visit today for the following health issues:    Reinjured her injury.   Taking ibuprofen 800mg and Tramadol 50mg  twice per day.  Can she get a larger amount of the Senexon-s so she doesn't have to have it delivered the medication as often.      Maddie Long CMA  She hurt her back again and  she wears the brace intermittently   She is takin gthe above   She also would like a lager amount of the senna   The tramadol only helps a little   She talked on and on about her back pain her constipation she did ask for refill on the medication for the constipation.  She does not get out very much and has a hard time getting to appointments.  Unfortunately I do not know what things would actually help her lower back she is already seeing the back specialist her MRI is not very full of injury.  She does perceive it is hurting all the time and feels that this limits her ability to do anything.  She does not have friends and her children are not connected to her.  She lives alone and this can be problematic as she definitely could use some help at times.  The  did contact her and she does have rides available through her insurance to get to appointments.  This should help facilitate her being able to follow-up with her specialists    Review of Systems   Constitutional, HEENT, cardiovascular, pulmonary, gi and gu systems are negative, except as otherwise noted.       Objective          Vitals:  No vitals were obtained today due to virtual visit.    healthy, alert and no distress  PSYCH: Alert and oriented times 3; coherent speech, normal   rate and volume, able to articulate logical thoughts, able   to abstract reason, no tangential thoughts, no hallucinations   or delusions  Her affect is anxious and perseverating and pressured speech   RESP: No cough, no audible wheezing, able to talk in full sentences  Remainder of exam unable to be completed due to telephone visits            Phone call duration:  30 minutes spent on the day of service with chart review, telephone call, counseling, and charting. Alana Carbajal M.D.

## 2023-04-20 NOTE — LETTER
April 20, 2023      Amira Scherer  [address] [facility] [address]        To Whom It May Concern:    Amira has been my patient for the last 9 years. She has multiple dental issues that need addressing. She has a back injury and she is not able to stay in one position for very long. She will require sedation for these procedures. She also has a lesion in the mouth that should be addressed. .      Sincerely,        Alana Carbajal MD

## 2023-04-21 ENCOUNTER — PATIENT OUTREACH (OUTPATIENT)
Dept: CARE COORDINATION | Facility: CLINIC | Age: 58
End: 2023-04-21
Payer: COMMERCIAL

## 2023-04-21 DIAGNOSIS — S72.091A OTH FRACTURE OF HEAD AND NECK OF RIGHT FEMUR, INIT (H): ICD-10-CM

## 2023-04-21 NOTE — TELEPHONE ENCOUNTER
Routing refill request to provider for review/approval because:  PCP to determine.   Suha Dawkins RN on 4/21/2023 at 9:55 AM

## 2023-04-21 NOTE — LETTER
[facility] CARE COORDINATION  [address] [facility] [address]    April 24, 2023    Amira Scherer  [address] [facility] [address]      Dear Amira,    I am a clinic care coordinator who works with Alana Carbajal MD with the [facility]. I have been trying to reach you recently to introduce Clinic Care Coordination. Below is a description of clinic care coordination and how I can further assist you.       The clinic care coordination team is made up of a registered nurse, , financial resource worker and community health worker who understand the health care system. The goal of clinic care coordination is to help you manage your health and improve access to the health care system. Our team works alongside your provider to assist you in determining your health and social needs. We can help you obtain health care and community resources, providing you with necessary information and education. We can work with you through any barriers and develop a care plan that helps coordinate and strengthen the communication between you and your care team.  Our services are voluntary and are offered without charge to you personally.    Please feel free to contact me with any questions or concerns regarding care coordination and what we can offer.      We are focused on providing you with the highest-quality healthcare experience possible.    Sincerely,     Kimberley Rojas  Community Health Worker  [facility] Care Coordination   Office: [phone]

## 2023-04-21 NOTE — PROGRESS NOTES
Clinic Care Coordination Contact  [facility]/Voicemail     Clinical Data: Care Coordinator Outreach  Outreach attempted x 1.  Left message on patient's voicemail with call back information and requested return call.  Plan: Care Coordinator will try to reach patient again in 1-2 business days.    Overview  Consult prior to calling.  Do not schedule an assessment.  Transportation. Has medical transportation at  [phone].    Kimberley Rojas  [facility] Health Worker  [facility] Care Coordination   Rajat Garcia [place], [place] [facility]  Office: [phone]

## 2023-04-24 ENCOUNTER — PATIENT OUTREACH (OUTPATIENT)
Dept: CARE COORDINATION | Facility: CLINIC | Age: 58
End: 2023-04-24
Payer: COMMERCIAL

## 2023-04-24 NOTE — PROGRESS NOTES
Clinic Care Coordination Contact  [facility]/Voicemail     Clinical Data: Care Coordinator Outreach  Outreach attempted x 2.  Left message on patient's voicemail with call back information and requested return call.    Plan: Care Coordinator will send care coordination introduction letter with care coordinator contact information and explanation of care coordination services via mail. Care Coordinator will do no further outreaches at this time.    Kimberley [facility] Health Worker  [facility] Care Coordination   [place], [place] [facility]  Office: [phone]

## 2023-04-24 NOTE — PROGRESS NOTES
Clinic Care Coordination Contact  Community Health Worker Initial Outreach    Patient accepts CC: No, CHW gave patient needed trasportation information for [facility] with Medical Assistance @ [phone]. [facility] Medical Assistance transportation is free to patient.     CHW and patient also discussed [employer] and patient declined because of [employer] not being a reliable transportation service. Patient will be sent Care Coordination introduction letter for future reference.     ** Patient was not happy that CHW did not have any other transportation resources for the [facility]. Patient also wanted to talk with CHW about details regarding dental sedation. CHW referred patient to talk with Dental clinic about sedation and dental work. Patient unhappy she was being referred back to the dental clinic for these concerns.    Patient let CHW know that she will talk with PCP Dr. Carbajal about her concerns further.     ** CHW will forward not to PCP Dr. Carbajal and [facility] ROXANNE Richter.    Kimberley Crystal  [facility] Health Worker  [facility] Care Coordination   [place] [facility]  Office: [phone]

## 2023-04-25 RX ORDER — DOCUSATE SODIUM 100 MG/1
100 CAPSULE, LIQUID FILLED ORAL 2 TIMES DAILY PRN
Qty: 60 CAPSULE | Refills: 0 | Status: SHIPPED | OUTPATIENT
Start: 2023-04-25 | End: 2023-06-01

## 2023-04-25 RX ORDER — POLYETHYLENE GLYCOL 3350 17 G/17G
POWDER, FOR SOLUTION ORAL
Qty: 578 G | Refills: 5 | Status: SHIPPED | OUTPATIENT
Start: 2023-04-25 | End: 2023-07-27

## 2023-04-27 ENCOUNTER — VIRTUAL VISIT (OUTPATIENT)
Dept: FAMILY MEDICINE | Facility: CLINIC | Age: 58
End: 2023-04-27
Payer: COMMERCIAL

## 2023-04-27 DIAGNOSIS — G89.29 CHRONIC MIDLINE LOW BACK PAIN WITHOUT SCIATICA: ICD-10-CM

## 2023-04-27 DIAGNOSIS — K59.04 CHRONIC IDIOPATHIC CONSTIPATION: Primary | ICD-10-CM

## 2023-04-27 DIAGNOSIS — M54.50 CHRONIC MIDLINE LOW BACK PAIN WITHOUT SCIATICA: ICD-10-CM

## 2023-04-27 PROCEDURE — 99443 PR PHYSICIAN TELEPHONE EVALUATION 21-30 MIN: CPT | Mod: 93 | Performed by: FAMILY MEDICINE

## 2023-04-27 RX ORDER — LACTULOSE 20 G/30ML
10-20 SOLUTION ORAL 3 TIMES DAILY
Qty: 1892 ML | Refills: 3 | Status: SHIPPED | OUTPATIENT
Start: 2023-04-27 | End: 2023-09-07

## 2023-04-27 RX ORDER — CELECOXIB 100 MG/1
100 CAPSULE ORAL 2 TIMES DAILY
Qty: 60 CAPSULE | Refills: 3 | Status: SHIPPED | OUTPATIENT
Start: 2023-04-27 | End: 2024-01-23

## 2023-04-27 NOTE — PROGRESS NOTES
Amira is a 58 year old who is being evaluated via a billable telephone visit.      What phone number would you like to be contacted at? [phone]  How would you like to obtain your AVS? Mail a copy    Distant Location (provider location):  On-site    Assessment & Plan     Chronic idiopathic constipation  Will have her try this   - lactulose 20 GM/30ML solution; Take 15-30 mLs (10-20 g) by mouth 3 times daily    Chronic midline low back pain without sciatica  Will have her try this instead   - celecoxib (CELEBREX) 100 MG capsule; Take 1 capsule (100 mg) by mouth 2 times daily         4 -5 weeks virtual visit to update     Alana Carbajal MD  [facility]    Robert Collier is a 58 year old, presenting for the following health issues:  Recheck Medication    She has tried the tramadol she took 2 and it caused so much constipation   She tries to go without medications but then is in so much pain   She has a good regimen now except the tramadol does not help as much   Still in a lot of pain   Has not gotten to the pain clininc\  She ended up giving up on the ssdi for now    HPI       We discussed the constipation and pain plan      Review of Systems   Constitutional, HEENT, cardiovascular, pulmonary, gi and gu systems are negative, except as otherwise noted.      Objective           Vitals:  No vitals were obtained today due to virtual visit.    Physical Exam   healthy, alert and no distress  PSYCH: Alert and oriented times 3; coherent speech, normal   rate and volume, able to articulate logical thoughts, able   to abstract reason, no tangential thoughts, no hallucinations   or delusions  Her affect is anxious  RESP: No cough, no audible wheezing, able to talk in full sentences  Remainder of exam unable to be completed due to telephone visits                Phone call duration: 30 minutes spent on the day of service with chart review, telephone call, counseling, and charting. Alana ORTEZ  LADARIUS Carbajal.   minutes

## 2023-04-29 ENCOUNTER — NURSE TRIAGE (OUTPATIENT)
Dept: NURSING | Facility: CLINIC | Age: 58
End: 2023-04-29
Payer: COMMERCIAL

## 2023-04-29 NOTE — TELEPHONE ENCOUNTER
Triage Call:    Caller: Patient     Medication question about lactulose and isn't sure what 15-30 ml converts to.  There was no medicine cups included.  Advised that 30ml is 2 tablespoons and she will use a baking/cooking measuring spoon to administer.   She has not started taking the medication yet.        Also having episodes of sweating yesterday and today and isn't sure where it is coming from.   And is having an increase with body aches and reminds her of having the flu before and going on since today.     Rating the pain 7/10 at start of call and now is getting a little better.       Protocol Recommended Disposition: Home care    Caller verbalized understanding of instructions and questions answered.      Chantell Harvey RN on 4/29/2023 at 3:47 PM        Reason for Disposition    Caller has medicine question only, adult not sick, AND triager answers question    [1] MILD pain (e.g., does not interfere with normal activities) AND [2] present < 7 days    Additional Information    Negative: Shock suspected (e.g., cold/pale/clammy skin, too weak to stand, low BP, rapid pulse)    Negative: Difficult to awaken or acting confused (e.g., disoriented, slurred speech)    Negative: Sounds like a life-threatening emergency to the triager    Negative: Dark (cola colored) or red-colored urine    Negative: [1] Drinking very little AND [2] dehydration suspected (e.g., no urine > 12 hours, very dry mouth, very lightheaded)    Negative: Patient sounds very sick or weak to the triager    Negative: [1] SEVERE pain (e.g., excruciating, unable to do any normal activities) AND [2] not improved 2 hours after pain medicine    Negative: [1] SEVERE pain AND [2] taking a statin medicine (a lipid or cholesterol lowering drug)    Negative: Fever > 104 F (40 C)    Negative: [1] Fever > 101 F (38.3 C) AND [2] age > 60 years    Negative: [1] Fever > 100.0 F (37.8 C) AND [2] bedridden (e.g., nursing home patient, CVA, chronic illness,  recovering from surgery)    Negative: [1] Fever > 100.0 F (37.8 C) AND [2] indwelling urinary catheter (e.g., Lopes, Coude)    Negative: [1] Fever > 100.0 F (37.8 C) AND [2] diabetes mellitus or weak immune system (e.g., HIV positive, cancer chemo, splenectomy, organ transplant, chronic steroids)    Negative: Fever present > 3 days (72 hours)    Negative: [1] Muscle aches are unexplained AND [2] occur within 1 month of a tick bite    Negative: Diabetes mellitus or weak immune system (e.g., HIV positive, cancer chemo, splenectomy, organ transplant, chronic steroids)    Negative: [1] MODERATE pain (e.g., interferes with normal activities) AND [2] present > 3 days    Negative: [1] MILD or MODERATE muscle aches or pain AND [2] taking a statin medicine (a lipid or cholesterol lowering drug)    Negative: [1] Age > 50 AND [2] bilateral shoulder pains present > 2 weeks    Negative: [1] MILD pain (e.g., does not interfere with normal activities) AND [2] present > 7 days    Negative: Muscle aches are a chronic symptom (recurrent or ongoing AND present > 4 weeks)    Negative: Body pains are a chronic symptom (recurrent or ongoing AND present > 4 weeks)    Protocols used: MEDICATION QUESTION CALL-A-AH, MUSCLE ACHES AND BODY PAIN-A-AH

## 2023-04-30 NOTE — TELEPHONE ENCOUNTER
Nurse Triage SBAR    Is this a 2nd Level Triage? NO    Situation: Pt called with f/u from earlier today.    Background: Amira called earlier with medication questions.    Assessment: Amira was wanting to know how much 15-30 mL converted to in teaspoons or tablespoons. Advised her of the conversion to tablespoons. She states she was feeling achy earlier but ibuprofen helped. She wonders if she can take Celebrex with her ibuprofen and tramadol. Advised her to take one NSAID or another but not both. Also discussed Tylenol dosage. She wonders if she has the flu or COVID but states she doesn't leave her house and is very cautious about sanitizing goods brought into her home. She has not tested for these nor has she checked her temperature.    Protocol Recommended Disposition:   Call Pharmacist Within 24 Hours    Recommendation: I did advise her to call a 24 hour pharmacist to discuss medication questions further. She states she does not have MyChart and will 'wait and see' how she is feeling before going in to be evaluated. Encouraged to call back with any further questions or concerns.    Reason for Disposition    [1] Caller has medicine question about med NOT prescribed by PCP AND [2] triager unable to answer question (e.g., compatibility with other med, storage)    Additional Information    Negative: [1] Intentional drug overdose AND [2] suicidal thoughts or ideas    Negative: MORE THAN A DOUBLE DOSE of a prescription or over-the-counter (OTC) drug    Negative: [1] DOUBLE DOSE (an extra dose or lesser amount) of prescription drug AND [2] any symptoms (e.g., dizziness, nausea, pain, sleepiness)    Negative: [1] DOUBLE DOSE (an extra dose or lesser amount) of over-the-counter (OTC) drug AND [2] any symptoms (e.g., dizziness, nausea, pain, sleepiness)    Negative: Took another person's prescription drug    Negative: [1] DOUBLE DOSE (an extra dose or lesser amount) of prescription drug AND [2] NO symptoms (Exception: a  double dose of antibiotics)    Negative: Diabetes drug error or overdose (e.g., took wrong type of insulin or took extra dose)    Negative: [1] Prescription not at pharmacy AND [2] was prescribed by PCP recently (Exception: triager has access to EMR and prescription is recorded there. Go to Home Care and confirm for pharmacy.)    Negative: [1] Pharmacy calling with prescription question AND [2] triager unable to answer question    Negative: [1] Caller has URGENT medicine question about med that PCP or specialist prescribed AND [2] triager unable to answer question    Negative: Medicine patch causing local rash or itching    Protocols used: MEDICATION QUESTION CALL-A-    Suha Cline RN  [facility] Nurse Advisor   4/29/2023  7:34 PM

## 2023-05-01 ENCOUNTER — NURSE TRIAGE (OUTPATIENT)
Dept: NURSING | Facility: CLINIC | Age: 58
End: 2023-05-01
Payer: COMMERCIAL

## 2023-05-01 NOTE — TELEPHONE ENCOUNTER
"Patient is calling to report sweats and chills.    It started on Friday night with body aches.    She took ibuprofen and it helped and the aches didn't come back.  On Saturday, she kept getting sweats and chills and continued on to tonight.  She gets \"drenched\".  She doesn't have a thermometer and is unable to take a temp.  No other symptoms.  No respiratory symptoms.    She is concerned about having covid and is asking if she should get tested.  She is anxious about getting covid.    Disposition:  Advised that patient be seen in clinic within 24 hours.  She states she doesn't drive and doesn't have a way to get to clinic without mobility transportation.  She states she will call her PCP clinic in the morning and speak to her care team.  Patient thinks she could be having a fever.  Advised patient that if she thinks she's having a fever, she can take tylenol or ibuprofen to see if it will help with her symptoms. Pt verbalized understanding.    Lucita Osman, RN, BSN Nurse Triage Advisor 5/1/2023 1:50 AM     Reason for Disposition    [1] SEVERE sweating (e.g., drenched with sweat) AND [2] cause unknown    Additional Information    Negative: Shock suspected (e.g., cold/pale/clammy skin, too weak to stand, low BP, rapid pulse)    Negative: Sounds like a life-threatening emergency to the triager    Negative: Difficulty breathing    Negative: Patient sounds very sick or weak to the triager    Protocols used: [id number]      "

## 2023-05-04 ENCOUNTER — TELEPHONE (OUTPATIENT)
Dept: FAMILY MEDICINE | Facility: CLINIC | Age: 58
End: 2023-05-04
Payer: COMMERCIAL

## 2023-05-24 ENCOUNTER — TELEPHONE (OUTPATIENT)
Dept: NURSING | Facility: CLINIC | Age: 58
End: 2023-05-24
Payer: COMMERCIAL

## 2023-05-24 NOTE — TELEPHONE ENCOUNTER
Patient calling reports having an injury and taking more than she normally does. Reports taking two doses of Celebrex approximately 6 hours apart and two doses of Tramadol 6 hours apart. Patient reports she is more tired than normal. Patient has further questions about taking Celebrex with Ibuprofen. Advised taking two NSAIDS in a day is not advised. Advised if pain continues to worsen needs to be seen in the ER overnight.     Suha Marquez RN 05/24/23 1:23 AM   [facility] Triage Nurse Advisor

## 2023-06-01 ENCOUNTER — VIRTUAL VISIT (OUTPATIENT)
Dept: FAMILY MEDICINE | Facility: CLINIC | Age: 58
End: 2023-06-01
Payer: COMMERCIAL

## 2023-06-01 DIAGNOSIS — S72.001D CLOSED FRACTURE OF RIGHT HIP WITH ROUTINE HEALING, SUBSEQUENT ENCOUNTER: ICD-10-CM

## 2023-06-01 DIAGNOSIS — S72.091A OTH FRACTURE OF HEAD AND NECK OF RIGHT FEMUR, INIT (H): ICD-10-CM

## 2023-06-01 PROCEDURE — 99443 PR PHYSICIAN TELEPHONE EVALUATION 21-30 MIN: CPT | Mod: 93 | Performed by: FAMILY MEDICINE

## 2023-06-01 RX ORDER — TRAMADOL HYDROCHLORIDE 50 MG/1
50-100 TABLET ORAL 2 TIMES DAILY PRN
Qty: 75 TABLET | Refills: 3 | Status: SHIPPED | OUTPATIENT
Start: 2023-06-01 | End: 2023-10-16

## 2023-06-01 RX ORDER — DOCUSATE SODIUM 100 MG/1
100 CAPSULE, LIQUID FILLED ORAL 2 TIMES DAILY PRN
Qty: 60 CAPSULE | Refills: 0 | Status: SHIPPED | OUTPATIENT
Start: 2023-06-01 | End: 2023-09-05

## 2023-06-01 RX ORDER — ACETAMINOPHEN 325 MG/1
325-650 TABLET ORAL EVERY 6 HOURS PRN
Qty: 100 TABLET | Refills: 1 | Status: CANCELLED | OUTPATIENT
Start: 2023-06-01

## 2023-06-01 NOTE — PROGRESS NOTES
Amira is a 58 year old who is being evaluated via a billable telephone visit.      What phone number would you like to be contacted at? [phone]  How would you like to obtain your AVS? Seng    Distant Location (provider location):  On-site    Assessment & Plan     Closed fracture of right hip with routine healing, subsequent encounter  Cont to have issues with this and her back     Oth fracture of head and neck of right femur, init (H)  Has been trying to keep the constipation away and also taking tramadol as needed.   - docusate sodium (COLACE) 100 MG capsule; Take 1 capsule (100 mg) by mouth 2 times daily as needed for constipation  - traMADol (ULTRAM) 50 MG tablet; Take 1-2 tablets ( mg) by mouth 2 times daily as needed for severe pain           Alana Carbajal MD  [facility]    Robert Collier is a 58 year old, presenting for the following health issues:  Pain Management        6/1/2023     2:18 PM   Additional Questions   Roomed by BRIANNA Mclaughlin   Accompanied by self         6/1/2023     2:18 PM   Patient Reported Additional Medications   Patient reports taking the following new medications none     HPI      Pain Management  Follow up from last virtual visit 4/27  Reporting pain is 0 out of 10 while she is laying down.     Medication Followup of Celebrex     Taking Medication as prescribed: No, she ended up stopping this medication about x1 week ago after a fall that occurred.   Thinks she benefits more with taking tramadol.     Side Effects:  None    Medication Helping Symptoms:  NO- states her older injuries with legs and hip have been worsening since fall on stairs last week.  Has an appt with her hip surgeon June 6th she is worried about her transportation for this appt.     Medication Followup of Lactulose    Taking Medication as prescribed: taking 1-2 tablespoons 1x/ day.  Unsure if this would be the correct dose.   Also wondering if she should continue taking this  since stopping Celebrex.     Side Effects:  None    Medication Helping Symptoms:  yes    She is taking the tramadol and sometimes the celebrex alos   She has occassionally taken 2 tramadol at a time and this helps   She would like to take the 2 which she talked about for quite a while   She is perseverating about the back and the constipation      Review of Systems         Objective           Vitals:  No vitals were obtained today due to virtual visit.    Physical Exam   healthy, alert and no distress  PSYCH: Alert and oriented times 3; coherent speech, normal   rate and volume, able to articulate logical thoughts, able   to abstract reason, no tangential thoughts, no hallucinations   or delusions  Her affect is anxious and tangential   RESP: No cough, no audible wheezing, able to talk in full sentences  Remainder of exam unable to be completed due to telephone visits                Phone call duration: 30 minutes spent on the day of service with chart review, telephone call, counseling, and charting. Alana Carbajal M.D.

## 2023-06-19 ENCOUNTER — TELEPHONE (OUTPATIENT)
Dept: DERMATOLOGY | Facility: CLINIC | Age: 58
End: 2023-06-19
Payer: COMMERCIAL

## 2023-06-19 NOTE — TELEPHONE ENCOUNTER
[facility] Call Center    Phone Message    May a detailed message be left on voicemail: yes     Reason for Call: Pt is asking about the Co2 machine - knowing the machine is down. Pt would like a call back to discuss if there going to fix it soon or not. Thanks     Action Taken: Message routed to:  Other: [facility]    Travel Screening: Not Applicable

## 2023-06-20 NOTE — TELEPHONE ENCOUNTER
[facility] has the Fraxel laser, but does not have the CO2 laser.    Patient has not had a consult for the CO2 laser with Dr. Cueva.  She is scheduled for a consult in  as there was a cancellation so patient can get in sooner.  She is scheduled for CO2 laser treatment at the [facility] in October.

## 2023-06-20 NOTE — TELEPHONE ENCOUNTER
Spoke to patient. Advised of Note below and does not want to go to [place] to [facility] due to the distance she will have to drive.     Patient verbalized understanding. Stefany Clark RN

## 2023-06-20 NOTE — TELEPHONE ENCOUNTER
"ANDERI:    Spoke to patient and advised of note below. She stated she \"was referred to [facility] but they don't have a machine, either.. I was told by [facility] that [facility] have [facility] and now [facility] said I have to go to [facility] for another consultation, but then cannot get to the [facility] until October to get this done\"     Can she go to [facility]?...    Stefany Clark RN    "

## 2023-06-21 ENCOUNTER — TELEPHONE (OUTPATIENT)
Dept: DERMATOLOGY | Facility: CLINIC | Age: 58
End: 2023-06-21
Payer: COMMERCIAL

## 2023-06-21 NOTE — TELEPHONE ENCOUNTER
Spoke to patient. She wants an asap appt to have wrinkles treated with Co2 laser and I did advise she can schedule an appointment first available appointment.     I did advise we are booking into November and she can schedule an appointment and call before appt to see if we do now have the CO 2 laser in the clinic.     Stefany Clark RN

## 2023-06-22 NOTE — TELEPHONE ENCOUNTER
Called patient. She was scheduled with Dr. Urias for Co2 Laser. Patient stated that she would like to be added to wait list as well.     Suha Arthur LPN   6/22/23

## 2023-06-26 ENCOUNTER — TRANSFERRED RECORDS (OUTPATIENT)
Dept: HEALTH INFORMATION MANAGEMENT | Facility: CLINIC | Age: 58
End: 2023-06-26

## 2023-07-12 ENCOUNTER — NURSE TRIAGE (OUTPATIENT)
Dept: NURSING | Facility: CLINIC | Age: 58
End: 2023-07-12
Payer: COMMERCIAL

## 2023-07-12 NOTE — TELEPHONE ENCOUNTER
Patient calling with questions regarding Ibuprofen and stomach ulcers. Reviewed peptic ulcers using [facility] resources. Patient expressed understanding and will follow up with her PCP. Patient denies any pain currently and did not wish to triage symptoms, only wanted information.     Suha Marquez RN 07/12/23 4:23 AM    Health Triage Nurse Advisor      Reason for Disposition    [1] Caller requesting NON-URGENT health information AND [2] PCP's office is the best resource    Additional Information    Negative: [1] Caller is not with the adult (patient) AND [2] reporting urgent symptoms    Negative: Lab result questions    Negative: Medication questions    Negative: Caller can't be reached by phone    Negative: Caller has already spoken to PCP or another triager    Negative: RN needs further essential information from caller in order to complete triage    Negative: Requesting regular office appointment    Protocols used: INFORMATION ONLY CALL - [facility]

## 2023-07-13 ENCOUNTER — VIRTUAL VISIT (OUTPATIENT)
Dept: FAMILY MEDICINE | Facility: CLINIC | Age: 58
End: 2023-07-13
Payer: COMMERCIAL

## 2023-07-13 DIAGNOSIS — M79.604 PAIN IN BOTH LOWER EXTREMITIES: ICD-10-CM

## 2023-07-13 DIAGNOSIS — Z96.641 STATUS POST TOTAL REPLACEMENT OF RIGHT HIP: ICD-10-CM

## 2023-07-13 DIAGNOSIS — F41.9 ANXIETY: Primary | ICD-10-CM

## 2023-07-13 DIAGNOSIS — M79.605 PAIN IN BOTH LOWER EXTREMITIES: ICD-10-CM

## 2023-07-13 DIAGNOSIS — K59.04 CHRONIC IDIOPATHIC CONSTIPATION: ICD-10-CM

## 2023-07-13 PROCEDURE — 99443 PR PHYSICIAN TELEPHONE EVALUATION 21-30 MIN: CPT | Mod: 95 | Performed by: FAMILY MEDICINE

## 2023-07-13 NOTE — NURSING NOTE
"Chief Complaint   Patient presents with    Pain     Leg pain        Initial LMP 03/01/2013  Estimated body mass index is 17.86 kg/m  as calculated from the following:    Height as of 2/6/22: 1.676 m (5' 6\").    Weight as of 2/6/22: 50.2 kg (110 lb 10.7 oz).    Patient presents to the clinic using No DME    Is there anyone who you would like to be able to receive your results? No  If yes have patient fill out SIENA      "

## 2023-07-13 NOTE — PROGRESS NOTES
Amira is a 58 year old who is being evaluated via a billable telephone visit.      What phone number would you like to be contacted at? [phone]  How would you like to obtain your AVS? Mail a copy    Distant Location (provider location):  On-site    Assessment & Plan     Anxiety  Continues     Chronic idiopathic constipation      Status post total replacement of right hip  Cont with pain was referred to [facility] by orthopedics     Pain in both lower extremities  Cont with the pain           Follow up 1 month     Alana Carbajal MD  [facility] is a 58 year old, presenting for the following health issues:  Pain (Leg pain )      7/13/2023     5:08 PM   Additional Questions   Roomed by shawn mohamud cma     HPI   We discussed the pain schedule she takes ibuprofen then 6 hours of  Later celebrex and 6  hourssLater 2 tramadol   Pain History:  When did you first notice your pain? years   Have you seen this provider for your pain in the past? Yes   Where in your body do you have pain? Left and right pain   Are you seeing anyone else for your pain? No        Chronic Pain Follow Up:    Location of pain:left and right leg pain   Analgesia/pain control:    - Recent changes:  worse    - Overall control: Tolerable with discomfort    - Current treatments: ultram, celebrex    Adherence:     - Do you ever take more pain medicine than prescribed? No    - When did you take your last dose of pain medicine?  This morning    Adverse effects: No   PDMP Review         Value Time User    State PDMP site checked  Yes 6/1/2023  6:08 PM Alana Carbajal MD          Last CSA Agreement:   CSA -- Patient Level:    CSA: None found at the patient level.       Last UDS:             How many servings of fruits and vegetables do you eat daily?  4 or more    On average, how many sweetened beverages do you drink each day (Examples: soda, juice, sweet tea, etc.  Do NOT count diet or artificially sweetened  beverages)?   0    How many days per week do you exercise enough to make your heart beat faster? 3 or less    How many minutes a day do you exercise enough to make your heart beat faster? 9 or less    How many days per week do you miss taking your medication? 0        Review of Systems   Constitutional, HEENT, cardiovascular, pulmonary, gi and gu systems are negative, except as otherwise noted.      Objective           Vitals:  No vitals were obtained today due to virtual visit.    Physical Exam   healthy, alert and no distress  PSYCH: Alert and oriented times 3; coherent speech, normal   rate and volume, able to articulate logical thoughts, able   to abstract reason, no tangential thoughts, no hallucinations   or delusions  Her affect is anxious  RESP: No cough, no audible wheezing, able to talk in full sentences  Remainder of exam unable to be completed due to telephone visits                Phone call duration:  30 + minutes spent on the day of service with chart review, telephone call, counseling, and charting. Alana Carbajal M.D.

## 2023-07-20 ENCOUNTER — VIRTUAL VISIT (OUTPATIENT)
Dept: FAMILY MEDICINE | Facility: CLINIC | Age: 58
End: 2023-07-20
Payer: COMMERCIAL

## 2023-07-20 DIAGNOSIS — Z96.641 STATUS POST TOTAL REPLACEMENT OF RIGHT HIP: ICD-10-CM

## 2023-07-20 DIAGNOSIS — K59.01 SLOW TRANSIT CONSTIPATION: Primary | ICD-10-CM

## 2023-07-20 DIAGNOSIS — M54.50 CHRONIC MIDLINE LOW BACK PAIN WITHOUT SCIATICA: ICD-10-CM

## 2023-07-20 DIAGNOSIS — R20.2 PARESTHESIAS: ICD-10-CM

## 2023-07-20 DIAGNOSIS — G89.29 CHRONIC MIDLINE LOW BACK PAIN WITHOUT SCIATICA: ICD-10-CM

## 2023-07-20 PROCEDURE — 99443 PR PHYSICIAN TELEPHONE EVALUATION 21-30 MIN: CPT | Mod: 95 | Performed by: FAMILY MEDICINE

## 2023-07-20 NOTE — PROGRESS NOTES
Amira Scehrer is a 58 year old female who is being evaluated via a billable telephone visit.      What phone number would you like to be contacted at? [phone]  How would you like to obtain your AVS? Mail a copy      Assessment & Plan     Slow transit constipation  Cont lactulose    Paresthesias      Status post total replacement of right hip      Chronic midline low back pain without sciatica  She is in constant pain. I encouraged her to see pain management . She is hesitant and perseverates on the pain                Alana Carbajal MD  [facility]     Amira Scherer is a 58 year old female who presents via phone visit today for the following health issues:    Feels horrible, go over when to take medications.   Taking The tramadol dosing and how many she can take.   Taking two daily.   Transportation for dental appointments.   Discuss Senna.   Maddie Long CMA        She is wondering about the tramadol she tried 2 celebrex and did not help   She is very focused on the pain. There is not a really good reason for her pain she has not made an appointment with physical med and rehab.   She is worried about the pain she is seeing the eye doctor and working on the dental appointment   Worried about transportation       Review of Systems   Constitutional, HEENT, cardiovascular, pulmonary, gi and gu systems are negative, except as otherwise noted.       Objective          Vitals:  No vitals were obtained today due to virtual visit.    alert and no distress  PSYCH: Alert and oriented times 3; coherent speech, insight poor tangential Her affect is anxious and fearful  RESP: No cough, no audible wheezing, able to talk in full sentences  Remainder of exam unable to be completed due to telephone visits            Phone call duration:  29minutes spent on the day of service with chart review, telephone call, counseling, and charting. Alana ORTEZ  LADARIUS Carbajal.  minutes

## 2023-07-27 DIAGNOSIS — S72.091A OTH FRACTURE OF HEAD AND NECK OF RIGHT FEMUR, INIT (H): ICD-10-CM

## 2023-07-27 RX ORDER — POLYETHYLENE GLYCOL 3350 17 G/17G
POWDER, FOR SOLUTION ORAL
Qty: 578 G | Refills: 1 | Status: SHIPPED | OUTPATIENT
Start: 2023-07-27 | End: 2023-09-05

## 2023-09-05 DIAGNOSIS — S72.091A OTH FRACTURE OF HEAD AND NECK OF RIGHT FEMUR, INIT (H): ICD-10-CM

## 2023-09-05 DIAGNOSIS — K59.04 CHRONIC IDIOPATHIC CONSTIPATION: ICD-10-CM

## 2023-09-05 RX ORDER — DOCUSATE SODIUM 100 MG/1
100 CAPSULE, LIQUID FILLED ORAL 2 TIMES DAILY PRN
Qty: 60 CAPSULE | Refills: 0 | Status: SHIPPED | OUTPATIENT
Start: 2023-09-05 | End: 2024-01-26

## 2023-09-05 RX ORDER — POLYETHYLENE GLYCOL 3350 17 G/17G
POWDER, FOR SOLUTION ORAL
Qty: 578 G | Refills: 1 | Status: SHIPPED | OUTPATIENT
Start: 2023-09-05 | End: 2023-10-15

## 2023-09-05 NOTE — TELEPHONE ENCOUNTER
"Routing refill request to provider for review/approval because:  Drug not on the [facility] refill protocol         Requested Prescriptions   Pending Prescriptions Disp Refills    CONSTULOSE 10 GM/15ML solution [Pharmacy Med Name: Constulose 10 gram/15 mL oral solution]  3     Sig: Take 15-30 mLs (10-20 g) by mouth 3 times daily       There is no refill protocol information for this order       polyethylene glycol (MIRALAX) 17 GM/Dose powder [Pharmacy Med Name: polyethylene glycol 3350 17 gram/dose oral powder] 578 g 1     Sig: one capful(17gram) TWICE DAILY AS NEEDED       Laxatives Protocol Passed - 9/5/2023  7:23 AM        Passed - Patient is age 6 or older        Passed - Recent (12 mo) or future (30 days) visit within the authorizing provider's specialty     Patient has had an office visit with the authorizing provider or a provider within the authorizing providers department within the previous 12 mos or has a future within next 30 days. See \"Patient Info\" tab in inTrunkbowsket, or \"Choose Columns\" in Meds & Orders section of the refill encounter.              Passed - Medication is active on med list          docusate sodium (COLACE) 100 MG capsule [Pharmacy Med Name: docusate sodium 100 mg capsule] 60 capsule 0     Sig: Take 1 capsule (100 mg) by mouth 2 times daily as needed for constipation       Laxatives Protocol Passed - 9/5/2023  7:23 AM        Passed - Patient is age 6 or older        Passed - Recent (12 mo) or future (30 days) visit within the authorizing provider's specialty     Patient has had an office visit with the authorizing provider or a provider within the authorizing providers department within the previous 12 mos or has a future within next 30 days. See \"Patient Info\" tab in inbasket, or \"Choose Columns\" in Meds & Orders section of the refill encounter.              Passed - Medication is active on med list                 Suha Dawkins RN 09/05/23 4:03 PM    "

## 2023-09-07 RX ORDER — LACTULOSE 10 G/15ML
SOLUTION ORAL
Qty: 473 ML | Refills: 3 | Status: SHIPPED | OUTPATIENT
Start: 2023-09-07 | End: 2023-10-15

## 2023-10-02 ENCOUNTER — TELEPHONE (OUTPATIENT)
Dept: DERMATOLOGY | Facility: CLINIC | Age: 58
End: 2023-10-02
Payer: COMMERCIAL

## 2023-10-02 NOTE — TELEPHONE ENCOUNTER
[facility] Call Center    Phone Message    May a detailed message be left on voicemail: yes     Reason for Call: Other: Patient would like to know how long the procedure with Dr. Urias will be. Writer advised 1-2 hrs based on teams chat but patient was shocked and would like to discussed. Please call at [phone]     Action Taken: Message routed to:  Other: [facility] RN line    Travel Screening: Not Applicable

## 2023-10-02 NOTE — TELEPHONE ENCOUNTER
Patient Contact    Attempt # 1    Was call answered?  Yes    After talking with , pt needs Fraxel laser which we do not do here in [place]. Dr. Urias referred patient to see Dr. Cueva. Called patient back, informed her that we do have the new laser but it is not the laser that she needs to treat her face. Patient voiced disappointment about it. Patient stated that she can not go to [place] due to not having a  and due to her medical conditions. Patient wanted to know if there was anything that could be done here in [place]. Per Dr. Urias she could try non-ablative laser but she would need at least 4-5 treatments, this would be cosmetic so insurance would not cover this. Patient knows that I would be for laser treatment only. Patient agreed to this.     Suha Arthur LPN   [facility]   [phone]

## 2023-10-03 NOTE — TELEPHONE ENCOUNTER
Won't CO2 laser treatment of Rhytids take an hour or two?     (From rooming to patient leaving clinic)    Please advise. Stefany Clark RN

## 2023-10-05 ENCOUNTER — TELEPHONE (OUTPATIENT)
Dept: DERMATOLOGY | Facility: CLINIC | Age: 58
End: 2023-10-05
Payer: COMMERCIAL

## 2023-10-05 NOTE — TELEPHONE ENCOUNTER
[facility] Call Center    Phone Message    May a detailed message be left on voicemail: yes     Reason for Call: Other: Pt Amira states she would like a call back to discuss the 10/16/23 visit because she has questions about how long the appointment will last and other questions regarding the appointment.      Please call Amira back at [phone]. Thank you.     Action Taken: Other: [place] [facility]    Travel Screening: Not Applicable

## 2023-10-05 NOTE — TELEPHONE ENCOUNTER
Spoke to patient who has questions about the length of time the procedure will take, I did state it could be an hour or two, depending upon what provider does.     Patient verbalized understanding. Stefany Clark RN

## 2023-10-10 ENCOUNTER — TELEPHONE (OUTPATIENT)
Dept: DERMATOLOGY | Facility: CLINIC | Age: 58
End: 2023-10-10
Payer: COMMERCIAL

## 2023-10-10 NOTE — TELEPHONE ENCOUNTER
[facility] Call Center    Phone Message    May a detailed message be left on voicemail: yes     Reason for Call: Pt has some questions about her upcoming appt. Wants to speak with Suha. Please call [phone]. Thanks!    Action Taken: Other: [facility]ER[facility]    Travel Screening: Not Applicable

## 2023-10-11 DIAGNOSIS — S72.091A OTH FRACTURE OF HEAD AND NECK OF RIGHT FEMUR, INIT (H): ICD-10-CM

## 2023-10-11 DIAGNOSIS — K59.04 CHRONIC IDIOPATHIC CONSTIPATION: ICD-10-CM

## 2023-10-11 DIAGNOSIS — Z87.81 STATUS POST CLOSED FRACTURE OF HIP: ICD-10-CM

## 2023-10-11 NOTE — TELEPHONE ENCOUNTER
Patient Contact    Attempt # 1    Was call answered?  Yes    Called patient back. All patients questions were answered. Encouraged patient to call back with she has any other questions.     Suha Arthur LPN   [facility]   [phone]

## 2023-10-12 DIAGNOSIS — S72.091A OTH FRACTURE OF HEAD AND NECK OF RIGHT FEMUR, INIT (H): ICD-10-CM

## 2023-10-15 RX ORDER — IBUPROFEN 400 MG/1
400 TABLET, FILM COATED ORAL EVERY 8 HOURS PRN
Qty: 90 TABLET | Refills: 3 | Status: SHIPPED | OUTPATIENT
Start: 2023-10-15 | End: 2024-06-07

## 2023-10-15 RX ORDER — LACTULOSE 10 G/15ML
SOLUTION ORAL
Qty: 473 ML | Refills: 3 | Status: SHIPPED | OUTPATIENT
Start: 2023-10-15 | End: 2023-12-04

## 2023-10-15 RX ORDER — POLYETHYLENE GLYCOL 3350 17 G/17G
POWDER, FOR SOLUTION ORAL
Qty: 578 G | Refills: 1 | Status: SHIPPED | OUTPATIENT
Start: 2023-10-15 | End: 2023-11-10

## 2023-10-16 ENCOUNTER — TELEPHONE (OUTPATIENT)
Dept: FAMILY MEDICINE | Facility: CLINIC | Age: 58
End: 2023-10-16

## 2023-10-16 ENCOUNTER — OFFICE VISIT (OUTPATIENT)
Dept: DERMATOLOGY | Facility: CLINIC | Age: 58
End: 2023-10-16
Payer: COMMERCIAL

## 2023-10-16 DIAGNOSIS — L81.6 POIKILODERMA: Primary | ICD-10-CM

## 2023-10-16 DIAGNOSIS — Z87.81 STATUS POST CLOSED FRACTURE OF HIP: ICD-10-CM

## 2023-10-16 PROCEDURE — 96999 UNLISTED SPEC DERM SVC/PX: CPT | Performed by: DERMATOLOGY

## 2023-10-16 RX ORDER — IBUPROFEN 600 MG/1
600 TABLET, FILM COATED ORAL EVERY 8 HOURS PRN
Qty: 90 TABLET | Refills: 3 | OUTPATIENT
Start: 2023-10-16

## 2023-10-16 RX ORDER — TRAMADOL HYDROCHLORIDE 50 MG/1
50-100 TABLET ORAL 2 TIMES DAILY PRN
Qty: 75 TABLET | Refills: 1 | Status: SHIPPED | OUTPATIENT
Start: 2023-11-11 | End: 2023-12-20

## 2023-10-16 RX ORDER — IBUPROFEN 600 MG/1
600 TABLET, FILM COATED ORAL EVERY 8 HOURS PRN
Qty: 90 TABLET | Refills: 3 | Status: SHIPPED | OUTPATIENT
Start: 2023-10-16 | End: 2024-06-24

## 2023-10-16 RX ORDER — OMEGA-3 FATTY ACIDS/FISH OIL 300-1000MG
400 CAPSULE ORAL EVERY 6 HOURS PRN
Qty: 100 CAPSULE | Refills: 3 | Status: SHIPPED | OUTPATIENT
Start: 2023-10-16 | End: 2024-02-29

## 2023-10-16 ASSESSMENT — PAIN SCALES - GENERAL: PAINLEVEL: NO PAIN (0)

## 2023-10-16 NOTE — PROGRESS NOTES
Amira Scherer is an extremely pleasant 58 year old year old female patient here today for cosmetic non ablative ND Yag resurfacing.  She was sent to  for CO2 laser but she did not want to go there.  She notes pigmentation and wrinkles on face  Patient has no other skin complaints today.  Remainder of the HPI, Meds, PMH, Allergies, FH, and SH was reviewed in chart.    History reviewed. No pertinent past medical history.    Past Surgical History:   Procedure Laterality Date    COLECTOMY WITHOUT COLOSTOMY      COLON SURGERY   and     Colon removed     OPEN REDUCTION INTERNAL FIXATION HIP BIPOLAR Right 2022    Procedure: Bipolar Hemiarthroplasty Right Hip;  Surgeon: Kb Lizarraga MD;  Location: [facility]        Family History   Problem Relation Age of Onset    Hypertension Mother     Arthritis Mother     Breast Cancer Sister     Thyroid Disease Sister        Social History     Socioeconomic History    Marital status:      Spouse name: Tanner Scherer    Number of children: 2    Years of education: 12    Highest education level: Not on file   Occupational History     Employer: [employer]ER   Tobacco Use    Smoking status: Former     Packs/day: 0.50     Years: 20.00     Additional pack years: 0.00     Total pack years: 10.00     Types: Cigarettes     Quit date: 2007     Years since quittin.5    Smokeless tobacco: Never   Vaping Use    Vaping Use: Never used   Substance and Sexual Activity    Alcohol use: Yes     Comment: Red wine 2-3 times per week    Drug use: No    Sexual activity: Yes     Partners: Male   Other Topics Concern    Parent/sibling w/ CABG, MI or angioplasty before 65F 55M? No   Social History Narrative    Not on file     Social Determinants of Health     Financial Resource Strain: Not on file   Food Insecurity: Not on file   Transportation Needs: Not on file   Physical Activity: Not on file   Stress: Not on file   Social Connections: Not on file   Interpersonal Safety: Not  on file   Housing Stability: Not on file       Outpatient Encounter Medications as of 10/16/2023   Medication Sig Dispense Refill    celecoxib (CELEBREX) 100 MG capsule Take 1 capsule (100 mg) by mouth 2 times daily 60 capsule 3    CONSTULOSE 10 GM/15ML solution Take 15-30 mLs (10-20 g) by mouth 3 times daily 473 mL 3    docusate sodium (COLACE) 100 MG capsule Take 1 capsule (100 mg) by mouth 2 times daily as needed for constipation 60 capsule 0    ibuprofen (ADVIL/MOTRIN) 200 MG capsule Take 2 capsules (400 mg) by mouth every 6 hours as needed for fever 100 capsule 3    ibuprofen (ADVIL/MOTRIN) 400 MG tablet Take 1 tablet (400 mg) by mouth every 8 hours as needed for moderate pain (4-6) 90 tablet 3    ibuprofen (ADVIL/MOTRIN) 600 MG tablet Take 1 tablet (600 mg) by mouth every 8 hours as needed for moderate pain 90 tablet 3    polyethylene glycol (MIRALAX) 17 GM/Dose powder one capful(17gram) TWICE DAILY AS NEEDED 578 g 1    REFRESH RELIEVA 0.5-0.9 % ophthalmic solution 3 times daily      senna-docusate (SENEXON-S) 8.6-50 MG tablet Take 1 tablet by mouth 2 times daily as needed for constipation 180 tablet 1    Sennosides (SENNA) 8.8 MG/5ML SYRP Take 10 mLs by mouth 2 times daily 1800 mL 3    traMADol (ULTRAM) 50 MG tablet Take 1-2 tablets ( mg) by mouth 2 times daily as needed for severe pain 75 tablet 3     No facility-administered encounter medications on file as of 10/16/2023.             O:   NAD, WDWN, Alert & Oriented, Mood & Affect wnl, Vitals stable   Here today alone   LMP 03/01/2013    General appearance normal   Vitals stable   Alert, oriented and in no acute distress         Eyes: Conjunctivae/lids:Normal     ENT: Lips, buccal mucosa, tongue: normal    MSK:Normal    Cardiovascular: peripheral edema none    Pulm: Breathing Normal    Neuro/Psych: Orientation:Alert and Orientedx3 ; Mood/Affect:normal       A/P:  Laser resurfacing   Cosmetic nature discussed with patient 64372.255 2,673.00 3  sessions  1. Seborrheic keratosis, lentigo, angioma, dermal nevus  CONSENT FORM for PHOTOFACIAL/SKIN REJUVENATION   I consent to and authorize to perform treatments on me. Light can be used effectively to destroy targets located in the skin with minimum damage to the surrounding tissues. Light is used to lighten, fade or remove photo-damaged skin in a nonablative manner, a procedure known as photo rejuvenation. Visible sings of photo damage include wrinkling, enlarged pores, course skin texture, and pigment alterations. Photo-therapy, despite its high levels of efficacy and safety, is not free of side effects. Erythema (redness) and edema (swelling) of the treated area can occur but usually subsides within a few hours but can last up to seven days or longer. Irritation, itching, and/or a mild burning sensation or pain similar to sunburn may occur within 48 hours of treatment.   Pigmentary changes such as hyper pigmentation and hypo pigmentation of the skin in the treated areas can occasionally occur. Mostly it is transient, lasting up to six months, but in rare cases it can be permanent. Most cases of hypo- or hyper-pigmentation occur in people with darker skin or when the treated area has been exposed to sunlight before or after treatment. Occasionally these pigmentary changes occur despite appropriate protection from the sun.  Scarring, which can be hypertrophic or even keloid, can occur. Other known complications of this procedure include blisters, reddening, pinpoint pitted scars, bruising, superficial crusting, burns, pain, and infections. These side effects are usually temporary, lasting from five to ten days but can be permanent as well. The skin at or near the treatment site may become fragile. If this happens, makeup should be avoided and the area should not be rubbed, as this might tear the skin. A blue-purple bruise may appear on the treated area, which might last from five to fifteen days. As the bruise  fades, there may be rust-brown discoloration of this skin, which fades in one to three months or longer.  Additionally, there is a known and expected loss of hair in the treated areas. In a very small percent of people there is new hair growth in the surrounding areas being treated. Even though appropriate measures are taken to reduce side effects, they cannot be completely eliminated in every case. I understand that the treatment may involve risks of complication or injury from both known and unknown causes, and I freely assume these risks. There may be other treatment options, such as injections, other types of lasers/light sources or peels. With this in mind, I am choosing this non-invasive treatment for vascular and/or pigment lesions and other indicated skin conditions.   Eye damage can occur from the light and therefore protective eyewear must be worn during all phototherapy sessions.    After eye protection had been placed, analgesia obtained with cooling, N:YAG 20mj 17mm spot size 300 density to treat face.  An excellent clinical response was obtained and the patient confirmed that all sites had been treated.  The patient was given wound care instructions and will return in 4 weeks.    It was a pleasure speaking to Amira Scherer today.  Previous clinic notes and pertinent laboratory tests were reviewed prior to Amira Scherer's visit.

## 2023-10-16 NOTE — TELEPHONE ENCOUNTER
Received call from Patient.  Patient sounds anxius.  States that she can not get refills on her Ibuprofen medication.  Patient wants prescription for Ibuprofen in 200 mg tablets, 400 mg tablets and 600 mg tablets.  Patient just had 400 mg tablets filled on 10/15/23.  Patient states that she just got off the phone with [facility] and they told her that they do not have refills for any of them.  Patient needs refill for Ibuprofen 200 mg tablets and 600 mg tablets.  Seb Castro RN

## 2023-10-16 NOTE — LETTER
10/16/2023         RE: Amira Scherer  [address] [facility] [address]        Dear Colleague,    Thank you for referring your patient, Amira Scherer, to the [facility]. Please see a copy of my visit note below.    Amira Scherer is an extremely pleasant 58 year old year old female patient here today for cosmetic non ablative ND Yag resurfacing.  She was sent to  for CO2 laser but she did not want to go there.  She notes pigmentation and wrinkles on face  Patient has no other skin complaints today.  Remainder of the HPI, Meds, PMH, Allergies, FH, and SH was reviewed in chart.    History reviewed. No pertinent past medical history.    Past Surgical History:   Procedure Laterality Date     COLECTOMY WITHOUT COLOSTOMY       COLON SURGERY   and     Colon removed      OPEN REDUCTION INTERNAL FIXATION HIP BIPOLAR Right 2022    Procedure: Bipolar Hemiarthroplasty Right Hip;  Surgeon: Kb Lizarraga MD;  Location: [facility] OR        Family History   Problem Relation Age of Onset     Hypertension Mother      Arthritis Mother      Breast Cancer Sister      Thyroid Disease Sister        Social History     Socioeconomic History     Marital status:      Spouse name: Tanner Scherer     Number of children: 2     Years of education: 12     Highest education level: Not on file   Occupational History     Employer: [employer]   Tobacco Use     Smoking status: Former     Packs/day: 0.50     Years: 20.00     Additional pack years: 0.00     Total pack years: 10.00     Types: Cigarettes     Quit date: 2007     Years since quittin.5     Smokeless tobacco: Never   Vaping Use     Vaping Use: Never used   Substance and Sexual Activity     Alcohol use: Yes     Comment: Red wine 2-3 times per week     Drug use: No     Sexual activity: Yes     Partners: Male   Other Topics Concern     Parent/sibling w/ CABG, MI or angioplasty before 65F 55M? No   Social History Narrative      Not on file     Social Determinants of Health     Financial Resource Strain: Not on file   Food Insecurity: Not on file   Transportation Needs: Not on file   Physical Activity: Not on file   Stress: Not on file   Social Connections: Not on file   Interpersonal Safety: Not on file   Housing Stability: Not on file       Outpatient Encounter Medications as of 10/16/2023   Medication Sig Dispense Refill     celecoxib (CELEBREX) 100 MG capsule Take 1 capsule (100 mg) by mouth 2 times daily 60 capsule 3     CONSTULOSE 10 GM/15ML solution Take 15-30 mLs (10-20 g) by mouth 3 times daily 473 mL 3     docusate sodium (COLACE) 100 MG capsule Take 1 capsule (100 mg) by mouth 2 times daily as needed for constipation 60 capsule 0     ibuprofen (ADVIL/MOTRIN) 200 MG capsule Take 2 capsules (400 mg) by mouth every 6 hours as needed for fever 100 capsule 3     ibuprofen (ADVIL/MOTRIN) 400 MG tablet Take 1 tablet (400 mg) by mouth every 8 hours as needed for moderate pain (4-6) 90 tablet 3     ibuprofen (ADVIL/MOTRIN) 600 MG tablet Take 1 tablet (600 mg) by mouth every 8 hours as needed for moderate pain 90 tablet 3     polyethylene glycol (MIRALAX) 17 GM/Dose powder one capful(17gram) TWICE DAILY AS NEEDED 578 g 1     REFRESH RELIEVA 0.5-0.9 % ophthalmic solution 3 times daily       senna-docusate (SENEXON-S) 8.6-50 MG tablet Take 1 tablet by mouth 2 times daily as needed for constipation 180 tablet 1     Sennosides (SENNA) 8.8 MG/5ML SYRP Take 10 mLs by mouth 2 times daily 1800 mL 3     traMADol (ULTRAM) 50 MG tablet Take 1-2 tablets ( mg) by mouth 2 times daily as needed for severe pain 75 tablet 3     No facility-administered encounter medications on file as of 10/16/2023.             O:   NAD, WDWN, Alert & Oriented, Mood & Affect wnl, Vitals stable   Here today alone   LMP 03/01/2013    General appearance normal   Vitals stable   Alert, oriented and in no acute distress         Eyes: Conjunctivae/lids:Normal     ENT:  Lips, buccal mucosa, tongue: normal    MSK:Normal    Cardiovascular: peripheral edema none    Pulm: Breathing Normal    Neuro/Psych: Orientation:Alert and Orientedx3 ; Mood/Affect:normal       A/P:  Laser resurfacing   Cosmetic nature discussed with patient 18674.255 2,555.00 3 sessions  1. Seborrheic keratosis, lentigo, angioma, dermal nevus  CONSENT FORM for PHOTOFACIAL/SKIN REJUVENATION   I consent to and authorize to perform treatments on me. Light can be used effectively to destroy targets located in the skin with minimum damage to the surrounding tissues. Light is used to lighten, fade or remove photo-damaged skin in a nonablative manner, a procedure known as photo rejuvenation. Visible sings of photo damage include wrinkling, enlarged pores, course skin texture, and pigment alterations. Photo-therapy, despite its high levels of efficacy and safety, is not free of side effects. Erythema (redness) and edema (swelling) of the treated area can occur but usually subsides within a few hours but can last up to seven days or longer. Irritation, itching, and/or a mild burning sensation or pain similar to sunburn may occur within 48 hours of treatment.   Pigmentary changes such as hyper pigmentation and hypo pigmentation of the skin in the treated areas can occasionally occur. Mostly it is transient, lasting up to six months, but in rare cases it can be permanent. Most cases of hypo- or hyper-pigmentation occur in people with darker skin or when the treated area has been exposed to sunlight before or after treatment. Occasionally these pigmentary changes occur despite appropriate protection from the sun.  Scarring, which can be hypertrophic or even keloid, can occur. Other known complications of this procedure include blisters, reddening, pinpoint pitted scars, bruising, superficial crusting, burns, pain, and infections. These side effects are usually temporary, lasting from five to ten days but can be permanent as  well. The skin at or near the treatment site may become fragile. If this happens, makeup should be avoided and the area should not be rubbed, as this might tear the skin. A blue-purple bruise may appear on the treated area, which might last from five to fifteen days. As the bruise fades, there may be rust-brown discoloration of this skin, which fades in one to three months or longer.  Additionally, there is a known and expected loss of hair in the treated areas. In a very small percent of people there is new hair growth in the surrounding areas being treated. Even though appropriate measures are taken to reduce side effects, they cannot be completely eliminated in every case. I understand that the treatment may involve risks of complication or injury from both known and unknown causes, and I freely assume these risks. There may be other treatment options, such as injections, other types of lasers/light sources or peels. With this in mind, I am choosing this non-invasive treatment for vascular and/or pigment lesions and other indicated skin conditions.   Eye damage can occur from the light and therefore protective eyewear must be worn during all phototherapy sessions.    After eye protection had been placed, analgesia obtained with cooling, N:YAG 20mj 17mm spot size 300 density to treat face.  An excellent clinical response was obtained and the patient confirmed that all sites had been treated.  The patient was given wound care instructions and will return in 4 weeks.    It was a pleasure speaking to Amira Scherer today.  Previous clinic notes and pertinent laboratory tests were reviewed prior to Amira Scherer's visit.      Again, thank you for allowing me to participate in the care of your patient.        Sincerely,        Kevin Urias MD

## 2023-10-16 NOTE — NURSING NOTE
Amira Scehrer's chief complaint for this visit includes:  Chief Complaint   Patient presents with    Laser Treatment     Face      PCP: Alana Carbajal    Referring Provider:  No referring provider defined for this encounter.    LMP 03/01/2013   No Pain (0)      No Known Allergies      Do you need any medication refills at today's visit? No    Shantell Adames MA

## 2023-10-16 NOTE — PATIENT INSTRUCTIONS
You have been treated with IPL. The treated area is very delicate and should be treated gently. Many patients develop immediate redness and swelling of the treated area which feels like a sunburn. Occasionally, blisters and crusts may form. Please read and follow these instructions.     Quick warm showers are recommended. If areas are treated other than the facial area, hot baths are not advised for 24 hours.    Cold compresses (ice packs) should be applied immediately after treatment and may reduce postoperative pain and minimize swelling.    Wound care: wash the treated area with a mild soap twice daily. Emollients such as aquaphor, Aloe Vera gel or petrolatum ointments can be soothing when applied 2-3 times daily until healing is complete. If blistering occurs, a topical antibiotic ointment applied twice daily is helpful. A bandage is not required, however, applying a non-stick bandage or band-aid may help protect a particular area from being irritated by clothing or jewelry.    If crusts or scabs develop, be careful not to scratch or pick at the treated area. Allow them to fall off on their own.    Analgesics such as acetaminophen or ibuprofen may be taken if necessary to reduce discomfort. Additional application of ice or cool compresses in the first 24-36 hours may also be helpful.    Make-up may be applied on the next day unless blistering or crusts developed. Crusts usually disappear within 1 week. A good mineral, non-comedogenic makeup such as Anne-Marie Iredale is recommended.    Any degree of suntan will make the laser treatment less effective and increase your chance of having adverse effects such as blisters and scarring. It is absolutely necessary that you protect the area with a sunscreen that blocks UVA and UVB rays (SPF 15-30) when going outdoors (especially if you require future treatments). This should be done 4 weeks before and after treatment.

## 2023-10-19 ENCOUNTER — TELEPHONE (OUTPATIENT)
Dept: DERMATOLOGY | Facility: CLINIC | Age: 58
End: 2023-10-19

## 2023-10-19 NOTE — TELEPHONE ENCOUNTER
Patient calling to request the person- Shantell that was with her in the room for the procedure- she was very impressed with her.  She would like her to be in the room with her each time.asked about skin care after treatment-  Asked when she will see the results? Advised that this could take 2-3 sessions  She would like to know when to go back to her normal skin care routing- advised she can go back when she is comfortable - no burning or stinging    Thank you,    Petrona VIEIRARN BSN  [facility]- [phone]

## 2023-11-06 ENCOUNTER — OFFICE VISIT (OUTPATIENT)
Dept: DERMATOLOGY | Facility: CLINIC | Age: 58
End: 2023-11-06
Payer: COMMERCIAL

## 2023-11-06 DIAGNOSIS — L98.8 RHYTIDES: Primary | ICD-10-CM

## 2023-11-06 NOTE — PROGRESS NOTES
Amira Scherer is an extremely pleasant 58 year old year old female patient here today for ND YAG laser.  She would like fractional ablation today.  We previously discussed with patient that this was at  derm.  She does not want to go there.  We do not have this today.  Patient has no other skin complaints today.  Remainder of the HPI, Meds, PMH, Allergies, FH, and SH was reviewed in chart.    History reviewed. No pertinent past medical history.    Past Surgical History:   Procedure Laterality Date    COLECTOMY WITHOUT COLOSTOMY      COLON SURGERY   and     Colon removed     OPEN REDUCTION INTERNAL FIXATION HIP BIPOLAR Right 2022    Procedure: Bipolar Hemiarthroplasty Right Hip;  Surgeon: Kb Lizarraga MD;  Location: [facility] OR        Family History   Problem Relation Age of Onset    Hypertension Mother     Arthritis Mother     Breast Cancer Sister     Thyroid Disease Sister        Social History     Socioeconomic History    Marital status:      Spouse name: Tanner Scherer    Number of children: 2    Years of education: 12    Highest education level: Not on file   Occupational History     Employer: [employer]   Tobacco Use    Smoking status: Former     Packs/day: 0.50     Years: 20.00     Additional pack years: 0.00     Total pack years: 10.00     Types: Cigarettes     Quit date: 2007     Years since quittin.6    Smokeless tobacco: Never   Vaping Use    Vaping Use: Never used   Substance and Sexual Activity    Alcohol use: Yes     Comment: Red wine 2-3 times per week    Drug use: No    Sexual activity: Yes     Partners: Male   Other Topics Concern    Parent/sibling w/ CABG, MI or angioplasty before 65F 55M? No   Social History Narrative    Not on file     Social Determinants of Health     Financial Resource Strain: Not on file   Food Insecurity: Not on file   Transportation Needs: Not on file   Physical Activity: Not on file   Stress: Not on file   Social Connections: Not on file    Interpersonal Safety: Not on file   Housing Stability: Not on file       Outpatient Encounter Medications as of 11/6/2023   Medication Sig Dispense Refill    celecoxib (CELEBREX) 100 MG capsule Take 1 capsule (100 mg) by mouth 2 times daily 60 capsule 3    CONSTULOSE 10 GM/15ML solution Take 15-30 mLs (10-20 g) by mouth 3 times daily 473 mL 3    docusate sodium (COLACE) 100 MG capsule Take 1 capsule (100 mg) by mouth 2 times daily as needed for constipation 60 capsule 0    ibuprofen (ADVIL/MOTRIN) 200 MG capsule Take 2 capsules (400 mg) by mouth every 6 hours as needed for fever 100 capsule 3    ibuprofen (ADVIL/MOTRIN) 400 MG tablet Take 1 tablet (400 mg) by mouth every 8 hours as needed for moderate pain (4-6) 90 tablet 3    ibuprofen (ADVIL/MOTRIN) 600 MG tablet Take 1 tablet (600 mg) by mouth every 8 hours as needed for moderate pain 90 tablet 3    polyethylene glycol (MIRALAX) 17 GM/Dose powder one capful(17gram) TWICE DAILY AS NEEDED 578 g 1    REFRESH RELIEVA 0.5-0.9 % ophthalmic solution 3 times daily      senna-docusate (SENEXON-S) 8.6-50 MG tablet Take 1 tablet by mouth 2 times daily as needed for constipation 180 tablet 1    Sennosides (SENNA) 8.8 MG/5ML SYRP Take 10 mLs by mouth 2 times daily 1800 mL 3    [START ON 11/11/2023] traMADol (ULTRAM) 50 MG tablet Take 1-2 tablets ( mg) by mouth 2 times daily as needed for severe pain 75 tablet 1     No facility-administered encounter medications on file as of 11/6/2023.             O:   NAD, WDWN, Alert & Oriented, Mood & Affect wnl, Vitals stable  A/P:  She will wait for CO2 laser to be up and running  PATIENT ONLY GOT ONE NDYAG treatment of the 3 treatment package  Please only bill her for the last treatment    It was a pleasure speaking to Amira Scherer today.  Referral to [facility] derm discussed with patient she declines  Difference between ablative and non ablative discussed with patient again

## 2023-11-06 NOTE — LETTER
2023         RE: Amira Scherer  [address] [facility] [address]        Dear Colleague,    Thank you for referring your patient, Amira Scherer, to the [facility]. Please see a copy of my visit note below.    Amira Scherer is an extremely pleasant 58 year old year old female patient here today for ND YAG laser.  She would like fractional ablation today.  We previously discussed with patient that this was at  derm.  She does not want to go there.  We do not have this today.  Patient has no other skin complaints today.  Remainder of the HPI, Meds, PMH, Allergies, FH, and SH was reviewed in chart.    History reviewed. No pertinent past medical history.    Past Surgical History:   Procedure Laterality Date     COLECTOMY WITHOUT COLOSTOMY       COLON SURGERY   and     Colon removed      OPEN REDUCTION INTERNAL FIXATION HIP BIPOLAR Right 2022    Procedure: Bipolar Hemiarthroplasty Right Hip;  Surgeon: Kb Lizarraga MD;  Location: [place] OR        Family History   Problem Relation Age of Onset     Hypertension Mother      Arthritis Mother      Breast Cancer Sister      Thyroid Disease Sister        Social History     Socioeconomic History     Marital status:      Spouse name: Tanner Scherer     Number of children: 2     Years of education: 12     Highest education level: Not on file   Occupational History     Employer: [employer]   Tobacco Use     Smoking status: Former     Packs/day: 0.50     Years: 20.00     Additional pack years: 0.00     Total pack years: 10.00     Types: Cigarettes     Quit date: 2007     Years since quittin.6     Smokeless tobacco: Never   Vaping Use     Vaping Use: Never used   Substance and Sexual Activity     Alcohol use: Yes     Comment: Red wine 2-3 times per week     Drug use: No     Sexual activity: Yes     Partners: Male   Other Topics Concern     Parent/sibling w/ CABG, MI or angioplasty before 65F 55M? No    Social History Narrative     Not on file     Social Determinants of Health     Financial Resource Strain: Not on file   Food Insecurity: Not on file   Transportation Needs: Not on file   Physical Activity: Not on file   Stress: Not on file   Social Connections: Not on file   Interpersonal Safety: Not on file   Housing Stability: Not on file       Outpatient Encounter Medications as of 11/6/2023   Medication Sig Dispense Refill     celecoxib (CELEBREX) 100 MG capsule Take 1 capsule (100 mg) by mouth 2 times daily 60 capsule 3     CONSTULOSE 10 GM/15ML solution Take 15-30 mLs (10-20 g) by mouth 3 times daily 473 mL 3     docusate sodium (COLACE) 100 MG capsule Take 1 capsule (100 mg) by mouth 2 times daily as needed for constipation 60 capsule 0     ibuprofen (ADVIL/MOTRIN) 200 MG capsule Take 2 capsules (400 mg) by mouth every 6 hours as needed for fever 100 capsule 3     ibuprofen (ADVIL/MOTRIN) 400 MG tablet Take 1 tablet (400 mg) by mouth every 8 hours as needed for moderate pain (4-6) 90 tablet 3     ibuprofen (ADVIL/MOTRIN) 600 MG tablet Take 1 tablet (600 mg) by mouth every 8 hours as needed for moderate pain 90 tablet 3     polyethylene glycol (MIRALAX) 17 GM/Dose powder one capful(17gram) TWICE DAILY AS NEEDED 578 g 1     REFRESH RELIEVA 0.5-0.9 % ophthalmic solution 3 times daily       senna-docusate (SENEXON-S) 8.6-50 MG tablet Take 1 tablet by mouth 2 times daily as needed for constipation 180 tablet 1     Sennosides (SENNA) 8.8 MG/5ML SYRP Take 10 mLs by mouth 2 times daily 1800 mL 3     [START ON 11/11/2023] traMADol (ULTRAM) 50 MG tablet Take 1-2 tablets ( mg) by mouth 2 times daily as needed for severe pain 75 tablet 1     No facility-administered encounter medications on file as of 11/6/2023.             O:   NAD, WDWN, Alert & Oriented, Mood & Affect wnl, Vitals stable  A/P:  She will wait for CO2 laser to be up and running  PATIENT ONLY GOT ONE NDYAG treatment of the 3 treatment  package  Please only bill her for the last treatment    It was a pleasure speaking to Amira Scherer today.  Referral to  derm discussed with patient she declines  Difference between ablative and non ablative discussed with patient again      Again, thank you for allowing me to participate in the care of your patient.        Sincerely,        Kevin Urias MD

## 2023-11-09 ENCOUNTER — TELEPHONE (OUTPATIENT)
Dept: DERMATOLOGY | Facility: CLINIC | Age: 58
End: 2023-11-09
Payer: COMMERCIAL
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
## 2023-11-09 NOTE — TELEPHONE ENCOUNTER
"Spoke to patient and advised of Dr. Urias's notes regarding billing.     She verbalized understanding and stated: \"I wanted to talk to Aurora because she wa sin the room with me and she will know what we did. Can she call me back?..\"    I did advise Aurora is rooming patients and not sure when she would be able to call patient back. Patient stated: Just have her call me when she can-even Monday around 9 or 10 am will work..\"     Stefany Clark RN    "

## 2023-11-09 NOTE — TELEPHONE ENCOUNTER
[facility] Call Center    Phone Message    May a detailed message be left on voicemail: yes     Reason for Call: Pt is having a hard time sleeping and is worried regarding if the issue was taken care. Dr. Urias said he was going to contact billing and as of now it was not taken care of.    Pt said Arina was very kind and pt would like Arina to call pt to discuss billing. Pt is double checking and wants to make sure it was or will be taken care of. Pt was in for Appt 11/06/23  Thanks     Action Taken: Message routed to:  Other: [place] derm    Travel Screening: Not Applicable

## 2023-11-10 DIAGNOSIS — S72.091A OTH FRACTURE OF HEAD AND NECK OF RIGHT FEMUR, INIT (H): ICD-10-CM

## 2023-11-10 RX ORDER — POLYETHYLENE GLYCOL 3350 17 G/17G
POWDER, FOR SOLUTION ORAL
Qty: 510 G | Refills: 2 | Status: SHIPPED | OUTPATIENT
Start: 2023-11-10 | End: 2024-01-26

## 2023-11-10 NOTE — TELEPHONE ENCOUNTER
"Prescription approved per [facility] Refill Protocol.     Requested Prescriptions   Pending Prescriptions Disp Refills    polyethylene glycol (MIRALAX) 17 GM/Dose powder [Pharmacy Med Name: polyethylene glycol 3350 17 gram/dose oral powder] 510 g 2     Sig: one capful TWICE DAILY AS NEEDED       Laxatives Protocol Passed - 11/10/2023  7:43 AM        Passed - Patient is age 6 or older        Passed - Recent (12 mo) or future (30 days) visit within the authorizing provider's specialty     Patient has had an office visit with the authorizing provider or a provider within the authorizing providers department within the previous 12 mos or has a future within next 30 days. See \"Patient Info\" tab in inbasket, or \"Choose Columns\" in Meds & Orders section of the refill encounter.              Passed - Medication is active on med list                 Suha Dawkins RN 11/10/23 10:38 AM   "

## 2023-11-17 ENCOUNTER — TELEPHONE (OUTPATIENT)
Dept: DERMATOLOGY | Facility: CLINIC | Age: 58
End: 2023-11-17
Payer: COMMERCIAL

## 2023-11-17 NOTE — TELEPHONE ENCOUNTER
[facility] Call Center    Phone Message    May a detailed message be left on voicemail: yes     Reason for Call: Other: Pt calling as she states she is still having issues with billing. They are stating that she still owes money for a treatment she did not actually receive. Pt was told that Dr. Urias was going to speak with billing and get the issue straightened out. Pt was in tears and very anxious about this not being fixed and the bill being due Monday 11/20/23.     Action Taken: Other: Derm    Travel Screening: Not Applicable

## 2023-11-17 NOTE — TELEPHONE ENCOUNTER
Called patient- left detailed message that I was trying to resolve the issue.  I sent and email stating that patient was being charged for a package (3) but only had one treatment, so should only be billed for the one treatment.  Thank you,    Petrona VIEIRARN BSN  [facility]- [phone]

## 2023-11-20 DIAGNOSIS — S72.091A OTH FRACTURE OF HEAD AND NECK OF RIGHT FEMUR, INIT (H): ICD-10-CM

## 2023-11-20 RX ORDER — POLYETHYLENE GLYCOL 3350 17 G/17G
POWDER, FOR SOLUTION ORAL
Qty: 510 G | Refills: 2 | OUTPATIENT
Start: 2023-11-20

## 2023-11-21 NOTE — TELEPHONE ENCOUNTER
[facility] Call Center    Phone Message    May a detailed message be left on voicemail: yes     Reason for Call: Other: Pt called regarding below. Pt wanted to make sure that this is getting fixed. If you have additional questions, please reach out to her. Thanks       Action Taken: Message routed to:  Other: [place] DERM    Travel Screening: Not Applicable

## 2023-12-02 DIAGNOSIS — K59.04 CHRONIC IDIOPATHIC CONSTIPATION: ICD-10-CM

## 2023-12-04 RX ORDER — LACTULOSE 10 G/15ML
SOLUTION ORAL
Qty: 473 ML | Refills: 3 | Status: SHIPPED | OUTPATIENT
Start: 2023-12-04 | End: 2023-12-20

## 2023-12-05 NOTE — TELEPHONE ENCOUNTER
Per business office- charge has been removed for the other two sessions.  Thank you,    Petrona VIEIRARN BSN  [facility]- [phone]

## 2023-12-20 DIAGNOSIS — K59.04 CHRONIC IDIOPATHIC CONSTIPATION: ICD-10-CM

## 2023-12-20 DIAGNOSIS — S72.091A OTH FRACTURE OF HEAD AND NECK OF RIGHT FEMUR, INIT (H): ICD-10-CM

## 2023-12-20 RX ORDER — LACTULOSE 10 G/15ML
SOLUTION ORAL
Qty: 473 ML | Refills: 3 | Status: SHIPPED | OUTPATIENT
Start: 2023-12-20 | End: 2024-01-25

## 2023-12-20 RX ORDER — TRAMADOL HYDROCHLORIDE 50 MG/1
50-100 TABLET ORAL 2 TIMES DAILY PRN
Qty: 75 TABLET | Refills: 1 | Status: SHIPPED | OUTPATIENT
Start: 2023-12-20 | End: 2024-02-29

## 2024-01-23 ENCOUNTER — TELEPHONE (OUTPATIENT)
Dept: FAMILY MEDICINE | Facility: CLINIC | Age: 59
End: 2024-01-23

## 2024-01-23 ENCOUNTER — OFFICE VISIT (OUTPATIENT)
Dept: FAMILY MEDICINE | Facility: CLINIC | Age: 59
End: 2024-01-23
Payer: COMMERCIAL

## 2024-01-23 VITALS
BODY MASS INDEX: 18.4 KG/M2 | SYSTOLIC BLOOD PRESSURE: 122 MMHG | OXYGEN SATURATION: 100 % | DIASTOLIC BLOOD PRESSURE: 68 MMHG | TEMPERATURE: 98.6 F | HEART RATE: 72 BPM | WEIGHT: 114 LBS

## 2024-01-23 DIAGNOSIS — M54.50 CHRONIC MIDLINE LOW BACK PAIN WITHOUT SCIATICA: ICD-10-CM

## 2024-01-23 DIAGNOSIS — F11.90 CHRONIC, CONTINUOUS USE OF OPIOIDS: ICD-10-CM

## 2024-01-23 DIAGNOSIS — Z01.818 PREOP GENERAL PHYSICAL EXAM: Primary | ICD-10-CM

## 2024-01-23 DIAGNOSIS — K59.09 CHRONIC CONSTIPATION: ICD-10-CM

## 2024-01-23 DIAGNOSIS — E55.9 VITAMIN D DEFICIENCY: ICD-10-CM

## 2024-01-23 DIAGNOSIS — F41.1 GAD (GENERALIZED ANXIETY DISORDER): ICD-10-CM

## 2024-01-23 DIAGNOSIS — F60.9 PERSONALITY DISORDER IN ADULT (H): ICD-10-CM

## 2024-01-23 DIAGNOSIS — G89.29 CHRONIC MIDLINE LOW BACK PAIN WITHOUT SCIATICA: ICD-10-CM

## 2024-01-23 DIAGNOSIS — K04.7 DENTAL INFECTION: ICD-10-CM

## 2024-01-23 DIAGNOSIS — Z87.19 HISTORY OF DENTAL PROBLEMS: ICD-10-CM

## 2024-01-23 DIAGNOSIS — E53.8 VITAMIN B12 DEFICIENCY (NON ANEMIC): ICD-10-CM

## 2024-01-23 DIAGNOSIS — K59.04 CHRONIC IDIOPATHIC CONSTIPATION: ICD-10-CM

## 2024-01-23 DIAGNOSIS — R20.2 PARESTHESIAS: ICD-10-CM

## 2024-01-23 LAB
ALBUMIN SERPL BCG-MCNC: 4.4 G/DL (ref 3.5–5.2)
ALP SERPL-CCNC: 91 U/L (ref 40–150)
ALT SERPL W P-5'-P-CCNC: 14 U/L (ref 0–50)
ANION GAP SERPL CALCULATED.3IONS-SCNC: 12 MMOL/L (ref 7–15)
AST SERPL W P-5'-P-CCNC: 20 U/L (ref 0–45)
BILIRUB SERPL-MCNC: 0.2 MG/DL
BUN SERPL-MCNC: 9.2 MG/DL (ref 8–23)
CALCIUM SERPL-MCNC: 8.9 MG/DL (ref 8.8–10.2)
CHLORIDE SERPL-SCNC: 111 MMOL/L (ref 98–107)
CREAT SERPL-MCNC: 0.56 MG/DL (ref 0.51–0.95)
DEPRECATED HCO3 PLAS-SCNC: 16 MMOL/L (ref 22–29)
EGFRCR SERPLBLD CKD-EPI 2021: >90 ML/MIN/1.73M2
ERYTHROCYTE [DISTWIDTH] IN BLOOD BY AUTOMATED COUNT: 12.8 % (ref 10–15)
GLUCOSE SERPL-MCNC: 100 MG/DL (ref 70–99)
HCT VFR BLD AUTO: 38 % (ref 35–47)
HGB BLD-MCNC: 12.3 G/DL (ref 11.7–15.7)
MCH RBC QN AUTO: 33.5 PG (ref 26.5–33)
MCHC RBC AUTO-ENTMCNC: 32.4 G/DL (ref 31.5–36.5)
MCV RBC AUTO: 104 FL (ref 78–100)
PLATELET # BLD AUTO: 223 10E3/UL (ref 150–450)
POTASSIUM SERPL-SCNC: 4.5 MMOL/L (ref 3.4–5.3)
PROT SERPL-MCNC: 7.4 G/DL (ref 6.4–8.3)
PTH-INTACT SERPL-MCNC: 151 PG/ML (ref 15–65)
RBC # BLD AUTO: 3.67 10E6/UL (ref 3.8–5.2)
SODIUM SERPL-SCNC: 139 MMOL/L (ref 135–145)
VIT B12 SERPL-MCNC: 162 PG/ML (ref 232–1245)
VIT D+METAB SERPL-MCNC: 8 NG/ML (ref 20–50)
WBC # BLD AUTO: 5.2 10E3/UL (ref 4–11)

## 2024-01-23 PROCEDURE — 82607 VITAMIN B-12: CPT | Performed by: FAMILY MEDICINE

## 2024-01-23 PROCEDURE — 85027 COMPLETE CBC AUTOMATED: CPT | Performed by: FAMILY MEDICINE

## 2024-01-23 PROCEDURE — 36415 COLL VENOUS BLD VENIPUNCTURE: CPT | Performed by: FAMILY MEDICINE

## 2024-01-23 PROCEDURE — 82306 VITAMIN D 25 HYDROXY: CPT | Performed by: FAMILY MEDICINE

## 2024-01-23 PROCEDURE — 80053 COMPREHEN METABOLIC PANEL: CPT | Performed by: FAMILY MEDICINE

## 2024-01-23 PROCEDURE — 99214 OFFICE O/P EST MOD 30 MIN: CPT | Performed by: FAMILY MEDICINE

## 2024-01-23 PROCEDURE — 83970 ASSAY OF PARATHORMONE: CPT | Performed by: FAMILY MEDICINE

## 2024-01-23 RX ORDER — AMOXICILLIN 500 MG/1
500 TABLET, FILM COATED ORAL 3 TIMES DAILY
Qty: 30 TABLET | Refills: 0 | Status: SHIPPED | OUTPATIENT
Start: 2024-01-23 | End: 2024-04-18

## 2024-01-23 NOTE — TELEPHONE ENCOUNTER
Marc from [employer] called. Patients  went to the wrong address and is about 10 minutes away from picking the patient up from her [place] address. RN spoke with MA if we could still see patient. Ok'd.  RN let the  know.   Suha Dawkins RN on 1/23/2024 at 11:07 AM

## 2024-01-23 NOTE — PATIENT INSTRUCTIONS
Preparing for Your Surgery  Getting started  A nurse will call you to review your health history and instructions. They will give you an arrival time based on your scheduled surgery time. Please be ready to share:  Your doctor's clinic name and phone number  Your medical, surgical, and anesthesia history  A list of allergies and sensitivities  A list of medicines, including herbal treatments and over-the-counter drugs  Whether the patient has a legal guardian (ask how to send us the papers in advance)  Please tell us if you're pregnant--or if there's any chance you might be pregnant. Some surgeries may injure a fetus (unborn baby), so they require a pregnancy test. Surgeries that are safe for a fetus don't always need a test, and you can choose whether to have one.   If you have a child who's having surgery, please ask for a copy of Preparing for Your Child's Surgery.    Preparing for surgery  Within 10 to 30 days of surgery: Have a pre-op exam (sometimes called an H&P, or History and Physical). This can be done at a clinic or pre-operative center.  If you're having a , you may not need this exam. Talk to your care team.  At your pre-op exam, talk to your care team about all medicines you take. If you need to stop any medicines before surgery, ask when to start taking them again.  We do this for your safety. Many medicines can make you bleed too much during surgery. Some change how well surgery (anesthesia) drugs work.  Call your insurance company to let them know you're having surgery. (If you don't have insurance, call [phone].)  Call your clinic if there's any change in your health. This includes signs of a cold or flu (sore throat, runny nose, cough, rash, fever). It also includes a scrape or scratch near the surgery site.  If you have questions on the day of surgery, call your hospital or surgery center.  Eating and drinking guidelines  For your safety: Unless your surgeon tells you otherwise,  follow the guidelines below.  Eat and drink as usual until 8 hours before you arrive for surgery. After that, no food or milk.  Drink clear liquids until 2 hours before you arrive. These are liquids you can see through, like water, Gatorade, and Propel Water. They also include plain black coffee and tea (no cream or milk), candy, and breath mints. You can spit out gum when you arrive.  If you drink alcohol: Stop drinking it the night before surgery.  If your care team tells you to take medicine on the morning of surgery, it's okay to take it with a sip of water.  Preventing infection  Shower or bathe the night before and morning of your surgery. Follow the instructions your clinic gave you. (If no instructions, use regular soap.)  Don't shave or clip hair near your surgery site. We'll remove the hair if needed.  Don't smoke or vape the morning of surgery. You may chew nicotine gum up to 2 hours before surgery. A nicotine patch is okay.  Note: Some surgeries require you to completely quit smoking and nicotine. Check with your surgeon.  Your care team will make every effort to keep you safe from infection. We will:  Clean our hands often with soap and water (or an alcohol-based hand rub).  Clean the skin at your surgery site with a special soap that kills germs.  Give you a special gown to keep you warm. (Cold raises the risk of infection.)  Wear special hair covers, masks, gowns and gloves during surgery.  Give antibiotic medicine, if prescribed. Not all surgeries need antibiotics.  What to bring on the day of surgery  Photo ID and insurance card  Copy of your health care directive, if you have one  Glasses and hearing aids (bring cases)  You can't wear contacts during surgery  Inhaler and eye drops, if you use them (tell us about these when you arrive)  CPAP machine or breathing device, if you use them  A few personal items, if spending the night  If you have . . .  A pacemaker, ICD (cardiac defibrillator) or other  implant: Bring the ID card.  An implanted stimulator: Bring the remote control.  A legal guardian: Bring a copy of the certified (court-stamped) guardianship papers.  Please remove any jewelry, including body piercings. Leave jewelry and other valuables at home.  If you're going home the day of surgery  You must have a responsible adult drive you home. They should stay with you overnight as well.  If you don't have someone to stay with you, and you aren't safe to go home alone, we may keep you overnight. Insurance often won't pay for this.  After surgery  If it's hard to control your pain or you need more pain medicine, please call your surgeon's office.  Questions?   If you have any questions for your care team, list them here: _________________________________________________________________________________________________________________________________________________________________________ ____________________________________ ____________________________________ ____________________________________  For informational purposes only. Not to replace the advice of your health care provider. Copyright   2003, 2019 [employer]. All rights reserved. Clinically reviewed by Kelly Schneider MD. [employer] 270375 - REV 12/22.    How to Take Your Medication Before Surgery  - HOLD (do not take) ibuprofen for 1 day before the procedure   We will send you the blood test results . When you have more information about your dental work, please let us know    I added an antibiotic for you to take. It should help with some of the pain.

## 2024-01-23 NOTE — PROGRESS NOTES
Preoperative Evaluation  [facility]  [address]  [facility] [address]  Phone: [phone]  Primary Provider: Alana Clark  Pre-op Performing Provider: ALANA CLARK  Jan 23, 2024       Amira is a 59 year old, presenting for the following:  Pre-Op Exam      Surgical Information  Surgery/Procedure: Dental   Surgery Location: [facility]   Surgeon: Dr. Alan  Surgery Date: 2/9/24  Time of Surgery: TBD  Where patient plans to recover: At home alone  Fax number for surgical facility:     Assessment & Plan     The proposed surgical procedure is considered LOW risk.    Preop general physical exam      Chronic constipation  Will increase constulose   - CBC with platelets; Future  - Comprehensive metabolic panel (BMP + Alb, Alk Phos, ALT, AST, Total. Bili, TP); Future    Paresthesias  Check   - Vitamin B12; Future  - Vitamin D Deficiency; Future  - Parathyroid Hormone Intact; Future    CARLA (generalized anxiety disorder)      Chronic midline low back pain without sciatica    - Vitamin D Deficiency; Future  - Parathyroid Hormone Intact; Future    Personality disorder in adult (H)  Undefined can make communication more difficult     History of dental problems    - Vitamin D Deficiency; Future  - Parathyroid Hormone Intact; Future    Dental infection  Will treat   - amoxicillin (AMOXIL) 500 MG tablet; Take 1 tablet (500 mg) by mouth 3 times daily           Risks and Recommendations  The patient has the following additional risks and recommendations for perioperative complications:  Social and Substance:    - Social concerns that may affect postoperative recovery plan, including she lives alone    - Patient is taking medications for chronic pain    Antiplatelet or Anticoagulation Medication Instructions   - Patient is on no antiplatelet or anticoagulation medications.    Additional Medication Instructions   - ibuprofen (Advil, Motrin): HOLD 1 day before surgery.      Recommendation  APPROVAL GIVEN to proceed with proposed procedure, without further diagnostic evaluation.        Subjective       HPI related to upcoming procedure: dental issues needs an extraction but has not been able to be seen         1/23/2024    12:06 PM   Preop Questions   1. Have you ever had a heart attack or stroke? No   2. Have you ever had surgery on your heart or blood vessels, such as a stent placement, a coronary artery bypass, or surgery on an artery in your head, neck, heart, or legs? No   3. Do you have chest pain with activity? No   4. Do you have a history of  heart failure? No   5. Do you currently have a cold, bronchitis or symptoms of other infection? No   6. Do you have a cough, shortness of breath, or wheezing? No   7. Do you or anyone in your family have previous history of blood clots? No   8. Do you or does anyone in your family have a serious bleeding problem such as prolonged bleeding following surgeries or cuts? No   9. Have you ever had problems with anemia or been told to take iron pills? No   10. Have you had any abnormal blood loss such as black, tarry or bloody stools, or abnormal vaginal bleeding? No   11. Have you ever had a blood transfusion? No   12. Are you willing to have a blood transfusion if it is medically needed before, during, or after your surgery? Yes   13. Have you or any of your relatives ever had problems with anesthesia? No   14. Do you have sleep apnea, excessive snoring or daytime drowsiness? No   15. Do you have any artifical heart valves or other implanted medical devices like a pacemaker, defibrillator, or continuous glucose monitor? No   16. Do you have artificial joints? YES - hip   17. Are you allergic to latex? No   18. Is there any chance that you may be pregnant? No     Health Care Directive  Patient does not have a Health Care Directive or Living Will: Discussed advance care planning with patient; however, patient declined at this  time.    Preoperative Review of    reviewed - controlled substances reflected in medication list.      Status of Chronic Conditions:      Patient Active Problem List    Diagnosis Date Noted    Personality disorder in adult, unspecified 02/12/2022     Priority: Medium    Fall, initial encounter 02/07/2022     Priority: Medium    Closed right hip fracture (H) 02/06/2022     Priority: Medium    Closed fracture of right hip, initial encounter (H) 02/06/2022     Priority: Medium    Traumatic rhabdomyolysis, initial encounter (H24) 02/06/2022     Priority: Medium    Other plastic surgery for unacceptable cosmetic appearance 04/07/2015     Priority: Medium    CARDIOVASCULAR SCREENING; LDL GOAL LESS THAN 160 01/22/2014     Priority: Medium    S/P colon resection 04/25/2013     Priority: Medium     Had colon prolapse - had at least partial removal of her colon done at that time.        Liver lesion 04/25/2013     Priority: Medium    Anxiety 06/29/2012     Priority: Medium    Paresthesias 05/21/2012     Priority: Medium    Acne vulgaris 05/21/2012     Priority: Medium    Health Care Home 04/09/2013     Priority: Low     *See Letters for [facility] Care Plan: My Access Plan              History reviewed. No pertinent past medical history.  Past Surgical History:   Procedure Laterality Date    COLECTOMY WITHOUT COLOSTOMY      COLON SURGERY  1987 and 91    Colon removed     OPEN REDUCTION INTERNAL FIXATION HIP BIPOLAR Right 2/7/2022    Procedure: Bipolar Hemiarthroplasty Right Hip;  Surgeon: Kb Lizarraga MD;  Location: [facility] OR     Current Outpatient Medications   Medication Sig Dispense Refill    CONSTULOSE 10 GM/15ML solution take 15 to 30ml's orally three times a day 473 mL 3    docusate sodium (COLACE) 100 MG capsule Take 1 capsule (100 mg) by mouth 2 times daily as needed for constipation 60 capsule 0    ibuprofen (ADVIL/MOTRIN) 200 MG capsule Take 2 capsules (400 mg) by mouth every 6 hours as needed for fever 100  "capsule 3    ibuprofen (ADVIL/MOTRIN) 400 MG tablet Take 1 tablet (400 mg) by mouth every 8 hours as needed for moderate pain (4-6) 90 tablet 3    ibuprofen (ADVIL/MOTRIN) 600 MG tablet Take 1 tablet (600 mg) by mouth every 8 hours as needed for moderate pain 90 tablet 3    polyethylene glycol (MIRALAX) 17 GM/Dose powder one capful TWICE DAILY AS NEEDED 510 g 2    traMADol (ULTRAM) 50 MG tablet Take 1-2 tablets ( mg) by mouth 2 times daily as needed for severe pain 75 tablet 1    celecoxib (CELEBREX) 100 MG capsule Take 1 capsule (100 mg) by mouth 2 times daily (Patient not taking: Reported on 2024) 60 capsule 3    REFRESH RELIEVA 0.5-0.9 % ophthalmic solution 3 times daily (Patient not taking: Reported on 2024)      senna-docusate (SENEXON-S) 8.6-50 MG tablet Take 1 tablet by mouth 2 times daily as needed for constipation 180 tablet 1    Sennosides (SENNA) 8.8 MG/5ML SYRP Take 10 mLs by mouth 2 times daily 1800 mL 3       No Known Allergies     Social History     Tobacco Use    Smoking status: Former     Packs/day: 0.50     Years: 20.00     Additional pack years: 0.00     Total pack years: 10.00     Types: Cigarettes     Quit date: 2007     Years since quittin.8    Smokeless tobacco: Never   Substance Use Topics    Alcohol use: Yes     Comment: Red wine 2-3 times per week       History   Drug Use No         Review of Systems    Review of Systems  Constitutional, HEENT, cardiovascular, pulmonary, gi and gu systems are negative, except as otherwise noted.  Objective    /68 (BP Location: Right arm, Patient Position: Sitting, Cuff Size: Adult Regular)   Pulse 72   Temp 98.6  F (37  C) (Tympanic)   Wt 51.7 kg (114 lb)   LMP 2013   SpO2 100%   BMI 18.40 kg/m     Estimated body mass index is 18.4 kg/m  as calculated from the following:    Height as of 22: 1.676 m (5' 6\").    Weight as of this encounter: 51.7 kg (114 lb).  Physical Exam  GENERAL: alert and no distress  EYES: " Eyes grossly normal to inspection, PERRL and conjunctivae and sclerae normal  HENT: normal cephalic/atraumatic, oral mucous membranes moist, and dental caries and also swelling buccal mucosa 34 tooth  NECK: no adenopathy, no asymmetry, masses, or scars  RESP: lungs clear to auscultation - no rales, rhonchi or wheezes  CV: regular rate and rhythm, normal S1 S2, no S3 or S4, no murmur, click or rub, no peripheral edema  MS: no gross musculoskeletal defects noted, no edema    Recent Labs   Lab Test 02/10/22  0530 02/08/22  0543   HGB 9.1* 9.3*    153    136   POTASSIUM 3.6 3.6   CR 0.45* 0.45*      Results for orders placed or performed in visit on 01/23/24   CBC with platelets     Status: Abnormal   Result Value Ref Range    WBC Count 5.2 4.0 - 11.0 10e3/uL    RBC Count 3.67 (L) 3.80 - 5.20 10e6/uL    Hemoglobin 12.3 11.7 - 15.7 g/dL    Hematocrit 38.0 35.0 - 47.0 %     (H) 78 - 100 fL    MCH 33.5 (H) 26.5 - 33.0 pg    MCHC 32.4 31.5 - 36.5 g/dL    RDW 12.8 10.0 - 15.0 %    Platelet Count 223 150 - 450 10e3/uL   Comprehensive metabolic panel (BMP + Alb, Alk Phos, ALT, AST, Total. Bili, TP)     Status: Abnormal   Result Value Ref Range    Sodium 139 135 - 145 mmol/L    Potassium 4.5 3.4 - 5.3 mmol/L    Carbon Dioxide (CO2) 16 (L) 22 - 29 mmol/L    Anion Gap 12 7 - 15 mmol/L    Urea Nitrogen 9.2 8.0 - 23.0 mg/dL    Creatinine 0.56 0.51 - 0.95 mg/dL    GFR Estimate >90 >60 mL/min/1.73m2    Calcium 8.9 8.8 - 10.2 mg/dL    Chloride 111 (H) 98 - 107 mmol/L    Glucose 100 (H) 70 - 99 mg/dL    Alkaline Phosphatase 91 40 - 150 U/L    AST 20 0 - 45 U/L    ALT 14 0 - 50 U/L    Protein Total 7.4 6.4 - 8.3 g/dL    Albumin 4.4 3.5 - 5.2 g/dL    Bilirubin Total 0.2 <=1.2 mg/dL   Vitamin B12     Status: Abnormal   Result Value Ref Range    Vitamin B12 162 (L) 232 - 1,245 pg/mL   Vitamin D Deficiency     Status: Abnormal   Result Value Ref Range    Vitamin D, Total (25-Hydroxy) 8 (L) 20 - 50 ng/mL    Narrative     Season, race, dietary intake, and treatment affect the concentration of 25-hydroxy-Vitamin D. Values may decrease during winter months and increase during summer months.    Vitamin D determination is routinely performed by an immunoassay specific for 25 hydroxyvitamin D3.  If an individual is on vitamin D2(ergocalciferol) supplementation, please specify 25 OH vitamin D2 and D3 level determination by LCMSMS test VITD23.     Parathyroid Hormone Intact     Status: Abnormal   Result Value Ref Range    Parathyroid Hormone Intact 151 (H) 15 - 65 pg/mL    Narrative    This result was obtained with the Roche Elecsys PTH STAT assay.   This reference range differs from PTH assays used in other [facility] laboratories.         Diagnostics  Labs pending at this time.  Results will be reviewed when available.   No EKG required for low risk surgery (cataract, skin procedure, breast biopsy, etc).    Revised Cardiac Risk Index (RCRI)  The patient has the following serious cardiovascular risks for perioperative complications:   - No serious cardiac risks = 0 points     RCRI Interpretation: 0 points: Class I (very low risk - 0.4% complication rate)         Signed Electronically by: Alana Carbajal MD  Copy of this evaluation report is provided to requesting physician.

## 2024-01-25 ENCOUNTER — TELEPHONE (OUTPATIENT)
Dept: FAMILY MEDICINE | Facility: CLINIC | Age: 59
End: 2024-01-25

## 2024-01-25 DIAGNOSIS — K04.7 DENTAL INFECTION: Primary | ICD-10-CM

## 2024-01-25 RX ORDER — LACTULOSE 10 G/15ML
30 SOLUTION ORAL 3 TIMES DAILY
Qty: 946 ML | Refills: 3 | Status: SHIPPED | OUTPATIENT
Start: 2024-01-25 | End: 2024-03-12

## 2024-01-25 NOTE — TELEPHONE ENCOUNTER
Most dentist do not do the work she needs done in the or. The one place referred her to [facility] which apparently has this capacity. Olivier , then try the [facility] . Alana Carbajal M.D.

## 2024-01-25 NOTE — TELEPHONE ENCOUNTER
I put I the dental referral for the [facility]. I am not sure how long the wait will be . Alana Carbajal M.D.

## 2024-01-25 NOTE — TELEPHONE ENCOUNTER
Pt called with concerns of her mouth pain. She was seen by PCP 1/23/24, and is still waiting on antibiotic to get delivered to her house.    Pt is worried due to the pain she is experiencing.    The broken molar is rubbing against tongue again and causing significant pain and a lump on tongue.    Pt calling to see if you can you refer pt to a dental office.    Will route to PCP to advise, pt would like a call back.    ROXANNE Stinson.

## 2024-01-25 NOTE — TELEPHONE ENCOUNTER
Noted. RN called patient. She stated that she called [facility] and they cannot do everything that she needs done.    I gave her the appointment number and the emergency number for [facility] dental department. Can you place a referral for [facility]?    Patient wants Dr. Carbajal to know that she is very scared especially with Dr. Carbajal being off tomorrow and the weekend. Giving her other options to call really isn't helping much. She said the only reason she is able to talk right now is because she has been taking ibuprofen 800mg around the clock. She is still having pain.  She has not received her Abx in the mail yet. She is hoping to get them today or tomorrow.     She said she has been tempted to go to the ED. Patient asked if the ED would even do anything. RN told her most times they provide pain control and possibly an antibiotic but unsure how much they would be able to do.     Suha Dawkins RN on 1/25/2024 at 1:26 PM

## 2024-01-26 ENCOUNTER — TELEPHONE (OUTPATIENT)
Dept: FAMILY MEDICINE | Facility: CLINIC | Age: 59
End: 2024-01-26
Payer: COMMERCIAL

## 2024-01-26 DIAGNOSIS — S72.091A OTH FRACTURE OF HEAD AND NECK OF RIGHT FEMUR, INIT (H): ICD-10-CM

## 2024-01-26 RX ORDER — POLYETHYLENE GLYCOL 3350 17 G/17G
POWDER, FOR SOLUTION ORAL
Qty: 510 G | Refills: 3 | Status: SHIPPED | OUTPATIENT
Start: 2024-01-26 | End: 2024-02-29

## 2024-01-26 RX ORDER — DOCUSATE SODIUM 100 MG/1
100 CAPSULE, LIQUID FILLED ORAL 2 TIMES DAILY PRN
Qty: 180 CAPSULE | Refills: 3 | Status: SHIPPED | OUTPATIENT
Start: 2024-01-26

## 2024-01-26 NOTE — TELEPHONE ENCOUNTER
This needs to come from the dentist she saw. They have all of the xrays and notes to refer her. Alana Carbajal M.D.

## 2024-01-26 NOTE — TELEPHONE ENCOUNTER
Routing refill request to provider for review/approval because:  Patient needs to be seen because:  greater than 1 year since annual physical.          Requested Prescriptions           Seb Castro RN 01/26/24 8:11 AM

## 2024-01-26 NOTE — TELEPHONE ENCOUNTER
Medication Question or Refill    Contacts         Type Contact Phone/Fax    01/26/2024 03:00 AM CST Phone (Incoming) Amira Scherer (Self) [phone] (H)            What medication are you calling about (include dose and sig)?: docusate sodium (COLACE) 100 MG capsule       Preferred Pharmacy:       [facility] - [address] [address]  Phone: [phone] Fax: [phone]      Controlled Substance Agreement on file:   CSA -- Patient Level:    CSA: None found at the patient level.       Who prescribed the medication?: Suha Dawkins, RN       Do you need a refill? Yes    When did you use the medication last? Daily    Patient offered an appointment? No    Do you have any questions or concerns?  No      Okay to leave a detailed message?: Yes at Cell number on file:    Telephone Information:   Mobile [phone]

## 2024-01-26 NOTE — TELEPHONE ENCOUNTER
[employer] secret[employer]. Are you able to help patient with this?   Suha Dawkins RN on 1/26/2024 at 12:35 PM

## 2024-01-26 NOTE — TELEPHONE ENCOUNTER
Order/Referral Request    Who is requesting: patient    Orders being requested: New Info for Dental referral,     Reason service is needed/diagnosis: dental work    When are orders needed by: asap    Has this been discussed with Provider: Yes    Does patient have a preference on a Group/Provider/Facility?  [facility]    Does patient have an appointment scheduled?: Yes:     Where to send orders:  Go to [facility] and fill out referral to Specialty Team    Okay to leave a detailed message?: Yes at Home number on file [phone] (home)

## 2024-01-28 RX ORDER — ERGOCALCIFEROL 1.25 MG/1
50000 CAPSULE, LIQUID FILLED ORAL WEEKLY
Qty: 12 CAPSULE | Refills: 1 | Status: SHIPPED | OUTPATIENT
Start: 2024-01-28 | End: 2024-04-18

## 2024-01-28 RX ORDER — LANOLIN ALCOHOL/MO/W.PET/CERES
2000 CREAM (GRAM) TOPICAL DAILY
Qty: 90 TABLET | Refills: 3 | Status: SHIPPED | OUTPATIENT
Start: 2024-01-28 | End: 2024-04-09

## 2024-01-29 NOTE — TELEPHONE ENCOUNTER
I do not have what the referral needs. I don't have dental xrays or even know what needs to be done. She needs to call [facility] dental and have them refer her to [facility]  . I cannot fill out this form for her. Alana Carbajal M.D.

## 2024-01-29 NOTE — TELEPHONE ENCOUNTER
Received call from Patient.  Relayed Dr Carbajal's message.   Regarding Vit D and B 12 and PTH, lab results  Patient verbalized understanding.'    Message regarding sedation Dentistry.  Patient states that there is an on line form for  to fill out.  Relayed that Patient needs to call her Dentist and have them fill out on line form, per Dr Carbajal  Patient states that she thought that Dr Carbajal was going to do it for her.  Again recommended that Patient call her dentist to have them fill out forms.  Patient just keeps saying she did not know how to do that.  Kept telling Patient to call her Dentist and have them fill out forms, as they have the information to fill out the forms.  Patient keeps saying that Dr Carbajal is supposed to help her.  Patient states that she has to go.  Unsure if Patient will call dentist.  Wants Dr Carbajal to help her.  Seb Castro RN

## 2024-01-30 NOTE — TELEPHONE ENCOUNTER
Call placed to Patient.  Patient has extreme anxiety about calling [facility] and going to the [facility] dentistry speciality clinic.  Relayed Dr Carbajal's message again about needing to call [facility] and have them fill out the on line referral.  This writer stated that she would call [facility] and recommended referral be placed.   Patient states that she does not know if they will do everything that she needs to have done.  Recommended that Patient call dentistry school and see if it is appropriate.  Number given to patient for the [facility] dental school.  Patient verbalized understanding.    Answered questions regarding Vitamin D and Vitamin B 12.    Call placed to [facility].   No answer.  Left message and call back number for them to return call.  Seb Castro RN

## 2024-01-30 NOTE — TELEPHONE ENCOUNTER
Received call back from Nilson at [facility].  [facility]erral to [facility]  requested from Clinic  Nilson states that Dr Alan is out of clinic today and will be back tomorrow.  Will put referral in ASAP.  Seb Castro RN

## 2024-01-30 NOTE — TELEPHONE ENCOUNTER
Patient called back wanted this writer to know that Alysa Meehan referred her to [facility] and that they do not do restorative treatments.  Patient has cavities, so what what good would that referral be.  Updated Patient regarding message left with Alysa meehan.  Seb Castro RN

## 2024-02-01 NOTE — TELEPHONE ENCOUNTER
Patient called saying she was expecting a call from Seb OLIVEIRA. Patient stated she has information for Seb OLIVEIRA. Patient refused to give the information to this RN. Only wants to speak with Seb. Seb OLIVEIRA is not in clinic today.   Patient would like a call back tomorrow from Seb OLIVEIRA.  Suha Dawkins RN on 2/1/2024 at 9:48 AM

## 2024-02-02 NOTE — TELEPHONE ENCOUNTER
Call placed to Patient.relayed to Patient that [facility] was going to fill out on line referral.  Patient expressing anxiety about going to the [facility].  States that her tooth is feeling a little better after starting ABX.  States that she started B12 and Vitamin D on Wednesday.  Patient states that she forgot and took vit D again on Thursday.  Realized mistake about 2 hours later.  Recommended that Patient put grant on top of bottle to identify that it is only once a week medication.  Verbalized understanding.  Wondering about lab tests in 3 months to check Vit B12, Vit D and PTH levels.  Relayed that no orders placed at this time.  Wondering if she should schedule appointment with Dr eubanks to get them ordered.  Relayed to Patient that she can have labs drawn at any [place] lab and Dr will get the results.  Verbalized understanding.  Seb Castro RN

## 2024-02-06 NOTE — TELEPHONE ENCOUNTER
We can reconnect when she is starting with the dental surgeries. She may need to have preop or other things done in the next few weeks. Alana Carbajal M.D.

## 2024-02-07 NOTE — TELEPHONE ENCOUNTER
Call placed to [facility].  They state that the referral for the [facility] was submitted on 1/31/24 at 0851.  Stated that it could take 5-7 business days to receive message regarding referral.  Said that Patient should hear back from [facility] by Monday.    Call placed to Patient.  Relayed above information.  Verbalized understanding.    Seb Castro RN

## 2024-02-14 NOTE — PROGRESS NOTES
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     06/21/22 1400   Appointment Info   Signing clinician's name / credentials Regi Prado, PT, DPT, SCS   Visits Used 5   Progress Note/Certification   Progress Note Due Date 06/21/22   Progress Note Completed Date 06/21/22   Subjective Report   Subjective Report Arrives today 15 min late due to bus situation.  Overall improving.   Objective Measures   Objective Measures Objective Measure 1;Objective Measure 2;Objective Measure 3   Objective Measure 1   Objective Measure gait   Details no deviations   Objective Measure 2   Objective Measure sitting endurance   Details 30 min - with LBP, not hip pain   Treatment Interventions (PT)   Interventions Therapeutic Procedure/Exercise   Therapeutic Procedure/Exercise   Therapeutic Procedures: strength, endurance, ROM, flexibillity minutes (46407) 32   Skilled Intervention hip and LE strength   Patient Response/Progress pain free   Education   Learner/Method Patient   Plan   Homework ptrx [id number]   Plan for next session last session in 1 month, progress global hip and LE strength   Medicare Claim Information   Medical Diagnosis right bipolar hemiarthroplasty on 2/7/22   PT Diagnosis s/p right hip hemiarthroplasty following closed femoral fracture   Ortho Goal 1   Goal Description Patient will be able to walk community distances without an assistive device.   Target Date 06/21/22   Date Met 06/21/22   Ortho Goal 2   Goal Description Patient will be able to sit for >=30 min without hip pain.   Target Date 06/07/22   Goal Progress unable due to old injury, not hip   Date Met 06/21/22   Ortho Goal 3   Goal Description Patient will be able to cut toenails without difficulty.   Target Date 08/02/22   Goal Progress some difficulty   Session Number   Authorization status Medicaid (cert printed and faxed)

## 2024-02-15 ENCOUNTER — TELEPHONE (OUTPATIENT)
Dept: DERMATOLOGY | Facility: CLINIC | Age: 59
End: 2024-02-15
Payer: COMMERCIAL

## 2024-02-15 NOTE — TELEPHONE ENCOUNTER
[facility] Call Center    Phone Message    May a detailed message be left on voicemail: yes     Reason for Call: Other: Pt called and would like to speak to Arina or Suha or whoever is available tomorrow between 8:30-10am regarding some new and old stuff that is happening to the pt. Pt also would like to talk about the laser machine. Pt said that someone was suppose to contact her once it's up and going but she has not heard back from anyone. Please call her back. Thanks        Action Taken: Message routed to:  Other: [facility] [facility]    Travel Screening: Not Applicable

## 2024-02-16 NOTE — TELEPHONE ENCOUNTER
Patient Contact    Attempt # 2    Was call answered?  Yes    Called patient. Patient was very overjoyed to her from us. Writer informed patient that the [employer] laser training needed to be rescheduled due to equipment issues. The training has not be rescheduled yet. Writer informed patient that she is on the waiting  list, so as soon it is ready we will contact her.     Patient did state that she would like to set up an appointment for dry itchy skin on her face, hands and chest. Writer informed patient to try moisturizing twice daily. All other questions were answered. Patient was scheduled with Dr. Urias for itchy skin. Writer made sure to let patient know that this appointment is not for the laser. Patient stated that she would like to added to wait list for this appointment as well.     Suha Arthur LPN   [facility]   [phone]

## 2024-02-16 NOTE — TELEPHONE ENCOUNTER
"Spoke to patient and advised Provider is out of the office until Wednesday.     She is asking about getting scheduled for another Laser appt and is \"on a list and someone was supposed to call me, but I haven't heard back..\"     Please call patient and update her as to when she can come back in for next cosmetic laser treatment.     SEE NOTE BELOW.    Stefany Clark RN      "

## 2024-02-28 DIAGNOSIS — S72.091A OTH FRACTURE OF HEAD AND NECK OF RIGHT FEMUR, INIT (H): ICD-10-CM

## 2024-02-28 DIAGNOSIS — Z87.81 STATUS POST CLOSED FRACTURE OF HIP: ICD-10-CM

## 2024-02-29 RX ORDER — POLYETHYLENE GLYCOL 3350 17 G/17G
POWDER, FOR SOLUTION ORAL
Qty: 510 G | Refills: 3 | Status: SHIPPED | OUTPATIENT
Start: 2024-02-29 | End: 2024-06-24

## 2024-02-29 RX ORDER — TRAMADOL HYDROCHLORIDE 50 MG/1
50-100 TABLET ORAL 2 TIMES DAILY PRN
Qty: 75 TABLET | Refills: 1 | Status: SHIPPED | OUTPATIENT
Start: 2024-02-29 | End: 2024-04-18

## 2024-02-29 RX ORDER — IBUPROFEN 200 MG/1
TABLET, FILM COATED ORAL
Qty: 100 TABLET | Refills: 3 | Status: SHIPPED | OUTPATIENT
Start: 2024-02-29 | End: 2024-06-24

## 2024-03-12 ENCOUNTER — TELEPHONE (OUTPATIENT)
Dept: FAMILY MEDICINE | Facility: CLINIC | Age: 59
End: 2024-03-12
Payer: COMMERCIAL

## 2024-03-12 DIAGNOSIS — K59.04 CHRONIC IDIOPATHIC CONSTIPATION: ICD-10-CM

## 2024-03-12 RX ORDER — LACTULOSE 10 G/15ML
45 SOLUTION ORAL 3 TIMES DAILY
Qty: 6075 ML | Refills: 3 | Status: SHIPPED | OUTPATIENT
Start: 2024-03-12 | End: 2024-04-11

## 2024-03-12 NOTE — TELEPHONE ENCOUNTER
Patient called today to follow up on her dental referral. She stated 4 weeks ago she was told that the dental clinic would be reaching out to her the following Monday. She never heard anything from them.  RN reviewed referral and it does have instructions for the patient to call to schedule.   RN offered her to number to call.  Patient would like to speak with Seb OLIVEIRA. Patient was transferred.     Suha Dawkins RN on 3/12/2024 at 9:52 AM

## 2024-03-12 NOTE — TELEPHONE ENCOUNTER
Received call back from Patient states that she called the [facility] and was on the phone for 1 hour AND 15 minutes on the phone.  Was transferred 3 times.  Stating that she is done for the day, can't be on the phone any more today.  Patient again states that she wants more quantity of the contalose.  Recommended that Patient lay down and take her mind off of the [facility] for now.  Verbalized understanding.  Seb Castro RN

## 2024-03-12 NOTE — TELEPHONE ENCOUNTER
Call transferred to this writer.  Patient states that it has been 4 weeks since paper work was filled out and sent to [facility] and she has not heard from them yet.  With a lot of encouragement, patient will allie [facility] and try to get appointment scheduled.  Patient keeps worrying about how she will get there.  Spoke with Patient about getting the appointment first.    Patient states that she has been taking the vitamin B12 and Vitamin D.  Has about 5 weeks left.  Wants to know what to do after she is done with the 12 weeks of medications?  Does she need repeat labs done?  Patient needs refill on senna syrup.  Patient asked for increase in Constalose solution.  Last refill from pharmacy she only got 1 bottle instead of 2.  States that she wanted increase not decrease.  Tried to explain to patient that she is taking 45 mls instead of 15-30 ml 3 times daily.  Patient keeps thinking that she is getting less because she is only getting 1 bottle.  Does state that constalose is working.  Seb Castro RN

## 2024-03-13 ENCOUNTER — TELEPHONE (OUTPATIENT)
Dept: FAMILY MEDICINE | Facility: CLINIC | Age: 59
End: 2024-03-13
Payer: COMMERCIAL

## 2024-03-13 DIAGNOSIS — E53.8 VITAMIN B12 DEFICIENCY (NON ANEMIC): ICD-10-CM

## 2024-03-13 DIAGNOSIS — K59.01 SLOW TRANSIT CONSTIPATION: ICD-10-CM

## 2024-03-13 RX ORDER — SENNOSIDES 8.8 MG/5ML
LIQUID ORAL
Qty: 1800 ML | Refills: 3 | Status: SHIPPED | OUTPATIENT
Start: 2024-03-13 | End: 2024-04-18

## 2024-03-13 NOTE — TELEPHONE ENCOUNTER
"Call transferred to this writer.  Patient needs refill on senna syrup.    Patient on the other phone trying to get a hold of the  of [facility] stated that they could not take her as a Patient.  [facility] recommended that she be in a bed as patient not able to sit in dental chair.  The [facility] told [facility] that [facility] was supposed to call her to recommend other places for referral.  [facility] did not call her and this is making Patient more upset.  Patient states \"I can not take it any more\"  Wanting someone else to do it for her.  Relayed to Patient that she needed to do this, that it is not appropriate/ HIPAA violation for staff to do it for her.  Patient does not want to talk about this any more today and wanted to get off the phone.  Hung up with Patient.  Seb Castro RN      "

## 2024-03-13 NOTE — TELEPHONE ENCOUNTER
I sent the sent into the pharmacy.  I do not know how to help her with her dental issues.  She does need to do this herself we will not be doing it for her. Alana Carbajal M.D.

## 2024-04-09 RX ORDER — LANOLIN ALCOHOL/MO/W.PET/CERES
2000 CREAM (GRAM) TOPICAL DAILY
Qty: 180 TABLET | Refills: 2 | Status: SHIPPED | OUTPATIENT
Start: 2024-04-09 | End: 2024-09-17

## 2024-04-09 NOTE — TELEPHONE ENCOUNTER
Yes she can skip her next dose of vitamin D.  I believe I already sent in the senna for her but I will resend it.  I do not know anything about either of those dental offices.Alana Carbajal M.D.

## 2024-04-09 NOTE — TELEPHONE ENCOUNTER
Amira calls very unhappy that she never received Dr Carbajal's message below. She states she is scared and needs help with this. I advised her to find a dentist that does sedation dentistry and she states she does not know how to google this so I googled this for her and gave her three names of dental offices that look like they do sedation dentistry here in [place] was requesting [place]. [facility] [place], [facility], and [facility] [place]. She wants me to ask Dr Carbajal if she knows if one is better than the others? She also states she was told by [facility] that she cannot fill her vitamin D2 (ERGOCALCIFEROL) 53749 units (1250 mcg) capsule as it is too early because when she first starting taking it she took one on Wednesday and then accidentally took one the next day instead of the next week, she is wondering if it is ok to skip one dose/week so she gets caught back up or does she need to ask [facility] to contact her insurance to see if they will do an early fill due to this?  I also resent her B-12 tablets to [facility] with #180 instead of the #90 that was sent as she takes this twice daily. Thank you!    Alayna Quinn RN

## 2024-04-09 NOTE — TELEPHONE ENCOUNTER
Patient calling back again requesting a reputable dentist from Dr. Carbajal. I discussed message below and offered to transfer to nurse line and patient declined.    Oscar Borges, KIM Kirby

## 2024-04-09 NOTE — ADDENDUM NOTE
Addended by: GEMA PRATHER on: 4/9/2024 11:21 AM     Modules accepted: Orders     pt's sob is positional and with elevated pro bnp, risk of pulmonary embolism is unlikely pt cta chest and ct iv contrast abd/pelvis are cancelled due to bun/cr 36/1.83. pt's sob is positional and with elevated pro bnp [id number], risk of pulmonary embolism is unlikely pt cta chest and ct iv contrast abd/pelvis are cancelled due to bun/cr 36/1.83. Now pt sts she was seen at [facility] for the sob, and abd pain was recommended ct scan but she refused and left the hospital this morning, Pt sts she has a hx of chf takes lasix 40 mg qd Pt sts the 40 mg is not working for her it does not make her urinate She sts she used to take 80 mg. Now pt sts she was seen at [facility] for the sob, and abd pain was recommended ct scan but she refused and left the hospital this morning, Pt sts she has a hx of chf takes lasix 40 mg qd Pt sts the 40 mg is not working for her it does not make her urinate She sts she used to take 80 mg. Pt showed the d-dimer was 1600 and bnp was >83500 from [facility] on her cell phone. Dr. Gambino is notified and admit pt. ct abd/pelvis show large left adnexal tenderness us of pelvis is ordered. +

## 2024-04-18 ENCOUNTER — TELEPHONE (OUTPATIENT)
Dept: FAMILY MEDICINE | Facility: CLINIC | Age: 59
End: 2024-04-18

## 2024-04-18 ENCOUNTER — VIRTUAL VISIT (OUTPATIENT)
Dept: FAMILY MEDICINE | Facility: CLINIC | Age: 59
End: 2024-04-18
Payer: COMMERCIAL

## 2024-04-18 DIAGNOSIS — E55.9 VITAMIN D DEFICIENCY: ICD-10-CM

## 2024-04-18 DIAGNOSIS — Z12.31 VISIT FOR SCREENING MAMMOGRAM: Primary | ICD-10-CM

## 2024-04-18 DIAGNOSIS — Z87.81 STATUS POST CLOSED FRACTURE OF RIGHT FEMUR: ICD-10-CM

## 2024-04-18 DIAGNOSIS — K59.01 SLOW TRANSIT CONSTIPATION: ICD-10-CM

## 2024-04-18 DIAGNOSIS — F60.9 PERSONALITY DISORDER IN ADULT (H): Chronic | ICD-10-CM

## 2024-04-18 PROCEDURE — 99443 PR PHYSICIAN TELEPHONE EVALUATION 21-30 MIN: CPT | Mod: 93 | Performed by: FAMILY MEDICINE

## 2024-04-18 RX ORDER — SENNOSIDES 8.8 MG/5ML
LIQUID ORAL
Qty: 1800 ML | Refills: 3 | Status: SHIPPED | OUTPATIENT
Start: 2024-04-18 | End: 2024-06-24

## 2024-04-18 RX ORDER — ERGOCALCIFEROL 1.25 MG/1
50000 CAPSULE, LIQUID FILLED ORAL WEEKLY
Qty: 12 CAPSULE | Refills: 1 | Status: SHIPPED | OUTPATIENT
Start: 2024-04-18 | End: 2024-07-11

## 2024-04-18 RX ORDER — LACTULOSE 10 G/15ML
10 SOLUTION ORAL
COMMUNITY
Start: 2024-04-14 | End: 2024-04-18

## 2024-04-18 RX ORDER — TRAMADOL HYDROCHLORIDE 50 MG/1
50-100 TABLET ORAL EVERY 6 HOURS PRN
Qty: 90 TABLET | Refills: 1 | Status: SHIPPED | OUTPATIENT
Start: 2024-04-18 | End: 2024-06-24 | Stop reason: DRUGHIGH

## 2024-04-18 RX ORDER — LACTULOSE 10 G/15ML
20 SOLUTION ORAL 2 TIMES DAILY PRN
Qty: 946 ML | Refills: 3 | Status: SHIPPED | OUTPATIENT
Start: 2024-04-18 | End: 2024-06-24

## 2024-04-18 NOTE — PROGRESS NOTES
Amira is a 59 year old who is being evaluated via a billable telephone visit.    What phone number would you like to be contacted at? [phone]   How would you like to obtain your AVS? Mail a copy  Originating Location (pt. Location): Home    Distant Location (provider location):  On-site    Assessment & Plan     Vitamin D deficiency  Refilled   - vitamin D2 (ERGOCALCIFEROL) 70604 units (1250 mcg) capsule; Take 1 capsule (50,000 Units) by mouth once a week    Oth fracture of head and neck of right femur, init (H)  Cont to have pain   - traMADol (ULTRAM) 50 MG tablet; Take 1-2 tablets ( mg) by mouth every 6 hours as needed for severe pain or pain    Visit for screening mammogram    - MA SCREENING DIGITAL BILAT - Future  (s+30); Future    Slow transit constipation  Cont this and miralax as needed   - CONSTULOSE 10 GM/15ML solution; Take 30 mLs (20 g) by mouth 2 times daily as needed for constipation  - Sennosides (SENNA) 8.8 MG/5ML SYRP; Take 10 mLs by mouth 2 times daily      5. Personality disorder in adult, unspecifie  We discussed her dental care at length she does not want to call or look up any phone numbers. She is asking that I or staff do this for her. All of us have reinforced that she needs to do this herself. Sh eis also still upset about the letter [facility] send her saying they could not take care of her issues. She lacks the insight to see how her behavior isolates her and can cause anxiety in others.               Follow up with dentist     Subjective   Amira is a 59 year old, presenting for the following health issues:  Recheck Medication    She is talking   HPI     The Constulose is helping. Seems like she is running out.     Left leg swelling - few weeks.   She has more pain and she is constipated and she is worried about weight gain       She is still upset that her teeth have not been taken care of but she has not been calling or trying to get an appointment because she thinks that the  [place] office should do all of this for her. We are not going to do this. S he has a phone and can call places herself.     Ros she has a little bit of nearly everything       Objective           Vitals:  No vitals were obtained today due to virtual visit.    Physical Exam   General: Alert and no distress //Respiratory: No audible wheeze, cough, or shortness of breath // Psychiatric:  Appropriate affect, tone, and pace of words            Phone call duration:30 minutes spent on the day of service with chart review, telephone call, counseling, and charting. Alana Carbajal M.D.    Signed Electronically by: Alana Carbajal MD

## 2024-04-19 NOTE — TELEPHONE ENCOUNTER
RN tried to reach patient.   No answer.   LVM to call back to [phone].   Suha Dawkins RN on 4/19/2024 at 3:07 PM

## 2024-04-19 NOTE — TELEPHONE ENCOUNTER
General Call    Contacts         Type Contact Phone/Fax    04/18/2024 11:06 PM CDT Phone (Incoming) Amira Scherer (Self) [phone] (H)          Reason for Call:  Medical question    What are your questions or concerns:  Patient called, would like to ask if she will be taking the same dosage amount for vitamin B12 & D. Please call and advise.    Date of last appointment with provider: 04/18/24    Okay to leave a detailed message?: Yes at Cell number on file:    No relevant phone numbers on file.

## 2024-04-21 ENCOUNTER — HOSPITAL ENCOUNTER (EMERGENCY)
Facility: CLINIC | Age: 59
Discharge: HOME | End: 2024-04-21
Attending: FAMILY MEDICINE | Admitting: FAMILY MEDICINE
Payer: COMMERCIAL

## 2024-04-21 VITALS
DIASTOLIC BLOOD PRESSURE: 96 MMHG | TEMPERATURE: 97.7 F | HEIGHT: 66 IN | SYSTOLIC BLOOD PRESSURE: 160 MMHG | OXYGEN SATURATION: 97 % | WEIGHT: 114 LBS | RESPIRATION RATE: 16 BRPM | HEART RATE: 81 BPM | BODY MASS INDEX: 18.32 KG/M2

## 2024-04-21 DIAGNOSIS — K08.89 PAIN, DENTAL: ICD-10-CM

## 2024-04-21 DIAGNOSIS — K04.7 DENTAL INFECTION: ICD-10-CM

## 2024-04-21 DIAGNOSIS — K02.9 DENTAL CARIES: ICD-10-CM

## 2024-04-21 PROCEDURE — 99284 EMERGENCY DEPT VISIT MOD MDM: CPT | Performed by: FAMILY MEDICINE

## 2024-04-21 PROCEDURE — 250N000013 HC RX MED GY IP 250 OP 250 PS 637: Performed by: FAMILY MEDICINE

## 2024-04-21 RX ORDER — OXYCODONE HYDROCHLORIDE 5 MG/1
5 TABLET ORAL EVERY 4 HOURS PRN
Status: DISCONTINUED | OUTPATIENT
Start: 2024-04-21 | End: 2024-04-21 | Stop reason: HOSPADM

## 2024-04-21 RX ORDER — ACETAMINOPHEN 500 MG
1000 TABLET ORAL ONCE
Status: COMPLETED | OUTPATIENT
Start: 2024-04-21 | End: 2024-04-21

## 2024-04-21 RX ORDER — IBUPROFEN 400 MG/1
800 TABLET, FILM COATED ORAL ONCE
Status: COMPLETED | OUTPATIENT
Start: 2024-04-21 | End: 2024-04-21

## 2024-04-21 RX ORDER — PENICILLIN V POTASSIUM 500 MG/1
500 TABLET, FILM COATED ORAL ONCE
Status: COMPLETED | OUTPATIENT
Start: 2024-04-21 | End: 2024-04-21

## 2024-04-21 RX ORDER — OXYCODONE HYDROCHLORIDE 5 MG/1
5 TABLET ORAL EVERY 6 HOURS PRN
Qty: 12 TABLET | Refills: 0 | Status: SHIPPED | OUTPATIENT
Start: 2024-04-21 | End: 2024-04-24

## 2024-04-21 RX ORDER — PENICILLIN V POTASSIUM 500 MG/1
500 TABLET, FILM COATED ORAL 4 TIMES DAILY
Qty: 40 TABLET | Refills: 0 | Status: SHIPPED | OUTPATIENT
Start: 2024-04-21 | End: 2024-05-01 | Stop reason: SINTOL

## 2024-04-21 RX ADMIN — OXYCODONE HYDROCHLORIDE 5 MG: 5 TABLET ORAL at 06:58

## 2024-04-21 RX ADMIN — PENICILLIN V POTASSIUM 500 MG: 500 TABLET, FILM COATED ORAL at 06:50

## 2024-04-21 RX ADMIN — ACETAMINOPHEN 1000 MG: 500 TABLET, FILM COATED ORAL at 06:50

## 2024-04-21 RX ADMIN — IBUPROFEN 800 MG: 400 TABLET ORAL at 06:50

## 2024-04-21 ASSESSMENT — ACTIVITIES OF DAILY LIVING (ADL)
ADLS_ACUITY_SCORE: 38
ADLS_ACUITY_SCORE: 38

## 2024-04-21 ASSESSMENT — COLUMBIA-SUICIDE SEVERITY RATING SCALE - C-SSRS
2. HAVE YOU ACTUALLY HAD ANY THOUGHTS OF KILLING YOURSELF IN THE PAST MONTH?: NO
1. IN THE PAST MONTH, HAVE YOU WISHED YOU WERE DEAD OR WISHED YOU COULD GO TO SLEEP AND NOT WAKE UP?: NO
6. HAVE YOU EVER DONE ANYTHING, STARTED TO DO ANYTHING, OR PREPARED TO DO ANYTHING TO END YOUR LIFE?: NO

## 2024-04-21 NOTE — ED TRIAGE NOTES
Patient reports upper and lower dental pain on right side. Has not been to the dentist due to transportation issues.     Triage Assessment (Adult)       Row Name 04/21/24 0600          Triage Assessment    Airway WDL WDL        Respiratory WDL    Respiratory WDL WDL        Skin Circulation/Temperature WDL    Skin Circulation/Temperature WDL WDL        Cardiac WDL    Cardiac WDL WDL        Peripheral/Neurovascular WDL    Peripheral Neurovascular WDL WDL        Cognitive/Neuro/Behavioral WDL    Cognitive/Neuro/Behavioral WDL WDL

## 2024-04-21 NOTE — DISCHARGE INSTRUCTIONS
Tylenol 650 mg - 1 g p.o. every 4 hours as needed for pain maximum 4 g in 24 hours.  Ibuprofen 400-600 mg p.o. 3 times daily with food maximum 400 mg in 24 hours.  Penicillin as directed x 10 days.  Oxycodone as directed for breakthrough pain short-term use only.  Please contact a dentist for follow-up as soon as possible.  The following is a list of dental providers that may be of some service to you:  Many of these clinics offer a sliding fee option for patients that qualify, and see patients on a walk-in or same day basis. Please call each clinic directly. As services, hours, fees and policies vary greatly.          [facility], [facility]  [phone]  Sees no insurance  [facility], [place]  [phone]  Preventive services only  [facility] (mult loc) [phone]  [facility]    ([facility]), [facility]  [phone]  [facility]       [phone]  Preventive services only  [facility]  [phone]  Accepts MA & sees no ins  [facility],      Accepts MA & sees no ins   [place]   [phone]; [phone]  [facility]   Accepts MA & sees no ins       [phone]  [facility], [facility]  [phone]   Accepts MA emergencies  [facility] [phone]  [facility],     Accepts [facility]   [phone]    [facility] [phone]  Accepts MA & sees no ins   [facility]   Dental Clinic    [phone]  [facility], [facility]  [phone]   [facility] [phone]  [facility]  Preventive services only   [place]   [phone]  [facility] [phone]  [facility], [employer]  [phone]  Same day [facility] [phone]  Same day [facility],      [facility]  day apts   [place]   [phone]    [facility], [facility] [phone]  Free clinic, walk-in only  [facility] (multiple locations) [phone]      [facility] [phone]    [facility] [phone]  Free clinic, walk-in only  [facility]  [phone]  [facility] [phone] (adults)       [phone] (children)  [facility] [phone]    Also, referral service for low cost dental and healthcare: [phone]  And [phone]

## 2024-04-21 NOTE — ED PROVIDER NOTES
History     Chief Complaint   Patient presents with    Dental Pain     HPI  Amira Scherer is a 59 year old female, past medical history is significant for chronic continuous use of opioids, personality disorder, anxiety, acne vulgaris, presents to the emergency department with concerns of dental pain that has been ongoing for at least the last couple of years as the patient references to the time of her right hip fracture 2/6/2022.  She states that she has been to see her physician multiple times.  She has had difficulties getting into see a dentist and states that she had an appointment to see 1 back in August 2023 and they canceled the appointment and rescheduled it for this February which she did not go to.  She arrives today by EMS with concerns of severe right-sided dental pain affecting multiple areas including to upper teeth and likely to bottom teeth both on the right side of her mouth.  Associated right-sided facial swelling.  The patient is a very vague and somewhat evasive historian, it is not clear exactly how long this has been going on for likely at least a few weeks to longer.  Today she was unable to tolerate the pain anymore and states that she has no vehicle to get her to appointments or to the emergency department so she called for an ambulance to bring her in.  She had ibuprofen about 4 and half hours ago and would like something more for pain now.      Allergies:  No Known Allergies    Problem List:    Patient Active Problem List    Diagnosis Date Noted    Chronic, continuous use of opioids - F11.9 01/23/2024     Priority: Medium    Personality disorder in adult, unspecified 02/12/2022     Priority: Medium    Fall, initial encounter 02/07/2022     Priority: Medium    Closed right hip fracture (H) 02/06/2022     Priority: Medium    Closed fracture of right hip, initial encounter (H) 02/06/2022     Priority: Medium    Traumatic rhabdomyolysis, initial encounter (H24) 02/06/2022     Priority:  Medium    Other plastic surgery for unacceptable cosmetic appearance 2015     Priority: Medium    CARDIOVASCULAR SCREENING; LDL GOAL LESS THAN 160 2014     Priority: Medium    S/P colon resection 2013     Priority: Medium     Had colon prolapse - had at least partial removal of her colon done at that time.        Liver lesion 2013     Priority: Medium    Anxiety 2012     Priority: Medium    Paresthesias 2012     Priority: Medium    Acne vulgaris 2012     Priority: Medium    Health Care Home 2013     Priority: Low     *See Letters for [facility] Care Plan: My Access Plan                Past Medical History:    No past medical history on file.    Past Surgical History:    Past Surgical History:   Procedure Laterality Date    COLECTOMY WITHOUT COLOSTOMY      COLON SURGERY   and     Colon removed     OPEN REDUCTION INTERNAL FIXATION HIP BIPOLAR Right 2022    Procedure: Bipolar Hemiarthroplasty Right Hip;  Surgeon: Kb Lizarraga MD;  Location: [facility] OR       Family History:    Family History   Problem Relation Age of Onset    Hypertension Mother     Arthritis Mother     Breast Cancer Sister     Thyroid Disease Sister        Social History:  Marital Status:   [4]  Social History     Tobacco Use    Smoking status: Former     Current packs/day: 0.00     Average packs/day: 0.5 packs/day for 20.0 years (10.0 ttl pk-yrs)     Types: Cigarettes     Start date: 1987     Quit date: 2007     Years since quittin.0    Smokeless tobacco: Never   Vaping Use    Vaping status: Never Used   Substance Use Topics    Alcohol use: Yes     Comment: Red wine 2-3 times per week    Drug use: No        Medications:    CONSTULOSE 10 GM/15ML solution  cyanocobalamin (VITAMIN B-12) 1000 MCG tablet  docusate sodium (COLACE) 100 MG capsule  ibuprofen (ADVIL/MOTRIN) 400 MG tablet  ibuprofen (ADVIL/MOTRIN) 600 MG tablet  polyethylene glycol (MIRALAX) 17 GM/Dose  "powder  Sennosides (SENNA) 8.8 MG/5ML SYRP  SM IBUPROFEN 200 MG tablet  traMADol (ULTRAM) 50 MG tablet  vitamin D2 (ERGOCALCIFEROL) 73717 units (1250 mcg) capsule          Review of Systems   All other systems reviewed and are negative.      Physical Exam   BP: (!) 160/96  Pulse: 81  Temp: 97.7  F (36.5  C)  Resp: 16  Height: 167.6 cm (5' 6\")  Weight: 51.7 kg (114 lb)  SpO2: 100 %      Physical Exam  Vitals and nursing note reviewed.   Constitutional:       General: She is in acute distress.      Appearance: Normal appearance. She is normal weight. She is not ill-appearing.      Comments: Alert, crying when I enter the room, holding a face mask to the right side of her face in discomfort.   HENT:      Head: Normocephalic and atraumatic.      Right Ear: Tympanic membrane, ear canal and external ear normal.      Left Ear: Tympanic membrane, ear canal and external ear normal.      Nose: Nose normal.      Mouth/Throat:        Comments: Widespread dental caries, teeth diagrammed have significant erosion, cracks, associated gingival hyperplasia, no evidence of abscess.  There is mild swelling to the right side of the face without palpable focal abscess or fluctuance.  There is no erythema to the face.  Eyes:      Extraocular Movements: Extraocular movements intact.      Conjunctiva/sclera: Conjunctivae normal.      Pupils: Pupils are equal, round, and reactive to light.   Cardiovascular:      Rate and Rhythm: Normal rate and regular rhythm.   Lymphadenopathy:      Cervical: Cervical adenopathy present.   Neurological:      Mental Status: She is alert.         ED Course        Procedures         No results found for this or any previous visit (from the past 24 hour(s)).    Medications   ibuprofen (ADVIL/MOTRIN) tablet 800 mg (has no administration in time range)   acetaminophen (TYLENOL) tablet 1,000 mg (has no administration in time range)   penicillin V (VEETID) tablet 500 mg (has no administration in time range) "       Assessments & Plan (with Medical Decision Making)   59-year-old female past medical history reviewed as above who presents by EMS with concerns of dental pain ongoing for weeks if not much longer as described in the HPI and documented on exam.  The patient has widespread dental caries, multiple areas of gingival hyperplasia but no obvious abscess associated with right sided diffuse facial swelling.  I suspect that there is component of infection possibly related to multiple different areas in the dentition.  I placed her on oral penicillin, recommend she continue with ibuprofen and Tylenol in appropriate doses and given oxycodone for breakthrough pain.  I provided her with a list of dental providers that may see her and requested that she make an appointment absolutely as soon as possible.  Return to the emergency department if worse or changes.      Disclaimer: This note consists of symbols derived from keyboarding, dictation and/or voice recognition software. As a result, there may be errors in the script that have gone undetected. Please consider this when interpreting information found in this chart.    I have reviewed the nursing notes.    I have reviewed the findings, diagnosis, plan and need for follow up with the patient.          New Prescriptions    No medications on file       Final diagnoses:   Pain, dental   Dental caries   Dental infection       4/21/2024   [facility] EMERGENCY DEPT       Golden Fischer MD  04/21/24 1896

## 2024-04-22 ENCOUNTER — VIRTUAL VISIT (OUTPATIENT)
Dept: FAMILY MEDICINE | Facility: CLINIC | Age: 59
End: 2024-04-22
Payer: COMMERCIAL

## 2024-04-22 ENCOUNTER — TELEPHONE (OUTPATIENT)
Dept: FAMILY MEDICINE | Facility: CLINIC | Age: 59
End: 2024-04-22

## 2024-04-22 DIAGNOSIS — F60.9 PERSONALITY DISORDER IN ADULT (H): Chronic | ICD-10-CM

## 2024-04-22 DIAGNOSIS — S72.001D CLOSED FRACTURE OF RIGHT HIP WITH ROUTINE HEALING, SUBSEQUENT ENCOUNTER: ICD-10-CM

## 2024-04-22 DIAGNOSIS — R20.2 PARESTHESIAS: ICD-10-CM

## 2024-04-22 DIAGNOSIS — K04.7 DENTAL INFECTION: Primary | ICD-10-CM

## 2024-04-22 DIAGNOSIS — F41.9 ANXIETY: ICD-10-CM

## 2024-04-22 DIAGNOSIS — S72.091A OTH FRACTURE OF HEAD AND NECK OF RIGHT FEMUR, INIT (H): ICD-10-CM

## 2024-04-22 DIAGNOSIS — K59.01 SLOW TRANSIT CONSTIPATION: ICD-10-CM

## 2024-04-22 PROBLEM — Z87.81 STATUS POST CLOSED FRACTURE OF RIGHT FEMUR: Status: ACTIVE | Noted: 2024-04-22

## 2024-04-22 PROCEDURE — 99443 PR PHYSICIAN TELEPHONE EVALUATION 21-30 MIN: CPT | Mod: 93 | Performed by: FAMILY MEDICINE

## 2024-04-22 RX ORDER — POLYETHYLENE GLYCOL 3350 17 G/17G
POWDER, FOR SOLUTION ORAL
Qty: 510 G | Refills: 3 | OUTPATIENT
Start: 2024-04-22

## 2024-04-22 RX ORDER — POLYETHYLENE GLYCOL 3350 17 G/17G
17 POWDER, FOR SOLUTION ORAL 3 TIMES DAILY
Qty: 510 G | Refills: 3 | Status: SHIPPED | OUTPATIENT
Start: 2024-04-22 | End: 2024-06-07

## 2024-04-22 NOTE — TELEPHONE ENCOUNTER
Patient has an appointment with provider today. Provider was this addressed or does RN need to call patient again?    Suha Dawkins RN on 4/22/2024 at 10:59 AM

## 2024-04-22 NOTE — TELEPHONE ENCOUNTER
Patient had telephone visit with Dr. Carbajal     Patient wants to thank you for everything to Dr. Carbajal.    Patient is going to call [facility] tomorrow to find out if they can help with her procedure.    Patient just got a call from [facility] and they are still recomming dental care at [facility]. Patient is concerned about going to [facility] and the [facility]    Patient would like a call back; best time to call some time after 10:30 am tomorrow with Dr. Carbajal's dental location recommendation/feedback.    Carmina Santos, RN on 4/22/2024 at 4:15 PM

## 2024-04-22 NOTE — PROGRESS NOTES
Amira is a 59 year old who is being evaluated via a billable telephone visit.    What phone number would you like to be contacted at? [phone]   How would you like to obtain your AVS? Mail a copy  Originating Location (pt. Location): Home    Distant Location (provider location):  Off-site    Assessment & Plan     Dental infection  She will cont to look for this   - **Vitamin D Deficiency FUTURE 2mo; Future  - **Vitamin B12 FUTURE 2mo; Future  - Adult Palliative Care  Referral; Future    Personality disorder in adult, unspecified    - Adult Palliative Care  Referral; Future    Closed fracture of right hip with routine healing, subsequent encounter      Anxiety    - Adult Palliative Care  Referral; Future    Paresthesias    - Adult Palliative Care  Referral; Future    Slow transit constipation  Increase to 3 times per day   - polyethylene glycol (MIRALAX) 17 GM/Dose powder; Take 17 g by mouth 3 times daily          MED REC REQUIRED  Post Medication Reconciliation Status: discharge medications reconciled and changed, per note/orders        Subjective   Amira is a 59 year old, presenting for the following health issues:  ER F/U        HPI     ED/UC Followup:    Facility:  [facility]  Date of visit: 4/21/24  Reason for visit: Dental Pain, caries and infection.   Current Status: just as bad   She had gone to the emergency room for the pain she is on an antibiotic and this is feeling   A little better she I is able to talk and she is a little better . She is still not tisha to ambulate much due to pain and she is homebound  she does not have a life and she is wondering what is wrong with her. She does have the dental issues   She had gone though a divorce and she has not had any life since then she does not have any help and she has a hard time doing much of anything . She had been isolated and she has been dwelling on her injury in 2015 and she now does not  have any friends, no family, she has no one to take her places .  She is working on the dental part she is reading the list of dentists.   She continues to talk about the dentist even though I have told her repeatedly that I/we are not going to help her with this .   She continues to talk about this and she is will not change the subject.  She has focused on the hip.                 Review of Systems  She is constipated and she is focuse on the dental and the constipation. She cont to focus on       Objective           Vitals:  No vitals were obtained today due to virtual visit.    Physical Exam   General: Alert and no distress //Respiratory: No audible wheeze, cough, or shortness of breath // Psychiatric:  tangential, has been       No results found for any visits on 04/22/24.      Phone call duration: 30 minutes much more   Signed Electronically by: Alana Carbajal MD

## 2024-04-25 ENCOUNTER — TELEPHONE (OUTPATIENT)
Dept: FAMILY MEDICINE | Facility: CLINIC | Age: 59
End: 2024-04-25
Payer: COMMERCIAL

## 2024-04-25 ENCOUNTER — TELEPHONE (OUTPATIENT)
Dept: PALLIATIVE MEDICINE | Facility: CLINIC | Age: 59
End: 2024-04-25
Payer: COMMERCIAL

## 2024-04-25 NOTE — TELEPHONE ENCOUNTER
"Noted. RN called patient. Reviewed that palliative care has deemed her not appropriate for their services. Patient is upset with this news she still wants to know what palliative care is. She said \"no one knows how this feels, I'm in so much pain\" RN did empathize with patient and provide support. We did talk about what palliative care is and the service they typically provide. She did become tearful while on the phone.   She is going to be seen by the dentist tomorrow. She is very scared.    We scheduled a phone visit with Dr. Carbajal to talk about options.     Suha Dawkins RN on 4/25/2024 at 4:14 PM      "

## 2024-04-25 NOTE — TELEPHONE ENCOUNTER
Rns in clinic do not know Palliative cares process for everything. It would be best if they would be able to explain their process to her.   However, I now see a message that says that she is not appropriate for palliative care. It says that a message was sent to referring provider.     Routing to provider to advise.     Suha Dawkins, RN on 4/25/2024 at 9:50 AM

## 2024-04-25 NOTE — TELEPHONE ENCOUNTER
[facility] Call Center    Phone Message    May a detailed message be left on voicemail: yes     Reason for Call: Pt has been referred to Palliative Care.  She wants to see Jona Cruz in person in [place] but he has no in person visits.  Pt is unwilling to schedule a video visit.  She said that she doesn't have the capability to do video.  She wants to know if Jona Cruz would be willing to see her in person in [place] or just do a telephone visit with her.  Please call Pt back to discuss.  Thanks.

## 2024-04-25 NOTE — TELEPHONE ENCOUNTER
"FYI - Status Update    Who is Calling: patient    Update: Pt did telephone visit with Dr. Carbajal recently and is calling with info and questions:  [facility] is a \"[facility].\"    Pt has appt at [facility] next week 4/30, but they are trying to work her in maybe tomorrow and will call her back and let her know.     2.  Pt called for Palliative Care and they are only doing in person and video visit and pt only wants a telephone visit.  His staff will check with that provider and see if he will do a telephone visit and get back to pt.    3.  Pt wants to speak to Clinic RN about how Palliative Care works?  Please call patient and advise.      Does caller want a call/response back: Yes  [phone]     "

## 2024-04-26 RX ORDER — CEFDINIR 300 MG/1
600 CAPSULE ORAL DAILY
Qty: 14 CAPSULE | Refills: 0 | Status: SHIPPED | OUTPATIENT
Start: 2024-04-26 | End: 2024-05-03

## 2024-04-26 NOTE — TELEPHONE ENCOUNTER
"Amira is calling asking to speak with Suha. She NEEDS to speak with her and make sure Dr Carbajal gets a message. I tried to get a message to relay. She saw a dentist this morning and her \"teeth got zero attention\". They referred her to another dentist through [facility] and she has been on the phone for 5 hours trying to get an appointment. She is feeling better today as far as her GI upset form the Penicillin-she stopped it this morning.   She is trying to figure out how to avoid going to ER again over the weekend.   I am not sure what she needs, she said the dentist told her she does not need another antibiotic.   Communicated with Rasheed at [place], she will call Amira back shortly.  Susie WOOD RN    "

## 2024-04-26 NOTE — TELEPHONE ENCOUNTER
"Patient is having increased pain today because of going to the dentist and talking all day trying to find an oral surgeon. She called because she doesn't want to go to the ED over the weekend so she needed advice. Went to the dentist all they did was take X-rays and refer her to a oral surgeon [facility]. Her front to is In good shape, she will need 2 extractions and was told her mouth isn't as bad as she thinks. She then learned that she cannot go to [facility] because she has medical assistance. They wont let her pay out of pocket either because they could get in trouble? She then was told to call [facility]. They are closed now but she will call next week.     Patient stopped taking penicillin because she was having terrible stomach pain. No diarrhea. Since she has stopped she has felt better. The dentist told her to stop taking it. Would like it added to her allergy list. Explained that taking the medication with food and possibly a probiotic. Patient took an 2 tramadol today. She is wondering if she can take a 3rd. She is also wondering if she needed a antibiotic. Patient kept voicing that she is scared. And really wants Dr. Carbajal to know all of this information.     RN was able to reach out to provider via secure chat:\" yes she can take extra tramadol she will just need to take less on some other days. I can send in a once a day antibiotic for her. I am not surprised she is going to need a lot of extractions and she will need anesthesia .\"    Patient now has a issue with finding someone to get her abx and bring it to her. RN called [facility] and they don't have same day delivery.  RN called [facility] and was told the delivery javi JUST left before the antibiotic was filled. He kindly offered to bring it to her tomorrow and deliver it by 12.       She is going to take 1 penicillin V tonight (Provider notified) because [facility] cannot deliver it until tomorrow at 12 pm.    Suha Dawkins RN on 4/26/2024 " at 4:39 PM

## 2024-05-01 ENCOUNTER — VIRTUAL VISIT (OUTPATIENT)
Dept: FAMILY MEDICINE | Facility: CLINIC | Age: 59
End: 2024-05-01
Payer: COMMERCIAL

## 2024-05-01 DIAGNOSIS — K59.01 SLOW TRANSIT CONSTIPATION: ICD-10-CM

## 2024-05-01 DIAGNOSIS — K04.7 CHRONIC DENTAL INFECTION: ICD-10-CM

## 2024-05-01 DIAGNOSIS — Z87.19 HISTORY OF DENTAL PROBLEMS: Primary | ICD-10-CM

## 2024-05-01 PROCEDURE — 99443 PR PHYSICIAN TELEPHONE EVALUATION 21-30 MIN: CPT | Mod: 93 | Performed by: FAMILY MEDICINE

## 2024-05-01 NOTE — PROGRESS NOTES
Amira is a 59 year old who is being evaluated via a billable telephone visit.    What phone number would you like to be contacted at? [phone]  How would you like to obtain your AVS? Mail a copy  Originating Location (pt. Location): Home    Distant Location (provider location):  On-site    Assessment & Plan     History of dental problems      Chronic dental infection  She is getting her teeth pulled and a crown     Slow transit constipation  She is having a bit more of the constipation    She has taken a bit more tramaadol wot the dental exams and she will adjust the lactulose and sennna              Subjective   Amira is a 59 year old, presenting for the following health issues:  Dental Pain      HPI     Discuss another option palliative care, She does not qualify for palliative Care.   She is doing well with the omnicef the antibiotics have improved this   She does have a [place] appointment for oral surgery   This is in the near future and she has been working on this she has   She is feeling better about the oral surgery   She is worried about the rides           May 17   We did up the tramadol temporarily for this as she is going to be having dental extractions   The antibiotics are working some  She does have rides available through her insurance           Review of Systems  Constitutional, HEENT, cardiovascular, pulmonary, gi and gu systems are negative, except as otherwise noted.      Objective           Vitals:  No vitals were obtained today due to virtual visit.    Physical Exam   General: Alert and no distress //Respiratory: No audible wheeze, cough, or shortness of breath // Psychiatric:  Appropriate affect, tone, and pace of words            Phone call duration: 25 minutes 25 minutes spent on the day of service with chart review, telephone call, counseling, and charting. Alana Carbajal M.D.    Signed Electronically by: Alana Carbajal MD

## 2024-05-16 ENCOUNTER — TELEPHONE (OUTPATIENT)
Dept: FAMILY MEDICINE | Facility: CLINIC | Age: 59
End: 2024-05-16
Payer: COMMERCIAL

## 2024-05-16 DIAGNOSIS — K04.7 DENTAL INFECTION: Primary | ICD-10-CM

## 2024-05-16 RX ORDER — AZITHROMYCIN 250 MG/1
TABLET, FILM COATED ORAL
Qty: 6 TABLET | Refills: 0 | Status: SHIPPED | OUTPATIENT
Start: 2024-05-16 | End: 2024-05-21

## 2024-05-16 NOTE — TELEPHONE ENCOUNTER
Patient called again. She said she believes if [facility] has it by 12 they will be able to get it delivered to her today, otherwise she would have to wait until tomorrow.   Message send high priority to provider.     Would like a call back [phone], ok to leave a detailed VM  Suha Dawkins RN on 5/16/2024 at 11:42 AM

## 2024-05-16 NOTE — TELEPHONE ENCOUNTER
RN called patient and notified her. Called [facility] and they will be able to deliver it today. Patient also was asking for more gauze and gave RN permission to add 2x2 gauze to her order.   RN did let patient know to please contact the dentist if she has questions about anything related to the procedure today.   Suha Dawkins RN on 5/16/2024 at 1:52 PM

## 2024-05-16 NOTE — TELEPHONE ENCOUNTER
Patient's call transferred to author    Patient she had her dental procedure today in [place] and was sent home with a paper script for a Z-Pack / medication sig: take as directed per instructions / prescribed by Dr. Montanez    Patient states she had to pay for a ride service to take her to the appointment and back  Could not have  go to [facility] to drop off paper script to fill     Patient hoping Dr. Carbajal can send rx for Z-Pack to [facility] in [place] prior to 11am as she needs the medication delivered to her home as she has no transportation   Patient will to schedule a telephone appointment if needed    Sharif Manrique, Clinic RN  [facility]

## 2024-06-07 DIAGNOSIS — K59.01 SLOW TRANSIT CONSTIPATION: ICD-10-CM

## 2024-06-07 DIAGNOSIS — Z87.81 STATUS POST CLOSED FRACTURE OF HIP: ICD-10-CM

## 2024-06-07 RX ORDER — IBUPROFEN 400 MG/1
TABLET, FILM COATED ORAL
Qty: 90 TABLET | Refills: 3 | Status: SHIPPED | OUTPATIENT
Start: 2024-06-07 | End: 2024-06-24

## 2024-06-07 RX ORDER — POLYETHYLENE GLYCOL 3350 17 G/17G
17 POWDER, FOR SOLUTION ORAL 3 TIMES DAILY
Qty: 510 G | Refills: 3 | Status: SHIPPED | OUTPATIENT
Start: 2024-06-07 | End: 2024-08-23

## 2024-06-07 NOTE — TELEPHONE ENCOUNTER
Routing refill request to provider for review/approval because:  Patient needs to be seen because:  greater than 1 year since last annual physical.          Requested Prescriptions   Pending Prescriptions Disp Refills    polyethylene glycol (MIRALAX) 17 GM/Dose powder [Pharmacy Med Name: polyethylene glycol 3350 17 gram/dose oral powder] 510 g 3     Sig: Take 17 g by mouth 3 times daily       Laxatives Protocol Passed - 6/7/2024  6:29 AM        Passed - Patient is age 6 or older        Passed - Medication is active on med list        Passed - Medication indicated for associated diagnosis     The medication is prescribed for one or more of the following conditions:     Constipation   Preparation of bowel for procedure   Fecal impaction   Dysfunctional voiding   Narcotic induced constipation            Passed - Recent (12 mo) or future (90 days) visit within the authorizing provider's specialty     The patient must have completed an in-person or virtual visit within the past 12 months or has a future visit scheduled within the next 90 days with the authorizing provider s specialty.  Urgent care and e-visits do not quality as an office visit for this protocol.            ibuprofen (ADVIL/MOTRIN) 400 MG tablet [Pharmacy Med Name: ibuprofen 400 mg tablet] 90 tablet 3     Sig: take one tablet EVERY 8 HOURS AS NEEDED moderate pain       NSAID Medications Failed - 6/7/2024  6:29 AM        Failed - Blood pressure under 140/90 in past 12 months     BP Readings from Last 3 Encounters:   04/21/24 (!) 160/96   01/23/24 122/68   02/13/22 122/80       No data recorded            Failed - Normal CBC on file in past 12 months     Recent Labs   Lab Test 01/23/24  1235   WBC 5.2   RBC 3.67*   HGB 12.3   HCT 38.0                    Failed - Always Fail Criteria - Chart Review Required     Validate Diagnosis. If the medication is requested for an acute flare of a chronic pain associated with a musculoskeletal or rheumatologic  condition; okay to authorize if all other criteria are met. If not, then forward to provider for review.          Passed - Patient is age 6-64 years        Passed - Medication is active on med list        Passed - Normal GFR on file in past 12 months     Recent Labs   Lab Test 01/23/24  1235 02/06/22  0832 02/22/17  1554   GFRESTIMATED >90   < > >90  Non  GFR Calc     GFRESTBLACK  --   --  >90  African American GFR Calc      < > = values in this interval not displayed.             Passed - Recent (12 mo) or future (90 days) visit within the authorizing provider's specialty     The patient must have completed an in-person or virtual visit within the past 12 months or has a future visit scheduled within the next 90 days with the authorizing provider s specialty.  Urgent care and e-visits do not quality as an office visit for this protocol.          Passed - No active pregnancy on record        Passed - No positive pregnancy test in past 12 months                 Seb Castro RN 06/07/24 12:10 PM

## 2024-06-24 ENCOUNTER — VIRTUAL VISIT (OUTPATIENT)
Dept: FAMILY MEDICINE | Facility: CLINIC | Age: 59
End: 2024-06-24
Payer: COMMERCIAL

## 2024-06-24 DIAGNOSIS — G90.521 COMPLEX REGIONAL PAIN SYNDROME TYPE 1 OF RIGHT LOWER EXTREMITY: Primary | ICD-10-CM

## 2024-06-24 DIAGNOSIS — Z87.81 STATUS POST CLOSED FRACTURE OF HIP: ICD-10-CM

## 2024-06-24 DIAGNOSIS — S72.091A OTH FRACTURE OF HEAD AND NECK OF RIGHT FEMUR, INIT (H): ICD-10-CM

## 2024-06-24 DIAGNOSIS — K59.01 SLOW TRANSIT CONSTIPATION: ICD-10-CM

## 2024-06-24 PROCEDURE — 99443 PR PHYSICIAN TELEPHONE EVALUATION 21-30 MIN: CPT | Mod: 93 | Performed by: FAMILY MEDICINE

## 2024-06-24 RX ORDER — IBUPROFEN 400 MG/1
800 TABLET, FILM COATED ORAL EVERY 8 HOURS PRN
Qty: 180 TABLET | Refills: 3 | Status: SHIPPED | OUTPATIENT
Start: 2024-06-24

## 2024-06-24 RX ORDER — LACTULOSE 10 G/15ML
30 SOLUTION ORAL 2 TIMES DAILY PRN
Qty: 2838 ML | Refills: 3 | Status: SHIPPED | OUTPATIENT
Start: 2024-06-24 | End: 2024-09-19

## 2024-06-24 RX ORDER — PREGABALIN 25 MG/1
25 CAPSULE ORAL 2 TIMES DAILY
Qty: 60 CAPSULE | Refills: 3 | Status: SHIPPED | OUTPATIENT
Start: 2024-06-24 | End: 2024-07-11 | Stop reason: DRUGHIGH

## 2024-06-24 RX ORDER — IBUPROFEN 600 MG/1
600 TABLET, FILM COATED ORAL EVERY 8 HOURS PRN
Qty: 90 TABLET | Refills: 3 | Status: SHIPPED | OUTPATIENT
Start: 2024-06-24

## 2024-06-24 RX ORDER — IBUPROFEN 200 MG
200 TABLET ORAL EVERY 8 HOURS PRN
Qty: 100 TABLET | Refills: 3 | Status: SHIPPED | OUTPATIENT
Start: 2024-06-24 | End: 2024-09-19

## 2024-06-24 RX ORDER — SENNA AND DOCUSATE SODIUM 50; 8.6 MG/1; MG/1
1 TABLET, FILM COATED ORAL 2 TIMES DAILY PRN
Qty: 180 TABLET | Refills: 3 | Status: SHIPPED | OUTPATIENT
Start: 2024-06-24

## 2024-06-24 RX ORDER — TRAMADOL HYDROCHLORIDE 50 MG/1
100 TABLET ORAL 2 TIMES DAILY PRN
Qty: 120 TABLET | Refills: 2 | Status: SHIPPED | OUTPATIENT
Start: 2024-06-24 | End: 2024-07-18

## 2024-06-24 NOTE — PROGRESS NOTES
Amira is a 59 year old who is being evaluated via a billable telephone visit.    What phone number would you like to be contacted at? [phone]   How would you like to obtain your AVS? Mail a copy  Originating Location (pt. Location): Home    Distant Location (provider location):  Off-site    Assessment & Plan     Slow transit constipation  Increased   - lactulose (CONSTULOSE) 10 GM/15ML solution; Take 45 mLs (30 g) by mouth 2 times daily as needed for constipation  - SENNA-docusate sodium (SENNA S) 8.6-50 MG tablet; Take 1 tablet by mouth 2 times daily as needed (constipation)    Oth fracture of head and neck of right femur, init (H)      Status post closed fracture of hip    - traMADol (ULTRAM) 50 MG tablet; Take 2 tablets (100 mg) by mouth 2 times daily as needed for severe pain or pain  - pregabalin (LYRICA) 25 MG capsule; Take 1 capsule (25 mg) by mouth 2 times daily  - ibuprofen (ADVIL/MOTRIN) 400 MG tablet; Take 2 tablets (800 mg) by mouth every 8 hours as needed for moderate pain  - ibuprofen (ADVIL/MOTRIN) 600 MG tablet; Take 1 tablet (600 mg) by mouth every 8 hours as needed for moderate pain  - ibuprofen (SM IBUPROFEN) 200 MG tablet; Take 1 tablet (200 mg) by mouth every 8 hours as needed for pain    Complex regional pain syndrome type 1 of right lower extremity  Based on criteria, Amira meets the definition of this. Pain out of proportion to the injury . Will increase tramadol and then start lyrica.             Recheck 3 months     Subjective   Amira is a 59 year old, presenting for the following health issues:  RECHECK      HPI     Discuss Senna liquid verse the pill.   Checking in with tramadol.  The tramadol is working well when she takes 2 2 times per day she has had the best releif   Shehas been taking the lactulose and the senna and the miralax  As we discussed more it does appear that she has had worsening of the pain even though everything appears healed. She does report swelling and  hyperasthesia .                 Objective           Vitals:  No vitals were obtained today due to virtual visit.    Physical Exam   General: Alert and no distress //Respiratory: No audible wheeze, cough, or shortness of breath // Psychiatric:  Appropriate affect, tone, and pace of words      Office Visit on 01/23/2024   Component Date Value Ref Range Status    WBC Count 01/23/2024 5.2  4.0 - 11.0 10e3/uL Final    RBC Count 01/23/2024 3.67 (L)  3.80 - 5.20 10e6/uL Final    Hemoglobin 01/23/2024 12.3  11.7 - 15.7 g/dL Final    Hematocrit 01/23/2024 38.0  35.0 - 47.0 % Final    MCV 01/23/2024 104 (H)  78 - 100 fL Final    MCH 01/23/2024 33.5 (H)  26.5 - 33.0 pg Final    MCHC 01/23/2024 32.4  31.5 - 36.5 g/dL Final    RDW 01/23/2024 12.8  10.0 - 15.0 % Final    Platelet Count 01/23/2024 223  150 - 450 10e3/uL Final    Sodium 01/23/2024 139  135 - 145 mmol/L Final    Reference intervals for this test were updated on 09/26/2023 to more accurately reflect our healthy population. There may be differences in the flagging of prior results with similar values performed with this method. Interpretation of those prior results can be made in the context of the updated reference intervals.     Potassium 01/23/2024 4.5  3.4 - 5.3 mmol/L Final    Carbon Dioxide (CO2) 01/23/2024 16 (L)  22 - 29 mmol/L Final    Anion Gap 01/23/2024 12  7 - 15 mmol/L Final    Urea Nitrogen 01/23/2024 9.2  8.0 - 23.0 mg/dL Final    Creatinine 01/23/2024 0.56  0.51 - 0.95 mg/dL Final    GFR Estimate 01/23/2024 >90  >60 mL/min/1.73m2 Final    Calcium 01/23/2024 8.9  8.8 - 10.2 mg/dL Final    Chloride 01/23/2024 111 (H)  98 - 107 mmol/L Final    Glucose 01/23/2024 100 (H)  70 - 99 mg/dL Final    Alkaline Phosphatase 01/23/2024 91  40 - 150 U/L Final    Reference intervals for this test were updated on 11/14/2023 to more accurately reflect our healthy population. There may be differences in the flagging of prior results with similar values performed with  this method. Interpretation of those prior results can be made in the context of the updated reference intervals.    AST 01/23/2024 20  0 - 45 U/L Final    Reference intervals for this test were updated on 6/12/2023 to more accurately reflect our healthy population. There may be differences in the flagging of prior results with similar values performed with this method. Interpretation of those prior results can be made in the context of the updated reference intervals.    ALT 01/23/2024 14  0 - 50 U/L Final    Reference intervals for this test were updated on 6/12/2023 to more accurately reflect our healthy population. There may be differences in the flagging of prior results with similar values performed with this method. Interpretation of those prior results can be made in the context of the updated reference intervals.      Protein Total 01/23/2024 7.4  6.4 - 8.3 g/dL Final    Albumin 01/23/2024 4.4  3.5 - 5.2 g/dL Final    Bilirubin Total 01/23/2024 0.2  <=1.2 mg/dL Final    Vitamin B12 01/23/2024 162 (L)  232 - 1,245 pg/mL Final    Vitamin D, Total (25-Hydroxy) 01/23/2024 8 (L)  20 - 50 ng/mL Final    severe deficiency    Parathyroid Hormone Intact 01/23/2024 151 (H)  15 - 65 pg/mL Final         Phone call duration: 30 minutes  Signed Electronically by: Alana Carbajal MD

## 2024-06-25 ENCOUNTER — TELEPHONE (OUTPATIENT)
Dept: FAMILY MEDICINE | Facility: CLINIC | Age: 59
End: 2024-06-25
Payer: COMMERCIAL

## 2024-06-25 NOTE — TELEPHONE ENCOUNTER
Pt called and is having issues picking up lactulose. Pharmacy called and RX is available but patient has used 4 bottles in the last month. She is now obsessed about it being dangerous and if she will be dependent on it.? Please advise  Nuris Alvarez RN on 6/25/2024 at 2:52 PM

## 2024-06-26 NOTE — TELEPHONE ENCOUNTER
Noted. RN called patient back. Patient is concerned because the pharmacy said lactulose is danger and when she called the previous RN told her that she could become dependent on it. Amira and I have come to the conclusion that he probably said it was dangerous because of how much they were filling it. She was able to get it now. She asked what the potential concern with taking too much lactulose is. RN explained diarhrea can lead to eletrolyte imbalances. Explained the s/s of electrolyte imbalance which she has none of.  RN explained to patient that there are patients that HAVE to take this medication everyday for certain medical conditions at a higher dose than her.  Patient asked if she can become dependent on the medication. RN pulled up nursing central and up to date which did not show any indication of this becoming habit forming or her being dependent on it, I've never heard of a patient become reliant on this medication since it is not a stimulant laxative. RN explained that she will just need to find the amount that is right for her. She does not need to take it 2 times daily as it is a PRN. Patient understands that. She stated Dr. Carbajal also told her that its not a medication she could become dependent on. Patient would feel better if Dr. Carbajal would explain the concerns with this medication and if it is something that she could be dependent on.     She also has not started lyrica. She is going to wait until her stomach settles. RN advised to eat bland foods for now since her stomach is upset. Patient will also let us know if she has worsening peripheral edema with medication.    Routing to provider.   Suha Dawkins RN on 6/26/2024 at 4:07 PM

## 2024-06-26 NOTE — TELEPHONE ENCOUNTER
RN tried to reach patient.   No answer.   In the middle of leaving a voicemail the voicemail beeped again and hung up. Maybe mailbox is full.     Suha Dawkins RN on 6/26/2024 at 2:00 PM

## 2024-06-26 NOTE — TELEPHONE ENCOUNTER
People do take this 3 times per day for cirrhosis /ammonia to get rid of the ammonia. She should use the least possible to have a bm . Alana Carbajal M.D.

## 2024-06-26 NOTE — TELEPHONE ENCOUNTER
RN talked to provider who confirmed information below. RN called patient and relayed information again. RN reviewed with her the amount that she is able to take 30g=45 mL = 3tablespoons. RN explained the max amount of 90mL/6 tablespoons. Patient is going to try 2 tablespoons at a time and find the dose that works for her. Patient feels better about taking the medication.     No further questions.   Suha Dawkins RN on 6/26/2024 at 5:29 PM

## 2024-06-26 NOTE — TELEPHONE ENCOUNTER
Patient called back, she says she would like to talk directly to Rasheed, did try to read the note to patient but patient has questions and will only speak to Rasheed. Rasheed RN is on a break, will route to care to ask Rasheed to call patient once she is back from break.      ROXANNE Sanchez

## 2024-06-27 ENCOUNTER — TELEPHONE (OUTPATIENT)
Dept: FAMILY MEDICINE | Facility: CLINIC | Age: 59
End: 2024-06-27

## 2024-06-27 NOTE — TELEPHONE ENCOUNTER
"RN did not see that patient reached a RN to ask her question.   Patient asked about trying pear juice to help with constipation. She said Dr. Carbajal had mentioned it to her before. She wanted to know if it is effective. RN explained that \"P\" fruits are high in fiber which helps with constipation. She has tried prune juice but cannot handle the taste. Let her know if she doesn't like pear there is papaya, peaches, and pineapples that she can try.     Patient is going to try the pear juice. She wants to try to use as little lactulose as possible. RN encouraged patient to try to find real pear juice and that trying to use as little medication as possible using natural things that can help is ok.  Suha Dawkins RN on 6/27/2024 at 3:49 PM    "

## 2024-06-27 NOTE — TELEPHONE ENCOUNTER
Patient calling to speak with Rasheed RN. Transferred to RN line per patient request as Rasheed was with another patient.    Gloria Warner, KIM Lorenzo

## 2024-07-08 ENCOUNTER — TELEPHONE (OUTPATIENT)
Dept: FAMILY MEDICINE | Facility: CLINIC | Age: 59
End: 2024-07-08
Payer: COMMERCIAL

## 2024-07-08 NOTE — TELEPHONE ENCOUNTER
Patient is calling for help to schedule a telephone appointment. She reports it is a follow up appointment to further discuss previous injuries. She said her last appointment with provider was also a telephone appointment. She did not want to give additional details regarding needs for the appointment stating Dr. Carbajal knows all about it.  Patient was scheduled with Dr. Carbajal on 7/11/24.     Caitlin Hernández RN

## 2024-07-11 ENCOUNTER — VIRTUAL VISIT (OUTPATIENT)
Dept: FAMILY MEDICINE | Facility: CLINIC | Age: 59
End: 2024-07-11
Payer: COMMERCIAL

## 2024-07-11 DIAGNOSIS — S72.001D CLOSED FRACTURE OF RIGHT HIP WITH ROUTINE HEALING, SUBSEQUENT ENCOUNTER: Primary | ICD-10-CM

## 2024-07-11 DIAGNOSIS — F11.90 CHRONIC, CONTINUOUS USE OF OPIOIDS: ICD-10-CM

## 2024-07-11 DIAGNOSIS — R20.2 PARESTHESIAS: ICD-10-CM

## 2024-07-11 DIAGNOSIS — K59.01 SLOW TRANSIT CONSTIPATION: ICD-10-CM

## 2024-07-11 PROCEDURE — 99443 PR PHYSICIAN TELEPHONE EVALUATION 21-30 MIN: CPT | Mod: 93 | Performed by: FAMILY MEDICINE

## 2024-07-11 RX ORDER — PREGABALIN 50 MG/1
50 CAPSULE ORAL 2 TIMES DAILY
Qty: 60 CAPSULE | Refills: 1 | Status: SHIPPED | OUTPATIENT
Start: 2024-07-11

## 2024-07-11 RX ORDER — MORPHINE SULFATE 15 MG/1
15 TABLET, FILM COATED, EXTENDED RELEASE ORAL EVERY 12 HOURS
Qty: 60 TABLET | Refills: 0 | Status: SHIPPED | OUTPATIENT
Start: 2024-07-11 | End: 2024-07-16 | Stop reason: SINTOL

## 2024-07-11 NOTE — PROGRESS NOTES
Amira is a 59 year old who is being evaluated via a billable telephone visit.    What phone number would you like to be contacted at? [phone]  How would you like to obtain your AVS? Mail a copy  Originating Location (pt. Location): Home    Distant Location (provider location):  On-site    Assessment & Plan     Closed fracture of right hip with routine healing, subsequent encounter  Cont jhon pain. She gets little relief so will have her try extended release with tramdol for pain she wants oxy but I will  not give her this   - pregabalin (LYRICA) 50 MG capsule; Take 1 capsule (50 mg) by mouth 2 times daily  - morphine (MS CONTIN) 15 MG CR tablet; Take 1 tablet (15 mg) by mouth every 12 hours for 30 days maximum 2 tablet(s) per day    Paresthesias  Increase the lyrica   - pregabalin (LYRICA) 50 MG capsule; Take 1 capsule (50 mg) by mouth 2 times daily  - morphine (MS CONTIN) 15 MG CR tablet; Take 1 tablet (15 mg) by mouth every 12 hours for 30 days maximum 2 tablet(s) per day    Slow transit constipation  Using the lactulose.     Chronic, continuous use of opioids - F11.9    - morphine (MS CONTIN) 15 MG CR tablet; Take 1 tablet (15 mg) by mouth every 12 hours for 30 days maximum 2 tablet(s) per day            Check in in 4 weeks     Subjective   Amira is a 59 year old, presenting for the following health issues:  Injury      HPI     Discuss Lyrica   Discuss Vitamin D, took her last dose of her 50,000 units on Wednesday.   She  does not have any side effects from the lyrica she is willing to uptaper this she       She is having her usual problems she is in constant pain she spends her days with small amounts of time without pain. She c/o back and leg pain. She has had evaluations and there has not been a dx made. It is possible that she may have some rsd  as her pain is out of proportion to anything that shows up . She tolerated the very low dose of lyrica and this may be the best to titrate up for. She is  relieved that she finally has the tooth problem under control.         Review of Systems        Objective           Vitals:  No vitals were obtained today due to virtual visit.    Physical Exam   General: Alert and no distress //Respiratory: No audible wheeze, cough, or shortness of breath // Psychiatric:  Appropriate affect, tone, and pace of words            Phone call duration: 22 minutes spent on the day of service with chart review, telephone call, counseling, and charting. Alana Carbajal M.D.   minutes  Signed Electronically by: Alana Carbajal MD

## 2024-07-15 ENCOUNTER — TELEPHONE (OUTPATIENT)
Dept: FAMILY MEDICINE | Facility: CLINIC | Age: 59
End: 2024-07-15
Payer: COMMERCIAL

## 2024-07-15 DIAGNOSIS — S72.001A CLOSED FRACTURE OF RIGHT HIP, INITIAL ENCOUNTER (H): ICD-10-CM

## 2024-07-15 DIAGNOSIS — F11.90 CHRONIC, CONTINUOUS USE OF OPIOIDS: Primary | ICD-10-CM

## 2024-07-15 DIAGNOSIS — Z87.81 STATUS POST CLOSED FRACTURE OF HIP: ICD-10-CM

## 2024-07-15 NOTE — TELEPHONE ENCOUNTER
Dr. Carbajal,    Please advise. VV 07/11.    Started a new medication (Morphine). Tried for a couple days in a row, states it gives her a severe stomach ache and bloating/constipation. States it takes a while to get a stomach ache. Patient does take constipation medications (senna tablet with the Morphine and prune juice).     Patient states it does not do much for her pain.     Thank you,  Aniket SANCHES RN  [facility]

## 2024-07-16 RX ORDER — OXYCODONE HCL 10 MG/1
10 TABLET, FILM COATED, EXTENDED RELEASE ORAL EVERY 12 HOURS
Qty: 60 TABLET | Refills: 0 | Status: SHIPPED | OUTPATIENT
Start: 2024-07-16 | End: 2024-07-18

## 2024-07-16 NOTE — TELEPHONE ENCOUNTER
Pt called again and said she has been waiting for someone to get back to her on what to do.    PT said someone can call back between now and 130 or after 4:00pm today

## 2024-07-16 NOTE — TELEPHONE ENCOUNTER
Patient called again and is really hoping to speak to a nurse. She is available between now and 1:30 or after 4:00 today.    Gely Coronel on 7/16/2024 at 12:37 PM

## 2024-07-17 NOTE — TELEPHONE ENCOUNTER
I will send in long acting oxy for her to try she may do better with this. Alana Carbajal M.D.      Ok I am going to send in the tramadol for her to be able to take 2 2 times per day for now. The insurance has a lot of things she needs to try and fail before we can try the oxy. Alana Carbajal M.D.

## 2024-07-18 ENCOUNTER — TELEPHONE (OUTPATIENT)
Dept: FAMILY MEDICINE | Facility: CLINIC | Age: 59
End: 2024-07-18
Payer: COMMERCIAL

## 2024-07-18 RX ORDER — TRAMADOL HYDROCHLORIDE 50 MG/1
100 TABLET ORAL 2 TIMES DAILY PRN
Qty: 120 TABLET | Refills: 2 | Status: SHIPPED | OUTPATIENT
Start: 2024-07-18 | End: 2024-09-16

## 2024-07-18 NOTE — TELEPHONE ENCOUNTER
Retail Pharmacy Prior Authorization Team   Phone: [phone]      PRIOR AUTHORIZATION DENIED    Medication:    Insurance Company: [employer] - Phone [phone] Fax [phone]  Denial Date: 7/18/2024  Denial Reason(s):   Document:  Decision Notes: This drug is not a preferred drug on this plan. Before this drug can be covered, the points below must be met. This is from the [place] Medicaid Preferred Drug List for ?Analgesics, Narcotics Long-Acting? and the [employer] Non-Preferred Drug Prior Authorization Criteria.     From the records that we have received, OXYCODONE TABLET 10MG ER was denied for these reasons:      1) Records do not show that you have tried and failed at least TWO different preferred drugs. Preferred drugs for this plan are morphine sulfate extended release (ER) tablet (MS Contin equivalent), Belbuca (buprenorphine) buccal film, fentanyl patch (25mcg/hr, 50mcg/hr; Duragesic equivalent).  If preferred drugs have been tried, please have your provider send us records showing the drug names, dates of use, and reasons the drugs did not work for you.      2) Records did not show you tried the preferred drugs for a long enough time for them to work well for you. Records showing you took these drugs as prescribed must be sent to us.      3) Documentation (pharmacy dispensing records, medication orders in your health records, etc.) showing you tried the preferred drug(s) for enough time to have a good response have not been submitted.      4) Records do not show there are medical reasons why you cannot use ALL the preferred drugs. This includes other health issues, allergies, drug interactions, or other bad side effects.    Since the criteria have not been met, we are not able to approve.       Appeal Information: To send an appeal to the patient's insurance, please provide a letter of medical necessity for the requested medication that was denied.  Please use the denial rationale from the  "patient's insurance to write the letter.  Once it is completed, please have it available under the \"letters tab\" in the patient's chart and route directly back to me: RG [place] and I can send the appeal to the patient's       Patient Notified: No. The [facility] PA Team does not notify of the denial outcomes as the patient often will ask what their provider will prescribe in place of the denied medication, or additional information in regards to other therapies they can take in place of the denied medication.  This is not something we can advise on in our department.    "

## 2024-07-18 NOTE — TELEPHONE ENCOUNTER
Please let margy know about the oxycodone. She should start that and cut down on the tramadol if this is working . Alana Carbajal M.D.

## 2024-07-18 NOTE — TELEPHONE ENCOUNTER
Dr. Carbajal, the prior authorization for oxy 12hr was denied. Please advise.   Suha Dawkins RN on 7/18/2024 at 10:37 AM

## 2024-07-18 NOTE — TELEPHONE ENCOUNTER
Retail Pharmacy Prior Authorization Team   Phone: [phone]      EPA PA PORTAL APPROVAL    Medication: OXYCODONE HCL ER 10 MG PO T12A  Authorization Effective Date: 7/18/2024  Authorization Expiration Date: 7/18/2025  Approved Dose/Quantity: 60 per 30 days  Reference #:     Insurance Company: Cynthia - Phone [phone] Fax [phone]  Expected CoPay: $    CoPay Card Available: No    Financial Assistance Needed: N/A  Which Pharmacy is filling the prescription: [facility] - [place], [place] - [address] [place] [address]  Pharmacy Notified: YES - PER PHARMACY THEY RECEIVED A PAID CLAIM EARLIER   Patient Notified: NO **Instructed pharmacy to notify patient when script is ready to /ship.**

## 2024-07-19 ENCOUNTER — TELEPHONE (OUTPATIENT)
Dept: FAMILY MEDICINE | Facility: CLINIC | Age: 59
End: 2024-07-19
Payer: COMMERCIAL

## 2024-07-19 NOTE — TELEPHONE ENCOUNTER
Explained again to patient how the pills work. She will message back if it does not work to take Oxycodone and use the Tramadol as breakthrough pain. She will call back if this is not working for her.    Ericka Fofana RN on 7/19/2024 at 4:38 PM

## 2024-07-19 NOTE — TELEPHONE ENCOUNTER
Clarifying - is patient to take both the Tramadol and Oxycodone?  If getting relief, how should patient taper the Tramadol?    Sharif aMnrique, Clinic RN  [facility]

## 2024-07-19 NOTE — TELEPHONE ENCOUNTER
I spent quite a while trying to explain the purpose of the long acting medication. She can take the oxy and then if she has breakthrough pain she can take 1-2 tramadol up to 2 times daily. Thank you for explaning this to her. Alana Carbajal M.D.

## 2024-07-19 NOTE — TELEPHONE ENCOUNTER
Patient has received the Oxycodone - states that it does not work like the tramadol. She took it alone without the tramadol, but did not really feel much relief for long.    She wants to know parameters if she can take the both at the same time? She is confused and wants to know why she can't take the tramadol like normal and take the oxycodone as needed between?    She states she is going to do the tramadol like normal, and add the oxycodone in the middle for now until she hears otherwise. Advised not to take the oxycodone closer than 12 hours apart. Patient verbalized understanding.    Please Advise.  Ericka Fofana RN on 7/19/2024 at 12:43 PM

## 2024-07-19 NOTE — TELEPHONE ENCOUNTER
See Telephone encounter from 7/18/24 for call details.  Ericka Fofana RN on 7/19/2024 at 12:49 PM

## 2024-07-22 ENCOUNTER — TELEPHONE (OUTPATIENT)
Dept: FAMILY MEDICINE | Facility: CLINIC | Age: 59
End: 2024-07-22
Payer: COMMERCIAL

## 2024-07-22 DIAGNOSIS — Z87.81 STATUS POST CLOSED FRACTURE OF RIGHT FEMUR: ICD-10-CM

## 2024-07-22 DIAGNOSIS — G90.50 RSD (REFLEX SYMPATHETIC DYSTROPHY): Primary | ICD-10-CM

## 2024-07-22 RX ORDER — TRAMADOL HYDROCHLORIDE 100 MG/1
100 TABLET, EXTENDED RELEASE ORAL 2 TIMES DAILY PRN
Qty: 60 TABLET | Refills: 2 | Status: SHIPPED | OUTPATIENT
Start: 2024-07-22 | End: 2024-09-12

## 2024-07-22 NOTE — TELEPHONE ENCOUNTER
Noted. RN called patient back.   Patient stated that the oxycontin made her stomach pain worse and constipation was also much worse. She really did try it for a couple of days but she said it was just too harsh.   She said the only thing that really works is the tramadol.     She is wondering if Dr. Carbajal would be okay with her taking 2 tramadol 2x daily and taking 2 more as needed when she needs a few more hours out of bed.     Will talk to Dr. Carbajal tomorrow.   Dr. Carbajal, has patient ever tried ultram ER? would that potentially be beneficial?    Suha Dawkins RN on 7/22/2024 at 5:28 PM

## 2024-07-22 NOTE — TELEPHONE ENCOUNTER
Thank you for thr suggestion. I put through an order for her for thre emergency room tramadol. If dhr id not better eith that she will need to see pain management. Alana Carbajal M.D.

## 2024-07-23 DIAGNOSIS — Z87.81 STATUS POST CLOSED FRACTURE OF RIGHT FEMUR: ICD-10-CM

## 2024-07-23 DIAGNOSIS — G90.50 RSD (REFLEX SYMPATHETIC DYSTROPHY): ICD-10-CM

## 2024-07-23 NOTE — TELEPHONE ENCOUNTER
Are you wanting her to use the IR for any breakthrough pain? Or is she to completely discontinue the tramadol IR?    Suha Dawkins RN on 7/23/2024 at 8:00 AM

## 2024-07-23 NOTE — TELEPHONE ENCOUNTER
"Noted. RN called patient. Reviewed the new medication. Patient will try it for a couple of days. She will call if it is too harsh on her stomach. RN asked patient to only take the IR tramadol when she absolutely needs to. She is concerned that the ER medications are what really bothered her stomach. Explained the pain management if this does not work.     Patient now has a question about pineapple juice and if it is worth taking or not. She has been drinking prune juice but she is getting really tired of it. She found some pineapple juice  that is \"clean\"  Organic pineapple juice, filtered water(sufficient for reconstitution), organic pineapple juice concentrate.     She is wondering if it is worth drinking what may have to be a large amount of pineapple juice or should she just stick with prune juice?    OK to leave a detailed VM.   Suha Dawkins RN on 7/23/2024 at 12:33 PM     "

## 2024-07-24 RX ORDER — TRAMADOL HYDROCHLORIDE 100 MG/1
100 TABLET, EXTENDED RELEASE ORAL 2 TIMES DAILY PRN
Qty: 60 TABLET | Refills: 2 | OUTPATIENT
Start: 2024-07-24

## 2024-07-26 ENCOUNTER — TELEPHONE (OUTPATIENT)
Dept: FAMILY MEDICINE | Facility: CLINIC | Age: 59
End: 2024-07-26
Payer: COMMERCIAL

## 2024-07-26 NOTE — TELEPHONE ENCOUNTER
Pt calls with re: tramadol ER that was Rx'd 7/22 & only wants to leave msg for bj nurse.  States she spoke with [facility] & was told they have not heard from insurance on this med so pt has not been able to try it yet.   Pt will call next week if she does not receive it.    Sending to Bj pool for review.     Hue Bernstein RN

## 2024-08-02 ENCOUNTER — TELEPHONE (OUTPATIENT)
Dept: FAMILY MEDICINE | Facility: CLINIC | Age: 59
End: 2024-08-02
Payer: COMMERCIAL

## 2024-08-02 DIAGNOSIS — M54.50 CHRONIC MIDLINE LOW BACK PAIN WITHOUT SCIATICA: ICD-10-CM

## 2024-08-02 DIAGNOSIS — G89.29 CHRONIC MIDLINE LOW BACK PAIN WITHOUT SCIATICA: ICD-10-CM

## 2024-08-02 DIAGNOSIS — F11.90 CHRONIC, CONTINUOUS USE OF OPIOIDS: Primary | ICD-10-CM

## 2024-08-02 NOTE — TELEPHONE ENCOUNTER
PA Initiation    Medication: TRAMADOL HCL  MG PO TB24  Insurance Company: Cynthia - Phone [phone] Fax [phone]  Pharmacy Filling the Rx: [facility] - [place], [place] - [address] [place] [address]  Filling Pharmacy Phone: [phone]  Filling Pharmacy Fax: [phone]  Start Date: 8/2/2024

## 2024-08-02 NOTE — TELEPHONE ENCOUNTER
Prior Authorization Retail Medication Request    Medication/Dose: traMADol (ULTRAM-ER) 100 MG 24 hr tablet   Diagnosis and ICD code (if different than what is on RX):    New/renewal/insurance change PA/secondary ins. PA:  Previously Tried and Failed:    Rationale:  We want patient to have better coverage of pain throughout the day. She spends a lot of time in bed due to pain.     Insurance   Primary:   Insurance ID:      Secondary (if applicable):  Insurance ID:      Pharmacy Information (if different than what is on RX)  Name:    Phone:    Fax:

## 2024-08-05 NOTE — TELEPHONE ENCOUNTER
Fr no we can just keep with the current tramdol plan. She will need a pain management consultation if she cont to not have pain contrl. Alana Carbajal M.D.

## 2024-08-05 NOTE — TELEPHONE ENCOUNTER
PRIOR AUTHORIZATION DENIED    Medication: TRAMADOL HCL  MG PO TB24    Insurance Company: [employer] - Phone [phone] Fax [phone]    Denial Date: 8/2/2024    Denial Reason(s):       Appeal Information:

## 2024-08-06 ENCOUNTER — TELEPHONE (OUTPATIENT)
Dept: FAMILY MEDICINE | Facility: CLINIC | Age: 59
End: 2024-08-06
Payer: COMMERCIAL

## 2024-08-06 NOTE — TELEPHONE ENCOUNTER
I called and notified patient of response from provider. Patient wondering if Tramadol can be increased to 2 tabs three times daily.   She would like to see someone in pain management but states she is unable to handle driving any type of distance and wondering if she can see someone close by.   She is now reportedly very anxious and upset and wondering what other options she has.     Please advise.    KIM San

## 2024-08-06 NOTE — TELEPHONE ENCOUNTER
Noted. RN called and spoke with patient. She stated that at one point she spoke with Dr. Carbajal about a lesion inside her bottom lip. Per patient Dr. Carbajal had told her that it is something that would need to be removed.   She was told that it might be more medical vs dental.  Dr. Baires from [facility] told her that it should be removed but that they cannot do it there. Dr. Kay's the Oral surgeon at the [place] dental office may be able to remove it.   She believes that they may need a medical referral for removal.  Or is there a different place we would refer her to?    We also talked about the pain referral. She is afraid that she will not be able to get to appointments in the winter. RN explained that she might have to go to the first appointment in person but may be able to do virtuals after that. RN will call on Thursday to double check.     Suha Dawkins RN on 8/6/2024 at 5:57 PM

## 2024-08-06 NOTE — TELEPHONE ENCOUNTER
I made the pain management referral. They may be able to do most of the visits virtually. No increase in tramadol. Alana Carbajal M.D.

## 2024-08-06 NOTE — TELEPHONE ENCOUNTER
"Patient called and wants to only speak with Rasheed RN at [place].     She is requesting that Rasheed call her after 5:40 PM today, states Rasheed knows \"all about this possible referral\" and only wants Rasheed RN.    Message routed to Rasheed RN at [facility] [place], requesting she call patient after 5:40 pm today.    Julie Behrendt RN    "

## 2024-08-08 NOTE — TELEPHONE ENCOUNTER
Spoke with patient this evening. She believes she may not need a referral from Dr. Carbajal she will let us know if that is true or not.     She is scheduled for pain management in September. She knows she needs to get her B12 and Vitamin D drawn, she thought it was in October that it was supposed to be drawn. Dr. Carbajal are you okay with her getting it drawn in September while she is in [place] for her pain management appointment?    Plan is to call patient next Thursday around the same time.   Suha Dawkins RN on 8/8/2024 at 5:53 PM

## 2024-08-09 NOTE — TELEPHONE ENCOUNTER
Yes she can wait. It is possible that pain management will want labs also. Thank you for being patient with her. Alana Carbajal M.D.

## 2024-08-15 ENCOUNTER — TELEPHONE (OUTPATIENT)
Dept: FAMILY MEDICINE | Facility: CLINIC | Age: 59
End: 2024-08-15
Payer: COMMERCIAL

## 2024-08-15 NOTE — TELEPHONE ENCOUNTER
Pt calling needing a follow up from last Thurs RN was going to call her. Pt is wondering if RN was still going to call pt. Rasheed please call pt.    .Deborah Davila PSC

## 2024-08-16 NOTE — TELEPHONE ENCOUNTER
Noted. Patient called the clinic because I did not call her yesterday. I called patient today explained that I was ill and today I'm a solo RN. She was still on my list to call her back.   I didn't forget about her.   I called and left a detailed VM with the message.   Suha Dawkins RN on 8/16/2024 at 11:58 AM

## 2024-08-16 NOTE — TELEPHONE ENCOUNTER
Patient keeps calling and will not talk to anybody but Rasheed the RN and wants only her to call her back today or Monday later in the day.  Phone pool has been explained to the patient but she will not talk to anyone else.        Dipika Wu on 8/16/2024 at 2:46 PM

## 2024-08-19 NOTE — TELEPHONE ENCOUNTER
RN called patient in the mail she received the reason for denial for the tramadol XR. Which she may potential still be eligible for.   RN will look into it    She also is certain she does not need a referral for the dentist.   She is having some work done on her teeth on Wednesday. Plan to call her on Monday next week.   Suha Dawkins RN on 8/19/2024 at 5:59 PM

## 2024-08-21 ENCOUNTER — TELEPHONE (OUTPATIENT)
Dept: FAMILY MEDICINE | Facility: CLINIC | Age: 59
End: 2024-08-21
Payer: COMMERCIAL

## 2024-08-21 DIAGNOSIS — K04.7 DENTAL INFECTION: Primary | ICD-10-CM

## 2024-08-21 RX ORDER — AZITHROMYCIN 250 MG/1
TABLET, FILM COATED ORAL
Qty: 6 TABLET | Refills: 0 | Status: SHIPPED | OUTPATIENT
Start: 2024-08-21 | End: 2024-08-26

## 2024-08-21 NOTE — TELEPHONE ENCOUNTER
Patient's call transferred to author    Patient states she just got home from [place] after seeing the Oral Surgeon - Dr Tarik Montanez had given patient a written script for a Z-Pack (sig: take as directed on package)    Patient hoping Dr. Carbajal can send medication electronically to [facility] in [place] before noon today, so they can deliver the medication to her home today     Patient states leaving her home is difficult and going to the pharmacy is nearly impossible - would like medication delivered     Sharif Manrique, Clinic RN  [facility]

## 2024-08-23 ENCOUNTER — NURSE TRIAGE (OUTPATIENT)
Dept: NURSING | Facility: CLINIC | Age: 59
End: 2024-08-23
Payer: COMMERCIAL

## 2024-08-23 ENCOUNTER — TELEPHONE (OUTPATIENT)
Dept: FAMILY MEDICINE | Facility: CLINIC | Age: 59
End: 2024-08-23
Payer: COMMERCIAL

## 2024-08-23 DIAGNOSIS — K13.70 ORAL LESION: Primary | ICD-10-CM

## 2024-08-23 DIAGNOSIS — K59.01 SLOW TRANSIT CONSTIPATION: ICD-10-CM

## 2024-08-23 RX ORDER — POLYETHYLENE GLYCOL 3350 17 G/17G
17 POWDER, FOR SOLUTION ORAL 3 TIMES DAILY
Qty: 510 G | Refills: 3 | Status: SHIPPED | OUTPATIENT
Start: 2024-08-23

## 2024-08-23 NOTE — TELEPHONE ENCOUNTER
Call placed to patient  Relayed Dr. Cochran message    Patient verbalized understanding  Patient prefers to take another dose of antibiotic this morning as she tolerated the antibiotic in May 2024 and see how she does     Patient plans to go back to her normal NSAID use and is hoping that will keep her stomach from getting upset  Patient will eat before taking the antibiotic and plans to add greek yogurt BID   Will contact Oral Surgeon and Care Team if needed     Sharif Manrique, Clinic RN  [facility]

## 2024-08-23 NOTE — TELEPHONE ENCOUNTER
"RN looked up the [place] Medicaid preferred drug list for analgesics narcotics long acting.\"   The only preferred drugs are the 3 listed. There are no other options.   Suha Dawkins RN on 8/23/2024 at 9:29 AM    "

## 2024-08-23 NOTE — TELEPHONE ENCOUNTER
Patient called back to let us know that she talked to the oral surgeon and they recommended she stop the Z-Hao. She will not take them any more. See phone Encounter for 8/23 for other details of call.    Ericka Fofana RN on 8/23/2024 at 3:56 PM

## 2024-08-23 NOTE — TELEPHONE ENCOUNTER
Received call from Patient.  Spoke with Sharif this morning.  Did take ABX with food this morning and it seemed to help.  About 1 hour ago, she started having abdominal pain and stomach bloating.  Was able to have a small BM.  Drank some prune juice.  States that she can not take the zithromax any more.  Asked Patient if she had spoke with her oral surgeon.  Patient states that she has not talked with her oral surgeon, did not know she was supposed to.  Recommended that Patient call oral surgeon and speak with them regarding ABX.  Patient will call oral surgeon and discuss.  Recommended that Patient does not stop taking antibiotic without have another ABX in place.  Seb Castro RN

## 2024-08-23 NOTE — TELEPHONE ENCOUNTER
Provider Response to 2nd Level Triage Request    I have reviewed the RN documentation. My recommendation is:  This patient could contact her oral surgeon to see if there is a different medication that they would prescribe for her abdominal pain.  It appears as though Red flag symptoms were reviewed with the patient.  If she has continued or worsening pain she needs to be seen.    I would not necessarily recommended that she stop the antibiotics until she has another plan in place as I am certain these were prescribed for specific purpose.    Dr Cochran

## 2024-08-23 NOTE — TELEPHONE ENCOUNTER
Patient is very frustrated with not being able to talk to the same person every call. She is requesting MD send a referral for a biopsy per her oral surgeon recommendation. She states that Dr. Carbajal will know exactly what bump she is talking about. Last OV 7/11/24. Advised Dr. Carbajal may need new appointment to discuss lip bump.     Please Advise if you can do biopsy for lip issue order of if she needs appointment.  Ericka Fofana RN on 8/23/2024 at 4:04 PM

## 2024-08-23 NOTE — TELEPHONE ENCOUNTER
"Nurse Triage SBAR    Is this a 2nd Level Triage? YES, LICENSED PRACTITIONER REVIEW IS REQUIRED    Situation: Patient calling with significant constant abdominal pain since yesterday.     Background: Seeing an oral surgeon for an implant - on antibiotic (azithromycin), 600 mg ibuprofen, 1000 mg of tylenol- for the last 2 and half days  - has been taking ibuprofen for a while.     Assessment:   stomach ache  Taking ibuprofen  Right in the middle of her belly  - trying seven up and crackers  No radiating pain   Pain started yesterday  Constant pain- worsening with time   Rating pain 5-6/10   Indicates she has had this kind of pain in the past \"its just a stomach ache, like in the past\"   Patient believes this pain is related to the antibiotic she is on  Indicates some nausea   Baseline back pain  Noting some chills    Protocol Recommended Disposition:   See HCP Within 4 Hours (Or PCP Triage)    Recommendation: Advised patient to be seen within the next 4 hours- patient declines to do so. Offered to page an on call provider to review and patient declines to do so. She is requesting to have a message routed to the clinic for her provider to advise today.     Writer reviewed red flag symptoms with patient and when to be seen more urgently. Patient verbalizes understanding. Advised that if her symptoms change or if she changes her mind and would like to speak to an on call she is welcome to do so. Patient agreeable to do so but at this time again declines to have an on-call paged.     She would like to know if she should continue the antibiotic as she believes this is what is causing her upset stomach. She would also like to know if continuing the ibuprofen would be advised as well.     Please follow up with patient during clinic today.     Routed to provider- patient declines having a provider paged today, would like home clinic advice.    Does the patient meet one of the following criteria for ADS visit consideration? " "16+ years old, with an [facility] PCP     Ayleen Lyons RN BSN 8/23/2024 5:09 AM    Reason for Disposition   [1] MILD-MODERATE pain AND [2] constant AND [3] present > 2 hours    Additional Information   Negative: Shock suspected (e.g., cold/pale/clammy skin, too weak to stand, low BP, rapid pulse)   Negative: Difficult to awaken or acting confused (e.g., disoriented, slurred speech)   Negative: Passed out (i.e., lost consciousness, collapsed and was not responding)   Negative: Sounds like a life-threatening emergency to the triager   Negative: Followed an abdomen (stomach) injury   Negative: Chest pain   Negative: [1] Abdominal pain AND [2] pregnant < 20 weeks   Negative: [1] Abdominal pain AND [2] pregnant 20 or more weeks   Negative: [1] Abdominal pain AND [2] postpartum (from 0 to 6 weeks after delivery)   Negative: Pain is mainly in upper abdomen  (if needed ask: \"is it mainly above the belly button?\")   Negative: Abdomen bloating or swelling are main symptoms   Negative: [1] SEVERE pain (e.g., excruciating) AND [2] present > 1 hour   Negative: [1] SEVERE pain AND [2] age > 60 years   Negative: [1] Vomiting AND [2] contains red blood or black (\"coffee ground\") material  (Exception: Few red streaks in vomit that only happened once.)   Negative: Blood in bowel movements  (Exception: Blood on surface of BM with constipation.)   Negative: Black or tarry bowel movements  (Exception: Chronic-unchanged black-grey BMs AND is taking iron pills or Pepto-Bismol.)   Negative: [1] Vomiting AND [2] contains bile (green color)   Negative: Patient sounds very sick or weak to the triager    Protocols used: Abdominal Pain - Female-A-AH    "

## 2024-08-26 ENCOUNTER — TELEPHONE (OUTPATIENT)
Dept: FAMILY MEDICINE | Facility: CLINIC | Age: 59
End: 2024-08-26
Payer: COMMERCIAL

## 2024-08-26 NOTE — TELEPHONE ENCOUNTER
RN called patient and notified her of this information. She is going to keep her appointment with the pain clinic.     Suha Dawkins RN on 8/26/2024 at 6:03 PM

## 2024-08-26 NOTE — TELEPHONE ENCOUNTER
Order/Referral Request    Who is requesting:  pt     Orders being requested: referrals to have her lip lesion looked at or biopsy     Reason service is needed/diagnosis:  lip lesion     When are orders needed by:  asap    Has this been discussed with Provider: Yes    Does patient have a preference on a Group/Provider/Facility?  [facility]     Does patient have an appointment scheduled?: No    Where to send orders: Place orders within Epic    Okay to leave a detailed message?: Yes after 1 pm  at Home number on file [phone] (home)      Pt state not sure why she is going to ENT.  She called her insurance okayed [place] [facility] if she wish to go there.     Pt state her dental specialist was not able to see her due to insurance.

## 2024-08-26 NOTE — TELEPHONE ENCOUNTER
Noted. RN called patient. Insurance had mentioned ENT.   Is this something that could be removed and biopsied by a medical doctor if oral surgery is not in her network?    This continues to get in the way of when she is eating and talking. She bit it a couple of times while on the phone with this RN.     Dr. Carbajal,   Patient also stated that she has been needing to use extra tramadol for doctors appointments. She said you were okay with extra for appointments. She has been having someone clean her home, she doesn't want to lay around so she will take an extra dose so that she can get up.   She wanted to let you know she may need extra.    Suha Dawkins RN on 8/26/2024 at 6:10 PM

## 2024-08-26 NOTE — TELEPHONE ENCOUNTER
Called oral surgeons office [phone] and they said that they are not a provider under [facility]. So the referral will not help.    Oral Surgeon office has tried to explain this to Amira .    Called Amira and tried to tell her she needs to call [facility] to find out who is in her network for Oral Surgeons. But it seems overwhelming to do that for her.    She is not sure either if this is a medical doctor thing that it should be taking care of by?     Amira states she [facility] and I told her I would update Gering on this.

## 2024-08-29 ENCOUNTER — TELEPHONE (OUTPATIENT)
Dept: FAMILY MEDICINE | Facility: CLINIC | Age: 59
End: 2024-08-29
Payer: COMMERCIAL

## 2024-08-29 NOTE — TELEPHONE ENCOUNTER
Patient called the clinic asking for RN to call her back for an update.    She said she would rather have a doctor do the biopsy other than a dentist. RN reminded patient that dentists are doctors as well. She would rather have a medical provider.     She keeps going back to the letter from the denial for tramadol. She wants to make sure everything was tried before she goes to the pain specialist. RN explained again that she has tried the morphine ER. She has not tried belbuca film or fentanyl patch.   She asked why she hadn't tried those. RN explained that they are medications that are usually managed more so by a pain specialist as they can be more habit forming since they are a stronger opoid.     Patient still is asking why she can't just take an extra tramadol if needed.   She has been taking extra for teeth cleanings and medical appointments. She said you would give enough at one point so she would have enough if she needed an extra one or 2.   She may be running out soon.    Routing to provider. RN has scheduled phone call for next Thursday at [phone] with patient.    Suha Dawkins RN on 8/29/2024 at 1:41 PM

## 2024-08-30 NOTE — TELEPHONE ENCOUNTER
I cannot determine whether ent or oral surgery can do this removal Amira needs to be able to advocate for herself. If you can contact who her county worker is, I would be happy to speak with them. Her situation is difficult and I thank you for being kind to her. If things are not progressing, I am willing to see her in person to reevaluate what is going on. With some prep and clinic support, I think this can be resolved. Alana Carbajal M.D.

## 2024-09-05 NOTE — TELEPHONE ENCOUNTER
RN called patient today. 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   Patient would like to go to [place].   Please send a referral for ENT.     Patient again asked about the PA for the tramadol ER. RN explained again that she only tried 1/3 of the preferred medications. Patient asked why she didn't try the other two.     Patient stated she has used about 6-7 extra tramadol a month.   She wants an answer from Dr. Carbajal on if she can take a 9th tramadol a day until she is seen by the pain clinic.     She is going to run into an issue when it comes to filing because she has been taking extra of the 30 day supply.     I planned on calling her next Friday at 12:15.    Suha Dawkins RN on 9/5/2024 at 3:49 PM

## 2024-09-05 NOTE — TELEPHONE ENCOUNTER
Patient called stating she has been expecting a phone call from Rasheed RN with [facility] since 12:15 PM today.     Pls call her at [phone], she is only wanting to speak with Rasheed.     Message routed to Rasheed.     Julie Behrendt RN

## 2024-09-05 NOTE — TELEPHONE ENCOUNTER
There is no ent at [place]. I have been referring patient s to [facility]. I will put in this referral. She should not be taking more than what has been prescribed for the tramadol. Her need for increasing doses is consistent with tolerance and addiction. She will need to make sure that she does not run out of medication before she can get her next refill. .Alana Carbajal MD

## 2024-09-06 ENCOUNTER — TELEPHONE (OUTPATIENT)
Dept: OTOLARYNGOLOGY | Facility: CLINIC | Age: 59
End: 2024-09-06
Payer: COMMERCIAL

## 2024-09-06 NOTE — TELEPHONE ENCOUNTER
Outgoing call: Spoke with patient about up-coming appointment. She had questions if she needed a referral. RN saw her insurance information had been verified. Answered what questions I could. Pt somewhat hyper verbal at the time of conversation.     Arielle Briseno RN on 9/6/2024 at 12:31 PM

## 2024-09-06 NOTE — TELEPHONE ENCOUNTER
Sorry I did not know that they had been able to replace Dr. Armenta since he left. She already has an joselo tthere for 10/3. Alana Carbajal M.D.

## 2024-09-06 NOTE — TELEPHONE ENCOUNTER
Patient did not indicate she wanted to go to [facility]. She we specifically talked about [facility] because of her insurance. [facility] has ENT. Please send this to [facility] as the requested by the patient.   Route encounter back to me after.   Suha Dawkins RN on 9/6/2024 at 9:17 AM

## 2024-09-06 NOTE — TELEPHONE ENCOUNTER
[facility] Call Center    Phone Message    May a detailed message be left on voicemail: yes     Reason for Call: Appointment Intake    Referring Provider Name: DR CLARK  Diagnosis and/or Symptoms: ORAL LESION    PT DOESN'T WANT TO GO TO AN EXTERNAL CLINIC, SHE REQUESTED TO BE SEEN AT [place]. SHE HAS A REFERRAL TO GO TO [place] BUT DECLINED.SHE WOULD LIKE A CALL TO DISCUSS WHAT WILL HAPPEN AT THE SCHEDULED APPOINTMENT    Action Taken: Message routed to:  [facility] E[facility]    Travel Screening: Not Applicable

## 2024-09-12 ENCOUNTER — LAB (OUTPATIENT)
Dept: LAB | Facility: CLINIC | Age: 59
End: 2024-09-12
Payer: COMMERCIAL

## 2024-09-12 ENCOUNTER — TELEPHONE (OUTPATIENT)
Dept: FAMILY MEDICINE | Facility: CLINIC | Age: 59
End: 2024-09-12

## 2024-09-12 ENCOUNTER — OFFICE VISIT (OUTPATIENT)
Dept: PALLIATIVE MEDICINE | Facility: CLINIC | Age: 59
End: 2024-09-12
Attending: FAMILY MEDICINE
Payer: COMMERCIAL

## 2024-09-12 VITALS — SYSTOLIC BLOOD PRESSURE: 137 MMHG | HEART RATE: 73 BPM | DIASTOLIC BLOOD PRESSURE: 92 MMHG

## 2024-09-12 DIAGNOSIS — F11.90 CHRONIC, CONTINUOUS USE OF OPIOIDS: ICD-10-CM

## 2024-09-12 DIAGNOSIS — M54.50 CHRONIC MIDLINE LOW BACK PAIN WITHOUT SCIATICA: ICD-10-CM

## 2024-09-12 DIAGNOSIS — Z13.220 SCREENING FOR HYPERLIPIDEMIA: Primary | ICD-10-CM

## 2024-09-12 DIAGNOSIS — Z87.81 STATUS POST CLOSED FRACTURE OF HIP: ICD-10-CM

## 2024-09-12 DIAGNOSIS — G89.29 CHRONIC MIDLINE LOW BACK PAIN WITHOUT SCIATICA: ICD-10-CM

## 2024-09-12 DIAGNOSIS — M54.16 LUMBAR RADICULAR PAIN: Primary | ICD-10-CM

## 2024-09-12 DIAGNOSIS — K04.7 DENTAL INFECTION: ICD-10-CM

## 2024-09-12 LAB
CHOLEST SERPL-MCNC: 184 MG/DL
FASTING STATUS PATIENT QL REPORTED: YES
HDLC SERPL-MCNC: 78 MG/DL
LDLC SERPL CALC-MCNC: 82 MG/DL
NONHDLC SERPL-MCNC: 106 MG/DL
TRIGL SERPL-MCNC: 121 MG/DL
VIT B12 SERPL-MCNC: 3468 PG/ML (ref 232–1245)
VIT D+METAB SERPL-MCNC: 22 NG/ML (ref 20–50)

## 2024-09-12 PROCEDURE — 80061 LIPID PANEL: CPT

## 2024-09-12 PROCEDURE — 82607 VITAMIN B-12: CPT

## 2024-09-12 PROCEDURE — 36415 COLL VENOUS BLD VENIPUNCTURE: CPT

## 2024-09-12 PROCEDURE — 82306 VITAMIN D 25 HYDROXY: CPT

## 2024-09-12 PROCEDURE — 99205 OFFICE O/P NEW HI 60 MIN: CPT | Performed by: NURSE PRACTITIONER

## 2024-09-12 ASSESSMENT — ANXIETY QUESTIONNAIRES
3. WORRYING TOO MUCH ABOUT DIFFERENT THINGS: SEVERAL DAYS
4. TROUBLE RELAXING: NOT AT ALL
5. BEING SO RESTLESS THAT IT IS HARD TO SIT STILL: SEVERAL DAYS
IF YOU CHECKED OFF ANY PROBLEMS ON THIS QUESTIONNAIRE, HOW DIFFICULT HAVE THESE PROBLEMS MADE IT FOR YOU TO DO YOUR WORK, TAKE CARE OF THINGS AT HOME, OR GET ALONG WITH OTHER PEOPLE: SOMEWHAT DIFFICULT
2. NOT BEING ABLE TO STOP OR CONTROL WORRYING: SEVERAL DAYS
6. BECOMING EASILY ANNOYED OR IRRITABLE: NOT AT ALL
7. FEELING AFRAID AS IF SOMETHING AWFUL MIGHT HAPPEN: NOT AT ALL

## 2024-09-12 ASSESSMENT — PAIN SCALES - GENERAL: PAINLEVEL: MODERATE PAIN (5)

## 2024-09-12 NOTE — PATIENT INSTRUCTIONS
PATIENT INSTRUCTIONS:     I am recommending a multidisciplinary treatment plan to help this patient better manage her pain. The following recommendations were given to the patient. Diagnosis, treatment options, risks, benefits, and alternatives were discussed; all questions were answered. Self-care instructions were given. The patient expressed understanding of the plan for management.   Continue Current Treatment Plan with:   - Existing medications, as prescribed by Dr. Carbajal    New Referrals:   - Physical Therapy:    Referral placed today for evaluation and treatment for strengthening and conditioning of low back pain.     New Orders:   - Diagnostic Imaging:  MRI Lumbar Spine  at [facility].  Please call [phone] to schedule.    Return to Clinic:   -  30-minute In-Person Appointment with Gita Allen, KELSEY, VONDA, ANTON in 6-8 weeks, or sooner if needed    Future Considerations:   Please return for additional evaluation after you have completed the Lumbar MRI.     ----------------------------------------------------------------  Clinic Number:  [phone]   Call with any questions about your care and for scheduling assistance.   Calls are returned Monday through Friday between 8 AM and 4:30 PM. We usually get back to you within 2 business days depending on the issue/request.    If we are prescribing your medications:  For opioid medication refills, call the clinic or send a [employer] message 7 days in advance.  Please include:  Name of requested medication  Name of the pharmacy.  For non-opioid medications, call your pharmacy directly to request a refill. Please allow 3-4 days to be processed.   Per [place] State Law:  All controlled substance prescriptions must be filled within 30 days of being written.    For those controlled substances allowing refills, pickup must occur within 30 days of last fill.      We believe regular attendance is key to your success in our program!    Any time you are unable  to keep your appointment we ask that you call us at least 24 hours in advance to cancel.This will allow us to offer the appointment time to another patient.   Multiple missed appointments may lead to dismissal from the clinic.

## 2024-09-12 NOTE — PROGRESS NOTES
"[facility] Pain Management   Comprehensive Pain Evaluation    Date of Visit: 9/12/2024    CHIEF COMPLAINT:    Chief Complaint   Patient presents with    Pain          REASON FOR VISIT:    Amira Scherer is a 59 year old female who is seen today at the request of Alana Carbajal MD (PCP) for evaluation of her pain issues and recommendations for management; with specific emphasis on:  chronic low back pain w.o sciatica    Referring Provider Comments:   \"Personality disorder. very difficult to treat. has pain beyond what would be expected I am unsure it she may have some sort of rsd. [facility] see her last hospitalization for some perspective.\"        Subjective    HISTORY OF PRESENT ILLNESS:  Onset/Progression:   Amira Scherer is a 59 year old female with history of low back pain with bilateral leg pain.  Reports low back injury around 2014 after sleeping in an air mattress and shoveling snow.  She developed bilateral leg; describes as \"excruciating leg pain\" the following summer.   Leg pain is worse in right leg.  Had right hip fracture 2/07/22; leg pain is worse since surgery in 2022.   Endorses numbness/tingling from low back to bottoms of both feet.  \"I feel like I'm being ripped apart from the inside out.\"   Feels like lumps on the bottom of feet.   Feet and legs are always cold.  Pain is better when laying down.   Pain is worse when standing in one place.   Currently taking Tramadol 50mg - 2 tabs (100mg) every 10-12 hours; will improve pain level from 10/10 to 4-5/10; effect will last approximately 4 hours.   Had been prescribed Pregabalin and stopped taking it because she had stomach pain.   States stomach pain was also triggered by antibiotics for dental procedures.         Pain quality: Aching, Burning, and Numb, Pins & Needles, Cramping, Sharp   Pain timing: Constant    Pain score today: Moderate Pain (5)   Pain rating: intensity ranges from 3/10 to 10/10, and averages " "10/10 on a 0-10 scale.  Aggravating factors include: standing, changing position from sitting to standing; \"there all the time\"   Relieving factors include: laying down, exercise,  medications         Past Pain Treatments:   Pain Clinic:   No    Physical therapy: Yes  - for right hip surgery   Psychologist: No   Chiropractor: Yes   Acupuncture: No   Pharmacotherapy:    Opioids: Yes     Non-opioids:  Yes  TENs Unit/electric stim: No   Heat/Cold: Yes - warm shower      Injections/clinic procedures: No     Surgeries related to pain: Yes   ORIF right hip 2022      Current Pain Relevant Medications:    - Ibuprofen 600mg in AM, 800mg in PM    - Pregabalin 50mg BID  - Tramadol-ER 100mg - 1 tab BID   - Tramadol 50mg - 2 tabs BID     Other Relevant Medications:   None     Previous Pain Relevant Medications: (H--helped; HI--Helped initially; [facility]--Somewhat helpful; NH--No help; W--worse; SE--side effects; A--allergy; ?--Unsure if helpful)   Opioids: oxycodone (GI pain), tramadol (H), and morphine (GI pain)   NSAIDs: Ibuprofen (H)        Review of [place] Prescription Monitoring Program ():  reviewed on 9/12/24. No concern for abuse or misuse of controlled medications based on this report. Controlled substances prescribed in the past 6 months include: (Tramadol 50mg, Oxycontin ER 10mg, MS Contin 15mg, Oxycodone 5mg)   Current MME: 40 / day    Current controlled substance medications, if any, are being prescribed by: PCP and ER provider   Primary Care Provider is Alana Carbajal         Past Medical History   She  has no past medical history on file.   Past Surgical History   She  has a past surgical history that includes Colon surgery (1987 and 91); Colectomy without colostomy; and Open reduction internal fixation hip bipolar (Right, 2/7/2022).   Social History   Social History     Tobacco Use    Smoking status: Former     Current packs/day: 0.00     Average packs/day: 0.5 packs/day for 20.0 years (10.0 ttl pk-yrs)     " Types: Cigarettes     Start date: 1987     Quit date: 2007     Years since quittin.4    Smokeless tobacco: Never   Vaping Use    Vaping status: Never Used   Substance Use Topics    Alcohol use: Yes     Comment: Red wine 2-3 times per week    Drug use: No      Social History     Social History Narrative    Not on file        Current Outpatient Medications   Medication Sig Dispense Refill    cyanocobalamin (VITAMIN B-12) 1000 MCG tablet Take 2 tablets (2,000 mcg) by mouth daily 180 tablet 2    docusate sodium (COLACE) 100 MG capsule Take 1 capsule (100 mg) by mouth 2 times daily as needed for constipation 180 capsule 3    ibuprofen (ADVIL/MOTRIN) 400 MG tablet Take 2 tablets (800 mg) by mouth every 8 hours as needed for moderate pain 180 tablet 3    ibuprofen (ADVIL/MOTRIN) 600 MG tablet Take 1 tablet (600 mg) by mouth every 8 hours as needed for moderate pain 90 tablet 3    ibuprofen (SM IBUPROFEN) 200 MG tablet Take 1 tablet (200 mg) by mouth every 8 hours as needed for pain 100 tablet 3    lactulose (CONSTULOSE) 10 GM/15ML solution Take 45 mLs (30 g) by mouth 2 times daily as needed for constipation 2838 mL 3    polyethylene glycol (MIRALAX) 17 GM/Dose powder Take 17 g by mouth 3 times daily 510 g 3    SENNA-docusate sodium (SENNA S) 8.6-50 MG tablet Take 1 tablet by mouth 2 times daily as needed (constipation) 180 tablet 3    traMADol (ULTRAM) 50 MG tablet Take 2 tablets (100 mg) by mouth 2 times daily as needed for severe pain or pain 120 tablet 2    pregabalin (LYRICA) 50 MG capsule Take 1 capsule (50 mg) by mouth 2 times daily (Patient not taking: Reported on 2024) 60 capsule 1    traMADol (ULTRAM-ER) 100 MG 24 hr tablet Take 1 tablet (100 mg) by mouth 2 times daily as needed for severe pain Do not take oxycontin or morphine with this (Patient not taking: Reported on 2024) 60 tablet 2     No current facility-administered medications for this visit.     Allergies   Allergen Reactions    Pc  Pen Vk [Penicillin V] GI Disturbance        Medications and Allergies reviewed. Yes        REVIEW OF SYSTEMS:   ROS: 10 point ROS neg other than the symptoms noted above in the HPI.     The patient otherwise denies red flag symptoms, such as: thunderclap headache, bowel or bladder incontinence, parasthesias, weakness, saddle anesthesia, unintentional weight loss, or fever/chills/sweats.     Objective   OBJECTIVE    Vitals:    09/12/24 0804   BP: (!) 137/92   Patient Position: Standing   Pulse: 73         Appearance:   A&O. Patient is cachetic.      HHENT:  Normocephalic, atraumatic. Eyes without conjunctival injection or jaundice. Neck supple.     Pulmonary:  Normal effort; no cough or audible wheeze.       Skin: No obvious rash, lesions, or petechiae of exposed skin.    Psych:  Alert, without lethargy or stupor. Speech rapid fire and fluent. Anxious mood. Able to follow commands with difficulty. Requires frequent redirection. Catastrophising thought process.   Tearful or anxious affect: YES   Suicidal or homicidal ideation: NO        MSK:    Gait pattern:  Patient has an antalgic gait pattern:YES  Gait favors the neither side.  Mobility and/or assistive devices? NO      Able to walk on the heels and toes with great difficulty.      Strength:   Knee ext: R: 5/5  L: 5/5  Knee flex: R: 5/5  L: 5/5  Dorsiflexion: R: 5/5  L: 5/5  Plantarflexion: R: 5/5  L: 5/5  Great toe ext:  R: 5/5  L: 5/5       Neurological:   Deep Tendon Reflex exam:   Patella:  R:  2/4   L: 1/4      Sensory exam:  (lower extremities)   Light touch: normal    Allodynia: absent    Dysethesia: absent    Hyperalgesia: absent     Cervical spine:  Tenderness in the cervical spine at midline. No  Tenderness in the cervical paraspinal muscles. No    Thoracic spine:   Kyphosis. No   Tenderness in the thoracic spine at midline. No  Tenderness in the thoracic paraspinal muscles. No    Lumbar/Sacral spine:  Range of motion by visual estimation   Forward  Flexion:  60 degrees (0 - 60); relieves pain   Ext: 15 degrees (0 - 25); painful   Rotation/ext to right: pain free  Rotation/ext to left: pain free  Lordosis. No  Tenderness in the lumbar spine at midline. No  Tenderness in the lumbar paraspinal muscles.No  Straight leg exam:  Right: negative    Left:  negative  Neural Slump test:  Right: negative    Left:  negative  Eron/Angel's test:     Right: not done    Left:  not done  Passive internal rotation:   Right: not done    Left: not done   Active external rotation:    Right: not done    Left: not done  Tenderness over SI joint:     Right: negative    Left:  negative  Tenderness over Trochanteric Bursa:    Right: positive    Left: negative        Diagnostic Testing:  Labs:      Lab Results   Component Value Date    CR 0.56 01/23/2024    GFRESTIMATED >90 01/23/2024    ALKPHOS 91 01/23/2024    AST 20 01/23/2024    ALT 14 01/23/2024              Imaging:   MRI Lumbar w.o contrast 8/17/22  FINDINGS: There are 5 lumbar-type vertebral bodies assumed for the  purposes of this dictation. The distal spinal cord and cauda equina  appear normal.      Mild convex left curvature of the lumbar spine. No loss of vertebral  body height. There are no destructive osseous lesions. No STIR  hyperdense endplate edema (Modic type I changes).     Level by level as follows:      T12-L1: No loss of disc height. No significant disc herniation. Normal  facets. No spinal canal or neural foraminal narrowing.      L1-L2: No loss of disc height. No significant disc herniation. Normal  facets. No spinal canal or neural foraminal narrowing.      L2-L3: No loss of disc height. No significant disc herniation. Normal  facets. No spinal canal or neural foraminal narrowing.      L3-L4: No loss of disc height. No significant disc herniation. Normal  facets. No spinal canal or neural foraminal narrowing.      L4-L5: No loss of disc height. No significant disc herniation. Normal  facets. No spinal canal  "or neural foraminal narrowing.      L5-S1: No loss of disc height. No significant disc herniation. Normal  facets. No spinal canal or neural foraminal narrowing.      Paraspinous soft tissues: Unremarkable.                                                                       IMPRESSION:  No significant degenerative change in the lumbar spine.  No disc herniation. No spinal canal or neural foraminal narrowing at  any level.         Assessment & Plan      VISIT DIAGNOSIS:   1. Lumbar radicular pain  - MR Lumbar Spine w/o Contrast; Future  - Adult Pain Clinic Follow-Up Order; Future    2. Chronic, continuous use of opioids - F11.9  - Pain Management  Referral    3. Chronic midline low back pain without sciatica  - Pain Management  Referral  - MR Lumbar Spine w/o Contrast; Future  - Adult Pain Clinic Follow-Up Order; Future        ASSESSMENT:    Visit discussion: Amira Scherer is a 59 year old female with a past medical history significant for anxiety, low back pain, personality disorder who presents with complaints of low back and leg pain.  Patient is a challenging historian and frequently leaves topic of her current pain condition. She demonstrates significant anxiety and perceived barriers to improving her pain condition and health.  Significant portion of our discussion centered around her inability to leave her home in the winter months.  I encouraged her to trial physical therapy before  the winter snow falls.  She is hesitant to trial physical therapy but does agree to contact her insurance company to determine where she can be referred within her network.  She does state \"if there is any frost outside then I will be leaving my house\".  Is unclear whether she will follow through with physical therapy or not.  I do believe lumbar MRI is warranted to address low back and radicular leg pain.  However, patient was advised that she may be required to complete a conservative course of physical " "therapy before we can proceed to MRI of her low back.  She was advised this is an insurance requirement.  At this time, I will make no changes to her medication plan and advised her to continue following through with her primary care provider for medication management.  Patient was advised to return in 6 to 8 weeks, ideally after she has completed physical therapy, to address benefits of PT for low back pain versus advancing to lumbar MRI.  I did place order for lumbar MRI today in hopes that we can move forward with imaging irrespective of physical therapy.  Patient is very focused on wanting \"just 1 more tramadol\" per day.  She presents to this appointment with prescription bottles for MS Contin and oxycodone which she has not used.  I did advise her to bring those to the pharmacy for disposal.  Patient was advised to return to clinic in 6 to 8 weeks.  She states that this may not be possible due to snow.  I did advise her that to participate in the pain program we needed to move forward with conservative measures.  If we do obtain lumbar MRI, then she does need to present to clinic in person for review of results.    Patient will contact her insurance to determine where she can go for PT. She will call the clinic to let me know where to send the PT referral.      PLAN:    Continue Current Treatment Plan with:   - Existing medications, as prescribed by Dr. Carbajal       New Referrals:   - Physical Therapy:    Referral placed today for evaluation and treatment for strengthening and conditioning of low back pain.         New Orders:   - Diagnostic Imaging:  MRI Lumbar Spine  at [facility].  Please call [phone] to schedule.      Return to Clinic:   -  30-minute In-Person Appointment with Gita Allen, KELSEY, APRN, FNP-C in 6-8 weeks, or sooner if needed      Future Considerations:   Please return for additional evaluation after you have completed the Lumbar MRI. "     ___________________________________________________________________    BILLING TIME DOCUMENTATION:   TOTAL TIME includes:   Time spent preparing to see the patient: 5 minutes (reviewing records and tests)  Time spend face to face with the patient: 59 minutes  Time spent ordering tests, medications, procedures and referrals: 0 minutes  Time spent Referring and communicating with other healthcare professionals: 0 minutes  Documenting clinical information in Epic: 5 minutes    The total TIME spent on this patient on the day of the appointment was 69 minutes.   ___________________________________________________________________    Review of Electronic Chart, Relevant Records/History: Today I have also reviewed available medical information in the patient's medical record at [facility] ([facility]) and Care Everywhere (if available), including relevant provider notes, laboratory work, and imaging. Please see the [facility] health questionnaire which the patient completed and reviewed with me in detail.     VONDA Egan, DNP, FNP-C   [facility] Management

## 2024-09-12 NOTE — TELEPHONE ENCOUNTER
Patient called today. Again asking for me. She spoke about her pain clinic appointment. She is going to do PT but is unsure where. She is going to call her insurance.   She is in a lot of pain after her visit. She said she had to take pain medication so she could make it to her appointment without pain. In total patient had already took 4 tramadol and 800 mg of ibuprofen. She is trying not to take more tonight. She was notified Dr. Carbajal had said there would be no extra tramadol.     What other recommendations do you have for pain control so patient can do the appointments we are asking her to do? I am concerned if she doesn't have pain control she is not going to be able to do physical therapy or the MRI. She is willing to have an appointment with you if needed.     Patient was explaining her last injury with the shovel. This was the winter of 2022 per patient. She had not had any imaging since. RN encouraged patient to do the MRI since the last one was in August of that year. Well before the injury.     I do need to reach out to Gita Allen to clarify if patient should be taking the lyrica or not. I will do that tomorrow.     Suha Dawkins RN on 9/12/2024 at 6:02 PM

## 2024-09-15 NOTE — TELEPHONE ENCOUNTER
Thank you for speaking with her. I do not have anything more to offer for her. She is using more and more narcotics and she will need to wean off of them . That is why I referred her to pain management. Alana Carbajal M.D.

## 2024-09-16 ENCOUNTER — TELEPHONE (OUTPATIENT)
Dept: PALLIATIVE MEDICINE | Facility: CLINIC | Age: 59
End: 2024-09-16
Payer: COMMERCIAL

## 2024-09-16 RX ORDER — TRAMADOL HYDROCHLORIDE 50 MG/1
100 TABLET ORAL 2 TIMES DAILY PRN
Qty: 120 TABLET | Refills: 2 | Status: SHIPPED | OUTPATIENT
Start: 2024-09-16

## 2024-09-16 NOTE — TELEPHONE ENCOUNTER
She should be able to get her refill in 3 days. I have already sent this in. I think alysha will let her know that it was sent. Alana Carbajal M.D.

## 2024-09-16 NOTE — TELEPHONE ENCOUNTER
Patient has a question for pain management regarding the lyrica.   Patient stated she has not been taking the lyrica even through it is on the medication list. Its been a while now since she has taken it. When she started it she had also started a different medication and wasn't sure which one was causing abdominal upset. Patient figured out it was the antibiotic she was taking but never restarted lyrica.     Per discussion patient had with Gita Allen DNP, patient is to continue the pain management plan that patient started with Dr. Carbajal. Should patient restart the lyrica to see if that is helpful?    Routing to pain clinic.   I do have to call patient tomorrow, I can relay message if that is easier for pain care team.    Suha Dawkins RN on 9/16/2024 at 4:33 PM

## 2024-09-16 NOTE — TELEPHONE ENCOUNTER
Call placed to patient to relay Dr Carbajal's message.  Patient was just following what the pain clinic said.  The recommendation from the Pain clinic was to follow Dr Carbajal's pain pain.  Patient waiting for MRI scheduling to call and set up MRI.  Patient states that she has had multiple medical and dental appointments that she has had to take an extra pain medication to just get to the appointment.  Patient states that she will be 9 pain medications short at the end of the month.  Patient concerned about weaning off medications and id totally focusing on what she is going to do if she can not have any pain medication.  Patient will go to PT, where she is sure is not going to help.  Will not go to appointments of any kind after the snow falls as this scares her.  Patient states that I dont even know why this writer called her.  Patient wants both Dr Carbajal and Rasheed shaikh.  Seb Castro RN

## 2024-09-17 ENCOUNTER — TELEPHONE (OUTPATIENT)
Dept: FAMILY MEDICINE | Facility: CLINIC | Age: 59
End: 2024-09-17
Payer: COMMERCIAL

## 2024-09-17 NOTE — TELEPHONE ENCOUNTER
Noted. I will give her a call back at around that time.  Suha Dawkins RN on 9/17/2024 at 10:09 AM

## 2024-09-17 NOTE — TELEPHONE ENCOUNTER
----- Message from Alana Carbajal sent at 9/16/2024  4:21 AM CDT -----  Please let margy know that her vit d is back in the normal range. She is getting way too much vit b 12 she should stop taking any extra. The other labs are good. Alana Carbajal M.D.

## 2024-09-17 NOTE — TELEPHONE ENCOUNTER
"Noted. RN called patient as requested. Reviewed result again. Patient is taking B12 as prescribed. Not taking any other supplements that would contain B12. Patient said she is thrown off by the word \"extra\" RN sent provider a message to clarify what patient should do about B12. Patient is to stop b12. Discontinued off of med list.     Patient again asked about the letter. Reviewed the types of medication and that she could speak with her Pain management provider regarding them. Explained the type of medication they are vs the type of medication that tramadol is. Patient is worried about needing to wean off of medication. RN explained that she will need off of them at some point but that she needs to focus on starting her pain management program. She is also worried about not having enough tramadol for her appointments and doesn't get why Dr. Carbajal won't understand that she needs them for her appointments. RN scheduled appointment with provider to speak on the phone directly with provider about the issue.     Patient asked about lyrica. Waiting on response from Gita Allen about whether or not she should restart the Lyrica.     She is worried about not being able to get her MRI done because of insurance. She hopes there is something on the scan and she asked \"is that bad?\". RN explained the understanding of hoping there is something to fix or something to that explains the pain.      She is also worried about being a \"Lifer\" being a patient who is constantly in need of a doctor or a nurse.     No other questions or concerns.     Suha Dawkins RN on 9/17/2024 at 4:01 PM              "

## 2024-09-17 NOTE — TELEPHONE ENCOUNTER
If  patient resumes Pregabalin 50mg, I would recommend she start with one dose at bedtime for 7 days, then take one dose in AM and one dose at bedtime.    VONDA Egan, KELSEY, FNP-C   [facility] - Pain Management

## 2024-09-17 NOTE — TELEPHONE ENCOUNTER
Patient Returning Call    Reason for call:  patient is requesting to speak with Mark nurse per pt the nurse knows what is going on she would like a call back between 3 and 3:30 per pt did not want to disclose any thing else just to speak with her nurse.     Information relayed to patient:  yes    Patient has additional questions:  No      Okay to leave a detailed message?: Yes at Home number on file [phone] (home)

## 2024-09-18 DIAGNOSIS — Z87.81 STATUS POST CLOSED FRACTURE OF HIP: ICD-10-CM

## 2024-09-18 DIAGNOSIS — K59.01 SLOW TRANSIT CONSTIPATION: ICD-10-CM

## 2024-09-19 RX ORDER — IBUPROFEN 200 MG/1
TABLET, FILM COATED ORAL
Qty: 100 TABLET | Refills: 3 | Status: SHIPPED | OUTPATIENT
Start: 2024-09-19

## 2024-09-19 RX ORDER — LACTULOSE 10 G/15ML
SOLUTION ORAL
Qty: 2838 ML | Refills: 3 | Status: SHIPPED | OUTPATIENT
Start: 2024-09-19

## 2024-09-20 NOTE — TELEPHONE ENCOUNTER
"RN called patient. Reviewed message. She stated she was told by someone to start at the lowest dose of 25 mg. RN ask is this was information that was from Dr. Carbajal and patient stated it was not.    RN explained that if it was not from Dr. Carbajal or Gita Allen themselves, or their message delivered by someone, please do not change or start new medications. Patient would like to wait until she talks to Dr. Carbajal now.     She brought up OxyContin and oxycodone. RN explained similarities and differences. She has had both and both have upset her stomach. Patient said she never have oxycodone and never said it upset her stomach. RN pulled up 3 different encounters from 3 different nurses of her calling with complaints of abdominal upset and pains. She said it was probably \"constipation\". I told her it is something she will not get. RN explained that extended release medications are an abuse deterrent. \"Well I don't want something that's bad for me\". She's going to ask about it at her visit with Dr. Carbajal. She is also going to bring up the letter AGAIN.     I will call on Monday to see if her MRI was approve. Patient asked why I can't do it now. RN explained that there is not enough time right now.     Suha Dawkins RN on 9/20/2024 at 4:33 PM    "

## 2024-09-23 NOTE — TELEPHONE ENCOUNTER
I was not able to get through after a long wait today. From what I can see everything looks like it has been approved and authorized.    I can try again tomorrow.   Suha Dawkins RN on 9/23/2024 at 4:18 PM

## 2024-09-24 NOTE — TELEPHONE ENCOUNTER
RN was able to find work flow for central PA which shows system automatically authorized means patient is good to go. Suha Dawkins RN on 9/24/2024 at 5:14 PM

## 2024-09-24 NOTE — PROGRESS NOTES
Chief Complaint   Patient presents with    Mouth Lesions     History of Present Illness   Amira Scherer is a 59 year old female who presents today for evaluation.  The patient presents with a lesion on the lip (tic-tac size). The patient has noticed for approximately a few years. It hasn't been changing in size. It is painful when you bite down on it. No citrus or spicy intolerance. No bleeding. Former smoker, and no history of chewing tobacco. No lumps or swollen glands in the neck.     Past Medical History  Patient Active Problem List   Diagnosis    Paresthesias    Acne vulgaris    Anxiety    S/P colon resection    Liver lesion    CARDIOVASCULAR SCREENING; LDL GOAL LESS THAN 160    Other plastic surgery for unacceptable cosmetic appearance    Closed right hip fracture (H)    Closed fracture of right hip, initial encounter (H)    Traumatic rhabdomyolysis, initial encounter (H24)    Fall, initial encounter    Personality disorder in adult, unspecified    Chronic, continuous use of opioids - F11.9    Status post closed fracture of right femur    Slow transit constipation    RSD (reflex sympathetic dystrophy)     Current Medications     Current Outpatient Medications:     docusate sodium (COLACE) 100 MG capsule, Take 1 capsule (100 mg) by mouth 2 times daily as needed for constipation, Disp: 180 capsule, Rfl: 3    ibuprofen (ADVIL/MOTRIN) 400 MG tablet, Take 2 tablets (800 mg) by mouth every 8 hours as needed for moderate pain, Disp: 180 tablet, Rfl: 3    ibuprofen (ADVIL/MOTRIN) 600 MG tablet, Take 1 tablet (600 mg) by mouth every 8 hours as needed for moderate pain, Disp: 90 tablet, Rfl: 3    lactulose (CONSTULOSE) 10 GM/15ML solution, Take 45 mLs (30 g) by mouth 2 times daily as needed for constipation, Disp: 2838 mL, Rfl: 3    polyethylene glycol (MIRALAX) 17 GM/Dose powder, Take 17 g by mouth 3 times daily, Disp: 510 g, Rfl: 3    pregabalin (LYRICA) 50 MG capsule, Take 1 capsule (50 mg) by mouth 2 times daily  (Patient not taking: Reported on 2024), Disp: 60 capsule, Rfl: 1    SENNA-docusate sodium (SENNA S) 8.6-50 MG tablet, Take 1 tablet by mouth 2 times daily as needed (constipation), Disp: 180 tablet, Rfl: 3    SM IBUPROFEN 200 MG tablet, Take 1 tablet (200 mg) by mouth every 8 hours as needed for pain, Disp: 100 tablet, Rfl: 3    traMADol (ULTRAM) 50 MG tablet, Take 2 tablets (100 mg) by mouth 2 times daily as needed for severe pain or pain., Disp: 120 tablet, Rfl: 2    Allergies  Allergies   Allergen Reactions    Pc Pen Vk [Penicillin V] GI Disturbance       Social History   Social History     Socioeconomic History    Marital status:      Spouse name: Tanner Scherer    Number of children: 2    Years of education: 12   Occupational History     Employer: [employer]   Tobacco Use    Smoking status: Former     Current packs/day: 0.00     Average packs/day: 0.5 packs/day for 20.0 years (10.0 ttl pk-yrs)     Types: Cigarettes     Start date: 1987     Quit date: 2007     Years since quittin.4    Smokeless tobacco: Never   Vaping Use    Vaping status: Never Used   Substance and Sexual Activity    Alcohol use: Yes     Comment: Red wine 2-3 times per week    Drug use: No    Sexual activity: Yes     Partners: Male   Other Topics Concern    Parent/sibling w/ CABG, MI or angioplasty before 65F 55M? No       Family History  Family History   Problem Relation Age of Onset    Hypertension Mother     Arthritis Mother     Breast Cancer Sister     Thyroid Disease Sister        Review of Systems  As per HPI and PMHx, otherwise 10+ comprehensive system review is negative.    Physical Exam  BP (!) 145/95 (BP Location: Right arm, Patient Position: Chair, Cuff Size: Adult Small)   Pulse 86   Temp 97.6  F (36.4  C) (Tympanic)   Resp 18   Wt 47.9 kg (105 lb 9.6 oz)   LMP 2013   SpO2 99%   BMI 17.04 kg/m    GENERAL: Patient is a pleasant, cooperative 59 year old female in no acute distress.  HEAD:  Normocephalic, atraumatic.  Hair and scalp are normal.  EYES: Pupils are equal, round, reactive to light and accommodation.  Extraocular movements are intact.  The sclera nonicteric without injection.  The extraocular structures are normal.  EARS: Normal shape and symmetry.  No tenderness when palpating the mastoid or tragal areas bilaterally.  Otoscopic exam reveals no amount of cerumen bilaterally.  The bilateral tympanic membranes are round, intact without evidence of effusion, good landmarks.  No retraction, granulation, or drainage.  NOSE: Nares are patent.  Nasal mucosa is pink.  ORAL CAVITY: Dentition is in poor repair.  Mucous membranes are moist.  Examination of the oral cavity showed a 1  mm lesion located on the right lower lip.  Tongue is mobile, protrudes to the midline.  Palate elevates symmetrically.  Tonsils are +1.  No erythema or exudate.  No additional oral cavity or oropharyngeal masses, lesions, ulcerations, leukoplakia.  NECK: Supple, trachea is midline.  There no palpable cervical lymphadenopathy or masses bilaterally.  Palpation of the bilateral parotid and submandibular areas reveal no masses.  No thyromegaly.    NEUROLOGIC: Cranial nerves II through XII are grossly intact.  Voice is strong.  Patient is House-Brackmann I/VI bilaterally.  CARDIOVASCULAR: Extremities are warm and well-perfused.  No significant peripheral edema.  RESPIRATORY: Patient has nonlabored breathing without cough, wheeze, stridor.  PSYCHIATRIC: Patient is alert and oriented.  Mood and affect appear normal.  SKIN: Warm and dry.  No scalp, face, or neck lesions noted.    Assessment and Plan     ICD-10-CM    1. Oral lesion  K13.70 Adult ENT  Referral     Adult ENT  Referral     triamcinolone (KENALOG) 0.1 % paste        It was my pleasure seeing Amira Scherer today in clinic.  The patient presents today with a lesion on the lip. It is likely related to continuous biting and irritation.     I  recommended treating this with Orabase for approximately two weeks. I would like to see the patient back in 2-4 weeks to make sure it heals. If not, we should proceed with biopsy.       VONDA Torres [facility]  Otolaryngology  [facility]

## 2024-09-26 ENCOUNTER — HOSPITAL ENCOUNTER (OUTPATIENT)
Dept: MRI IMAGING | Facility: CLINIC | Age: 59
Discharge: HOME | End: 2024-09-26
Attending: NURSE PRACTITIONER | Admitting: NURSE PRACTITIONER
Payer: COMMERCIAL

## 2024-09-26 DIAGNOSIS — G89.29 CHRONIC MIDLINE LOW BACK PAIN WITHOUT SCIATICA: ICD-10-CM

## 2024-09-26 DIAGNOSIS — M54.50 CHRONIC MIDLINE LOW BACK PAIN WITHOUT SCIATICA: ICD-10-CM

## 2024-09-26 DIAGNOSIS — M54.16 LUMBAR RADICULAR PAIN: ICD-10-CM

## 2024-09-26 PROCEDURE — 72148 MRI LUMBAR SPINE W/O DYE: CPT

## 2024-10-03 ENCOUNTER — OFFICE VISIT (OUTPATIENT)
Dept: OTOLARYNGOLOGY | Facility: CLINIC | Age: 59
End: 2024-10-03
Attending: FAMILY MEDICINE
Payer: COMMERCIAL

## 2024-10-03 ENCOUNTER — OFFICE VISIT (OUTPATIENT)
Dept: PALLIATIVE MEDICINE | Facility: CLINIC | Age: 59
End: 2024-10-03
Payer: COMMERCIAL

## 2024-10-03 VITALS
BODY MASS INDEX: 17.04 KG/M2 | TEMPERATURE: 97.6 F | HEART RATE: 86 BPM | WEIGHT: 105.6 LBS | SYSTOLIC BLOOD PRESSURE: 145 MMHG | RESPIRATION RATE: 18 BRPM | DIASTOLIC BLOOD PRESSURE: 95 MMHG | OXYGEN SATURATION: 99 %

## 2024-10-03 VITALS — DIASTOLIC BLOOD PRESSURE: 92 MMHG | SYSTOLIC BLOOD PRESSURE: 156 MMHG | HEART RATE: 80 BPM

## 2024-10-03 DIAGNOSIS — K13.70 ORAL LESION: ICD-10-CM

## 2024-10-03 DIAGNOSIS — M54.16 LUMBAR RADICULAR PAIN: Primary | ICD-10-CM

## 2024-10-03 DIAGNOSIS — G89.29 CHRONIC MIDLINE LOW BACK PAIN WITHOUT SCIATICA: ICD-10-CM

## 2024-10-03 DIAGNOSIS — M54.50 CHRONIC MIDLINE LOW BACK PAIN WITHOUT SCIATICA: ICD-10-CM

## 2024-10-03 PROCEDURE — G2211 COMPLEX E/M VISIT ADD ON: HCPCS | Performed by: NURSE PRACTITIONER

## 2024-10-03 PROCEDURE — 99203 OFFICE O/P NEW LOW 30 MIN: CPT

## 2024-10-03 PROCEDURE — 99215 OFFICE O/P EST HI 40 MIN: CPT | Performed by: NURSE PRACTITIONER

## 2024-10-03 RX ORDER — TRIAMCINOLONE ACETONIDE 0.1 %
PASTE (GRAM) DENTAL 2 TIMES DAILY
Qty: 5 G | Refills: 0 | Status: SHIPPED | OUTPATIENT
Start: 2024-10-03 | End: 2024-10-03

## 2024-10-03 RX ORDER — TRIAMCINOLONE ACETONIDE 0.1 %
PASTE (GRAM) DENTAL
Qty: 5 G | Refills: 0 | Status: SHIPPED | OUTPATIENT
Start: 2024-10-03

## 2024-10-03 ASSESSMENT — PAIN SCALES - GENERAL
PAINLEVEL: SEVERE PAIN (6)
PAINLEVEL: MODERATE PAIN (5)

## 2024-10-03 NOTE — PATIENT INSTRUCTIONS
PATIENT INSTRUCTIONS:     I am recommending a multidisciplinary treatment plan to help this patient better manage her pain. The following recommendations were given to the patient. Diagnosis, treatment options, risks, benefits, and alternatives were discussed; all questions were answered. Self-care instructions were given. The patient expressed understanding of the plan for management.   Continue Current Treatment Plan with:   - Existing medications, as prescribed by Dr. Carbajal    New Referrals:   Please follow through with Physical Therapy that I placed last week.    Return to Clinic:   -  30-minute In-Person Appointment with Gita Allen, KELSEY, VONDA, ANTON in 2-3 months, or sooner if needed    Future Considerations:   We may consider a L5-S1 epidural steroid injection after a short course of physical therapy.      ----------------------------------------------------------------  Clinic Number:  [phone]   Call with any questions about your care and for scheduling assistance.   Calls are returned Monday through Friday between 8 AM and 4:30 PM. We usually get back to you within 2 business days depending on the issue/request.    If we are prescribing your medications:  For opioid medication refills, call the clinic or send a netZentry message 7 days in advance.  Please include:  Name of requested medication  Name of the pharmacy.  For non-opioid medications, call your pharmacy directly to request a refill. Please allow 3-4 days to be processed.   Per [place] State Law:  All controlled substance prescriptions must be filled within 30 days of being written.    For those controlled substances allowing refills, pickup must occur within 30 days of last fill.      We believe regular attendance is key to your success in our program!    Any time you are unable to keep your appointment we ask that you call us at least 24 hours in advance to cancel.This will allow us to offer the appointment time to another patient.   Multiple  missed appointments may lead to dismissal from the clinic.

## 2024-10-03 NOTE — PROGRESS NOTES
"[facility]         Date of Visit: 10/3/2024    CHIEF COMPLAINT:   Chief Complaint   Patient presents with    Pain       Subjective   SUBJECTIVE    INTERVAL HISTORY:   Amira Scherer is a 59 year old female seen for INITIAL EVALUATION on 9/12/24.  They are returning today for chronic low back pain w.o sciatica.         Recommendations/ Plan at the last visit included:  Visit discussion: Amira Scherer is a 59 year old female with a past medical history significant for anxiety, low back pain, personality disorder who presents with complaints of low back and leg pain.  Patient is a challenging historian and frequently leaves topic of her current pain condition. She demonstrates significant anxiety and perceived barriers to improving her pain condition and health.  Significant portion of our discussion centered around her inability to leave her home in the winter months.  I encouraged her to trial physical therapy before  the winter snow falls.  She is hesitant to trial physical therapy but does agree to contact her insurance company to determine where she can be referred within her network.  She does state \"if there is any frost outside then I will be leaving my house\".  Is unclear whether she will follow through with physical therapy or not.  I do believe lumbar MRI is warranted to address low back and radicular leg pain.  However, patient was advised that she may be required to complete a conservative course of physical therapy before we can proceed to MRI of her low back.  She was advised this is an insurance requirement.  At this time, I will make no changes to her medication plan and advised her to continue following through with her primary care provider for medication management.  Patient was advised to return in 6 to 8 weeks, ideally after she has completed physical therapy, to address benefits of PT for low back pain versus advancing to lumbar MRI.  I did place order for " "lumbar MRI today in hopes that we can move forward with imaging irrespective of physical therapy.  Patient is very focused on wanting \"just 1 more tramadol\" per day.  She presents to this appointment with prescription bottles for MS Contin and oxycodone which she has not used.  I did advise her to bring those to the pharmacy for disposal.  Patient was advised to return to clinic in 6 to 8 weeks.  She states that this may not be possible due to snow.  I did advise her that to participate in the pain program we needed to move forward with conservative measures.  If we do obtain lumbar MRI, then she does need to present to clinic in person for review of results.     Patient will contact her insurance to determine where she can go for PT. She will call the clinic to let me know where to send the PT referral.      PLAN:    Continue Current Treatment Plan with:   - Existing medications, as prescribed by Dr. Carbajal    New Referrals:   - Physical Therapy:    Referral placed today for evaluation and treatment for strengthening and conditioning of low back pain.     New Orders:   - Diagnostic Imaging:  MRI Lumbar Spine  at [facility].  Please call [phone] to schedule.    Return to Clinic:   -  30-minute In-Person Appointment with Gita Allen, KELSEY, APRN, ANTON in 6-8 weeks, or sooner if needed    Future Considerations:   Please return for additional evaluation after you have completed the Lumbar MRI.          HPI from initial visit on 9/12/24:   Amira Scherer is a 59 year old female with history of low back pain with bilateral leg pain.  Reports low back injury around 2014 after sleeping in an air mattress and shoveling snow.  She developed bilateral leg; describes as \"excruciating leg pain\" the following summer.   Leg pain is worse in right leg.  Had right hip fracture 2/07/22; leg pain is worse since surgery in 2022.   Endorses numbness/tingling from low back to bottoms of both feet.  \"I feel like I'm " "being ripped apart from the inside out.\"   Feels like lumps on the bottom of feet.   Feet and legs are always cold.  Pain is better when laying down.   Pain is worse when standing in one place.   Currently taking Tramadol 50mg - 2 tabs (100mg) every 10-12 hours; will improve pain level from 10/10 to 4-5/10; effect will last approximately 4 hours.   Had been prescribed Pregabalin and stopped taking it because she had stomach pain.   States stomach pain was also triggered by antibiotics for dental procedures.   Pain quality: Aching, Burning, and Numb, Pins & Needles, Cramping, Sharp   Pain timing: Constant    Pain score today: Moderate Pain (5)   Pain rating: intensity ranges from 3/10 to 10/10, and averages 10/10 on a 0-10 scale.  Aggravating factors include: standing, changing position from sitting to standing; \"there all the time\"   Relieving factors include: laying down, exercise,  medications      Current Pain Relevant Medications:    - Ibuprofen 600mg in AM, 800mg in PM    - Pregabalin 50mg BID  - Tramadol-ER 100mg - 1 tab BID   - Tramadol 50mg - 2 tabs BID      Other Relevant Medications:   None      Previous Pain Relevant Medications: (H--helped; HI--Helped initially; S[facility]--Somewhat helpful; NH--No help; W--worse; SE--side effects; A--allergy; ?--Unsure if helpful)   Opioids: oxycodone (GI pain), tramadol (H), and morphine (GI pain)   NSAIDs: Ibuprofen (H)            Since the last visit, Amira Scherer reports:   Pain score today: Severe Pain (6)   Pain rating: intensity ranges from 3/10 to 8/10 on a 0-10 scale.   Continues with \"excruciating leg pain\" is most significant pain.  Low back pain will develop through the course of the day; better with use of Tramadol.   Has not started physical therapy yet.       REVIEW OF SYSTEMS:   ROS: 10 point ROS neg other than the symptoms noted above in the HPI.     The patient otherwise denies red flag symptoms, such as: thunderclap headache, bowel or bladder " incontinence, parasthesias, weakness, saddle anesthesia, unintentional weight loss, or fever/chills/sweats.     Medical History: Any changes in medical history since they were last seen? No    Medications and Allergies reviewed. Yes     Review of [place] Prescription Monitoring Program ():  reviewed on 10/03/24. No concern for abuse or misuse of controlled medications based on this report. Controlled substances prescribed in the past 6 months include: (Tramadol 50mg, Oxycontin ER 10mg, MS Contin 15mg, Pregabalin 50mg)     Current MME: 40 / day    Current controlled substance medications, if any, are being prescribed by: PCP   Primary Care Provider is Alana Carbajal           Objective    OBJECTIVE:    Physical Exam  Vitals:    10/03/24 1005   BP: (!) 156/92   Pulse: 80        Appearance:   A&O. Patient is appropriate.   Patient is in NAD.      HHENT:  Normocephalic, atraumatic. Eyes without conjunctival injection or jaundice. Neck supple. No obvious neck masses.     Pulmonary:  Normal effort; no cough or audible wheeze      Skin: No obvious rash, lesions, or petechiae of exposed skin.    Neuro: Cranial nerves grossly intact.  Mentation and speech appropriate for age.     Psych:  Alert, without lethargy or stupor. Speech fluent. Appropriate affect. Mood normal. Able to follow commands without difficulty. Normal mood, judgement and behavior.        Gait pattern:   Patient has an antalgic gait pattern:YES  Gait favors the neither side.  Mobility and/or assistive devices? NO          Diagnostic Tests/ Imaging/ Labs resulted since last visit:     MRI Lumbar Spine w.o contrast 9/26/24  FINDINGS:  There are five lumbar-type vertebrae assumed for the  purposes of this dictation.       The tip of the conus medullaris is at L1.  Left convex curvature with  the apex at L3.  Schmorl's node with associated height loss along L4  upper endplate. Normal marrow signal.     On a level by level basis:     T12-L1: Mild  bilateral facet arthropathy. No spinal canal or  neuroforaminal stenosis.     L1-L2: Mild bilateral facet arthropathy. No spinal canal or  neuroforaminal stenosis.     L2-L3: Mild bilateral facet arthropathy. No spinal canal or  neuroforaminal stenosis.     L3-L4: Circumferential disc bulge. Mild bilateral facet arthropathy.  Minimal bilateral neural foraminal stenosis. Mild spinal canal  stenosis.      L4-L5: Bilateral facet arthropathy. No spinal canal or neural  foraminal stenosis.     L5-S1: Circumferential disc bulge. Bilateral facet arthropathy. No  spinal canal or neural foraminal stenosis.     Paraspinous tissues are within normal limits.                                                                      IMPRESSION: Mild lumbar spondylosis without high-grade spinal canal or  neural foraminal stenosis. No significant change compared to  8/17/2022.      Assessment & Plan      VISIT DIAGNOSIS:   1. Lumbar radicular pain  - Adult Pain Clinic Follow-Up Order; Future  - Adult Pain Clinic Follow-Up Order    2. Chronic midline low back pain without sciatica  - Adult Pain Clinic Follow-Up Order      ASSESSMENT:   Visit discussion: Amira Scherer is seen in follow up today at the pain clinic for low back pain and bilateral lower extremity pain.  The majority of this encounter was spent reviewing results of most recent lumbar MRI and importance of conservative measures such as physical therapy for low back pain.  Overall patient does have mild facet arthropathy through multiple levels of her low back.  There is a circumferential disc bulge at L3-L4 with a Schmorl node in the L4 vertebrae.  Patient's leg pain is most significant in the L5-S1 distribution.  She does have a circumferential disc bulge at L5-S1.  We discussed potential benefit of an L5-S1 LESI to address lower extremity pain.  She is very anxious about the thought of an injection and offers several reasons that she would be unable to arrive in an  appointment due to transportation and lack of someone to help her.  I did really iterate the importance of physical therapy and recommend she start this before winter.  She is resistant and hesitant to the idea of initiating physical therapy but has made contact with samira Martinez.  Patient will follow-up with her primary care provider in a few weeks.  I encouraged her to discuss potential benefits of lumbar epidural steroid injection with her provider for additional advice.  Patient was advised to continue seeking medication refills through her primary care provider.  At this time, I have no plan to take over opioid prescribing as she is on stable doses with her primary care provider.  Patient can follow-up in 6 to 8 weeks after initiating trial of physical therapy.  If she does decide to trial LESI, she was advised to contact the clinic and I will place an order as indicated below.  If she does trial LESI and has no improvement in bilateral leg pain, then I would consider EMG testing to determine if she does have neuropathy as the driving source of her pain.      PLAN:    Continue Current Treatment Plan with:   - Existing medications, as prescribed by Dr. Carbajal       New Referrals:   Please follow through with Physical Therapy that I placed last week.      Return to Clinic:   -  30-minute In-Person Appointment with Gita Allen, KELSEY, VONDA, ROSALIE-C in 2-3 months, or sooner if needed      Future Considerations:   We may consider a L5-S1 epidural steroid injection after a short course of physical therapy.        ___________________________________________________________________    BILLING TIME DOCUMENTATION:   TOTAL TIME includes:   Time spent preparing to see the patient: 5 minutes (reviewing records and tests)  Time spend face to face with the patient: 35 minutes  Time spent ordering tests, medications, procedures and referrals: 0 minutes  Time spent Referring and communicating with other healthcare  professionals: 0 minutes  Documenting clinical information in Epic: 5 minutes    The total TIME spent on this patient on the day of the appointment was 45 minutes.     ___________________________________________________________________    Review of Electronic Chart: Today I have also reviewed available medical information in the patient's medical record at [facility] ([facility]) and Care Everywhere (if available), including relevant provider notes, laboratory work, and imaging.     Gita Allen, VONDA, DNP, FNP-C   [facility]

## 2024-10-03 NOTE — LETTER
10/3/2024      Amira Scherer  [address] [facility] [id number]      Dear Colleague,    Thank you for referring your patient, Amira Scherer, to the [facility]. Please see a copy of my visit note below.    Chief Complaint   Patient presents with     Mouth Lesions     History of Present Illness   Amira Scherer is a 59 year old female who presents today for evaluation.  The patient presents with a lesion on the lip (tic-tac size). The patient has noticed for approximately a few years. It hasn't been changing in size. It is painful when you bite down on it. No citrus or spicy intolerance. No bleeding. Former smoker, and no history of chewing tobacco. No lumps or swollen glands in the neck.     Past Medical History  Patient Active Problem List   Diagnosis     Paresthesias     Acne vulgaris     Anxiety     S/P colon resection     Liver lesion     CARDIOVASCULAR SCREENING; LDL GOAL LESS THAN 160     Other plastic surgery for unacceptable cosmetic appearance     Closed right hip fracture (H)     Closed fracture of right hip, initial encounter (H)     Traumatic rhabdomyolysis, initial encounter (H24)     Fall, initial encounter     Personality disorder in adult, unspecified     Chronic, continuous use of opioids - F11.9     Status post closed fracture of right femur     Slow transit constipation     RSD (reflex sympathetic dystrophy)     Current Medications     Current Outpatient Medications:      docusate sodium (COLACE) 100 MG capsule, Take 1 capsule (100 mg) by mouth 2 times daily as needed for constipation, Disp: 180 capsule, Rfl: 3     ibuprofen (ADVIL/MOTRIN) 400 MG tablet, Take 2 tablets (800 mg) by mouth every 8 hours as needed for moderate pain, Disp: 180 tablet, Rfl: 3     ibuprofen (ADVIL/MOTRIN) 600 MG tablet, Take 1 tablet (600 mg) by mouth every 8 hours as needed for moderate pain, Disp: 90 tablet, Rfl: 3     lactulose (CONSTULOSE) 10 GM/15ML solution, Take 45 mLs (30 g)  by mouth 2 times daily as needed for constipation, Disp: 2838 mL, Rfl: 3     polyethylene glycol (MIRALAX) 17 GM/Dose powder, Take 17 g by mouth 3 times daily, Disp: 510 g, Rfl: 3     pregabalin (LYRICA) 50 MG capsule, Take 1 capsule (50 mg) by mouth 2 times daily (Patient not taking: Reported on 2024), Disp: 60 capsule, Rfl: 1     SENNA-docusate sodium (SENNA S) 8.6-50 MG tablet, Take 1 tablet by mouth 2 times daily as needed (constipation), Disp: 180 tablet, Rfl: 3     SM IBUPROFEN 200 MG tablet, Take 1 tablet (200 mg) by mouth every 8 hours as needed for pain, Disp: 100 tablet, Rfl: 3     traMADol (ULTRAM) 50 MG tablet, Take 2 tablets (100 mg) by mouth 2 times daily as needed for severe pain or pain., Disp: 120 tablet, Rfl: 2    Allergies  Allergies   Allergen Reactions     Pc Pen Vk [Penicillin V] GI Disturbance       Social History   Social History     Socioeconomic History     Marital status:      Spouse name: Tanner Scherer     Number of children: 2     Years of education: 12   Occupational History     Employer: [employer]   Tobacco Use     Smoking status: Former     Current packs/day: 0.00     Average packs/day: 0.5 packs/day for 20.0 years (10.0 ttl pk-yrs)     Types: Cigarettes     Start date: 1987     Quit date: 2007     Years since quittin.4     Smokeless tobacco: Never   Vaping Use     Vaping status: Never Used   Substance and Sexual Activity     Alcohol use: Yes     Comment: Red wine 2-3 times per week     Drug use: No     Sexual activity: Yes     Partners: Male   Other Topics Concern     Parent/sibling w/ CABG, MI or angioplasty before 65F 55M? No       Family History  Family History   Problem Relation Age of Onset     Hypertension Mother      Arthritis Mother      Breast Cancer Sister      Thyroid Disease Sister        Review of Systems  As per HPI and PMHx, otherwise 10+ comprehensive system review is negative.    Physical Exam  BP (!) 145/95 (BP Location: Right arm, Patient  Position: Chair, Cuff Size: Adult Small)   Pulse 86   Temp 97.6  F (36.4  C) (Tympanic)   Resp 18   Wt 47.9 kg (105 lb 9.6 oz)   LMP 03/01/2013   SpO2 99%   BMI 17.04 kg/m    GENERAL: Patient is a pleasant, cooperative 59 year old female in no acute distress.  HEAD: Normocephalic, atraumatic.  Hair and scalp are normal.  EYES: Pupils are equal, round, reactive to light and accommodation.  Extraocular movements are intact.  The sclera nonicteric without injection.  The extraocular structures are normal.  EARS: Normal shape and symmetry.  No tenderness when palpating the mastoid or tragal areas bilaterally.  Otoscopic exam reveals no amount of cerumen bilaterally.  The bilateral tympanic membranes are round, intact without evidence of effusion, good landmarks.  No retraction, granulation, or drainage.  NOSE: Nares are patent.  Nasal mucosa is pink.  ORAL CAVITY: Dentition is in poor repair.  Mucous membranes are moist.  Examination of the oral cavity showed a 1  mm lesion located on the right lower lip.  Tongue is mobile, protrudes to the midline.  Palate elevates symmetrically.  Tonsils are +1.  No erythema or exudate.  No additional oral cavity or oropharyngeal masses, lesions, ulcerations, leukoplakia.  NECK: Supple, trachea is midline.  There no palpable cervical lymphadenopathy or masses bilaterally.  Palpation of the bilateral parotid and submandibular areas reveal no masses.  No thyromegaly.    NEUROLOGIC: Cranial nerves II through XII are grossly intact.  Voice is strong.  Patient is House-Brackmann I/VI bilaterally.  CARDIOVASCULAR: Extremities are warm and well-perfused.  No significant peripheral edema.  RESPIRATORY: Patient has nonlabored breathing without cough, wheeze, stridor.  PSYCHIATRIC: Patient is alert and oriented.  Mood and affect appear normal.  SKIN: Warm and dry.  No scalp, face, or neck lesions noted.    Assessment and Plan     ICD-10-CM    1. Oral lesion  K13.70 Adult ENT   Referral     Adult ENT  Referral     triamcinolone (KENALOG) 0.1 % paste        It was my pleasure seeing Amira Scherer today in clinic.  The patient presents today with a lesion on the lip. It is likely related to continuous biting and irritation.     I recommended treating this with Orabase for approximately two weeks. I would like to see the patient back in 2-4 weeks to make sure it heals. If not, we should proceed with biopsy.       VONDA Torres CNP  Otolaryngology  [place] [facility] [place]      Again, thank you for allowing me to participate in the care of your patient.        Sincerely,        VONDA Torres CNP

## 2024-10-03 NOTE — PATIENT INSTRUCTIONS
You were seen by Kaur Loredo CNP.  If you have questions or concerns regarding your appointment today, you can reach out to our call center at [phone].  The following has been recommended at your appointment today:    Use the cream three times daily for the next 2 weeks. Please call me with an update.

## 2024-10-03 NOTE — NURSING NOTE
"Chief Complaint   Patient presents with    Mouth Lesions       Initial BP (!) 145/95 (BP Location: Right arm, Patient Position: Chair, Cuff Size: Adult Small)   Pulse 86   Temp 97.6  F (36.4  C) (Tympanic)   Resp 18   Wt 47.9 kg (105 lb 9.6 oz)   LMP 03/01/2013   SpO2 99%   BMI 17.04 kg/m   Estimated body mass index is 17.04 kg/m  as calculated from the following:    Height as of 4/21/24: 1.676 m (5' 6\").    Weight as of this encounter: 47.9 kg (105 lb 9.6 oz).  BP completed using cuff size: small regular  Medications and allergies reviewed.      Mariel SANCHES CMA     "

## 2024-10-11 ENCOUNTER — TRANSFERRED RECORDS (OUTPATIENT)
Dept: HEALTH INFORMATION MANAGEMENT | Facility: CLINIC | Age: 59
End: 2024-10-11
Payer: COMMERCIAL

## 2024-10-13 ENCOUNTER — NURSE TRIAGE (OUTPATIENT)
Dept: NURSING | Facility: CLINIC | Age: 59
End: 2024-10-13
Payer: COMMERCIAL

## 2024-10-13 ENCOUNTER — APPOINTMENT (OUTPATIENT)
Dept: CT IMAGING | Facility: CLINIC | Age: 59
End: 2024-10-13
Attending: EMERGENCY MEDICINE
Payer: COMMERCIAL

## 2024-10-13 ENCOUNTER — HOSPITAL ENCOUNTER (EMERGENCY)
Facility: CLINIC | Age: 59
Discharge: HOME | End: 2024-10-14
Attending: EMERGENCY MEDICINE | Admitting: EMERGENCY MEDICINE
Payer: COMMERCIAL

## 2024-10-13 VITALS
SYSTOLIC BLOOD PRESSURE: 136 MMHG | DIASTOLIC BLOOD PRESSURE: 97 MMHG | BODY MASS INDEX: 17.68 KG/M2 | HEIGHT: 66 IN | TEMPERATURE: 98.1 F | RESPIRATION RATE: 22 BRPM | HEART RATE: 87 BPM | WEIGHT: 110 LBS | OXYGEN SATURATION: 97 %

## 2024-10-13 DIAGNOSIS — R10.84 GENERALIZED ABDOMINAL PAIN: ICD-10-CM

## 2024-10-13 LAB
ALBUMIN SERPL BCG-MCNC: 4.2 G/DL (ref 3.5–5.2)
ALBUMIN UR-MCNC: 100 MG/DL
ALP SERPL-CCNC: 87 U/L (ref 40–150)
ALT SERPL W P-5'-P-CCNC: 15 U/L (ref 0–50)
ANION GAP SERPL CALCULATED.3IONS-SCNC: 16 MMOL/L (ref 7–15)
APPEARANCE UR: ABNORMAL
AST SERPL W P-5'-P-CCNC: 22 U/L (ref 0–45)
BACTERIA #/AREA URNS HPF: ABNORMAL /HPF
BASOPHILS # BLD MANUAL: 0.1 10E3/UL (ref 0–0.2)
BASOPHILS NFR BLD MANUAL: 1 %
BILIRUB SERPL-MCNC: 0.4 MG/DL
BILIRUB UR QL STRIP: NEGATIVE
BUN SERPL-MCNC: 11.8 MG/DL (ref 8–23)
CALCIUM SERPL-MCNC: 9.8 MG/DL (ref 8.8–10.4)
CAOX CRY #/AREA URNS HPF: ABNORMAL /HPF
CHLORIDE SERPL-SCNC: 100 MMOL/L (ref 98–107)
COLOR UR AUTO: ABNORMAL
CREAT SERPL-MCNC: 0.89 MG/DL (ref 0.51–0.95)
EGFRCR SERPLBLD CKD-EPI 2021: 74 ML/MIN/1.73M2
EOSINOPHIL # BLD MANUAL: 0.1 10E3/UL (ref 0–0.7)
EOSINOPHIL NFR BLD MANUAL: 1 %
ERYTHROCYTE [DISTWIDTH] IN BLOOD BY AUTOMATED COUNT: 12.8 % (ref 10–15)
GLUCOSE SERPL-MCNC: 97 MG/DL (ref 70–99)
GLUCOSE UR STRIP-MCNC: NEGATIVE MG/DL
HCO3 SERPL-SCNC: 23 MMOL/L (ref 22–29)
HCT VFR BLD AUTO: 45.5 % (ref 35–47)
HGB BLD-MCNC: 15.7 G/DL (ref 11.7–15.7)
HGB UR QL STRIP: NEGATIVE
HYALINE CASTS: 162 /LPF
KETONES UR STRIP-MCNC: NEGATIVE MG/DL
LEUKOCYTE ESTERASE UR QL STRIP: NEGATIVE
LIPASE SERPL-CCNC: 26 U/L (ref 13–60)
LYMPHOCYTES # BLD MANUAL: 1.6 10E3/UL (ref 0.8–5.3)
LYMPHOCYTES NFR BLD MANUAL: 21 %
MCH RBC QN AUTO: 33.8 PG (ref 26.5–33)
MCHC RBC AUTO-ENTMCNC: 34.5 G/DL (ref 31.5–36.5)
MCV RBC AUTO: 98 FL (ref 78–100)
MONOCYTES # BLD MANUAL: 0.8 10E3/UL (ref 0–1.3)
MONOCYTES NFR BLD MANUAL: 11 %
MUCOUS THREADS #/AREA URNS LPF: PRESENT /LPF
NEUTROPHILS # BLD MANUAL: 4.9 10E3/UL (ref 1.6–8.3)
NEUTROPHILS NFR BLD MANUAL: 65 %
NITRATE UR QL: NEGATIVE
OTHER CELLS # BLD MANUAL: 0.1 10E3/UL
OTHER CELLS NFR BLD MANUAL: 1 %
PH UR STRIP: 5 [PH] (ref 5–7)
PLAT MORPH BLD: ABNORMAL
PLATELET # BLD AUTO: 271 10E3/UL (ref 150–450)
POTASSIUM SERPL-SCNC: 3.7 MMOL/L (ref 3.4–5.3)
PROT SERPL-MCNC: 7.9 G/DL (ref 6.4–8.3)
RBC # BLD AUTO: 4.65 10E6/UL (ref 3.8–5.2)
RBC MORPH BLD: ABNORMAL
RBC URINE: 6 /HPF
RENAL TUB EPI: 2 /HPF
SODIUM SERPL-SCNC: 139 MMOL/L (ref 135–145)
SP GR UR STRIP: 1.03 (ref 1–1.03)
SQUAMOUS EPITHELIAL: 6 /HPF
UROBILINOGEN UR STRIP-MCNC: NORMAL MG/DL
VARIANT LYMPHS BLD QL SMEAR: PRESENT
WBC # BLD AUTO: 7.5 10E3/UL (ref 4–11)
WBC CLUMPS #/AREA URNS HPF: PRESENT /HPF
WBC URINE: 15 /HPF
YEAST #/AREA URNS HPF: ABNORMAL /HPF

## 2024-10-13 PROCEDURE — 99285 EMERGENCY DEPT VISIT HI MDM: CPT | Mod: 25 | Performed by: EMERGENCY MEDICINE

## 2024-10-13 PROCEDURE — 87086 URINE CULTURE/COLONY COUNT: CPT | Performed by: EMERGENCY MEDICINE

## 2024-10-13 PROCEDURE — 250N000009 HC RX 250: Performed by: EMERGENCY MEDICINE

## 2024-10-13 PROCEDURE — 36415 COLL VENOUS BLD VENIPUNCTURE: CPT | Performed by: EMERGENCY MEDICINE

## 2024-10-13 PROCEDURE — 250N000011 HC RX IP 250 OP 636: Performed by: EMERGENCY MEDICINE

## 2024-10-13 PROCEDURE — 99284 EMERGENCY DEPT VISIT MOD MDM: CPT | Performed by: EMERGENCY MEDICINE

## 2024-10-13 PROCEDURE — 85007 BL SMEAR W/DIFF WBC COUNT: CPT | Performed by: EMERGENCY MEDICINE

## 2024-10-13 PROCEDURE — 74177 CT ABD & PELVIS W/CONTRAST: CPT

## 2024-10-13 PROCEDURE — 85027 COMPLETE CBC AUTOMATED: CPT | Performed by: EMERGENCY MEDICINE

## 2024-10-13 PROCEDURE — 81001 URINALYSIS AUTO W/SCOPE: CPT | Performed by: EMERGENCY MEDICINE

## 2024-10-13 PROCEDURE — 83690 ASSAY OF LIPASE: CPT | Performed by: EMERGENCY MEDICINE

## 2024-10-13 PROCEDURE — 82947 ASSAY GLUCOSE BLOOD QUANT: CPT | Performed by: EMERGENCY MEDICINE

## 2024-10-13 RX ORDER — IOPAMIDOL 755 MG/ML
54 INJECTION, SOLUTION INTRAVASCULAR ONCE
Status: COMPLETED | OUTPATIENT
Start: 2024-10-13 | End: 2024-10-13

## 2024-10-13 RX ORDER — FAMOTIDINE 40 MG/1
40 TABLET, FILM COATED ORAL DAILY
Qty: 30 TABLET | Refills: 0 | Status: SHIPPED | OUTPATIENT
Start: 2024-10-13 | End: 2024-10-18

## 2024-10-13 RX ORDER — FAMOTIDINE 20 MG/1
40 TABLET, FILM COATED ORAL ONCE
Status: COMPLETED | OUTPATIENT
Start: 2024-10-14 | End: 2024-10-14

## 2024-10-13 RX ORDER — ONDANSETRON 4 MG/1
4 TABLET, ORALLY DISINTEGRATING ORAL EVERY 8 HOURS PRN
Qty: 10 TABLET | Refills: 0 | Status: SHIPPED | OUTPATIENT
Start: 2024-10-13

## 2024-10-13 RX ADMIN — IOPAMIDOL 54 ML: 755 INJECTION, SOLUTION INTRAVENOUS at 22:56

## 2024-10-13 RX ADMIN — SODIUM CHLORIDE 54 ML: 9 INJECTION, SOLUTION INTRAVENOUS at 22:56

## 2024-10-13 ASSESSMENT — COLUMBIA-SUICIDE SEVERITY RATING SCALE - C-SSRS
6. HAVE YOU EVER DONE ANYTHING, STARTED TO DO ANYTHING, OR PREPARED TO DO ANYTHING TO END YOUR LIFE?: NO
1. IN THE PAST MONTH, HAVE YOU WISHED YOU WERE DEAD OR WISHED YOU COULD GO TO SLEEP AND NOT WAKE UP?: NO
2. HAVE YOU ACTUALLY HAD ANY THOUGHTS OF KILLING YOURSELF IN THE PAST MONTH?: NO

## 2024-10-13 ASSESSMENT — ACTIVITIES OF DAILY LIVING (ADL): ADLS_ACUITY_SCORE: 38

## 2024-10-13 NOTE — TELEPHONE ENCOUNTER
Nurse Triage SBAR    Is this a 2nd Level Triage? YES, LICENSED PRACTITIONER REVIEW IS REQUIRED    Situation:   Began with abdominal pain on or about 10/9/24  10/10 - right around umbilicus  Last ate a meal on 10/9/24  Nausea  Unable to eat  Unable to sleep    Background:   Hx of bowel obstruction, per Pt    Assessment:   Laying down on left side does help a bit  Has had a small amount of watery BM in last several days but nothing more than that  Abdomin is very painful to the touch   Pacing around from bedroom to livingroom at her home hoping for pain to get better  Sometimes needs to lay down  Feels like she may need to vomit    Left side     'Tramadol does help a little'      Protocol Recommended Disposition:   Go to ED Now  Pt states, 'I am not going to ER.  I want an appt to see my  On Monday morning'    Paged to provider, Dr. Maciel Peters who advised;  Pt does need to go to ED now.  We cannot schedule an appt in clinic for her because that is not appropriate care.  If Pt chooses not to go - then document as such but that is our recommendation for her at this time'.    Pt is notified of above  Recommendation.  Pt states she does not want to go to ED but will consider it if not feeling better by 12 PM today.    Does the patient meet one of the following criteria for ADS visit consideration? 16+ years old, with an [facility] PCP   Anne-Marie Rangel RN, Nurse Advisor 6:28 AM 10/13/2024  TIP  Providers, please consider if this condition is appropriate for management at one of our Acute and Diagnostic Services sites.     If patient is a good candidate, please use dotphrase <dot>triageresponse and select Refer to ADS to document.  Anne-Marie Rangel RN, Nurse Advisor 5:45 AM 10/13/2024  Reason for Disposition   [1] Abdomen pain is main symptom AND [2] adult female   [1] SEVERE pain AND [2] age > 60 years    Additional Information   Negative: [1] Abdomen pain is main symptom AND [2] male   Negative: Shock suspected  "(e.g., cold/pale/clammy skin, too weak to stand, low BP, rapid pulse)   Negative: Difficult to awaken or acting confused (e.g., disoriented, slurred speech)   Negative: Passed out (i.e., lost consciousness, collapsed and was not responding)   Negative: Sounds like a life-threatening emergency to the triager   Negative: Followed an abdomen (stomach) injury   Negative: Chest pain   Negative: [1] Abdominal pain AND [2] pregnant < 20 weeks   Negative: [1] Abdominal pain AND [2] pregnant 20 or more weeks   Negative: [1] Abdominal pain AND [2] postpartum (from 0 to 6 weeks after delivery)   Negative: Pain is mainly in upper abdomen  (if needed ask: \"is it mainly above the belly button?\")   Negative: Abdomen bloating or swelling are main symptoms   Negative: [1] SEVERE pain (e.g., excruciating) AND [2] present > 1 hour    Protocols used: Constipation-A-AH, Abdominal Pain - Female-A-AH    "

## 2024-10-14 PROCEDURE — 250N000013 HC RX MED GY IP 250 OP 250 PS 637: Performed by: EMERGENCY MEDICINE

## 2024-10-14 RX ORDER — POLYETHYLENE GLYCOL 3350 17 G/17G
17 POWDER, FOR SOLUTION ORAL ONCE
Status: COMPLETED | OUTPATIENT
Start: 2024-10-14 | End: 2024-10-14

## 2024-10-14 RX ORDER — OXYCODONE HYDROCHLORIDE 5 MG/1
5 TABLET ORAL ONCE
Status: COMPLETED | OUTPATIENT
Start: 2024-10-14 | End: 2024-10-14

## 2024-10-14 RX ORDER — TRAMADOL HYDROCHLORIDE 50 MG/1
100 TABLET ORAL ONCE
Status: COMPLETED | OUTPATIENT
Start: 2024-10-14 | End: 2024-10-14

## 2024-10-14 RX ADMIN — FAMOTIDINE 40 MG: 20 TABLET, FILM COATED ORAL at 00:08

## 2024-10-14 RX ADMIN — POLYETHYLENE GLYCOL 3350 17 G: 17 POWDER, FOR SOLUTION ORAL at 00:43

## 2024-10-14 RX ADMIN — TRAMADOL HYDROCHLORIDE 100 MG: 50 TABLET, COATED ORAL at 00:43

## 2024-10-14 ASSESSMENT — ACTIVITIES OF DAILY LIVING (ADL): ADLS_ACUITY_SCORE: 38

## 2024-10-14 NOTE — ED TRIAGE NOTES
Pt here with abdominal pain since Wednesday, states she hasn't been eating well, hx of obstruction      Triage Assessment (Adult)       Row Name 10/13/24 0395          Triage Assessment    Airway WDL WDL        Respiratory WDL    Respiratory WDL WDL        Skin Circulation/Temperature WDL    Skin Circulation/Temperature WDL WDL        Cardiac WDL    Cardiac WDL WDL        Peripheral/Neurovascular WDL    Peripheral Neurovascular WDL WDL        Cognitive/Neuro/Behavioral WDL    Cognitive/Neuro/Behavioral WDL WDL

## 2024-10-14 NOTE — DISCHARGE INSTRUCTIONS
I am not certain what is causing your symptoms but so far the tests have been reassuring.  Return for increasing pain, repeated vomiting, lightheadedness, fevers, or other concerns.  Try taking famotidine (Pepcid) 40 mg daily to see if this can help with your symptoms.  Use ondansetron as needed for nausea.  Follow-up in clinic if not starting to improve over the next 4-5 days

## 2024-10-14 NOTE — ED PROVIDER NOTES
History     Chief Complaint   Patient presents with    Abdominal Pain     HPI  Amira Scherer is a 59 year old female who presents for abdominal pain.  Symptoms ongoing over the past 5 days.  Periumbilical abdominal pain, nonradiating.  Nausea but no vomiting.  She has not been passing gas or having bowel movements.  No fevers.  No diarrhea.  She denies dysuria or urinary frequency.    Allergies:  Allergies   Allergen Reactions    Pc Pen Vk [Penicillin V] GI Disturbance       Problem List:    Patient Active Problem List    Diagnosis Date Noted    RSD (reflex sympathetic dystrophy) 07/22/2024     Priority: Medium    Status post closed fracture of right femur 04/22/2024     Priority: Medium    Slow transit constipation 04/22/2024     Priority: Medium    Chronic, continuous use of opioids - F11.9 01/23/2024     Priority: Medium    Personality disorder in adult, unspecified 02/12/2022     Priority: Medium    Fall, initial encounter 02/07/2022     Priority: Medium    Closed right hip fracture (H) 02/06/2022     Priority: Medium    Closed fracture of right hip, initial encounter (H) 02/06/2022     Priority: Medium    Traumatic rhabdomyolysis, initial encounter (H) 02/06/2022     Priority: Medium    Other plastic surgery for unacceptable cosmetic appearance 04/07/2015     Priority: Medium    CARDIOVASCULAR SCREENING; LDL GOAL LESS THAN 160 01/22/2014     Priority: Medium    S/P colon resection 04/25/2013     Priority: Medium     Had colon prolapse - had at least partial removal of her colon done at that time.        Liver lesion 04/25/2013     Priority: Medium    Anxiety 06/29/2012     Priority: Medium    Paresthesias 05/21/2012     Priority: Medium    Acne vulgaris 05/21/2012     Priority: Medium        Past Medical History:    No past medical history on file.    Past Surgical History:    Past Surgical History:   Procedure Laterality Date    COLECTOMY WITHOUT COLOSTOMY      COLON SURGERY  1987 and 91    Colon removed   "   OPEN REDUCTION INTERNAL FIXATION HIP BIPOLAR Right 2022    Procedure: Bipolar Hemiarthroplasty Right Hip;  Surgeon: Kb Lizarraga MD;  Location: [facility] OR       Family History:    Family History   Problem Relation Age of Onset    Hypertension Mother     Arthritis Mother     Breast Cancer Sister     Thyroid Disease Sister        Social History:  Marital Status:   [4]  Social History     Tobacco Use    Smoking status: Former     Current packs/day: 0.00     Average packs/day: 0.5 packs/day for 20.0 years (10.0 ttl pk-yrs)     Types: Cigarettes     Start date: 1987     Quit date: 2007     Years since quittin.5    Smokeless tobacco: Never   Vaping Use    Vaping status: Never Used   Substance Use Topics    Alcohol use: Yes     Comment: Red wine 2-3 times per week    Drug use: No        Medications:    famotidine (PEPCID) 40 MG tablet  ondansetron (ZOFRAN ODT) 4 MG ODT tab  docusate sodium (COLACE) 100 MG capsule  ibuprofen (ADVIL/MOTRIN) 400 MG tablet  ibuprofen (ADVIL/MOTRIN) 600 MG tablet  lactulose (CONSTULOSE) 10 GM/15ML solution  polyethylene glycol (MIRALAX) 17 GM/Dose powder  pregabalin (LYRICA) 50 MG capsule  SENNA-docusate sodium (SENNA S) 8.6-50 MG tablet  SM IBUPROFEN 200 MG tablet  traMADol (ULTRAM) 50 MG tablet  triamcinolone (KENALOG) 0.1 % paste          Review of Systems    Physical Exam   BP: (!) 136/97  Pulse: 105  Temp: 98.1  F (36.7  C)  Resp: 22  Height: 167.6 cm (5' 6\")  Weight: 49.9 kg (110 lb)  SpO2: 99 %      Physical Exam  Constitutional:       General: She is in acute distress.      Appearance: She is well-developed.   HENT:      Head: Normocephalic and atraumatic.   Cardiovascular:      Rate and Rhythm: Normal rate.   Pulmonary:      Effort: No respiratory distress.      Breath sounds: No stridor.   Abdominal:      Tenderness: There is no abdominal tenderness. There is no guarding or rebound.   Skin:     General: Skin is warm and dry.   Neurological:      " Mental Status: She is alert.         ED Course        Procedures              Critical Care time:  none              Results for orders placed or performed during the hospital encounter of 10/13/24 (from the past 24 hour(s))   CBC with Platelets & Differential    Narrative    The following orders were created for panel order CBC with Platelets & Differential.  Procedure                               Abnormality         Status                     ---------                               -----------         ------                     CBC with platelets and d...[[id number]]  Abnormal            Final result               Manual Differential[[id number]]          Abnormal            Final result                 Please view results for these tests on the individual orders.   Comprehensive metabolic panel   Result Value Ref Range    Sodium 139 135 - 145 mmol/L    Potassium 3.7 3.4 - 5.3 mmol/L    Carbon Dioxide (CO2) 23 22 - 29 mmol/L    Anion Gap 16 (H) 7 - 15 mmol/L    Urea Nitrogen 11.8 8.0 - 23.0 mg/dL    Creatinine 0.89 0.51 - 0.95 mg/dL    GFR Estimate 74 >60 mL/min/1.73m2    Calcium 9.8 8.8 - 10.4 mg/dL    Chloride 100 98 - 107 mmol/L    Glucose 97 70 - 99 mg/dL    Alkaline Phosphatase 87 40 - 150 U/L    AST 22 0 - 45 U/L    ALT 15 0 - 50 U/L    Protein Total 7.9 6.4 - 8.3 g/dL    Albumin 4.2 3.5 - 5.2 g/dL    Bilirubin Total 0.4 <=1.2 mg/dL   Lipase   Result Value Ref Range    Lipase 26 13 - 60 U/L   CBC with platelets and differential   Result Value Ref Range    WBC Count 7.5 4.0 - 11.0 10e3/uL    RBC Count 4.65 3.80 - 5.20 10e6/uL    Hemoglobin 15.7 11.7 - 15.7 g/dL    Hematocrit 45.5 35.0 - 47.0 %    MCV 98 78 - 100 fL    MCH 33.8 (H) 26.5 - 33.0 pg    MCHC 34.5 31.5 - 36.5 g/dL    RDW 12.8 10.0 - 15.0 %    Platelet Count 271 150 - 450 10e3/uL   Manual Differential   Result Value Ref Range    % Neutrophils 65 %    % Lymphocytes 21 %    % Monocytes 11 %    % Eosinophils 1 %    % Basophils 1 %    % Other Cells 1 %     Absolute Neutrophils 4.9 1.6 - 8.3 10e3/uL    Absolute Lymphocytes 1.6 0.8 - 5.3 10e3/uL    Absolute Monocytes 0.8 0.0 - 1.3 10e3/uL    Absolute Eosinophils 0.1 0.0 - 0.7 10e3/uL    Absolute Basophils 0.1 0.0 - 0.2 10e3/uL    Absolute Other Cells 0.1 (H) <=0.0 10e3/uL    RBC Morphology Confirmed RBC Indices     Platelet Assessment  Automated Count Confirmed. Platelet morphology is normal.     Automated Count Confirmed. Platelet morphology is normal.    Reactive Lymphocytes Present (A) None Seen   UA with Microscopic reflex to Culture    Specimen: Urine, Clean Catch   Result Value Ref Range    Color Urine Lindsay (A) Colorless, Straw, Light Yellow, Yellow    Appearance Urine Cloudy (A) Clear    Glucose Urine Negative Negative mg/dL    Bilirubin Urine Negative Negative    Ketones Urine Negative Negative mg/dL    Specific Gravity Urine 1.026 1.003 - 1.035    Blood Urine Negative Negative    pH Urine 5.0 5.0 - 7.0    Protein Albumin Urine 100 (A) Negative mg/dL    Urobilinogen Urine Normal Normal, 2.0 mg/dL    Nitrite Urine Negative Negative    Leukocyte Esterase Urine Negative Negative    Bacteria Urine Few (A) None Seen /HPF    WBC Clumps Urine Present (A) None Seen /HPF    Budding Yeast Urine Few (A) None Seen /HPF    Mucus Urine Present (A) None Seen /LPF    Calcium Oxalate Crystals Urine Few (A) None Seen /HPF    RBC Urine 6 (H) <=2 /HPF    WBC Urine 15 (H) <=5 /HPF    Squamous Epithelials Urine 6 (H) <=1 /HPF    Renal Tubular Epithelials Urine 2 (H) None Seen /HPF    Hyaline Casts Urine 162 (H) <=2 /LPF    Narrative    Urine Culture ordered based on laboratory criteria   CT Abdomen Pelvis w Contrast    Narrative    EXAM: CT ABDOMEN PELVIS W CONTRAST  LOCATION: [facility]  DATE: 10/13/2024    INDICATION: Abdominal pain and nausea.  COMPARISON: None.  TECHNIQUE: CT scan of the abdomen and pelvis was performed following injection of IV contrast. Multiplanar reformats were obtained. Dose  reduction techniques were used.  CONTRAST: 54 mL Isovue 370.    FINDINGS:   LOWER CHEST: Normal.    HEPATOBILIARY: Subcentimeter low-attenuation lesion in the right hepatic lobe is of doubtful significance and probably due to a small cyst or benign vascular lesion. Focal fatty infiltration in the left hepatic lobe near the falciform ligament   inferiorly. No calcified gallstones or biliary dilatation.    PANCREAS: Normal.    SPLEEN: Normal.    ADRENAL GLANDS: Normal.    KIDNEYS/BLADDER: Normal.    BOWEL: Stomach is partially contracted which limits evaluation. There is some segmental areas of low-density wall thickening in the mid and distal stomach. This may be due to peristalsis but not well evaluated on CT. Bowel is normal in caliber with no   evidence for obstruction. Appendix not visualized with certainty. No inflammatory changes in the right lower quadrant.    LYMPH NODES: Normal.    VASCULATURE: Aortic calcification without aneurysm.    PELVIC ORGANS: Normal.    MUSCULOSKELETAL: Very minimal compression superior endplate of the L4 vertebral body likely chronic in nature. Right hip arthroplasty.      Impression    IMPRESSION:   1.  Negative for evidence of bowel obstruction.    2.  Stomach is partially contracted which limits evaluation. There is some segmental low-density wall thickening in the mid to distal stomach, which may be due to contraction/peristalsis but nonspecific. If there is clinical concern, endoscopy could   better evaluate.    3.  Remainder of the exam is unremarkable. See above for additional incidental details.       Medications   famotidine (PEPCID) tablet 40 mg (has no administration in time range)   CT Saline (54 mLs Intravenous $Given 10/13/24 2256)   iopamidol (ISOVUE-370) solution 54 mL (54 mLs Intravenous $Given 10/13/24 2256)       Assessments & Plan (with Medical Decision Making)   59-year-old female presents for abdominal pain with nausea.  She is nontoxic on exam, declined pain  medications here.  White blood cell count is 7.5 and her hemoglobin is 15.7, no signs of anemia.  Lipase is normal, no signs of pancreatitis.  LFTs are normal, no signs of hepatitis.  Electrolytes are within normal limits.  Urinalysis is positive for white blood cells but negative for leukocyte esterase or nitrite.  There are white blood cell clumps.  Urine culture is pending however without urinary symptoms I believe this is unlikely to represent urinary tract infection will not treat with antibiotics at this time.  CT of the abdomen and pelvis obtained, images interpreted independently as well as radiology read reviewed, no signs of a bowel obstruction or other intra-abdominal acute process.  Unclear cause of her symptoms at this time.  Workup has been reassuring.  She has normal vital signs here.  Possible gastritis or inflammation of the stomach as a cause of her symptoms and I will trial a course of famotidine to see if this can help.  She is told to return if she has worsening of her symptoms or other concerns, otherwise follow-up in clinic.    Addendum: Prior to the patient's discharge she asked for her evening dose of tramadol and MiraLAX and these are provided.    I have reviewed the nursing notes.    I have reviewed the findings, diagnosis, plan and need for follow up with the patient.           Medical Decision Making  The patient's presentation was of moderate complexity (an undiagnosed new problem with uncertain prognosis).    The patient's evaluation involved:  ordering and/or review of 3+ test(s) in this encounter (see separate area of note for details)  independent interpretation of testing performed by another health professional (see separate area of note for details)    The patient's management necessitated moderate risk (IV contrast administration).        New Prescriptions    FAMOTIDINE (PEPCID) 40 MG TABLET    Take 1 tablet (40 mg) by mouth daily.    ONDANSETRON (ZOFRAN ODT) 4 MG ODT TAB     Take 1 tablet (4 mg) by mouth every 8 hours as needed for nausea.       Final diagnoses:   Generalized abdominal pain       10/13/2024   [facility] EMERGENCY DEPT       Ken Cornejo MD  10/14/24 0021       Ken Cornejo MD  10/14/24 0027

## 2024-10-15 LAB — BACTERIA UR CULT: NORMAL

## 2024-10-18 ENCOUNTER — VIRTUAL VISIT (OUTPATIENT)
Dept: FAMILY MEDICINE | Facility: CLINIC | Age: 59
End: 2024-10-18
Payer: COMMERCIAL

## 2024-10-18 DIAGNOSIS — K59.01 SLOW TRANSIT CONSTIPATION: ICD-10-CM

## 2024-10-18 DIAGNOSIS — F60.9 PERSONALITY DISORDER IN ADULT (H): Chronic | ICD-10-CM

## 2024-10-18 DIAGNOSIS — Z87.81 STATUS POST CLOSED FRACTURE OF HIP: ICD-10-CM

## 2024-10-18 DIAGNOSIS — R10.84 ABDOMINAL PAIN, GENERALIZED: Primary | ICD-10-CM

## 2024-10-18 DIAGNOSIS — F41.9 ANXIETY: ICD-10-CM

## 2024-10-18 PROCEDURE — 99443 PR PHYSICIAN TELEPHONE EVALUATION 21-30 MIN: CPT | Mod: 93 | Performed by: FAMILY MEDICINE

## 2024-10-18 RX ORDER — TRAMADOL HYDROCHLORIDE 50 MG/1
100 TABLET ORAL 2 TIMES DAILY PRN
Qty: 135 TABLET | Refills: 2 | Status: SHIPPED | OUTPATIENT
Start: 2024-10-18

## 2024-10-18 RX ORDER — FAMOTIDINE 40 MG/1
40 TABLET, FILM COATED ORAL DAILY
Qty: 90 TABLET | Refills: 1 | Status: SHIPPED | OUTPATIENT
Start: 2024-10-18

## 2024-10-18 NOTE — PROGRESS NOTES
Amira is a 59 year old who is being evaluated via a billable telephone visit.    What phone number would you like to be contacted at? [phone]  How would you like to obtain your AVS? Mail a copy  Originating Location (pt. Location): Home    Distant Location (provider location):  On-site    Assessment & Plan     Abdominal pain, generalized  Continue to take for now  - famotidine (PEPCID) 40 MG tablet; Take 1 tablet (40 mg) by mouth daily.    Status post closed fracture of hip  Increased amount  - traMADol (ULTRAM) 50 MG tablet; Take 2 tablets (100 mg) by mouth 2 times daily as needed for severe pain or pain. You may take 1 extra tablet on therapy days    Anxiety  This remains untreated    Slow transit constipation  Semicontrolled    Personality disorder in adult, unspecified  Very difficult to treat her due to this.        MED REC REQUIRED  Post Medication Reconciliation Status: discharge medications reconciled and changed, per note/orders    See Patient Instructions    Subjective   Amira is a 59 year old, presenting for the following health issues:  ER F/U (10/13/2024 - 10/14/2024 /[facility] Emergency Dept/Generalized abdominal pain//) and Recheck Medication (Med check)    HPI     Chandrikad been in the emergency room for the constipation and abdominal pain she has been doing pineapple pear and prune juices  She is feeling lots of bloating and the gas pain   Hospital Follow-up Visit:    Hospital/Nursing Home/IP Rehab Facility: [facility]  Date of Admission: 10/13/24  Date of Discharge: 10/14/24  Reason(s) for Admission: Generalized abdominal pain   Was the patient in the ICU or did the patient experience delirium during hospitalization?  No  Do you have any other stressors you would like to discuss with your provider? Health Concerns    Problems taking medications regularly:  None  Medication changes since discharge: None  Problems adhering to non-medication therapy:   None    Summary of hospitalization:  [facility] discharge summary reviewed  Diagnostic Tests/Treatments reviewed.  Follow up needed: none  Other Healthcare Providers Involved in Patient s Care:          pain management and physical therapy   Update since discharge: stable.         Plan of care communicated with patient                 She was in the emergency room she had severe abdominal pain she stopped the lyrica herself she was having abdominal pain thought it might be the lyrica  She also had been having pain likely started around the time of the antibiotics started she had been able to stop those and she is working on  the teeth      Had not been passing gas andhtere was concern over a bowel obstruction. Today she is feeling better she was prescribed   Objective           Vitals:  No vitals were obtained today due to virtual visit.    Physical Exam   General: Alert and no distress //Respiratory: No audible wheeze, cough, or shortness of breath // Psychiatric:  Appropriate affect, tone, and pace of words      Admission on 10/13/2024, Discharged on 10/14/2024   Component Date Value Ref Range Status    Sodium 10/13/2024 139  135 - 145 mmol/L Final    Potassium 10/13/2024 3.7  3.4 - 5.3 mmol/L Final    Carbon Dioxide (CO2) 10/13/2024 23  22 - 29 mmol/L Final    Anion Gap 10/13/2024 16 (H)  7 - 15 mmol/L Final    Urea Nitrogen 10/13/2024 11.8  8.0 - 23.0 mg/dL Final    Creatinine 10/13/2024 0.89  0.51 - 0.95 mg/dL Final    GFR Estimate 10/13/2024 74  >60 mL/min/1.73m2 Final    eGFR calculated using 2021 CKD-EPI equation.    Calcium 10/13/2024 9.8  8.8 - 10.4 mg/dL Final    Reference intervals for this test were updated on 7/16/2024 to reflect our healthy population more accurately. There may be differences in the flagging of prior results with similar values performed with this method. Those prior results can be interpreted in the context of the updated reference intervals.    Chloride 10/13/2024  100  98 - 107 mmol/L Final    Glucose 10/13/2024 97  70 - 99 mg/dL Final    Alkaline Phosphatase 10/13/2024 87  40 - 150 U/L Final    AST 10/13/2024 22  0 - 45 U/L Final    ALT 10/13/2024 15  0 - 50 U/L Final    Protein Total 10/13/2024 7.9  6.4 - 8.3 g/dL Final    Albumin 10/13/2024 4.2  3.5 - 5.2 g/dL Final    Bilirubin Total 10/13/2024 0.4  <=1.2 mg/dL Final    Lipase 10/13/2024 26  13 - 60 U/L Final    WBC Count 10/13/2024 7.5  4.0 - 11.0 10e3/uL Final    RBC Count 10/13/2024 4.65  3.80 - 5.20 10e6/uL Final    Hemoglobin 10/13/2024 15.7  11.7 - 15.7 g/dL Final    Hematocrit 10/13/2024 45.5  35.0 - 47.0 % Final    MCV 10/13/2024 98  78 - 100 fL Final    MCH 10/13/2024 33.8 (H)  26.5 - 33.0 pg Final    MCHC 10/13/2024 34.5  31.5 - 36.5 g/dL Final    RDW 10/13/2024 12.8  10.0 - 15.0 % Final    Platelet Count 10/13/2024 271  150 - 450 10e3/uL Final    Color Urine 10/13/2024 Lindsay (A)  Colorless, Straw, Light Yellow, Yellow Final    Appearance Urine 10/13/2024 Cloudy (A)  Clear Final    Glucose Urine 10/13/2024 Negative  Negative mg/dL Final    Bilirubin Urine 10/13/2024 Negative  Negative Final    Ketones Urine 10/13/2024 Negative  Negative mg/dL Final    Specific Gravity Urine 10/13/2024 1.026  1.003 - 1.035 Final    Blood Urine 10/13/2024 Negative  Negative Final    pH Urine 10/13/2024 5.0  5.0 - 7.0 Final    Protein Albumin Urine 10/13/2024 100 (A)  Negative mg/dL Final    Urobilinogen Urine 10/13/2024 Normal  Normal, 2.0 mg/dL Final    Nitrite Urine 10/13/2024 Negative  Negative Final    Leukocyte Esterase Urine 10/13/2024 Negative  Negative Final    Bacteria Urine 10/13/2024 Few (A)  None Seen /HPF Final    WBC Clumps Urine 10/13/2024 Present (A)  None Seen /HPF Final    Budding Yeast Urine 10/13/2024 Few (A)  None Seen /HPF Final    Mucus Urine 10/13/2024 Present (A)  None Seen /LPF Final    Calcium Oxalate Crystals Urine 10/13/2024 Few (A)  None Seen /HPF Final    RBC Urine 10/13/2024 6 (H)  <=2 /HPF Final     WBC Urine 10/13/2024 15 (H)  <=5 /HPF Final    Squamous Epithelials Urine 10/13/2024 6 (H)  <=1 /HPF Final    Renal Tubular Epithelials Urine 10/13/2024 2 (H)  None Seen /HPF Final    Hyaline Casts Urine 10/13/2024 162 (H)  <=2 /LPF Final    % Neutrophils 10/13/2024 65  % Final    % Lymphocytes 10/13/2024 21  % Final    % Monocytes 10/13/2024 11  % Final    % Eosinophils 10/13/2024 1  % Final    % Basophils 10/13/2024 1  % Final    % Other Cells 10/13/2024 1  % Final    Absolute Neutrophils 10/13/2024 4.9  1.6 - 8.3 10e3/uL Final    Absolute Lymphocytes 10/13/2024 1.6  0.8 - 5.3 10e3/uL Final    Absolute Monocytes 10/13/2024 0.8  0.0 - 1.3 10e3/uL Final    Absolute Eosinophils 10/13/2024 0.1  0.0 - 0.7 10e3/uL Final    Absolute Basophils 10/13/2024 0.1  0.0 - 0.2 10e3/uL Final    Absolute Other Cells 10/13/2024 0.1 (H)  <=0.0 10e3/uL Final    RBC Morphology 10/13/2024 Confirmed RBC Indices   Final    Platelet Assessment 10/13/2024 Automated Count Confirmed. Platelet morphology is normal.  Automated Count Confirmed. Platelet morphology is normal. Final    Reactive Lymphocytes 10/13/2024 Present (A)  None Seen Final    Culture 10/13/2024 <10,000 CFU/mL Mixture of Urogenital Concetta   Final         Phone call duration: 30 minutes  Signed Electronically by: Alana Carbajal MD

## 2024-10-21 DIAGNOSIS — K59.01 SLOW TRANSIT CONSTIPATION: ICD-10-CM

## 2024-10-21 RX ORDER — POLYETHYLENE GLYCOL 3350 17 G/17G
17 POWDER, FOR SOLUTION ORAL 3 TIMES DAILY
Qty: 510 G | Refills: 3 | Status: SHIPPED | OUTPATIENT
Start: 2024-10-21

## 2024-10-23 ENCOUNTER — VIRTUAL VISIT (OUTPATIENT)
Dept: FAMILY MEDICINE | Facility: CLINIC | Age: 59
End: 2024-10-23
Payer: COMMERCIAL

## 2024-10-23 DIAGNOSIS — S72.001D CLOSED FRACTURE OF RIGHT HIP WITH ROUTINE HEALING, SUBSEQUENT ENCOUNTER: ICD-10-CM

## 2024-10-23 DIAGNOSIS — G90.50 RSD (REFLEX SYMPATHETIC DYSTROPHY): Primary | ICD-10-CM

## 2024-10-23 DIAGNOSIS — R20.2 PARESTHESIAS: ICD-10-CM

## 2024-10-23 PROCEDURE — 99443 PR PHYSICIAN TELEPHONE EVALUATION 21-30 MIN: CPT | Mod: 93 | Performed by: FAMILY MEDICINE

## 2024-10-23 RX ORDER — FENTANYL 25 UG/1
1 PATCH TRANSDERMAL
Qty: 5 PATCH | Refills: 0 | Status: SHIPPED | OUTPATIENT
Start: 2024-10-23 | End: 2024-10-29 | Stop reason: SINTOL

## 2024-10-23 NOTE — PROGRESS NOTES
Amira is a 59 year old who is being evaluated via a billable telephone visit.    What phone number would you like to be contacted at? [phone]  How would you like to obtain your AVS? Mail a copy  Originating Location (pt. Location): Home    Distant Location (provider location):  On-site    Assessment & Plan     Closed fracture of right hip with routine healing, subsequent encounter  Insurance is requiring her to try this   - fentaNYL (DURAGESIC) 25 mcg/hr 72 hr patch; Place 1 patch over 72 hours onto the skin every 72 hours. remove old patch.    Paresthesias      RSD (reflex sympathetic dystrophy)  I do think that this may be part of her pain syndrome.   - fentaNYL (DURAGESIC) 25 mcg/hr 72 hr patch; Place 1 patch over 72 hours onto the skin every 72 hours. remove old patch.            Lyrica start     Subjective   Amira is a 59 year old, presenting for the following health issues:  Recheck Medication    Amira want s to talk more about her medicine   She will start the b 12   She would like to try the lyrica she has the 25 mg will do this and will increase to the 50 mg   She has been taking the tramadol and she is trying to stretch   HPI       She has the abdominal pain but this has improved   She has been taking the pepcid and she is going to stop this and see if this was helping   She has been drinking the pineapple juice   She will be going to the dentist for the lesion removal on 10/31      Review of Systems  Constitutional, HEENT, cardiovascular, pulmonary, gi and gu systems are negative, except as otherwise noted.      Objective           Vitals:  No vitals were obtained today due to virtual visit.    Physical Exam   General: Alert and no distress //Respiratory: No audible wheeze, cough, or shortness of breath // Psychiatric:  Appropriate affect, tone,  pressured  speech             Phone call duration: 25 minutes   Signed Electronically by: Alana Carbajal MD

## 2024-10-28 ENCOUNTER — NURSE TRIAGE (OUTPATIENT)
Dept: NURSING | Facility: CLINIC | Age: 59
End: 2024-10-28
Payer: COMMERCIAL

## 2024-10-28 ENCOUNTER — TELEPHONE (OUTPATIENT)
Dept: PALLIATIVE MEDICINE | Facility: CLINIC | Age: 59
End: 2024-10-28
Payer: COMMERCIAL

## 2024-10-28 ENCOUNTER — TELEPHONE (OUTPATIENT)
Dept: NURSING | Facility: CLINIC | Age: 59
End: 2024-10-28
Payer: COMMERCIAL

## 2024-10-28 ENCOUNTER — TELEPHONE (OUTPATIENT)
Dept: FAMILY MEDICINE | Facility: CLINIC | Age: 59
End: 2024-10-28
Payer: COMMERCIAL

## 2024-10-28 NOTE — TELEPHONE ENCOUNTER
Reason for Disposition    [1] Caller requests to speak ONLY to PCP AND [2] NON-URGENT question    Protocols used: PCP Call - No Triage-A-AH

## 2024-10-28 NOTE — CONFIDENTIAL NOTE
Please see my triage encounter for further details.    Patient is requesting WENDY call her tomorrow.      Yinka Mercer RN, BSN  Triage Nurse Advisor

## 2024-10-28 NOTE — TELEPHONE ENCOUNTER
Pt calling back.  Read her comment from Dr Carbajal.  She is wondering when the Fentanyl will be out of her system? Took patch off between 10 and midnight Sunday night.   She can't eat, throwing everything up when she eats.  Please call pt back.  Pt would really like to talk to Dr Carbajal if possible.  Pt states a message an be left.    Nini Islas on 10/28/2024 at 2:08 PM

## 2024-10-28 NOTE — TELEPHONE ENCOUNTER
She should have the fentanyl out of her system by today or tomorrow. She can take the zofran for the nausea. Alana Carbajal M.D.

## 2024-10-28 NOTE — TELEPHONE ENCOUNTER
She should stop the fentanyl. I would like her to follow up with pain management to consider belbuca for her pain. She may returnto her usual dosing of tramadol for the time being. Alana Carbajal M.D.

## 2024-10-28 NOTE — TELEPHONE ENCOUNTER
Telephone call  Patient calling to report Threw up  again this morning she had the fentanyl patch on 2 days and now she has thrown up 2 times.  She was trying to take her miralax with chicken broth and threw up her tramadol also.. She is very anxious and she took off the fentanyl patch.  She really wants to talk with Dr Carbajal or Suha Dawkins RN she feels they know her best.  She really would like a call back from one of them today.  This is her 2nd call today she is aware that the clinic does not open until 7 am.  Routing to PCP    Natalie Bob RN   [facility] Nurse Advisor  6:05 AM 10/28/2024

## 2024-10-28 NOTE — TELEPHONE ENCOUNTER
Medication Question or Refill    Contacts       Contact Date/Time Type Contact Phone/Fax    10/28/2024 12:13 AM CDT Phone (Incoming) Amira Scherer (Self) [phone] (H)            What medication are you calling about (include dose and sig)?: fentaNYL (DURAGESIC) 25 mcg/hr 72 hr patch     Preferred Pharmacy:   [facility] [place], [place] - [address]  [address] [facility] [address]  Phone: [phone] Fax: [phone]    [facility] [place] [place] [facility] [address] [facility]  [address] [facility] [address]  Phone: [phone] Fax: [phone]    [facility] - [place], [place] - [address] [place] [address]  Phone: [phone] Fax: [phone]      Controlled Substance Agreement on file:   CSA -- Patient Level:    CSA: None found at the patient level.       Who prescribed the medication?: Dr Carbajal    Do you need a refill? No    When did you use the medication last? Last night    Patient offered an appointment? Yes: Dr Carbajal does not have any openings until the November 4.    Do you have any questions or concerns?  Yes: Patient was having concerns with the patch. She vomited after 24 hours of wearing the patch.       Okay to leave a detailed message?: Yes at Home number on file [phone] (home)

## 2024-10-28 NOTE — CONFIDENTIAL NOTE
Nurse Triage SBAR    Is this a 2nd Level Triage? NO    Situation: Call from patient    Fentanyl placed at 9pm last night  Was ok last night except for some trouble with sleep  Got up and went to the kitchen and drank orange and took Tramadol (6:30 pm) and then vomited.  Feels very relaxed and walking ok but also feeling very irritable.   She is afraid she will vomit if she eats again and is very worried that something wrong is going on for her to vomit. She is also concerned about constipation.  Patient was talking non-stop, answered some questions.    Main goal for patient's call to see if she can talk to Dr. Carbajal who is not on duty tomorrow or the on-call tonight to ask why she vomited once after drinking some juice and have not eating in over 14 hrs.      Background:     Assessment:  Patient vomited once and isn't nauseas now. She was able to sip on some flat 7-up without feeling nauseas during the phone call. She went back and forth about eating something now.    Patient was not triage; it was difficult to keep patient on one subject and she didn't answer questions but went back to repeating her concerns. It does sound like the fentanyl patch is effective to control her pain.    Protocol Recommended Disposition:   Call PCP within 24 hrs - Advised will route message to clinic pool per her repeated requests. Advised writer will not page on-call because she has no symptoms that justify calling them.     Patient is asking Suha to call her back on Monday.        Yinka Mercer RN, BSN  Triage Nurse Advisor

## 2024-10-28 NOTE — TELEPHONE ENCOUNTER
Received another call from Patient.  Wondering why she has not heard back from Dr Carbajal yet.  Believes fentanyl patient is what caused her nausea.  Put patch on Saturday evening around 9 PM.  Patient found on the floor Sunday between 10 pm- midnight.  Patient has taken 1 sip of sprite this morning.  Wants to know when she will feel better.  Relayed to Patient that half life of fentanyl is 13 to 22 hours.  Patient would like to hear from Dr Carbajal.  Seb Castro RN

## 2024-10-28 NOTE — TELEPHONE ENCOUNTER
Received [facility] plan of care certification. Placed in provider's [facility], [place]. Will fax back when reviewed and signed.

## 2024-10-29 NOTE — TELEPHONE ENCOUNTER
Call placed to Patient.  Left message on voice mail as instructed from previous note.  Left call back number for Patient to return call.  Seb Castro RN

## 2024-10-29 NOTE — TELEPHONE ENCOUNTER
Reviewed, signed and returned to MA for fax.    VONDA Egan, DNP, FNP-C   [facility] - Pain Management

## 2024-10-30 ENCOUNTER — TELEPHONE (OUTPATIENT)
Dept: OTOLARYNGOLOGY | Facility: CLINIC | Age: 59
End: 2024-10-30
Payer: COMMERCIAL

## 2024-10-30 NOTE — TELEPHONE ENCOUNTER
Kaur Loredo, APRN CNP  [facility]ty Pool25 minutes ago (12:21 PM)     KK  I am willing to work her in. Please remind her that there may be a wait.     LVM for patient to call back to get r/s.     Bonnie Arana RN on 10/30/2024 at 12:52 PM

## 2024-10-30 NOTE — TELEPHONE ENCOUNTER
[facility] Call Center    Phone Message    May a detailed message be left on voicemail: yes     Reason for Call: Other: Per pt she needs to r/s but was not happy with the December schedule that I had to work with. Please call to discuss. Thank you     Action Taken: Message routed to:  [facility] ENT    Travel Screening: Not Applicable     Date of Service:
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
## 2024-10-31 NOTE — TELEPHONE ENCOUNTER
Patient does not wish to schedule at this time. If patient calls back to schedule please offer a POA spot.    Do Holley RN on 10/31/2024 at 11:10 AM

## 2024-10-31 NOTE — TELEPHONE ENCOUNTER
Call placed to patient   Relayed Dr. Carbajal's message     Patient states she already received the message  States she is feeling better after taking the patch off     No further questions/concerns    Sharif Manrique, Clinic RN  [facility]

## 2024-11-20 ENCOUNTER — TELEPHONE (OUTPATIENT)
Dept: FAMILY MEDICINE | Facility: CLINIC | Age: 59
End: 2024-11-20
Payer: COMMERCIAL

## 2024-11-20 NOTE — TELEPHONE ENCOUNTER
Received call from Patient.  States that she is trying lyrica.  Lyrica is causing more constipation.  Wondering if she can add a 3rd dose of miralax a day, is already taking it 2 x per day.  Patient tolerating lyrica so far.  Would like [facility] pharmacy notified in increase in Miralax is ok with Dr Carbajal.  Seb Castro RN

## 2024-11-20 NOTE — TELEPHONE ENCOUNTER
Call placed to patient   Relayed Dr. Carbajal's message    Patient verbalized understanding  No further questions/concerns    Sharif Manrique, Clinic RN  [facility]

## 2024-11-20 NOTE — TELEPHONE ENCOUNTER
Yes she can in crease the miralax to 3 times per day and even to 2 capfuls 2 times per day if needeed . Alana Carbajal M.D.

## 2024-11-27 ENCOUNTER — VIRTUAL VISIT (OUTPATIENT)
Dept: FAMILY MEDICINE | Facility: CLINIC | Age: 59
End: 2024-11-27
Payer: COMMERCIAL

## 2024-11-27 DIAGNOSIS — R20.2 PARESTHESIAS: ICD-10-CM

## 2024-11-27 DIAGNOSIS — S72.001D CLOSED FRACTURE OF RIGHT HIP WITH ROUTINE HEALING, SUBSEQUENT ENCOUNTER: ICD-10-CM

## 2024-11-27 DIAGNOSIS — G89.4 CHRONIC PAIN SYNDROME: Primary | ICD-10-CM

## 2024-11-27 PROCEDURE — 99443 PR PHYSICIAN TELEPHONE EVALUATION 21-30 MIN: CPT | Mod: 93 | Performed by: FAMILY MEDICINE

## 2024-11-27 RX ORDER — OXYCODONE HYDROCHLORIDE 5 MG/1
5 TABLET ORAL 2 TIMES DAILY PRN
Qty: 45 TABLET | Refills: 0 | Status: SHIPPED | OUTPATIENT
Start: 2024-11-27

## 2024-11-27 RX ORDER — PREGABALIN 50 MG/1
50 CAPSULE ORAL 2 TIMES DAILY
Qty: 60 CAPSULE | Refills: 1 | COMMUNITY
Start: 2024-11-27 | End: 2024-11-27

## 2024-11-27 NOTE — PROGRESS NOTES
Amira is a 59 year old who is being evaluated via a billable telephone visit.    What phone number would you like to be contacted at? [phone]   How would you like to obtain your AVS? Mail a copy  Originating Location (pt. Location): Home  {PROVIDER LOCATION On-site should be selected for visits conducted from your clinic location or adjoining [facility] hospital, academic office, or other nearby [facility] building. Off-site should be selected for all other provider locations, including home:167234}  Distant Location (provider location):  On-site    {PROVIDER CHARTING PREFERENCE:508425}    Subjective   Amira is a 59 year old, presenting for the following health issues:  RECHECK      HPI   Taking Lyrica 50mg bid   She is miserable and has a lot of pain her mri does not really show a whole lot she is taking lyrica 50 2 times per day does not note an y improvement  she was offered belbuca and physical therapy and she has been offered reuben . She is afraid of this and she claims that she cannot go anywhere . She feels that she has no life and she is just going to doctors and appointments. She had a hard time with adl. She would qualify for a  waiver . She does not want anyone coming in the house and helping her .         {ROS Picklists (Optional):062897}      Objective           Vitals:  No vitals were obtained today due to virtual visit.    Physical Exam   General: Alert and no distress //Respiratory: No audible wheeze, cough, or shortness of breath // Psychiatric:  Appropriate affect, tone, and pace of words      {Diagnostic Test Results (Optional):511793}      Phone call duration: *** minutes  Signed Electronically by: Alana Carbajal MD  {Email feedback regarding this note to [email or web address]   :[id number]}   Systems  Constitutional, HEENT, cardiovascular, pulmonary, gi and gu systems are negative, except as otherwise noted.      Objective           Vitals:  No vitals were obtained today due to virtual visit.    Physical Exam   General: Alert and no distress //Respiratory: No audible wheeze, cough, or shortness of breath // Psychiatric:  Appropriate affect, tone, and pace of words            Phone call duration: 25 minutes  spent in chart review medication interactions and phone call and charting    Signed Electronically by: Alana Carbajal MD

## 2024-12-16 ENCOUNTER — TELEPHONE (OUTPATIENT)
Dept: FAMILY MEDICINE | Facility: CLINIC | Age: 59
End: 2024-12-16
Payer: COMMERCIAL

## 2024-12-16 NOTE — TELEPHONE ENCOUNTER
General Call      Reason for Call:  Pt is wanting to know if Alana Carbajal is willing to put the future dated fills in for her oxycodone so they are ready at the pharmacy when she needs them.    Okay to leave a detailed message?: Yes at Home number on file [phone] (home)    Leonila Mc on 12/16/2024 at 1:42 PM

## 2024-12-17 DIAGNOSIS — K59.01 SLOW TRANSIT CONSTIPATION: ICD-10-CM

## 2024-12-17 RX ORDER — POLYETHYLENE GLYCOL 3350 17 G/17G
17 POWDER, FOR SOLUTION ORAL 3 TIMES DAILY
Qty: 510 G | Refills: 3 | Status: SHIPPED | OUTPATIENT
Start: 2024-12-17

## 2024-12-19 ENCOUNTER — NURSE TRIAGE (OUTPATIENT)
Dept: FAMILY MEDICINE | Facility: CLINIC | Age: 59
End: 2024-12-19
Payer: COMMERCIAL

## 2024-12-19 NOTE — TELEPHONE ENCOUNTER
"Spoke to patient and I did relay Provider's message to her as written below.     Patient prefers to speak to Suha at [place] location \"since she knows what is going on\".     Stefany Clark RN        "

## 2024-12-19 NOTE — TELEPHONE ENCOUNTER
Patient calls regarding abdominal pain and is informed of message below.   See other encounter re: abdominal pain triage.    Ruthann Garcia RN  [facility]

## 2024-12-19 NOTE — TELEPHONE ENCOUNTER
I am not prescribing fentanyl again. I can message her but her answer may still be no. I'm sorry about the wait for an assessment. Alana Carbajal M.D.
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
## 2024-12-19 NOTE — TELEPHONE ENCOUNTER
"Patient calling back regarding medication Oxycodone  States she feels she is having a sore abdomen and severe bloating from Oxycodone, taking one tab at night  Requesting different medication to [facility]  States she is getting tolerant of Tramadol \"barely works\"    [phone] okay to leave a detailed message    Susie Scherer RN on 12/19/2024 at 9:43 AM        "

## 2024-12-19 NOTE — TELEPHONE ENCOUNTER
Nurse Triage SBAR    Is this a 2nd Level Triage? YES, LICENSED PRACTITIONER REVIEW IS REQUIRED    Situation: Patient with middle abdominal pain x2 days.     Background: History of colon resection.  Takes bowel medications including senna-docusate, polyethylene and lactulose.  Also takes tramadol BID, oxycodone at night for chronic pain.    Assessment: Patient reports mid-abdominal pain that radiates to back rated 9/10 x 2 days.  It seemed to come on suddenly, is constant in nature.  Last normal BM was 2-3 days ago, and since then has had a few bouts of watery diarrhea.  Denies blood.  Denies vomiting.  She has not checked temp, but endorses chills and body aches. She has been taking bowel medications/laxatives as directed.  She last took tramadol BID yesterday and oxycodone last night.      Protocol Recommended Disposition:   Go to ED Now    Recommendation: Patient declines ED, states she has no way to get there between her pain and driving in the weather.  She requests this be sent to PCP for advice.  Her main concern seems to be whether she should take her tramadol and oxycodone today.  She asks RN to remind PCP that she will be due for refill of her oxycodone in about a week.     Routed to provider    Does the patient meet one of the following criteria for ADS visit consideration? 16+ years old, with an [facility] PCP     TIP  Providers, please consider if this condition is appropriate for management at one of our Acute and Diagnostic Services sites.     If patient is a good candidate, please use dotphrase <dot>triageresponse and select Refer to ADS to document.      Reason for Disposition   SEVERE abdominal pain (e.g., excruciating)    Additional Information   Negative: Passed out (e.g., fainted, lost consciousness, blacked out and was not responding)   Negative: Shock suspected (e.g., cold/pale/clammy skin, too weak to stand, low BP, rapid pulse)   Negative: Sounds like a life-threatening emergency to the triager    "Negative: Followed an abdomen (stomach) injury   Negative: Chest pain   Negative: Abdominal pain and pregnant < 20 weeks   Negative: Abdominal pain and pregnant 20 or more weeks   Negative: Pain is mainly in upper abdomen (if needed ask: 'is it mainly above the belly button?')   Negative: Abdomen bloating or swelling are main symptoms    Answer Assessment - Initial Assessment Questions  1. LOCATION: \"Where does it hurt?\"       Middle of stomach  2. RADIATION: \"Does the pain shoot anywhere else?\" (e.g., chest, back)      Seems to radiate to back  3. ONSET: \"When did the pain begin?\" (e.g., minutes, hours or days ago)       Tuesday  4. SUDDEN: \"Gradual or sudden onset?\"      Seemed to be more suddenly  5. PATTERN \"Does the pain come and go, or is it constant?\"      Pretty constant  6. SEVERITY: \"How bad is the pain?\"  (e.g., Scale 1-10; mild, moderate, or severe)      Rates pain 9/10  7. RECURRENT SYMPTOM: \"Have you ever had this type of stomach pain before?\" If Yes, ask: \"When was the last time?\" and \"What happened that time?\"       See above.   8. CAUSE: \"What do you think is causing the stomach pain?\"      Unsure, originally thought it was from onions she ate (bloating), but continued.  She has a history of colon resection, but this seems different.  She does take medication for constipation.  Last normal BM was a few days ago, since then has been having some watery diarrhea.  She takes constulose.   9. RELIEVING/AGGRAVATING FACTORS: \"What makes it better or worse?\" (e.g., antacids, bending or twisting motion, bowel movement)      No. She takes tramadol BID for other issues, last took oxycodone last night and is wondering if she should take pain meds today, although concerned that they could be part of her abdominal problem.   10. OTHER SYMPTOMS: \"Do you have any other symptoms?\" (e.g., back pain, diarrhea, fever, urination pain, vomiting)        Endorses back pain, loose watery stools.  Denies vomiting and has not " "checked temp, but endorses chills and body aches.   11. PREGNANCY: \"Is there any chance you are pregnant?\" \"When was your last menstrual period?\"        NA    Protocols used: Abdominal Pain - Female-A-[place]    Ruthann Gracia RN  [facility]    "

## 2024-12-19 NOTE — TELEPHONE ENCOUNTER
Provider Response to 2nd Level Triage Request    I have reviewed the RN documentation. My recommendation is:  Best addressed by PCP/specialist for pain management.as for the bloating I would recommend that she be seen in urgent care . ..nkdd Alana Carbajal M.D.

## 2024-12-19 NOTE — TELEPHONE ENCOUNTER
Thank you for speaking with Amira.  That is fine if she stops her oxycodone she should continue to take the tramadol she has been taking it so long she will need to taper off if she decides not to take it anymore.  I have nothing else to offer her for pain I think she should try making a virtual visit with pain management at telephone call likely do but I cannot prescribe anything more for her. Alana Carbajal M.D.
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
## 2024-12-20 NOTE — TELEPHONE ENCOUNTER
Noted. RN called the patient and spoke with about her abdominal pains. Concerns over her abdomen pain just now happening after 19 days of taking the oxycodone. Patient also expressed this concern. With the chills and body aches that she had early as well and now patient is feeling warmer than usual potential fever.   Patient also relayed that she has very watery stools. RN relayed concern of obstructions and the need to go to the ED. The safest option being to call 911. Patient does not want to do that.  Patient is going to skip her oxycodone tonight, let us know in the morning how she is feeling.  RN did explain the complications that can occur from obstructions.   Suha Dawkins RN on 12/19/2024 at 6:17 PM

## 2024-12-20 NOTE — TELEPHONE ENCOUNTER
Called patient back. She is still in pain. Maybe even worse. RN explained she needs to go to the ED. Patient is hesitant due to her barriers with transportation. RN explained she can call 911. She also should not worry right now about getting home, figuring out the pain is important.   Patient is going to wait a little longer and if it isn't better she said she will go for sure.   Suha Dawkins RN on 12/20/2024 at 5:33 PM

## 2024-12-21 ENCOUNTER — APPOINTMENT (OUTPATIENT)
Dept: CT IMAGING | Facility: CLINIC | Age: 59
End: 2024-12-21
Attending: EMERGENCY MEDICINE
Payer: COMMERCIAL

## 2024-12-21 ENCOUNTER — HOSPITAL ENCOUNTER (EMERGENCY)
Facility: CLINIC | Age: 59
Discharge: HOME | End: 2024-12-21
Attending: EMERGENCY MEDICINE | Admitting: EMERGENCY MEDICINE
Payer: COMMERCIAL

## 2024-12-21 VITALS
DIASTOLIC BLOOD PRESSURE: 97 MMHG | HEART RATE: 104 BPM | TEMPERATURE: 97.6 F | OXYGEN SATURATION: 99 % | HEIGHT: 66 IN | WEIGHT: 104 LBS | SYSTOLIC BLOOD PRESSURE: 135 MMHG | RESPIRATION RATE: 21 BRPM | BODY MASS INDEX: 16.71 KG/M2

## 2024-12-21 DIAGNOSIS — R11.2 NAUSEA AND VOMITING, UNSPECIFIED VOMITING TYPE: ICD-10-CM

## 2024-12-21 DIAGNOSIS — R10.30 LOWER ABDOMINAL PAIN: ICD-10-CM

## 2024-12-21 LAB
ALBUMIN SERPL BCG-MCNC: 4 G/DL (ref 3.5–5.2)
ALBUMIN UR-MCNC: 300 MG/DL
ALP SERPL-CCNC: 127 U/L (ref 40–150)
ALT SERPL W P-5'-P-CCNC: 14 U/L (ref 0–50)
ANION GAP SERPL CALCULATED.3IONS-SCNC: 14 MMOL/L (ref 7–15)
APPEARANCE UR: ABNORMAL
AST SERPL W P-5'-P-CCNC: 13 U/L (ref 0–45)
BACTERIA #/AREA URNS HPF: ABNORMAL /HPF
BASOPHILS # BLD AUTO: 0.1 10E3/UL (ref 0–0.2)
BASOPHILS NFR BLD AUTO: 1 %
BILIRUB SERPL-MCNC: 0.4 MG/DL
BILIRUB UR QL STRIP: ABNORMAL
BUN SERPL-MCNC: 14 MG/DL (ref 8–23)
CALCIUM SERPL-MCNC: 9.3 MG/DL (ref 8.8–10.4)
CHLORIDE SERPL-SCNC: 109 MMOL/L (ref 98–107)
COLOR UR AUTO: YELLOW
CREAT SERPL-MCNC: 0.66 MG/DL (ref 0.51–0.95)
EGFRCR SERPLBLD CKD-EPI 2021: >90 ML/MIN/1.73M2
EOSINOPHIL # BLD AUTO: 0 10E3/UL (ref 0–0.7)
EOSINOPHIL NFR BLD AUTO: 0 %
ERYTHROCYTE [DISTWIDTH] IN BLOOD BY AUTOMATED COUNT: 14 % (ref 10–15)
FLUAV RNA SPEC QL NAA+PROBE: NEGATIVE
FLUBV RNA RESP QL NAA+PROBE: NEGATIVE
GLUCOSE SERPL-MCNC: 104 MG/DL (ref 70–99)
GLUCOSE UR STRIP-MCNC: NEGATIVE MG/DL
HCO3 SERPL-SCNC: 17 MMOL/L (ref 22–29)
HCT VFR BLD AUTO: 39.5 % (ref 35–47)
HGB BLD-MCNC: 12.9 G/DL (ref 11.7–15.7)
HGB UR QL STRIP: ABNORMAL
HYALINE CASTS: 28 /LPF
IMM GRANULOCYTES # BLD: 0 10E3/UL
IMM GRANULOCYTES NFR BLD: 1 %
KETONES UR STRIP-MCNC: 40 MG/DL
LEUKOCYTE ESTERASE UR QL STRIP: NEGATIVE
LIPASE SERPL-CCNC: 17 U/L (ref 13–60)
LYMPHOCYTES # BLD AUTO: 1.2 10E3/UL (ref 0.8–5.3)
LYMPHOCYTES NFR BLD AUTO: 16 %
MCH RBC QN AUTO: 32.6 PG (ref 26.5–33)
MCHC RBC AUTO-ENTMCNC: 32.7 G/DL (ref 31.5–36.5)
MCV RBC AUTO: 100 FL (ref 78–100)
MONOCYTES # BLD AUTO: 1.2 10E3/UL (ref 0–1.3)
MONOCYTES NFR BLD AUTO: 16 %
MUCOUS THREADS #/AREA URNS LPF: PRESENT /LPF
NEUTROPHILS # BLD AUTO: 4.9 10E3/UL (ref 1.6–8.3)
NEUTROPHILS NFR BLD AUTO: 66 %
NITRATE UR QL: NEGATIVE
NRBC # BLD AUTO: 0 10E3/UL
NRBC BLD AUTO-RTO: 0 /100
PH UR STRIP: 6 [PH] (ref 5–7)
PLATELET # BLD AUTO: 327 10E3/UL (ref 150–450)
POTASSIUM SERPL-SCNC: 3.2 MMOL/L (ref 3.4–5.3)
PROT SERPL-MCNC: 7.7 G/DL (ref 6.4–8.3)
RBC # BLD AUTO: 3.96 10E6/UL (ref 3.8–5.2)
RBC URINE: 5 /HPF
RSV RNA SPEC NAA+PROBE: NEGATIVE
SARS-COV-2 RNA RESP QL NAA+PROBE: NEGATIVE
SODIUM SERPL-SCNC: 140 MMOL/L (ref 135–145)
SP GR UR STRIP: 1.03 (ref 1–1.03)
UROBILINOGEN UR STRIP-MCNC: 2 MG/DL
WBC # BLD AUTO: 7.4 10E3/UL (ref 4–11)
WBC URINE: 7 /HPF

## 2024-12-21 PROCEDURE — 80053 COMPREHEN METABOLIC PANEL: CPT | Performed by: EMERGENCY MEDICINE

## 2024-12-21 PROCEDURE — 99285 EMERGENCY DEPT VISIT HI MDM: CPT | Mod: 25 | Performed by: EMERGENCY MEDICINE

## 2024-12-21 PROCEDURE — 36415 COLL VENOUS BLD VENIPUNCTURE: CPT | Performed by: EMERGENCY MEDICINE

## 2024-12-21 PROCEDURE — 87086 URINE CULTURE/COLONY COUNT: CPT | Performed by: EMERGENCY MEDICINE

## 2024-12-21 PROCEDURE — 96360 HYDRATION IV INFUSION INIT: CPT | Mod: 59 | Performed by: EMERGENCY MEDICINE

## 2024-12-21 PROCEDURE — 85004 AUTOMATED DIFF WBC COUNT: CPT | Performed by: EMERGENCY MEDICINE

## 2024-12-21 PROCEDURE — 83690 ASSAY OF LIPASE: CPT | Performed by: EMERGENCY MEDICINE

## 2024-12-21 PROCEDURE — 81003 URINALYSIS AUTO W/O SCOPE: CPT | Performed by: EMERGENCY MEDICINE

## 2024-12-21 PROCEDURE — 87637 SARSCOV2&INF A&B&RSV AMP PRB: CPT | Performed by: EMERGENCY MEDICINE

## 2024-12-21 PROCEDURE — 85041 AUTOMATED RBC COUNT: CPT | Performed by: EMERGENCY MEDICINE

## 2024-12-21 PROCEDURE — 99284 EMERGENCY DEPT VISIT MOD MDM: CPT | Performed by: EMERGENCY MEDICINE

## 2024-12-21 PROCEDURE — 74177 CT ABD & PELVIS W/CONTRAST: CPT

## 2024-12-21 PROCEDURE — 258N000003 HC RX IP 258 OP 636: Performed by: EMERGENCY MEDICINE

## 2024-12-21 PROCEDURE — 250N000009 HC RX 250: Performed by: EMERGENCY MEDICINE

## 2024-12-21 PROCEDURE — 250N000011 HC RX IP 250 OP 636: Performed by: EMERGENCY MEDICINE

## 2024-12-21 RX ORDER — ONDANSETRON 4 MG/1
8 TABLET, ORALLY DISINTEGRATING ORAL EVERY 8 HOURS PRN
Qty: 10 TABLET | Refills: 0 | Status: SHIPPED | OUTPATIENT
Start: 2024-12-21 | End: 2024-12-26

## 2024-12-21 RX ORDER — ONDANSETRON 2 MG/ML
4 INJECTION INTRAMUSCULAR; INTRAVENOUS ONCE
Status: DISCONTINUED | OUTPATIENT
Start: 2024-12-21 | End: 2024-12-21 | Stop reason: HOSPADM

## 2024-12-21 RX ORDER — IOPAMIDOL 755 MG/ML
51 INJECTION, SOLUTION INTRAVASCULAR ONCE
Status: COMPLETED | OUTPATIENT
Start: 2024-12-21 | End: 2024-12-21

## 2024-12-21 RX ADMIN — SODIUM CHLORIDE 53 ML: 9 INJECTION, SOLUTION INTRAVENOUS at 14:53

## 2024-12-21 RX ADMIN — IOPAMIDOL 51 ML: 755 INJECTION, SOLUTION INTRAVENOUS at 14:53

## 2024-12-21 RX ADMIN — SODIUM CHLORIDE 500 ML: 9 INJECTION, SOLUTION INTRAVENOUS at 15:16

## 2024-12-21 ASSESSMENT — COLUMBIA-SUICIDE SEVERITY RATING SCALE - C-SSRS
1. IN THE PAST MONTH, HAVE YOU WISHED YOU WERE DEAD OR WISHED YOU COULD GO TO SLEEP AND NOT WAKE UP?: NO
6. HAVE YOU EVER DONE ANYTHING, STARTED TO DO ANYTHING, OR PREPARED TO DO ANYTHING TO END YOUR LIFE?: NO
2. HAVE YOU ACTUALLY HAD ANY THOUGHTS OF KILLING YOURSELF IN THE PAST MONTH?: NO

## 2024-12-21 ASSESSMENT — ACTIVITIES OF DAILY LIVING (ADL)
ADLS_ACUITY_SCORE: 47

## 2024-12-21 NOTE — DISCHARGE INSTRUCTIONS
Return if symptoms worsen or new symptoms develop.  Follow-up with primary care physician next available.  Drink plenty of fluids.

## 2024-12-21 NOTE — ED TRIAGE NOTES
Pt report abdominal tenderness since Tuesday, nausea and vomiting. Pt concerned because of her hx abdominal surgeries. Pt declined anything for nausea because the nurse told her it causes constipation.      Triage Assessment (Adult)       Row Name 12/21/24 2886          Triage Assessment    Airway WDL WDL        Respiratory WDL    Respiratory WDL WDL        Cardiac WDL    Cardiac WDL WDL        Cognitive/Neuro/Behavioral WDL    Cognitive/Neuro/Behavioral WDL WDL

## 2024-12-21 NOTE — ED NOTES
Pt requested to try oral intake first before trying zofran. [place] and soda provided per pts request.

## 2024-12-21 NOTE — ED PROVIDER NOTES
History     Chief Complaint   Patient presents with    Abdominal Pain     Tender abdomen since last Tuesday    Nausea & Vomiting     HPI  Amira Scherer is a 59 year old female with past medical history significant for history of previous colon resection close right hip fracture on continuous pain medication who presents emergency department complaining of lower abdominal pain.  Patient states symptoms been going on since Tuesday.  Having cramping lower abdominal pain that has become more prevalent.  She has been able unable to have a bowel movement and vomited last night.  Pain is in her lower abdomen and does not radiate denies any urinary symptoms.  She did pass a small bit of hard stool today.  Takes pain medication but also takes MiraLAX.  Denies any fevers or chills has not any headache or visual changes denies any neck pain has not any chest pain shortness of breath denies focal numbness weakness any extremity or bowel or bladder dysfunction.    Allergies:  Allergies   Allergen Reactions    Pc Pen Vk [Penicillin V] GI Disturbance       Problem List:    Patient Active Problem List    Diagnosis Date Noted    RSD (reflex sympathetic dystrophy) 07/22/2024     Priority: Medium    Status post closed fracture of right femur 04/22/2024     Priority: Medium    Slow transit constipation 04/22/2024     Priority: Medium    Chronic, continuous use of opioids - F11.9 01/23/2024     Priority: Medium    Personality disorder in adult, unspecified 02/12/2022     Priority: Medium    Fall, initial encounter 02/07/2022     Priority: Medium    Closed right hip fracture (H) 02/06/2022     Priority: Medium    Closed fracture of right hip, initial encounter (H) 02/06/2022     Priority: Medium    Traumatic rhabdomyolysis, initial encounter (H) 02/06/2022     Priority: Medium    Other plastic surgery for unacceptable cosmetic appearance 04/07/2015     Priority: Medium    CARDIOVASCULAR SCREENING; LDL GOAL LESS THAN 160 01/22/2014  "    Priority: Medium    S/P colon resection 2013     Priority: Medium     Had colon prolapse - had at least partial removal of her colon done at that time.        Liver lesion 2013     Priority: Medium    Anxiety 2012     Priority: Medium    Paresthesias 2012     Priority: Medium    Acne vulgaris 2012     Priority: Medium        Past Medical History:    No past medical history on file.    Past Surgical History:    Past Surgical History:   Procedure Laterality Date    COLECTOMY WITHOUT COLOSTOMY      COLON SURGERY   and     Colon removed     OPEN REDUCTION INTERNAL FIXATION HIP BIPOLAR Right 2022    Procedure: Bipolar Hemiarthroplasty Right Hip;  Surgeon: Kb Lizarraga MD;  Location: [facility] OR       Family History:    Family History   Problem Relation Age of Onset    Hypertension Mother     Arthritis Mother     Breast Cancer Sister     Thyroid Disease Sister        Social History:  Marital Status:   [4]  Social History     Tobacco Use    Smoking status: Former     Current packs/day: 0.00     Average packs/day: 0.5 packs/day for 20.0 years (10.0 ttl pk-yrs)     Types: Cigarettes     Start date: 1987     Quit date: 2007     Years since quittin.7    Smokeless tobacco: Never   Vaping Use    Vaping status: Never Used   Substance Use Topics    Alcohol use: Yes     Comment: Red wine 2-3 times per week    Drug use: No        Medications:    docusate sodium (COLACE) 100 MG capsule  ibuprofen (ADVIL/MOTRIN) 400 MG tablet  ibuprofen (ADVIL/MOTRIN) 600 MG tablet  lactulose (CONSTULOSE) 10 GM/15ML solution  ondansetron (ZOFRAN ODT) 4 MG ODT tab  oxyCODONE (ROXICODONE) 5 MG tablet  polyethylene glycol (MIRALAX) 17 GM/Dose powder  SENNA-docusate sodium (SENNA S) 8.6-50 MG tablet  traMADol (ULTRAM) 50 MG tablet          Review of Systems  As per HPI  Physical Exam   BP: (!) 135/97  Pulse: 104  Temp: 97.6  F (36.4  C)  Resp: 21  Height: 167.6 cm (5' 6\")  Weight: 47.2 " kg (104 lb)  SpO2: 98 %      Physical Exam  Vitals and nursing note reviewed.   Constitutional:       General: She is not in acute distress.     Appearance: She is well-developed. She is not ill-appearing, toxic-appearing or diaphoretic.   HENT:      Head: Normocephalic and atraumatic.      Nose: Nose normal.      Mouth/Throat:      Mouth: Mucous membranes are moist.      Pharynx: Oropharynx is clear.   Eyes:      Conjunctiva/sclera: Conjunctivae normal.   Cardiovascular:      Rate and Rhythm: Normal rate and regular rhythm.      Pulses: Normal pulses.      Heart sounds: Normal heart sounds. No murmur heard.  Pulmonary:      Effort: Pulmonary effort is normal.      Breath sounds: Normal breath sounds. No stridor. No wheezing or rhonchi.   Abdominal:      Comments: Soft, nondistended, tender to palpation of the lower abdomen left greater than right with no guarding or rebound present bowel sounds positive.  No masses or hernia are palpated.  Flank pain present.   Musculoskeletal:         General: No swelling or tenderness. Normal range of motion.      Cervical back: Normal range of motion and neck supple.      Right lower leg: No edema.      Left lower leg: No edema.   Skin:     General: Skin is warm and dry.      Findings: No rash.   Neurological:      General: No focal deficit present.      Mental Status: She is alert and oriented to person, place, and time.      Sensory: No sensory deficit.      Motor: No weakness.      Coordination: Coordination normal.   Psychiatric:         Mood and Affect: Mood normal.         ED Course        Procedures              Critical Care time:  none              Labs Ordered and Resulted from Time of ED Arrival to Time of ED Departure   COMPREHENSIVE METABOLIC PANEL - Abnormal       Result Value    Sodium 140      Potassium 3.2 (*)     Carbon Dioxide (CO2) 17 (*)     Anion Gap 14      Urea Nitrogen 14.0      Creatinine 0.66      GFR Estimate >90      Calcium 9.3      Chloride 109 (*)      Glucose 104 (*)     Alkaline Phosphatase 127      AST 13      ALT 14      Protein Total 7.7      Albumin 4.0      Bilirubin Total 0.4     ROUTINE UA WITH MICROSCOPIC REFLEX TO CULTURE - Abnormal    Color Urine Yellow      Appearance Urine Slightly Cloudy (*)     Glucose Urine Negative      Bilirubin Urine Small (*)     Ketones Urine 40 (*)     Specific Gravity Urine 1.033      Blood Urine Small (*)     pH Urine 6.0      Protein Albumin Urine 300 (*)     Urobilinogen Urine 2.0      Nitrite Urine Negative      Leukocyte Esterase Urine Negative      Bacteria Urine Few (*)     Mucus Urine Present (*)     RBC Urine 5 (*)     WBC Urine 7 (*)     Hyaline Casts Urine 28 (*)    LIPASE - Normal    Lipase 17     INFLUENZA A/B, RSV AND SARS-COV2 PCR - Normal    Influenza A PCR Negative      Influenza B PCR Negative      RSV PCR Negative      SARS CoV2 PCR Negative     CBC WITH PLATELETS AND DIFFERENTIAL    WBC Count 7.4      RBC Count 3.96      Hemoglobin 12.9      Hematocrit 39.5            MCH 32.6      MCHC 32.7      RDW 14.0      Platelet Count 327      % Neutrophils 66      % Lymphocytes 16      % Monocytes 16      % Eosinophils 0      % Basophils 1      % Immature Granulocytes 1      NRBCs per 100 WBC 0      Absolute Neutrophils 4.9      Absolute Lymphocytes 1.2      Absolute Monocytes 1.2      Absolute Eosinophils 0.0      Absolute Basophils 0.1      Absolute Immature Granulocytes 0.0      Absolute NRBCs 0.0          Medications   sodium chloride 0.9% BOLUS 500 mL (0 mLs Intravenous Stopped 12/21/24 1604)   iopamidol (ISOVUE-370) solution 51 mL (51 mLs Intravenous $Given 12/21/24 1453)   sodium chloride 0.9 % bag 500mL for CT scan flush use (53 mLs Intravenous $Given 12/21/24 1453)       Assessments & Plan (with Medical Decision Making) records were reviewed including past medical history medications and allergies.  Family practice visit on 11/27/2024 for chronic pain was reviewed.  Labs were obtained.  I  independently reviewed and interpreted labs.  Patient was given a fluid bolus.  I independently reviewed and interpreted labs.  Influenza and COVID were negative.  Patient CBC was unremarkable.  Comprehensive metabolic panel significant for potassium of 3.2 bicarb is slightly decreased at 17.  Lipase was within normal limits.  Urine analysis with 5 RBCs 7 WBCs 28 hyaline casts negative leukocyte or nitrate.  She had 40 ketones small bilirubin present.  Due to patient's presentation and exam and a history of CT scan the abdomen pelvis was obtained.  Independently reviewed this and agree with radiologist findings of no acute findings in the abdomen and pelvis.  Findings discussed with patient in detail.  No evidence of significant constipation or other abnormality found.  Will send the patient home on Zofran and recommended her to eat many small meals advancing diet as tolerated.  She should continue taking your medications and may need to follow-up with gastroenterology but following up with primary care for further referral of her chronic pain.  She needs to take in as many calories as she can and she is aware of this.     I have reviewed the nursing notes.    I have reviewed the findings, diagnosis, plan and need for follow up with the patient.         Discharge Medication List as of 12/21/2024  5:36 PM        START taking these medications    Details   !! ondansetron (ZOFRAN ODT) 4 MG ODT tab Take 2 tablets (8 mg) by mouth every 8 hours as needed for nausea or vomiting., Disp-10 tablet, R-0, Local Print       !! - Potential duplicate medications found. Please discuss with provider.          Final diagnoses:   Lower abdominal pain   Nausea and vomiting, unspecified vomiting type       12/21/2024   [facility] EMERGENCY DEPT       Kathryn, Sage Tabares MD  12/23/24 1402

## 2024-12-22 LAB — BACTERIA UR CULT: NORMAL

## 2024-12-24 ENCOUNTER — PATIENT OUTREACH (OUTPATIENT)
Dept: CARE COORDINATION | Facility: CLINIC | Age: 59
End: 2024-12-24

## 2024-12-24 ENCOUNTER — TELEPHONE (OUTPATIENT)
Dept: FAMILY MEDICINE | Facility: CLINIC | Age: 59
End: 2024-12-24

## 2024-12-24 NOTE — PROGRESS NOTES
Clinic Care Coordination Contact  Community Health Worker Initial Outreach    CHW Initial Information Gathering:  Referral Source: ED Follow-Up  Current living arrangement:: Not Assessed  CHW Additional Questions  If ED/Hospital discharge, follow-up appointment scheduled as recommended?: Yes  Medication changes made following ED/Hospital discharge?: No  MyChart active?: No  Patient agreeable to assistance with activating MyChart?: No    Patient accepts CC: No, Patient expressed no additional support is needed at this time. Unable to send care coordination introduction letter since MyChart is not active.  Clinic Care Coordination services remain available via referral if needed.     Erlinda Powell  Community Health Worker    Connected Care Resources   [facility]     *Connected Care Resources Team does NOT   follow patient ongoing. Referrals are identified   based on internal discharge report and the outreach is to ensure   Patient has understanding of their discharge instructions.

## 2024-12-24 NOTE — TELEPHONE ENCOUNTER
Patient is feeling better and will wait to her appt on Thur12/26 to talk to her provider about her stomach symptoms.  Suha OLIVEIRA was sent a secure message as to this.(Pt wanted her to know).

## 2024-12-26 ENCOUNTER — VIRTUAL VISIT (OUTPATIENT)
Dept: FAMILY MEDICINE | Facility: CLINIC | Age: 59
End: 2024-12-26
Payer: COMMERCIAL

## 2024-12-26 DIAGNOSIS — Z90.49 S/P COLON RESECTION: ICD-10-CM

## 2024-12-26 DIAGNOSIS — E53.8 VITAMIN B12 DEFICIENCY (NON ANEMIC): ICD-10-CM

## 2024-12-26 DIAGNOSIS — K59.01 SLOW TRANSIT CONSTIPATION: ICD-10-CM

## 2024-12-26 DIAGNOSIS — F11.90 CHRONIC, CONTINUOUS USE OF OPIOIDS: Primary | ICD-10-CM

## 2024-12-26 DIAGNOSIS — Z87.81 STATUS POST CLOSED FRACTURE OF RIGHT FEMUR: ICD-10-CM

## 2024-12-26 DIAGNOSIS — T88.7XXA SIDE EFFECT OF MEDICATION: ICD-10-CM

## 2024-12-26 PROCEDURE — 99441 PR PHYSICIAN TELEPHONE EVALUATION 5-10 MIN: CPT | Mod: 93 | Performed by: FAMILY MEDICINE

## 2024-12-26 RX ORDER — LANOLIN ALCOHOL/MO/W.PET/CERES
1000 CREAM (GRAM) TOPICAL DAILY
Qty: 90 TABLET | Refills: 3 | Status: SHIPPED | OUTPATIENT
Start: 2024-12-26

## 2024-12-26 RX ORDER — ONDANSETRON 4 MG/1
4-8 TABLET, ORALLY DISINTEGRATING ORAL EVERY 8 HOURS PRN
Qty: 28 TABLET | Refills: 0 | Status: SHIPPED | OUTPATIENT
Start: 2024-12-26

## 2024-12-26 NOTE — PROGRESS NOTES
Amira is a 59 year old who is being evaluated via a billable telephone visit.    What phone number would you like to be contacted at? [phone]  How would you like to obtain your AVS? Mail a copy  Originating Location (pt. Location): Home  {PROVIDER LOCATION On-site should be selected for visits conducted from your clinic location or adjoining [facility] hospital, academic office, or other nearby [facility] building. Off-site should be selected for all other provider locations, including home:[id number]}  Distant Location (provider location):  On-site    {PROVIDER CHARTING PREFERENCE:[id number]}    Subjective   Amira is a 59 year old, presenting for the following health issues:  Urgent Care (Follow up )        12/26/2024     1:42 PM   Additional Questions   Roomed by Maddie ARIAS CMA     \[facility] [place] [facility]\""     ED/UC Followup:  Facility:  [facility]   Date of visit: 12/21/24  Reason for visit: Lower abdominal pain, nausea and vomiting.   Current Status: better    Follow up from urgent care bloating and different pain she has altered her diet before this she stopped the advil and she is feeling better   She felt better with the anti nausea medications   She does not have a urinary tract infection cx neg   She finally was able to move her bowels this gave her immediate relief from the bloating           Objective           Vitals:  No vitals were obtained today due to virtual visit.    Physical Exam   General: Alert and no distress //Respiratory: No audible wheeze, cough, or shortness of breath // Psychiatric:  Appropriate affect, tone, and pace of words      Admission on 12/21/2024, Discharged on 12/21/2024   Component Date Value Ref Range Status     Sodium 12/21/2024 140  135 - 145 mmol/L Final     Potassium 12/21/2024 3.2 (L)  3.4 - 5.3 mmol/L Final     Carbon Dioxide (CO2) 12/21/2024 17 (L)  22 - 29 mmol/L Final     Anion Gap 12/21/2024 14  7 - 15 mmol/L Final     Urea Nitrogen 12/21/2024 14.0  8.0 - 23.0 mg/dL Final     Creatinine 12/21/2024 0.66   0.51 - 0.95 mg/dL Final     GFR Estimate 12/21/2024 >90  >60 mL/min/1.73m2 Final    eGFR calculated using 2021 CKD-EPI equation.     Calcium 12/21/2024 9.3  8.8 - 10.4 mg/dL Final    Reference intervals for this test were updated on 7/16/2024 to reflect our healthy population more accurately. There may be differences in the flagging of prior results with similar values performed with this method. Those prior results can be interpreted in the context of the updated reference intervals.     Chloride 12/21/2024 109 (H)  98 - 107 mmol/L Final     Glucose 12/21/2024 104 (H)  70 - 99 mg/dL Final     Alkaline Phosphatase 12/21/2024 127  40 - 150 U/L Final     AST 12/21/2024 13  0 - 45 U/L Final     ALT 12/21/2024 14  0 - 50 U/L Final     Protein Total 12/21/2024 7.7  6.4 - 8.3 g/dL Final     Albumin 12/21/2024 4.0  3.5 - 5.2 g/dL Final     Bilirubin Total 12/21/2024 0.4  <=1.2 mg/dL Final     Lipase 12/21/2024 17  13 - 60 U/L Final     Color Urine 12/21/2024 Yellow  Colorless, Straw, Light Yellow, Yellow Final     Appearance Urine 12/21/2024 Slightly Cloudy (A)  Clear Final     Glucose Urine 12/21/2024 Negative  Negative mg/dL Final     Bilirubin Urine 12/21/2024 Small (A)  Negative Final     Ketones Urine 12/21/2024 40 (A)  Negative mg/dL Final     Specific Gravity Urine 12/21/2024 1.033  1.003 - 1.035 Final     Blood Urine 12/21/2024 Small (A)  Negative Final     pH Urine 12/21/2024 6.0  5.0 - 7.0 Final     Protein Albumin Urine 12/21/2024 300 (A)  Negative mg/dL Final     Urobilinogen Urine 12/21/2024 2.0  Normal, 2.0 mg/dL Final     Nitrite Urine 12/21/2024 Negative  Negative Final     Leukocyte Esterase Urine 12/21/2024 Negative  Negative Final     Bacteria Urine 12/21/2024 Few (A)  None Seen /HPF Final     Mucus Urine 12/21/2024 Present (A)  None Seen /LPF Final     RBC Urine 12/21/2024 5 (H)  <=2 /HPF Final     WBC Urine 12/21/2024 7 (H)  <=5 /HPF Final     Hyaline Casts Urine 12/21/2024 28 (H)  <=2 /LPF Final      WBC Count 12/21/2024 7.4  4.0 - 11.0 10e3/uL Final     RBC Count 12/21/2024 3.96  3.80 - 5.20 10e6/uL Final     Hemoglobin 12/21/2024 12.9  11.7 - 15.7 g/dL Final     Hematocrit 12/21/2024 39.5  35.0 - 47.0 % Final     MCV 12/21/2024 100  78 - 100 fL Final     MCH 12/21/2024 32.6  26.5 - 33.0 pg Final     MCHC 12/21/2024 32.7  31.5 - 36.5 g/dL Final     RDW 12/21/2024 14.0  10.0 - 15.0 % Final     Platelet Count 12/21/2024 327  150 - 450 10e3/uL Final     % Neutrophils 12/21/2024 66  % Final     % Lymphocytes 12/21/2024 16  % Final     % Monocytes 12/21/2024 16  % Final     % Eosinophils 12/21/2024 0  % Final     % Basophils 12/21/2024 1  % Final     % Immature Granulocytes 12/21/2024 1  % Final     NRBCs per 100 WBC 12/21/2024 0  <1 /100 Final     Absolute Neutrophils 12/21/2024 4.9  1.6 - 8.3 10e3/uL Final     Absolute Lymphocytes 12/21/2024 1.2  0.8 - 5.3 10e3/uL Final     Absolute Monocytes 12/21/2024 1.2  0.0 - 1.3 10e3/uL Final     Absolute Eosinophils 12/21/2024 0.0  0.0 - 0.7 10e3/uL Final     Absolute Basophils 12/21/2024 0.1  0.0 - 0.2 10e3/uL Final     Absolute Immature Granulocytes 12/21/2024 0.0  <=0.4 10e3/uL Final     Absolute NRBCs 12/21/2024 0.0  10e3/uL Final     Influenza A PCR 12/21/2024 Negative  Negative Final     Influenza B PCR 12/21/2024 Negative  Negative Final     RSV PCR 12/21/2024 Negative  Negative Final     SARS CoV2 PCR 12/21/2024 Negative  Negative Final    NEGATIVE: SARS-CoV-2 (COVID-19) RNA not detected, presumed negative.     Culture 12/21/2024 <10,000 CFU/mL Mixture of Urogenital Concetta   Final         Phone call duration: *** minutes  Signed Electronically by: Alana Carabjal MD  {Email feedback regarding this note to [email or web address]   :[id number]}   Concetta   Final         Phone call duration:  spent in chart review medication interactions and phone call and charting  25 minutes  Signed Electronically by: Alana Carbajal MD

## 2025-01-04 ENCOUNTER — TELEPHONE (OUTPATIENT)
Dept: FAMILY MEDICINE | Facility: CLINIC | Age: 60
End: 2025-01-04
Payer: COMMERCIAL

## 2025-01-04 DIAGNOSIS — G89.4 CHRONIC PAIN SYNDROME: ICD-10-CM

## 2025-01-04 DIAGNOSIS — R20.2 PARESTHESIAS: ICD-10-CM

## 2025-01-04 NOTE — TELEPHONE ENCOUNTER
General Call    Contacts       Contact Date/Time Type Contact Phone/Fax    01/04/2025 01:34 PM CST Phone (Incoming) Amira Scherer (Self) [phone] ()          Reason for Call:  med refill    What are your questions or concerns:  Notify [facility] to get refill for oxycodone. Pt is getting low     Date of last appointment with provider: 12/26/2024    Okay to leave a detailed message?: Yes at Home number on file [phone] ([place])

## 2025-01-06 DIAGNOSIS — Z87.81 STATUS POST CLOSED FRACTURE OF HIP: ICD-10-CM

## 2025-01-06 RX ORDER — OXYCODONE HYDROCHLORIDE 5 MG/1
5 TABLET ORAL 2 TIMES DAILY PRN
Qty: 45 TABLET | Refills: 0 | Status: SHIPPED | OUTPATIENT
Start: 2025-01-06

## 2025-01-06 RX ORDER — TRAMADOL HYDROCHLORIDE 50 MG/1
100 TABLET ORAL 2 TIMES DAILY PRN
Qty: 135 TABLET | Refills: 2 | Status: SHIPPED | OUTPATIENT
Start: 2025-01-06

## 2025-01-08 DIAGNOSIS — K59.01 SLOW TRANSIT CONSTIPATION: ICD-10-CM

## 2025-01-08 RX ORDER — POLYETHYLENE GLYCOL 3350 17 G/17G
17 POWDER, FOR SOLUTION ORAL 3 TIMES DAILY
Qty: 510 G | Refills: 3 | OUTPATIENT
Start: 2025-01-08

## 2025-02-06 DIAGNOSIS — G89.4 CHRONIC PAIN SYNDROME: ICD-10-CM

## 2025-02-06 DIAGNOSIS — R20.2 PARESTHESIAS: ICD-10-CM

## 2025-02-08 RX ORDER — OXYCODONE HYDROCHLORIDE 5 MG/1
5 TABLET ORAL 2 TIMES DAILY PRN
Qty: 45 TABLET | Refills: 0 | Status: SHIPPED | OUTPATIENT
Start: 2025-02-08

## 2025-02-09 DIAGNOSIS — K59.01 SLOW TRANSIT CONSTIPATION: ICD-10-CM

## 2025-02-09 RX ORDER — POLYETHYLENE GLYCOL 3350 17 G/17G
17 POWDER, FOR SOLUTION ORAL 3 TIMES DAILY
Qty: 510 G | Refills: 3 | Status: SHIPPED | OUTPATIENT
Start: 2025-02-09

## 2025-02-10 DIAGNOSIS — K59.01 SLOW TRANSIT CONSTIPATION: ICD-10-CM

## 2025-02-10 RX ORDER — LACTULOSE 10 G/15ML
SOLUTION ORAL
Qty: 237 ML | Refills: 0 | Status: SHIPPED | OUTPATIENT
Start: 2025-02-10

## 2025-02-11 DIAGNOSIS — S72.091A OTH FRACTURE OF HEAD AND NECK OF RIGHT FEMUR, INIT (H): ICD-10-CM

## 2025-02-12 RX ORDER — DOCUSATE SODIUM 100 MG/1
100 CAPSULE, LIQUID FILLED ORAL 2 TIMES DAILY PRN
Qty: 180 CAPSULE | Refills: 3 | Status: SHIPPED | OUTPATIENT
Start: 2025-02-12

## 2025-02-12 NOTE — TELEPHONE ENCOUNTER
Requested Prescriptions   Pending Prescriptions Disp Refills    docusate sodium (COLACE) 100 MG capsule [Pharmacy Med Name: docusate sodium 100 mg capsule] 180 capsule 3     Sig: Take 1 capsule (100 mg) by mouth 2 times daily as needed for constipation       Laxatives Protocol Failed - 2/12/2025  1:36 PM        Failed - Medication indicated for associated diagnosis     The medication is prescribed for one or more of the following conditions:     Constipation   Preparation of bowel for procedure   Fecal impaction   Dysfunctional voiding   Narcotic induced constipation   Cystic Fibrosis  Bronchiectasis            Passed - Patient is age 6 or older        Passed - Medication is active on med list and the sig matches. RN to manually verify dose and sig if red X/fail.     If the protocol passes (green check), you do not need to verify med dose and sig.    A prescription matches if they are the same clinical intention.    For Example: once daily and every morning are the same.    For all fails (red x), verify dose and sig.    If the refill does match what is on file, the RN can still proceed to approve the refill request.     If they do not match, route to the appropriate provider.             Passed - Recent (12 mo) or future (90 days) visit within the authorizing provider's specialty     The patient must have completed an in-person or virtual visit within the past 12 months or has a future visit scheduled within the next 90 days with the authorizing provider s specialty.  Urgent care and e-visits do not qualify as an office visit for this protocol.            Julie Behrendt RN

## 2025-02-14 ENCOUNTER — TELEPHONE (OUTPATIENT)
Dept: FAMILY MEDICINE | Facility: CLINIC | Age: 60
End: 2025-02-14
Payer: COMMERCIAL

## 2025-02-14 DIAGNOSIS — R20.2 PARESTHESIAS: ICD-10-CM

## 2025-02-14 DIAGNOSIS — S72.001D CLOSED FRACTURE OF RIGHT HIP WITH ROUTINE HEALING, SUBSEQUENT ENCOUNTER: ICD-10-CM

## 2025-02-14 NOTE — TELEPHONE ENCOUNTER
See Pain Management telephone encounter 11/05/24. Pt has not yet followed up with Pain Management. There has been no controlled substance agreement sign by patient and no documentation that pain clinic would be assuming prescribing role.   Routing for review

## 2025-02-14 NOTE — TELEPHONE ENCOUNTER
Medication Question or Refill    Contacts       Contact Date/Time Type Contact Phone/Fax    02/14/2025 01:30 PM CST Phone (Incoming) Amira Scherer (Self) [phone] (H)            What medication are you calling about (include dose and sig)?: Lyrica, pregabalin, 50mg    Preferred Pharmacy:       [facility] - [address] [address]  Phone: [phone] Fax: [phone]      Controlled Substance Agreement on file:   CSA -- Patient Level:    CSA: None found at the patient level.       Who prescribed the medication?:     Do you need a refill? Yes, she has tried taking this, but stopped, would like to start it again. Has some on hand would like to know if she should take this again, for pain.    When did you use the medication last? Few months ago    Patient offered an appointment? No    Do you have any questions or concerns?  Yes: she would like a team member to call back to discuss this medication      Okay to leave a detailed message?: Yes at Home number on file [phone] (home)

## 2025-02-14 NOTE — TELEPHONE ENCOUNTER
It looks like Dr Carbajal recommended she go back to pain management , when they saw each other in December. I think that is a good idea as they can manage both non narcotic and narcotic medications   HUGO Monk MD

## 2025-02-17 RX ORDER — PREGABALIN 50 MG/1
50 CAPSULE ORAL 2 TIMES DAILY
Qty: 60 CAPSULE | Refills: 0 | Status: SHIPPED | OUTPATIENT
Start: 2025-02-17

## 2025-02-17 NOTE — TELEPHONE ENCOUNTER
1 month refill sent.  Please have her follow-up with the pain clinic as this was recommended by Dr. Carbajal.    EB

## 2025-02-17 NOTE — TELEPHONE ENCOUNTER
"Patient states she does not remember that she had to see pain clinic.  Advised that it was talked about in her last OV in December. Patient states that she does not want to work with Gita Glennxavi anymore because she is not a doctor. She complains about the previous side effects of Lyrica and this is a much bigger thing than it needs to be. Advised regardless if she continues the Lyrica or not she still needs to follow up with Pain Clinic as recommended by her PCP. She states she feels like her PCP is trying to break up with her. Advised her PCP is OOO until end of March and it is not uncommon for PCP's to refer to pain clinic for patients with chronic pain not well managed with current medications.     She reports she will not go to the pain clinic prior to her appointment with Dr. Carbajal because she talked to her last. She wants Dr. Carbajal to know that she cannot drive herself to appointments and that she     Advised that she will not get a refill of the Lyrica without that appointment.   Advised that she can call her insurance and check for ride coverage for appointments. She states that she will not do this again because they are terrible people. Scheduled telephone appointment with PCP when she returns per patient request. She is hard to follow changing subjects frequently and forgetting what she was saying several times. She blames it on \"my meds are wearing off\"    Advised to schedule pain clinic appointment before PCP returns. Patient is not sure if she will follow advise.    Ericka Fofana RN on 2/17/2025 at 2:27 PM            "

## 2025-02-18 ENCOUNTER — TELEPHONE (OUTPATIENT)
Dept: FAMILY MEDICINE | Facility: CLINIC | Age: 60
End: 2025-02-18
Payer: COMMERCIAL

## 2025-02-18 DIAGNOSIS — G89.4 CHRONIC PAIN SYNDROME: Primary | ICD-10-CM

## 2025-02-18 NOTE — TELEPHONE ENCOUNTER
Patient called the clinic only wanting to speak with this RN.   Patient is concerned about the response from yesterday regarding the Lyrica and needing to go back to the pain clinic.   Patient has SDOH and major barriers to care.   It has been an on going issue with patient wanting to have a virtual visit with Gita Allen to be compliant with visits but stated she was told it was not possible. She is not sure why she is not able to have a virtual visit. Patient has transportation issues especially in the winter time due to snow and ice. Patient has severe pain and is on controlled substances which she cannot drive on. Dr. Carbajal was going to ask Gita Allen about this?    She is concerned about getting her tramadol and oxycodone, she stated she will be in significant pain if she cannot get her medication. She does not want to start the lyrica if she is only going to get a months worth and not have it renewed because she cannot get to the pain clinic. She said she was told by Dr. Carbajal to try taking the lyrica again whenever, she said she was trying to do the right thing by asking first before she started it to make sure she doesn't need to slowly increase. She is adamant that this was the plan between her and her provider, Dr. Carbajal. RN agrees that we should figure out plan before patient starts this medication. She is concerned that since Dr. Carbajal is out of office, she is not going to get proper care from providers who do not know her. She said she has a friend that has tried gabapentin, RN does not see that she has tried that.     RN asked about PCA options for possible transportation. She said she is waiting for an assessment, the county told her it would be 7 months. I do not see that we have even sent a referral and RN is not sure if that process was ever initiated by clinic. Will send a message to Clinical care coordinator for help with PCA process and transportation.     Also reviewed the pain  injection Gita Allen talked about with patient. Patient now feels more comfortable trying it but we need to figure out the transportation piece. She is going to try tylenol again to see if that help with break through pain. She would like to try ibuprofen but will hold for now since it cause abdominal issues recently.     Dr. Cochran, since you prescribed the lyrica, please advise.   Is patient going to have pain management while Dr. Carbajal is away?  She is very worried about this and it is her main concern right now.     Suha Dawkins RN on 2/18/2025 at 11:46 AM

## 2025-02-18 NOTE — TELEPHONE ENCOUNTER
RN and patient spoke today about clinical coordination. She recently had a referral sent for CC. Patient said when they called both herself and the other party were not sure why the referral was placed, she declined help.     Patient does need additional support and would like to try speaking with CC again but wants them to be aware of what she needs help with.     Patient would really benefit from help with transportation to appointments, getting a PCA, and with food security.   Patient stated that she spoke with Dr. Carbajal about PCA hours, however she needs some help navigating the system. She said that she spoke with someone at the [facility] but does not have a contact name. They had told her an assessment is 7 months out. I don't know if there has been any kind of referral sent or exactly what they would need from the provider. Can we help her investigate this and get started?    She has trouble going out to get groceries, are there any [employer] resources for food delivery?    A PCA would help with transportation but if there truly is a wait of 7 months, are there any other resources we can help her seek out?    She is not able to get to the pain management provider because of transportation issues.     Any help would be greatly appreciated.     She said after 10:30 is the best time to call her to avoid phone tag.     Suha Dawkins RN on 2/18/2025 at 11:58 AM

## 2025-02-18 NOTE — TELEPHONE ENCOUNTER
I would reassure the patient that she will be able to get her refills of medications but would mention that Dr. Carbajal recommended she go back to the pain clinic per her last note.    I would recommend that she work on getting into the pain clinic per Dr. Carbajal's recommendation but medications can be filled as they were prior to Dr. Carbajal being on at least for this month.  Dr. Carbajal can then determine further care when she returns.    EB

## 2025-02-19 ENCOUNTER — PATIENT OUTREACH (OUTPATIENT)
Dept: CARE COORDINATION | Facility: CLINIC | Age: 60
End: 2025-02-19
Payer: COMMERCIAL

## 2025-02-19 NOTE — PROGRESS NOTES
Clinic Care Coordination Contact    Situation: Patient chart reviewed by care coordinator.    Background: CC reached out to pt for referral for CCC.     Assessment: Pt noted they are in need of help with transportation and PCA hours    Plan/Recommendations: [facility] and pt talked about pt's concerns and needs. [facility] provided listening ear as pt expressed frustrations.     Transportation: Pt noted they are aware of medical rides and do not want to use this service anymore. Pt would like other options: does not want metro mobility, help at your door, new trax bus. Pt accepted information for go go grandparent; they will call them on their own to look into their services.     PCA: Pt noted they had a [employer] assessment about 2 years ago and they were approved for PCA at that time but PCA services never started as they needed to find their own agency and they were not able to do so. Pt has called [facility] to start this process again - is aware it is up to a 7 month wait. Pt will continue to wait and follow-up with the [facility]. There is no referral needed for PCA services; this is a self referral process through the [facility] however someone can call on their behalf to start the process. Pt has already started this process so no assistance is needed.     ARMSH: [facility] offered ARMHS services through [employer]. Pt noted they are unsure about this services due to name including 'mental health' - they do no feel this service is for them. [facility] explained that service is covered by insurance and pt would qualify. Pt will consider reaching out to [employer] but is leaning towards no.     Pt noted they have no other needs - no food needs. Pt is aware they can reach out to clinic to get back in touch with this [facility]. No other needs at this time.

## 2025-02-20 NOTE — TELEPHONE ENCOUNTER
Patient called back. Reviewed message with patient. She will try to get an appointment with Gita. Explained why.   Patient did follow up with some of the resources from CC. She would like to talk to CC again.   Provided active listening for patients frustration with injury not healing.   Talked about functional medicine and what they would offer. She is not opposed to it.   Talked about inflammation caused by certain foods.   Patient said she eats a lot of salmon and tuna. Explained there is concern for high mercury levels.   Wondering if this is something to be concerned about and needing to best.     Suha Dawkins RN on 2/20/2025 at 6:20 PM

## 2025-02-20 NOTE — TELEPHONE ENCOUNTER
Amira calling in, waiting to hear back  from nurse Suha MEYER about tramadol and oxycodone.   Amira is available after 10am for calls.

## 2025-02-20 NOTE — TELEPHONE ENCOUNTER
Noted. RN finally able to call patient.   Lvm to call back.   If patient calls, ok to send call to me.     Suha Dawkins RN on 2/20/2025 at 4:55 PM

## 2025-02-21 NOTE — TELEPHONE ENCOUNTER
Both tuna and salmon historically have low levels of mercury and thus she should not need to be concerned about that.    Thanks    EB

## 2025-03-02 DIAGNOSIS — K59.01 SLOW TRANSIT CONSTIPATION: ICD-10-CM

## 2025-03-04 RX ORDER — POLYETHYLENE GLYCOL 3350 17 G/17G
17 POWDER, FOR SOLUTION ORAL 3 TIMES DAILY
Qty: 510 G | Refills: 3 | Status: SHIPPED | OUTPATIENT
Start: 2025-03-04

## 2025-03-07 DIAGNOSIS — K59.01 SLOW TRANSIT CONSTIPATION: ICD-10-CM

## 2025-03-07 DIAGNOSIS — R20.2 PARESTHESIAS: ICD-10-CM

## 2025-03-07 DIAGNOSIS — S72.001D CLOSED FRACTURE OF RIGHT HIP WITH ROUTINE HEALING, SUBSEQUENT ENCOUNTER: ICD-10-CM

## 2025-03-10 RX ORDER — PREGABALIN 50 MG/1
50 CAPSULE ORAL 2 TIMES DAILY
Qty: 60 CAPSULE | Refills: 0 | Status: SHIPPED | OUTPATIENT
Start: 2025-03-10

## 2025-03-10 RX ORDER — LACTULOSE 10 G/15ML
SOLUTION ORAL
Qty: 237 ML | Refills: 0 | Status: SHIPPED | OUTPATIENT
Start: 2025-03-10

## 2025-03-26 ENCOUNTER — TELEPHONE (OUTPATIENT)
Dept: FAMILY MEDICINE | Facility: CLINIC | Age: 60
End: 2025-03-26
Payer: COMMERCIAL

## 2025-03-26 ENCOUNTER — TELEPHONE (OUTPATIENT)
Dept: FAMILY MEDICINE | Facility: CLINIC | Age: 60
End: 2025-03-26

## 2025-03-26 DIAGNOSIS — R20.2 PARESTHESIAS: ICD-10-CM

## 2025-03-26 DIAGNOSIS — G89.4 CHRONIC PAIN SYNDROME: ICD-10-CM

## 2025-03-26 RX ORDER — OXYCODONE HYDROCHLORIDE 5 MG/1
5 TABLET ORAL EVERY 6 HOURS PRN
Qty: 45 TABLET | Refills: 0 | Status: SHIPPED | OUTPATIENT
Start: 2025-04-07 | End: 2025-03-26

## 2025-03-26 RX ORDER — OXYCODONE HYDROCHLORIDE 5 MG/1
5 TABLET ORAL EVERY 6 HOURS PRN
Qty: 45 TABLET | Refills: 0 | Status: SHIPPED | OUTPATIENT
Start: 2025-04-04

## 2025-03-26 NOTE — TELEPHONE ENCOUNTER
Dr. Carbajal's last note indicates that she would prefer patient to follow back up with pain management as she was increasing her opioid use.    It appears from the PDMP that she gives 45 tablets of oxycodone once a month.  Patient received 45 tablets on 3/7/2025.  So she is not due until April 4th, 2025. This was supposed to be temporary. This seems like an increase again in usage. I will prescribe starting on April 4th.   PCP to determine further action.   She also receives tramadol.  Prescription for Narcan given.     Susan Reid, APRN, CNP

## 2025-03-26 NOTE — TELEPHONE ENCOUNTER
[facility] - Status Update    Who is Calling: patient    Update: Pt is having issues with her   oxyCODONE (ROXICODONE) 5 MG tablet. She takes 2 per day, and she gets 45 per script. Pharmacy says she can't pick it up until 4/4/2025, however her meds will be gone by 3/31/2025. Please call her to sort this out.  Also, there seems to be an issue with her Tramadol script as well.     Does caller want a call/response back: Yes     Okay to leave a detailed message?: Yes at [phone]

## 2025-03-27 NOTE — TELEPHONE ENCOUNTER
Call placed to patient     Patient states that the Pharmacy had requested the refill too soon and no further action is needed at this time  Has follow-up with Dr. Carbajal on Wednesday    Sharif Manrique, Clinic RN  [facility]

## 2025-03-27 NOTE — TELEPHONE ENCOUNTER
45 tablets has been a monthly prescription since Jan.  Filled 1/8, /2/9 and 3/9.  She will need to wait to address this with Dr Carbajal upon her return    Dr. Monica Gambino,

## 2025-04-02 ENCOUNTER — VIRTUAL VISIT (OUTPATIENT)
Dept: FAMILY MEDICINE | Facility: CLINIC | Age: 60
End: 2025-04-02
Payer: COMMERCIAL

## 2025-04-02 DIAGNOSIS — K59.01 SLOW TRANSIT CONSTIPATION: ICD-10-CM

## 2025-04-02 DIAGNOSIS — G90.50 RSD (REFLEX SYMPATHETIC DYSTROPHY): ICD-10-CM

## 2025-04-02 DIAGNOSIS — F60.9 PERSONALITY DISORDER IN ADULT (H): Chronic | ICD-10-CM

## 2025-04-02 DIAGNOSIS — F11.90 CHRONIC, CONTINUOUS USE OF OPIOIDS: Primary | ICD-10-CM

## 2025-04-02 PROCEDURE — 98014 SYNCH AUDIO-ONLY EST MOD 30: CPT | Performed by: FAMILY MEDICINE

## 2025-04-02 RX ORDER — PREGABALIN 50 MG/1
50 CAPSULE ORAL 3 TIMES DAILY
Qty: 90 CAPSULE | Refills: 1 | Status: SHIPPED | OUTPATIENT
Start: 2025-04-02

## 2025-04-02 NOTE — PROGRESS NOTES
Amira is a 60 year old who is being evaluated via a billable telephone visit.    What phone number would you like to be contacted at? [phone]  How would you like to obtain your AVS? Mail a copy  Originating Location (pt. Location): Home    Distant Location (provider location):  On-site  Telephone visit completed due to the patient did not consent to a video visit.    Assessment & Plan     Chronic, continuous use of opioids - F11.9  Pain  maybe sensitive    RSD (reflex sympathetic dystrophy)  Maybe     Personality disorder in adult, unspecified  Makes care hard             Work on weight loss      She has her usual complaints. She has been taking oxy and tramadol   The lyrica is helping she wants more of this   Asked for more oxy and tramadool. We have discussed this over and over again.     Subjective   Amira is a 60 year old, presenting for the following health issues:  Recheck Medication      HPI      Lyrica  Thinks it helps   Discuss the Lyrica - thinks this may be helping.   Stopped ibuprofen - discuss if she can ever taken again.   Maddie Long CMA           Review of Systems  + ros       Objective           Vitals:  No vitals were obtained today due to virtual visit.    Physical Exam   General: Alert and no distress //Respiratory: No audible wheeze, cough, or shortness of breath // Psychiatric:  Appropriate anxious , tone, and pressured speech tangential.               Phone call duration:   16spent in chart review medication interactions and phone call and charting   minutes  Signed Electronically by: Alana Carbajal MD

## 2025-04-03 RX ORDER — POLYETHYLENE GLYCOL 3350 17 G/17G
17 POWDER, FOR SOLUTION ORAL 3 TIMES DAILY
Qty: 510 G | Refills: 3 | Status: SHIPPED | OUTPATIENT
Start: 2025-04-03

## 2025-04-04 DIAGNOSIS — Z87.81 STATUS POST CLOSED FRACTURE OF HIP: ICD-10-CM

## 2025-04-06 RX ORDER — TRAMADOL HYDROCHLORIDE 50 MG/1
100 TABLET ORAL 2 TIMES DAILY PRN
Qty: 135 TABLET | Refills: 0 | Status: SHIPPED | OUTPATIENT
Start: 2025-04-11

## 2025-04-08 DIAGNOSIS — Z87.81 STATUS POST CLOSED FRACTURE OF HIP: ICD-10-CM

## 2025-04-09 RX ORDER — TRAMADOL HYDROCHLORIDE 50 MG/1
100 TABLET ORAL 2 TIMES DAILY PRN
Qty: 135 TABLET | Refills: 0 | OUTPATIENT
Start: 2025-04-11

## 2025-04-14 ENCOUNTER — TELEPHONE (OUTPATIENT)
Dept: FAMILY MEDICINE | Facility: CLINIC | Age: 60
End: 2025-04-14
Payer: COMMERCIAL

## 2025-04-21 DIAGNOSIS — R20.2 PARESTHESIAS: ICD-10-CM

## 2025-04-21 DIAGNOSIS — G89.4 CHRONIC PAIN SYNDROME: ICD-10-CM

## 2025-04-21 NOTE — TELEPHONE ENCOUNTER
Dr. Carbajal:    Patient called the clinic, she says she would like to request a refill on her Oxycodone, she has 13 tablets left and uses 2 tablets per day, she would like to have the medication approved for next refill. Please advise.      ROXANNE Sanchez

## 2025-04-22 RX ORDER — OXYCODONE HYDROCHLORIDE 5 MG/1
5 TABLET ORAL EVERY 6 HOURS PRN
Qty: 45 TABLET | Refills: 0 | Status: SHIPPED | OUTPATIENT
Start: 2025-05-02

## 2025-04-22 NOTE — TELEPHONE ENCOUNTER
"Patient calling back stating medication was approved and fillable on 5/2. She states this is not right and she will be out on Monday morning.   She does not want a call back and \"just wants it fixed\".     Upcoming appt on Monday with Dr. Carbajal.     Oscar Borges, [facility]     "

## 2025-04-22 NOTE — TELEPHONE ENCOUNTER
"Patient calls again (20 minutes after previous call), somewhat frantic and stating she \"doesn't trust the system\", concerned that Dr. Carbajal hasn't seen or responded to her message yet.    RN explained that the encounter was routed to Dr. Carbajal yesterday, but she was working virtually, so has not responded to the message yet.   Patient expresses concern that her next fill date of oxycodone is due 5/2/25, but she counted her pills and will be out on 4/28/25.  She does have an appt with Dr. Carbajal this day, but states she takes the oxycodone twice daily and is afraid she won't get her 2nd pill on time.   We did also review the Rx instructions and that she is only prescribed 45 pills per month, so she cannot take it twice daily every day, or she will run out.  Discussed that she needs to discuss her dosing frequency at the upcoming visit.  Patient goes in circles with her conversation, which is not productive, so RN ended the call. Advised that this will be sent to Dr. Carbajal per her request and we will call her back.    Ruthann Garcia RN  [facility]    "

## 2025-04-23 ENCOUNTER — TELEPHONE (OUTPATIENT)
Dept: FAMILY MEDICINE | Facility: CLINIC | Age: 60
End: 2025-04-23
Payer: COMMERCIAL

## 2025-04-23 DIAGNOSIS — K59.01 SLOW TRANSIT CONSTIPATION: ICD-10-CM

## 2025-04-23 NOTE — TELEPHONE ENCOUNTER
Received call from Patient.  States that she wants to talk with Rasheed .  Relayed that Rasheed was busy at this time and that this writer could help her.  Patient states that this writer does not always see eye to eye with her.  Requesting to speak with Rasheed only.  Relayed that has other Patient and that she should speak with who ever she gets on the phone.  Patient refused to speak with this writer and wants Rasheed to call her back today or tomorrow.  Seb QUEVEDO, Clinic RN   [facility]

## 2025-04-23 NOTE — TELEPHONE ENCOUNTER
Patient last filled this on 4 4 it is a 30-day supply this soon as she can likely refill it is on 5 2.  If she does not have enough to last her until then that is not going to make her give her more.  We have been through this multiple times and this was my concern with giving her any opiates.  She needs to continue with pain management and if she continues to make early requests I we will have to discuss with her about her being my patient still. Alana Carbajal M.D.

## 2025-04-24 RX ORDER — LACTULOSE 10 G/15ML
SOLUTION ORAL
Qty: 1200 ML | Refills: 0 | Status: SHIPPED | OUTPATIENT
Start: 2025-04-24

## 2025-04-24 NOTE — TELEPHONE ENCOUNTER
Duplicate encounter  Closing encounter    Sharif Manrique, Clinic RN  [facility]

## 2025-04-24 NOTE — TELEPHONE ENCOUNTER
Duplicate encounter  Closing encounter    Sharif Manrique, Clinic RN  [facility]

## 2025-04-24 NOTE — TELEPHONE ENCOUNTER
Seb Castro, RN13 hours ago (5:53 PM)     MR  Received call from Patient.  States that she wants to talk with Rasheed .  Relayed that Rasheed was busy at this time and that this writer could help her.  Patient states that this writer does not always see eye to eye with her.  Requesting to speak with Rasheed only.  Relayed that has other Patient and that she should speak with who ever she gets on the phone.  Patient refused to speak with this writer and wants Rasheed to call her back today or tomorrow.  Seb QUEVEDO, Clinic RN   [facility]

## 2025-04-24 NOTE — TELEPHONE ENCOUNTER
"Call placed to patient   Reviewed Dr. Carbajal's message     Patient states she was incredibly upset regarding the messages and discussion regarding the Oxycodone  Patient kept stating \"I didn't know I was doing anything wrong\"    Patient states she was under the impression she was to take 2 tablets daily - patient did not realize her prescription was only 45 tablets and would not last a month taking 2 tablets  Reviewed prescription with patient - patient verbalized understanding and states she will be more vigilant regarding her usage of the medication   Patient will plan to space out Oxycodone to last her until her next fill on 5/2/2025    Patient states she will follow the medication directions better and does want to continue seeing Dr. Carbajal as her primary  Patient apologizes for her misunderstanding of the medication instructions   Patient plans to follow-up on Monday with PCP as scheduled    Patient plans to discuss pain management clinic and PCP goals for patient with pain management - potential medications/interventions/etc   Patient does have concerns for when the Oxycodone fill date falls on a weekend - will discuss with PCP at upcoming appointment    Patient apologetic for her behavior on the phone with care team     Sharif Manrique, Clinic RN  [facility]     "

## 2025-04-24 NOTE — TELEPHONE ENCOUNTER
"  FYI - Status Update    Who is Calling: patient    Update: Patient will be out of oxycodone pills on 4/28/25 and is asking for refill. She takes 2 pills a day.  She isn't sure how she is running out early. Patient says \"she didn't know she is doing anything wrong\".    Does caller want a call/response back: Yes     Okay to leave a detailed message?: Yes at Cell number on file:      [phone]     "

## 2025-04-28 ENCOUNTER — TELEPHONE (OUTPATIENT)
Dept: FAMILY MEDICINE | Facility: CLINIC | Age: 60
End: 2025-04-28

## 2025-04-28 ENCOUNTER — TELEPHONE (OUTPATIENT)
Dept: PALLIATIVE MEDICINE | Facility: CLINIC | Age: 60
End: 2025-04-28

## 2025-04-28 ENCOUNTER — VIRTUAL VISIT (OUTPATIENT)
Dept: FAMILY MEDICINE | Facility: CLINIC | Age: 60
End: 2025-04-28
Payer: COMMERCIAL

## 2025-04-28 DIAGNOSIS — G89.4 CHRONIC PAIN SYNDROME: ICD-10-CM

## 2025-04-28 DIAGNOSIS — G90.50 RSD (REFLEX SYMPATHETIC DYSTROPHY): Primary | ICD-10-CM

## 2025-04-28 DIAGNOSIS — F11.90 CHRONIC, CONTINUOUS USE OF OPIOIDS: ICD-10-CM

## 2025-04-28 DIAGNOSIS — R20.2 PARESTHESIAS: ICD-10-CM

## 2025-04-28 DIAGNOSIS — S72.001D CLOSED FRACTURE OF RIGHT HIP WITH ROUTINE HEALING, SUBSEQUENT ENCOUNTER: ICD-10-CM

## 2025-04-28 DIAGNOSIS — K59.01 SLOW TRANSIT CONSTIPATION: ICD-10-CM

## 2025-04-28 PROCEDURE — 98014 SYNCH AUDIO-ONLY EST MOD 30: CPT | Performed by: FAMILY MEDICINE

## 2025-04-28 RX ORDER — OXYCODONE HYDROCHLORIDE 5 MG/1
5 TABLET ORAL EVERY 6 HOURS PRN
Qty: 45 TABLET | Refills: 0 | Status: SHIPPED | OUTPATIENT
Start: 2025-05-29 | End: 2025-05-01

## 2025-04-28 RX ORDER — PREGABALIN 100 MG/1
100 CAPSULE ORAL 3 TIMES DAILY
Qty: 90 CAPSULE | Refills: 2 | Status: SHIPPED | OUTPATIENT
Start: 2025-04-28

## 2025-04-28 NOTE — TELEPHONE ENCOUNTER
Patient requested to speak with this RN. She said iut was not clear that 45 pills needed to last her 30 days. RN can see how this may have been confusing especially with the script saying take one every 6 hours as needed. She is very worried about what she is going to do until Friday with limited pain management. Her ability to do her ADLs is limited when she is in pain. She is worried about being able to make herself food, showers, cleaning, getting up to pay bills, and she is very worried about falling going outside to the mailbox. She is hoping the increase in lyrica will be helpful and that she will not need to take oxycodone again but the change is still new.     RN explained the goal is to ween off of medications but still have good quality of life. Decreasing the amount of pain medication has always been the goal. Patient stated she was unaware that she was supposed to decrease her medication. Patient keeps relaying how anxious she is about not having pain control and having to lay in bed all day. She wants to be able to get out of bed and live her life.     RN will talk to Dr. Carbajal tomorrow about pain management.   Patients schedule for medication:  Tramadol only lasts her about 1.5 hours.  Oxycodone lasts maybe a couple of hours before wearing off.  Takes tramadol in the morning and then tries to last at least 3 hours until she takes her oxycodone.   She tried to go without taking her next tramadol for at least 3 hours and then she takes her 2nd tramadol. She was then taking another oxycodone maybe 3 hours after that.     She started taking her lyrica tonight.     Will try to come up with a recommendation and speak with Dr. Carbajal tomorrow.   Suha Dawkins RN on 4/28/2025 at 6:27 PM

## 2025-04-28 NOTE — TELEPHONE ENCOUNTER
[facility] Call Center    Phone Message    May a detailed message be left on voicemail: yes     Reason for Call: Other: Patient calling wondering if she can have a phone appt with her.  It is hard for her to get around with her pain.  She is also wondering if Gita can fill her Oxycodone or if it needs to go through Dr. Carbajal.  Please call her back to advise.      Action Taken: Message routed to:  Other: [facility]     Travel Screening: Not Applicable     Date of Service:

## 2025-04-28 NOTE — TELEPHONE ENCOUNTER
I agree with this assessment.  While we are trying to switch her meds and get to a place where she is taking the least, she did not listen to me at all about limitations on the OxyContin or oxycodone.  I told her I was not going to increase this amount and that the whole goal would be to taper her off of it.  She will need to do health psychology and continue with Ms. Allen with pain management.  It would be so wonderful if she did not have this focus every day. Alana Carbajal M.D.

## 2025-04-28 NOTE — PROGRESS NOTES
Amira is a 60 year old who is being evaluated via a billable telephone visit.    What phone number would you like to be contacted at? [phone]  How would you like to obtain your AVS? Mail a copy  Originating Location (pt. Location): Home    Distant Location (provider location):  Off-site  Telephone visit completed due to the patient did not consent to a video visit.    Assessment & Plan     RSD (reflex sympathetic dystrophy)  Increase lyrica   - pregabalin (LYRICA) 100 MG capsule; Take 1 capsule (100 mg) by mouth 3 times daily.    Chronic, continuous use of opioids - F11.9  As above   - pregabalin (LYRICA) 100 MG capsule; Take 1 capsule (100 mg) by mouth 3 times daily.    Paresthesias  45/30 d   - oxyCODONE (ROXICODONE) 5 MG tablet; Take 1 tablet (5 mg) by mouth every 6 hours as needed for pain. This is a 30-day supply    Chronic pain syndrome  45/30d  - oxyCODONE (ROXICODONE) 5 MG tablet; Take 1 tablet (5 mg) by mouth every 6 hours as needed for pain. This is a 30-day supply            Follow-up 2+weeks for updaeon celebrex and       Subjective   Amira is a 60 year old, presenting for the following health issues:  Recheck Medication        4/28/2025    10:50 AM   Additional Questions   Roomed by CURTIS Keating                  Objective           Vitals:  No vitals were obtained today due to virtual visit.    Physical Exam   General: Alert and no distress //Respiratory: No audible wheeze, cough, or shortness of breath // Psychiatric:  Appropriate affect, tone, and pace of words            Phone call duration: 32 minutes  spent in chart review medication interactions and phone call and charting    Signed Electronically by: Alana Carbajal MD

## 2025-04-28 NOTE — TELEPHONE ENCOUNTER
Patient is 4 months overdue for in-person visit.  Dr. Carbajal handles all medications at this time.     Please have patient schedule in person.     Malou Medina RN

## 2025-04-28 NOTE — TELEPHONE ENCOUNTER
Received call from Patient.  Had virtual appointment with Dr Carbajal today.  States sorry for calling already.  Has 1 oxycodone left.  Wondering how she is going to make it until her next refill is due.  Has been seen by pain clinic x 2 but hard for her to get there.  Patient worried about being in pain and not having oxycodone available.  Wondering if she should call Poly at pain clinic to get refill for the rest of the month.  In the past Poly has told patient to follow Dr Carbajal's recommendation.  Spoke with Sharif last week and was told that she might have to do a relaxed, lay back day to get her through.  Uses [facility] pharmacy.  Seb QUEVEDO, Clinic RN   [facility]

## 2025-04-29 RX ORDER — POLYETHYLENE GLYCOL 3350 17 G/17G
17 POWDER, FOR SOLUTION ORAL 3 TIMES DAILY
Qty: 510 G | Refills: 5 | Status: SHIPPED | OUTPATIENT
Start: 2025-04-29
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
## 2025-04-30 ENCOUNTER — TELEPHONE (OUTPATIENT)
Dept: FAMILY MEDICINE | Facility: CLINIC | Age: 60
End: 2025-04-30
Payer: COMMERCIAL

## 2025-04-30 DIAGNOSIS — R20.2 PARESTHESIAS: ICD-10-CM

## 2025-04-30 DIAGNOSIS — G89.4 CHRONIC PAIN SYNDROME: ICD-10-CM

## 2025-04-30 NOTE — TELEPHONE ENCOUNTER
"Amira is calling to relay a message to Dr Carbajal.  \"Nothing is working. I am really trying really. I am in excruciating pain. It's really terrible.\" She cannot  get more oxycodone until Friday and says she is not asking for any additional medication just wants Dr Carbajal to know. She says she does not need to go to the emergency room she needs to get off the phone to finish up what she is doing.   Writer informed Amira I do not know her history and wanted to ask a few more questions to determine if she should be seen for evaluation. She stated, \"I injured my back a long time ago and things keep getting worse\".   Advised she should go to ER if looses control of bowel or bladder, has new or worsening numbness, tingling or weakness to any extremities. She states, \"I have no way to get there\".   Advised she would need to call 911 then.    Amira states, \"Tomorrow it's gonna be worse, I just want her to know that\".    Susie FOY, RN    "

## 2025-05-01 RX ORDER — CELECOXIB 100 MG/1
100 CAPSULE ORAL 2 TIMES DAILY
Qty: 60 CAPSULE | Refills: 5 | Status: SHIPPED | OUTPATIENT
Start: 2025-05-01

## 2025-05-01 RX ORDER — OXYCODONE HYDROCHLORIDE 5 MG/1
5 TABLET ORAL EVERY 6 HOURS PRN
Qty: 45 TABLET | Refills: 0 | Status: SHIPPED | OUTPATIENT
Start: 2025-05-01 | End: 2025-05-07

## 2025-05-01 NOTE — TELEPHONE ENCOUNTER
"Noted. RN called patient. Patient does feel like she gets some relief from the tramadol and celebrex. She is taking them at the same time though. She does not feel like the lyrica is helping much. RN told her to try staggering all of her pain medications to see if that gives her some coverage throughout the day. She did try taking one 50mg tramadol with a celebrex and another 50mg later but it did not help her pain when taking separately.  She stated \"there is no way I can do this again next month\". Patient stated her lack of pain control is causing mobility issues. She is having to rush to complete tasks so she is not standing so long. She stated she has tripped over her feet a couple of times and almost fell. She this has happened before and was told by PT that it was foot drop. It has been a long time since it has actually happened to her.   RN recommended using her walker while walking in the home for safety. Patient agrees.   Patient hurt her ribs yesterday while trying to press down on something. She has been using heat to the area. RN recommended ice and then switching to heat after 48 hours from injury.   She also stated that she slapped herself in the eye with a rag. She has some pain, watering, and blurry vision. She said it has slightly improved today. She doesn't want to go to the hospital for any of her concerns tonight.   Tomorrow when she gets her pain medication and if her eye is not improving she will seek care. RN explained that she may have scratched her eye and if it does not improve, she should be seen. Patient understands and agrees.    She brings up the pain management for next month. She said she needs someone to tell her what to do and to be owen with her \"if I need to see hollis, someone needs to say that, not 'Maybe' or 'possibly\" and \"when she says other therapies, I need to know what exactly I should try next\". RN told her this needs to be a discussion between her and Dr. Carbajal. She " needs to verbalize this to her provider and explain her feelings so an appropriate plan can be made. Patient is schedule to have a phone visit next week.     Routing to provider as AUGUSTUS Dawkins RN on 5/1/2025 at 6:01 PM

## 2025-05-01 NOTE — TELEPHONE ENCOUNTER
Dr. Carbajal, please see the questions from patient. Also the script for tomorrow was e-cancelled.  Suha Dawkins RN on 5/1/2025 at 9:37 AM

## 2025-05-01 NOTE — TELEPHONE ENCOUNTER
Celebrex may take 1 2 times per day   I will send in another prescption for tomorrow   Lyrica 100mg 3 times per day  I sent in a new prescription for Celebrex 1 capsule twice a day.  She can stagger these to try and get better coverage I do not know what to tell her about the pain was to get her off most of the narcotics that she is on having some just for an emergency.  This would require her most likely to continue with the pain clinic and also being open to other therapies.. Alana Carbajal M.D.

## 2025-05-03 DIAGNOSIS — Z87.81 STATUS POST CLOSED FRACTURE OF HIP: ICD-10-CM

## 2025-05-05 RX ORDER — TRAMADOL HYDROCHLORIDE 50 MG/1
100 TABLET ORAL 2 TIMES DAILY PRN
Qty: 135 TABLET | Refills: 0 | Status: SHIPPED | OUTPATIENT
Start: 2025-05-09

## 2025-05-07 ENCOUNTER — VIRTUAL VISIT (OUTPATIENT)
Dept: FAMILY MEDICINE | Facility: CLINIC | Age: 60
End: 2025-05-07
Payer: COMMERCIAL

## 2025-05-07 DIAGNOSIS — G89.4 CHRONIC PAIN SYNDROME: ICD-10-CM

## 2025-05-07 DIAGNOSIS — R20.2 PARESTHESIAS: ICD-10-CM

## 2025-05-07 DIAGNOSIS — L98.8 WRINKLES: ICD-10-CM

## 2025-05-07 DIAGNOSIS — Z12.31 VISIT FOR SCREENING MAMMOGRAM: Primary | ICD-10-CM

## 2025-05-07 DIAGNOSIS — G90.50 RSD (REFLEX SYMPATHETIC DYSTROPHY): ICD-10-CM

## 2025-05-07 PROCEDURE — 98014 SYNCH AUDIO-ONLY EST MOD 30: CPT | Performed by: FAMILY MEDICINE

## 2025-05-07 RX ORDER — OXYCODONE HYDROCHLORIDE 5 MG/1
5 TABLET ORAL DAILY PRN
Qty: 45 TABLET | Refills: 0 | Status: SHIPPED | OUTPATIENT
Start: 2025-05-30

## 2025-05-07 NOTE — PROGRESS NOTES
Amira is a 60 year old who is being evaluated via a billable telephone visit.    What phone number would you like to be contacted at? [phone]  How would you like to obtain your AVS? Mail a copy  Originating Location (pt. Location): Home    Distant Location (provider location):  On-site  Telephone visit completed due to the patient did not have access to video, while the distant provider did.    Assessment & Plan     Visit for screening mammogram    - MA Screening Bilateral w/ Jose; Future    Paresthesias  Will continue with the current level of oxycodone  - oxyCODONE (ROXICODONE) 5 MG tablet; Take 1 tablet (5 mg) by mouth daily as needed for pain or severe pain. May take second tablet up to 15 days per month    Chronic pain syndrome  I think she needs to return to pain management I am not going to increase the amount of narcotics she gets.  - oxyCODONE (ROXICODONE) 5 MG tablet; Take 1 tablet (5 mg) by mouth daily as needed for pain or severe pain. 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  I did bring up the thought that she is able to go to the dermatologist but apparently cannot get to pain management.  She became very upset about this and started telling me that she would do what ever she had to do to feel better.  - Adult Dermatology  Referral; Future    Amira has very little insight into her current condition she perseverates about her pain.  She also tries to have others do the things that she needs to do herself.  She claims she is unable to do many of the things requested of her and the excuse  usually is she has no one to help her she is in too much pain or she does not know how to do things.  Recheck after pain clinic visit        Follow-up       Subjective   Amira is a 60 year old, presenting for the following health issues:  Recheck Medication        5/7/2025     4:51 PM   Additional Questions   Roomed by CURTIS Keating      Amira is calling again because she felt like we were unable to finish our last visit.  She continues to insist that she needs more medication she wants to take the Lyrica more often and more than she should she also wants more oxycodone and does not understand why she cannot have enough for every single day she has tried a few things but maybe only for 1 or 2 doses.  I did tell her that I want to refer her back to pain management I am no longer comfortable prescribing her these medications, especially since she does not come into the office very often.  She continued to lament her lack of friends or others to help her also stating that she is not able to get places even though her health insurance includes transportation.  She does not want to take that transportation so it makes it hard for her to get anywhere.  As mentioned above her behavior is alienating to most people.  She does not understand this at all and feels that she is being perfectly reasonable.  She is the only one who thinks she is being reasonable.            Objective           Vitals:  No vitals were obtained today due to virtual visit.    Physical Exam   General: Alert and no distress //Respiratory: No audible wheeze, cough, or shortness of breath // Psychiatric:  Appropriate affect, tone, and pace of words            Phone call duration: 40 minutes spent on the day of service with chart review, telephone call, counseling, and charting. Alana Carbajal M.D.  40 minutes  Signed Electronically by: Alana Carbajal MD

## 2025-05-08 ENCOUNTER — TELEPHONE (OUTPATIENT)
Dept: ANESTHESIOLOGY | Facility: CLINIC | Age: 60
End: 2025-05-08
Payer: COMMERCIAL

## 2025-05-08 ENCOUNTER — TELEPHONE (OUTPATIENT)
Dept: FAMILY MEDICINE | Facility: CLINIC | Age: 60
End: 2025-05-08

## 2025-05-08 NOTE — TELEPHONE ENCOUNTER
Provider unable to see patient virtually today  Patient can be re-scheduled for Monday, but unable to accommodate appointment today  Please notify patient     Sharif Manrique, Clinic RN  [facility]

## 2025-05-08 NOTE — TELEPHONE ENCOUNTER
New pain management referral received from PCP. RN reviewed chart and noted patient has established care with Gita Allen, KELSEY, APRN, FNP-C , last seen on 10/3/24. Writer will reject referral and route to team.    Shantell Mobley RNCC

## 2025-05-11 DIAGNOSIS — K59.01 SLOW TRANSIT CONSTIPATION: ICD-10-CM

## 2025-05-12 ENCOUNTER — VIRTUAL VISIT (OUTPATIENT)
Dept: FAMILY MEDICINE | Facility: CLINIC | Age: 60
End: 2025-05-12
Payer: COMMERCIAL

## 2025-05-12 DIAGNOSIS — F60.9 PERSONALITY DISORDER IN ADULT (H): Chronic | ICD-10-CM

## 2025-05-12 DIAGNOSIS — G90.50 RSD (REFLEX SYMPATHETIC DYSTROPHY): ICD-10-CM

## 2025-05-12 DIAGNOSIS — R20.2 PARESTHESIAS: ICD-10-CM

## 2025-05-12 DIAGNOSIS — F41.9 ANXIETY: ICD-10-CM

## 2025-05-12 DIAGNOSIS — F11.90 CHRONIC, CONTINUOUS USE OF OPIOIDS: Primary | ICD-10-CM

## 2025-05-12 DIAGNOSIS — Z87.81 STATUS POST CLOSED FRACTURE OF RIGHT FEMUR: ICD-10-CM

## 2025-05-12 PROCEDURE — 98014 SYNCH AUDIO-ONLY EST MOD 30: CPT | Performed by: FAMILY MEDICINE

## 2025-05-12 RX ORDER — LACTULOSE 10 G/15ML
SOLUTION ORAL
Qty: 4000 ML | Refills: 0 | Status: SHIPPED | OUTPATIENT
Start: 2025-05-12

## 2025-05-12 NOTE — PROGRESS NOTES
[facility]         Date of Visit: May 13, 2025     CHIEF COMPLAINT:   Chief Complaint   Patient presents with    Pain       Subjective   SUBJECTIVE    INTERVAL HISTORY:   Amira Scherer is a 60 year old female seen for INITIAL EVALUATION on 9/12/24; last seen for FOLLOW UP on 10/03/24.  They are returning today for chronic low back pain w.o sciatica.         Recommendations/ Plan at the last visit included:  Visit discussion: Amira Scherer is seen in follow up today at the pain clinic for low back pain and bilateral lower extremity pain.  The majority of this encounter was spent reviewing results of most recent lumbar MRI and importance of conservative measures such as physical therapy for low back pain.  Overall patient does have mild facet arthropathy through multiple levels of her low back.  There is a circumferential disc bulge at L3-L4 with a Schmorl node in the L4 vertebrae.  Patient's leg pain is most significant in the L5-S1 distribution.  She does have a circumferential disc bulge at L5-S1.  We discussed potential benefit of an L5-S1 LESI to address lower extremity pain.  She is very anxious about the thought of an injection and offers several reasons that she would be unable to arrive in an appointment due to transportation and lack of someone to help her.  I did really iterate the importance of physical therapy and recommend she start this before winter.  She is resistant and hesitant to the idea of initiating physical therapy but has made contact with samira Martinez.  Patient will follow-up with her primary care provider in a few weeks.  I encouraged her to discuss potential benefits of lumbar epidural steroid injection with her provider for additional advice.  Patient was advised to continue seeking medication refills through her primary care provider.  At this time, I have no plan to take over opioid prescribing as she is on stable doses with her primary care provider.  " Patient can follow-up in 6 to 8 weeks after initiating trial of physical therapy.  If she does decide to trial LESI, she was advised to contact the clinic and I will place an order as indicated below.  If she does trial LESI and has no improvement in bilateral leg pain, then I would consider EMG testing to determine if she does have neuropathy as the driving source of her pain.      PLAN:    Continue Current Treatment Plan with:   - Existing medications, as prescribed by Dr. Carbajal    New Referrals:   Please follow through with Physical Therapy that I placed last week.    Return to Clinic:   -  30-minute In-Person Appointment with Gita Allen, KELSEY, APRN, ANTON in 2-3 months, or sooner if needed    Future Considerations:   We may consider a L5-S1 epidural steroid injection after a short course of physical therapy.        Interval history from last visit on 10/03/24:   Pain score today: Severe Pain (6)   Pain rating: intensity ranges from 3/10 to 8/10 on a 0-10 scale.   Continues with \"excruciating leg pain\" is most significant pain.  Low back pain will develop through the course of the day; better with use of Tramadol.   Has not started physical therapy yet.           Since the last visit, Amira BINA Scherer reports:   Pain score today: Severe Pain (7)   Reports \"my back doesn't hurt as much as my legs.\"  Has varicose veins and slight edema bilaterally; feels \"my veins are excruciating\".  Swelling in lower extremities feels better when laying down.   Very anxious about pain levels today.  States \"I have done everything that you've told me to do.\"   Has difficulty describing location of radicular low back pain.   States the Pain Mgmt clinic requirement of appointment frequency every 3 months \"Is ridiculous\".   States Dr. Carbajal told her she needed to schedule this appointment; demands to know why she needs to be here today.       REVIEW OF SYSTEMS:   ROS: 10 point ROS neg other than the symptoms noted above in " the HPI.     The patient otherwise denies red flag symptoms, such as: thunderclap headache, bowel or bladder incontinence, parasthesias, weakness, saddle anesthesia, unintentional weight loss, or fever/chills/sweats.     Medical History: Any changes in medical history since they were last seen? No    Medications and Allergies reviewed. Yes     Review of [place] Prescription Monitoring Program ():  reviewed on 5/13/25. No concern for abuse or misuse of controlled medications based on this report. Controlled substances prescribed in the past 6-12 months include: (Tramadol 50mg, Oxycodone 5mg, Pregabalin 100mg, Pregabalin 50mg)     Current MME: 47.5-65 / day    Current PDMP reportable controlled substance medications, if any, are being prescribed by: PCP   Primary Care Provider is Alana Carbajal         Objective    OBJECTIVE:    Physical Exam  Vitals:    05/13/25 1417   BP: (!) 147/91   Patient Position: Standing   Pulse: 88        Appearance:   A&O. Patient is not appropriate.      HHENT:  Normocephalic, atraumatic. Eyes without conjunctival injection or jaundice. Neck supple. No obvious neck masses.     Pulmonary:  Normal effort; no cough or audible wheeze      Skin: Varicose veins from of lower extremities in ankle/foot area bilaterally; slight edema at sock line bilaterally, No obvious rash, lesions, or petechiae of exposed skin.    Neuro: Cranial nerves grossly intact.  Mentation and speech appropriate for age.     Psych:  Alert, without lethargy or stupor. Rapid-fire speech, argumentative and interruptive throughout encounter. Able to follow commands with difficulty, requires frequent redirection. Very anxious, pacing around exam room         Gait pattern:   Patient has an antalgic gait pattern:YES  Gait favors the neither side.  Mobility and/or assistive devices? NO            Diagnostic Tests/ Imaging/ Labs resulted since last visit:     MRI Lumbar Spine w.o contrast 9/26/24  FINDINGS:  There are  five lumbar-type vertebrae assumed for the  purposes of this dictation.       The tip of the conus medullaris is at L1.  Left convex curvature with  the apex at L3.  Schmorl's node with associated height loss along L4  upper endplate. Normal marrow signal.     On a level by level basis:     T12-L1: Mild bilateral facet arthropathy. No spinal canal or  neuroforaminal stenosis.     L1-L2: Mild bilateral facet arthropathy. No spinal canal or  neuroforaminal stenosis.     L2-L3: Mild bilateral facet arthropathy. No spinal canal or  neuroforaminal stenosis.     L3-L4: Circumferential disc bulge. Mild bilateral facet arthropathy.  Minimal bilateral neural foraminal stenosis. Mild spinal canal  stenosis.      L4-L5: Bilateral facet arthropathy. No spinal canal or neural  foraminal stenosis.     L5-S1: Circumferential disc bulge. Bilateral facet arthropathy. No  spinal canal or neural foraminal stenosis.     Paraspinous tissues are within normal limits.                                                                      IMPRESSION: Mild lumbar spondylosis without high-grade spinal canal or  neural foraminal stenosis. No significant change compared to  8/17/2022.        Assessment & Plan      VISIT DIAGNOSIS:   1. Lumbar radicular pain (Primary)  - Adult Pain Clinic Follow-Up Order; Future    2. Chronic midline low back pain without sciatica  - Adult Pain Clinic Follow-Up Order; Future    3. Swelling of lower extremity  - Adult Pain Clinic Follow-Up Order; Future    4. Anxiety      ASSESSMENT:   Visit discussion: Amira Scherer is seen in follow up today at the pain clinic for lumbar radiculopathy and chronic lower extremity pain.  Patient is very difficult to communicate with throughout the encounter.  She is frequently pacing through the exam room.  I finally needed to ask her to sit down because she was standing over my shoulder while I was trying to document at the computer.  Patient is very interruptive throughout  the encounter; frequently requiring redirection in conversation.  It is very difficult to ask questions, as patient will often speak over this writer during the encounter.  Patient was redirected in conversation several times and continued to interrupt and speak over this writer.  I finally advised the patient that this was not an effective encounter as she was unable to sit and have reasonable conversation.  We discussed potential benefit of working with a pain psychologist to help with both anxiety and her perception of pain.  She states that Dr. Carbajal told her that her anxiety was causing her to find more pain in her body.  She stated that she thought that was ridiculous.  I actually did validate this concern as part of the mind-body connection.  The more she focuses on finding pain in her body, the more pain she will find.      I do believe there is a significant psychiatric component to patient's ongoing pain.  However, in light of lumbar MRI results from 9/26/2024, she does have a's circumferential disc bulge at L5-S1.  This may be contributing to the pain she is feeling in her lower extremities.  I did attempt to discuss potential benefit of an L5-S1 LESI.  Patient again interrupted throughout this discussion demanding to know why she had not been told that she could have the injection in [place].  Patient was advised that she has not been into see the pain management program since October 2024.  At the time we had previously discussed LESI, there was no provider performing interventional pain injections in the [place] location.  At this time, we do have a provider available.  Patient escalates into another cycle of anxiety with this information.      Patient is demanding to know what medication options she has available.  Unfortunately, time for addressing this was exhausted and I told the patient she would need to return for a 60-minute appointment in the future to complete this exam.  Patient stated that  I was 15 minutes into her appointment time.  I reminded the patient that the appointment time is scheduled for 30 minutes and at this point in our discussion, I was now 35 minutes into our allotted point appointment time.  Patient was advised that this does set me back 20 minutes into the next patient's appointment time.  I am unable to communicate with this patient in a reasonable 30-minute timeframe.  Patient was told that she would need to come in for 60-minute appointments if we were going to move forward in any direction under pain management.  Patient attempted to negotiate the amount of time required for future appointments asking if she could have a 30-minute appointment if she could keep her mouth shut.  I will not see this patient for anything less than 30 minutes per appointment time.      Patient was also advised that she will be expected to engage in reasonable conversation.  If she continues with argumentative and interruptive pattern in communication, then appointments will be ended and she will be asked to leave the clinic.  At this time, all medications are managed by her primary care provider.  I have no intention of transferring controlled substance prescribing to the pain management program.  She is at doses that are reasonable to be managed under her primary care provider.  Patient was advised that she is currently under a controlled substance agreement with her primary care provider.  If patient would like to return to address interventional pain options and/or other medication alternatives then she can schedule a 60-minute appointment to return.  Patient was also advised that if she does continue with the pain management program, she will be expected to follow-up every 3 months irrespective of her opinion on the validity of this requirement.  I did offer patient a referral to vascular medicine to address lower extremity swelling and varicose veins.  She states that Dr. Carbajal had previously  mentioned that.  Appointment time was ended and patient was advised to reschedule to complete this visit.       PLAN:    Continue Current Treatment Plan with:   - Existing medications, as prescribed by Primary Care       Return to Clinic:   -  60-minute In-Person Appointment with Gita Allen DNP, APRN, FNP-C in 2-4 weeks, or sooner if needed      Future Considerations:   Discussed possible referral to Vascular Medicine to address lower extremity swelling and varicose veins.   May consider L5-S1 LESI     ___________________________________________________________________    BILLING TIME DOCUMENTATION:   TOTAL TIME includes:   Time spent preparing to see the patient: 5 minutes (reviewing records and tests)  Time spend face to face with the patient: 35 minutes  Time spent ordering tests, medications, procedures and referrals: 0 minutes  Time spent Referring and communicating with other healthcare professionals: 0 minutes  Documenting clinical information in Epic: 0 minutes    The total TIME spent on this patient on the day of the appointment was 40 minutes.     ___________________________________________________________________    Review of Electronic Chart: Today I have also reviewed available medical information in the patient's medical record at [facility] ([facility]) and Care Everywhere (if available), including relevant provider notes, laboratory work, and imaging.     VONDA Egan, KELSEY, ANTON   [facility]

## 2025-05-12 NOTE — PROGRESS NOTES
Amira is a 60 year old who is being evaluated via a billable telephone visit.    What phone number would you like to be contacted at? [phone]  How would you like to obtain your AVS? Mail a copy  Originating Location (pt. Location): Home    Distant Location (provider location):  Off-site  Telephone visit completed due to the patient did not have access to video, while the distant provider did.    Assessment & Plan     Chronic, continuous use of opioids - F11.9  Continue for now    Paresthesias  Increase Lyrica    RSD (reflex sympathetic dystrophy)  ?    Anxiety  untreated    Status post closed fracture of right femur      Personality disorder in adult, unspecified  I believe she is histrionic along with narcissistic personality disorder and possibly borderline            Follow-up       Subjective   Amira is a 60 year old, presenting for the following health issues:  Recheck Medication      HPI      She is feeling overwhelmed she was able to talk   Worried and she has an appointment with [facility] tomorrow and she has many questions about her ibu and pain management and mike is now preseverating about the visits I the future  She is also obssessed with her pain and all of the lack of dx and also why she has pain all of the time which noone has been able to tell her definiteivly . Sounds like rsd with the leg pain ? After hip injury   She has pain in the right shoulder pain after they tried tens she cont to have pain               Objective           Vitals:  No vitals were obtained today due to virtual visit.    Physical Exam   General: Alert and no distress //Respiratory: No audible wheeze, cough, or shortness of breath // Psychiatric:  Appropriate affect, tone, and pace of words            Phone call duration: 25 minutes spent on the day of service with chart review, telephone call, counseling, and charting. Alana Carbajal M.D.  25 minutes  Signed Electronically by: Alana Carbajal MD

## 2025-05-13 ENCOUNTER — OFFICE VISIT (OUTPATIENT)
Dept: PALLIATIVE MEDICINE | Facility: CLINIC | Age: 60
End: 2025-05-13
Payer: COMMERCIAL

## 2025-05-13 VITALS — SYSTOLIC BLOOD PRESSURE: 147 MMHG | DIASTOLIC BLOOD PRESSURE: 91 MMHG | HEART RATE: 88 BPM

## 2025-05-13 DIAGNOSIS — M54.50 CHRONIC MIDLINE LOW BACK PAIN WITHOUT SCIATICA: ICD-10-CM

## 2025-05-13 DIAGNOSIS — M79.89 SWELLING OF LOWER EXTREMITY: ICD-10-CM

## 2025-05-13 DIAGNOSIS — G89.29 CHRONIC MIDLINE LOW BACK PAIN WITHOUT SCIATICA: ICD-10-CM

## 2025-05-13 DIAGNOSIS — M54.16 LUMBAR RADICULAR PAIN: Primary | ICD-10-CM

## 2025-05-13 DIAGNOSIS — F41.9 ANXIETY: ICD-10-CM

## 2025-05-13 PROCEDURE — 99215 OFFICE O/P EST HI 40 MIN: CPT | Performed by: NURSE PRACTITIONER

## 2025-05-13 PROCEDURE — 3080F DIAST BP >= 90 MM HG: CPT | Performed by: NURSE PRACTITIONER

## 2025-05-13 PROCEDURE — G2211 COMPLEX E/M VISIT ADD ON: HCPCS | Performed by: NURSE PRACTITIONER

## 2025-05-13 PROCEDURE — 3077F SYST BP >= 140 MM HG: CPT | Performed by: NURSE PRACTITIONER

## 2025-05-13 PROCEDURE — 1125F AMNT PAIN NOTED PAIN PRSNT: CPT | Performed by: NURSE PRACTITIONER

## 2025-05-13 ASSESSMENT — PAIN SCALES - GENERAL: PAINLEVEL_OUTOF10: SEVERE PAIN (7)

## 2025-05-13 NOTE — PATIENT INSTRUCTIONS
----------------------------------------------------------------  [facility] Number:  [phone]   Call with any questions about your care and for scheduling assistance.   Calls are returned Monday through Friday between 8 AM and 4:30 PM. We usually get back to you within 2 business days depending on the issue/request.    If we are prescribing your medications:  For opioid medication refills, call the clinic or send a [employer] message 7 days in advance.  Please include:  Name of requested medication  Name of the pharmacy.  For non-opioid medications, call your pharmacy directly to request a refill. Please allow 3-4 days to be processed.   Per [place] State Law:  All controlled substance prescriptions must be filled within 30 days of being written.    For those controlled substances allowing refills, pickup must occur within 30 days of last fill.      We believe regular attendance is key to your success in our program!    Any time you are unable to keep your appointment we ask that you call us at least 24 hours in advance to cancel.This will allow us to offer the appointment time to another patient.   Multiple missed appointments may lead to dismissal from the clinic.

## 2025-05-15 ENCOUNTER — TELEPHONE (OUTPATIENT)
Dept: DERMATOLOGY | Facility: CLINIC | Age: 60
End: 2025-05-15
Payer: COMMERCIAL

## 2025-05-15 DIAGNOSIS — L81.6 POIKILODERMA: Primary | ICD-10-CM

## 2025-05-15 NOTE — TELEPHONE ENCOUNTER
Patient calling back to speak with Suha - please call back [phone] - Friday call after 12 noon

## 2025-05-15 NOTE — TELEPHONE ENCOUNTER
[facility] Call Center    Phone Message    May a detailed message be left on voicemail: no     Reason for Call: Other: Pt would like to speak to Suha about using retinol first and canceling upcoming appointment and see if their is any improvements before actually proceeding with the CO2 laser. Please call [phone] to advise. Thank you.      Action Taken: Other: [place] [facility]    Travel Screening: Not Applicable     Date of Service:

## 2025-05-16 NOTE — TELEPHONE ENCOUNTER
Pt called back asking to speak to Suha. Please call back to advise. Pt is also asking clinic to call her on Monday 05/19/25 about the prescription strength Retinol so she knows if she can cancel her appointment on Tuesday 05/20/25. Thank you.

## 2025-05-16 NOTE — TELEPHONE ENCOUNTER
Spoke to patient and she stated she has an appt with Dr. Adonay Vale for laser and she is wondering if she should cancel her appt for the laser and try retinol prior to doing laser and she if she is happy with just the retinol. She would like prescription strength Retinol. Advised her that these questions would need to be discussed with provider. Please advise.  Jovita QUEVEDO RN BSN PHN  Specialty Clinics

## 2025-05-18 ENCOUNTER — TELEPHONE (OUTPATIENT)
Dept: FAMILY MEDICINE | Facility: CLINIC | Age: 60
End: 2025-05-18
Payer: COMMERCIAL

## 2025-05-18 NOTE — TELEPHONE ENCOUNTER
Medication Question or Refill    Contacts       Contact Date/Time Type Contact Phone/Fax    05/18/2025 03:37 AM CDT Phone (Incoming) Amira Scherer (Self) [phone] (H)            What medication are you calling about (include dose and sig)?: pregabalin (LYRICA) 100 MG capsule  Sig - Route: Take 1 capsule (100 mg) by mouth 3 times daily. - Oral    Preferred Pharmacy:    [facility]. - [address] [address]  Phone: [phone] Fax: [phone]      Controlled Substance Agreement on file:   CSA -- Patient Level:    CSA: None found at the patient level.       Who prescribed the medication?: Dr Carbajal    Do you need a refill? Yes    When did you use the medication last? Today    Patient offered an appointment? Yes: Patient delcined    Do you have any questions or concerns?  Yes: Patient's refill is due on Memorial day and she needs to know if it is ok to have it refilled sooner due to the holiday.       Okay to leave a detailed message?: Yes at Home number on file [phone] (home)

## 2025-05-19 RX ORDER — TRETINOIN 1 MG/G
CREAM TOPICAL AT BEDTIME
Qty: 45 G | Refills: 6 | Status: SHIPPED | OUTPATIENT
Start: 2025-05-19

## 2025-05-19 NOTE — TELEPHONE ENCOUNTER
Next fill is due 5/28/25.  Pt is asking for early fill due to holiday weekend?  Pt would like to have it filled this Friday, 5/23.    Pt says no need to call her back. She will wait to hear from pharmacy.      Let pharmacy, [facility], know if she can fill it early?    Maria G Brito RN

## 2025-05-19 NOTE — TELEPHONE ENCOUNTER
Pt called back and wanted me to tell Dr Carbajal that she will have 1 pill left on Wednesday the 28th and she takes 3 a day

## 2025-05-19 NOTE — TELEPHONE ENCOUNTER
She can get it filled that day. They deliver her medications to her. If needed , she could pick it up on the 27th, a;lso not a holiday. Alana Carbajal M.D.

## 2025-05-19 NOTE — TELEPHONE ENCOUNTER
Patient would like to try the retinoid for 3 months before seeing if she wants to do the laser. If she proceeds with the laser she is thinking the CO2.    Please send retinoid rx.    Patient will call in a month with an update.     Aurora Humphrey MA

## 2025-05-19 NOTE — TELEPHONE ENCOUNTER
The 28th is a Wednesday. [facility] so [facility] doesn't need to[facility] this early. Alana Carbajal M.D.

## 2025-05-20 ENCOUNTER — TELEPHONE (OUTPATIENT)
Dept: DERMATOLOGY | Facility: CLINIC | Age: 60
End: 2025-05-20

## 2025-05-20 NOTE — TELEPHONE ENCOUNTER
[facility] Call Center    Phone Message    May a detailed message be left on voicemail: yes     Reason for Call: Medication Question or concern regarding medication   Prescription Clarification  Name of Medication: tretinoin (RETIN-A) 0.1 % external cream   Prescribing Provider: Dr. Urias   Pharmacy:   [facility] [place], [place] - [address] [place] [address]      What on the order needs clarification? Pt would like to discuss the side effects of this Rx and if she can use other cleansers while using it. Olay exfoliating with retinol and another one with retinol.     Pt would also like to discuss the comparison between this and retinol and adapalene.     Please call back to discuss.     Thank you.       Action Taken: Other: [place] [facility]    Travel Screening: Not Applicable     Date of Service:

## 2025-05-20 NOTE — TELEPHONE ENCOUNTER
Patient called back. There was confusion with this message. She said she received different information from different people. Patient said the FILLABLE date is May 26th. She did not know she could pick it up a couple of days before she runs out. So does not need to get it early. She can get it on May 27th. She understands.     She does not think celebrex is working. She stopped it. She told RN about her visit with Gita Allen. She said she needs to talk to Dr. Carbajal about the LESI, they now do it at [place] and she really wants to talk about it next time. Poly gave her the brochure for it and told her to talk to Dr. Carbajal about it. Wanted me to make sure Dr. Carbajal knows they need to talk about it.     Suha Dawkins, ROXANNE on 5/20/2025 at 2:17 PM

## 2025-05-21 ENCOUNTER — TELEPHONE (OUTPATIENT)
Dept: FAMILY MEDICINE | Facility: CLINIC | Age: 60
End: 2025-05-21

## 2025-05-21 NOTE — TELEPHONE ENCOUNTER
Patient Contact    Attempt # 1    Was call answered?  No, left message to return call      [facility]   [phone]

## 2025-05-21 NOTE — TELEPHONE ENCOUNTER
Spoke with Dr. Urias regarding patients questions below. Called patient and informed patient of Dr. Urias's response.    Patient can skip a day or 2 or many of the retinoid if very irritating   Only use the Tretinoin,  no other retinoids.  Use gentle cleansers  Use a moisturizer with SPF  Patient is currently on the highest strength of Tretinoin but there is another brand of retinoid that is stronger but that is very harsh on the skin so typically Dr. Urias doesn't prescribe this type first.    Writer asked patient to write the responses down to help remember since it was a lot of information during the phone call.    Aurora Humphrey MA

## 2025-05-21 NOTE — TELEPHONE ENCOUNTER
Pt called and has a couple of questions of the provider:    Can she any cleanser?  Can she take a day off the retinol if starts to bother her?    Please call [phone] after 12 PM to advise. Thank you.

## 2025-06-04 ENCOUNTER — VIRTUAL VISIT (OUTPATIENT)
Dept: FAMILY MEDICINE | Facility: CLINIC | Age: 60
End: 2025-06-04
Payer: COMMERCIAL

## 2025-06-04 DIAGNOSIS — G90.50 RSD (REFLEX SYMPATHETIC DYSTROPHY): Primary | ICD-10-CM

## 2025-06-04 DIAGNOSIS — Z87.81 STATUS POST CLOSED FRACTURE OF HIP: ICD-10-CM

## 2025-06-04 PROCEDURE — 98013 SYNCH AUDIO-ONLY EST LOW 20: CPT | Performed by: FAMILY MEDICINE

## 2025-06-04 RX ORDER — TRAMADOL HYDROCHLORIDE 50 MG/1
100 TABLET ORAL 2 TIMES DAILY PRN
Qty: 135 TABLET | Refills: 2 | Status: SHIPPED | OUTPATIENT
Start: 2025-06-04

## 2025-06-04 RX ORDER — PREGABALIN 150 MG/1
150 CAPSULE ORAL 3 TIMES DAILY
Qty: 90 CAPSULE | Refills: 3 | Status: SHIPPED | OUTPATIENT
Start: 2025-06-04

## 2025-06-04 ASSESSMENT — ANXIETY QUESTIONNAIRES
1. FEELING NERVOUS, ANXIOUS, OR ON EDGE: NOT AT ALL
2. NOT BEING ABLE TO STOP OR CONTROL WORRYING: SEVERAL DAYS
6. BECOMING EASILY ANNOYED OR IRRITABLE: NOT AT ALL
GAD7 TOTAL SCORE: 3
7. FEELING AFRAID AS IF SOMETHING AWFUL MIGHT HAPPEN: SEVERAL DAYS
4. TROUBLE RELAXING: NOT AT ALL
5. BEING SO RESTLESS THAT IT IS HARD TO SIT STILL: NOT AT ALL
GAD7 TOTAL SCORE: 3
3. WORRYING TOO MUCH ABOUT DIFFERENT THINGS: SEVERAL DAYS

## 2025-06-04 ASSESSMENT — PATIENT HEALTH QUESTIONNAIRE - PHQ9: SUM OF ALL RESPONSES TO PHQ QUESTIONS 1-9: 0

## 2025-06-04 NOTE — PROGRESS NOTES
Amira is a 60 year old who is being evaluated via a billable telephone visit.    What phone number would you like to be contacted at? [phone]   How would you like to obtain your AVS? Mail a copy  Originating Location (pt. Location): Home    Distant Location (provider location):  On-site  Telephone visit completed due to the patient did not have access to video, while the distant provider did.    Assessment & Plan     RSD (reflex sympathetic dystrophy)    - pregabalin (LYRICA) 150 MG capsule; Take 1 capsule (150 mg) by mouth 3 times daily.    Status post closed fracture of hip    - traMADol (ULTRAM) 50 MG tablet; Take 2 tablets (100 mg) by mouth 2 times daily as needed for severe pain or pain. You may take 1 extra tablet on therapy days this is a 30day supply  I am unsure if she has RSD but it would be 1 answer for her pain that is way out of proportion to anything.  This conversation was a little easier we did discuss  Leeanns visit.  Amira said she was unaware that she was being so hard to deal with.  This is one of the biggest issues in trying to help her is that she is so focused on herself and this pain that she cannot get past it.  She is alone and this is very sad but she does not understand this.  It appears she has some when she pays to drop off her groceries.  She did not call them her friend she said she does not have anyone to drive her she wants help setting up rides and I told her this was her responsibility.  She needs to own more of this herself.  Follow-up after next visit with pain management or as needed.             Follow-up       Subjective   Amira is a 60 year old, presenting for the following health issues:  Recheck Medication        6/4/2025     5:15 PM   Additional Questions   Roomed by Maddie ARIAS CMA     HPI      Stopped the celecrex - not helping.   Stopped taking vitamin b12   Seen pain clinic 5/13/25.  Maddie Long CMA            Review of Systems   pretty much positive       Objective           Vitals:  No vitals were obtained today due to virtual visit.    Physical Exam   General: Alert and no distress //Respiratory: No audible wheeze, cough, or shortness of breath // Psychiatric:  Appropriate affect, tone, and pace of words            Phone call duration: 18 minutes  Signed Electronically by: Alana Carbajal MD

## 2025-06-10 ENCOUNTER — TELEPHONE (OUTPATIENT)
Dept: FAMILY MEDICINE | Facility: CLINIC | Age: 60
End: 2025-06-10
Payer: COMMERCIAL

## 2025-06-10 DIAGNOSIS — G90.50 RSD (REFLEX SYMPATHETIC DYSTROPHY): ICD-10-CM

## 2025-06-10 DIAGNOSIS — F11.90 CHRONIC, CONTINUOUS USE OF OPIOIDS: ICD-10-CM

## 2025-06-10 RX ORDER — PREGABALIN 50 MG/1
50 CAPSULE ORAL 3 TIMES DAILY
Qty: 180 CAPSULE | Refills: 0 | Status: SHIPPED | OUTPATIENT
Start: 2025-06-10

## 2025-06-10 RX ORDER — PREGABALIN 100 MG/1
100 CAPSULE ORAL 3 TIMES DAILY
Qty: 90 CAPSULE | Refills: 0 | Status: SHIPPED | OUTPATIENT
Start: 2025-06-10

## 2025-06-10 NOTE — TELEPHONE ENCOUNTER
"Patient called stating \"nobody can ever understand how to take a phone message and mixes everything up so I don't know if I should even call you people\".     Writer reassured  her I would listen and follow up with PCP on her request.     Call anytime after 1 pm,this the best time that works for patient to answer as she is up quite a bit at night.     Patient is wanting to change her pregabalin medication \"her way\" as she feels the 150 mg strength tablet is causing increased constipation approximately 1 hour after taking it.     States she still had some pregabalin  100 mg strength and 50 mg strength tabs so she took a 100 mg strength tablet, then in a few hours took the 50 mg strength tablet to total 450 mg/day, states her stomach tolerated this so much better and is wanting to change back to the pregabalin 100 mg and 50 mg tabs.     Patient states she continues taking the miralax 17 gm 2-3 x/d, lactulose 45 mls BID and colace 100 mg po BID this is in addition to prune juice that she also takes.     Preferred pharmacy is [facility].     RX pended, message routed as high priority per patient request to Dr. Alana Carbajal.     Julie Behrendt RN          "

## 2025-06-11 NOTE — TELEPHONE ENCOUNTER
Call placed to Patient.  Relayed Dr Carbajal's message to Patient.  Verbalized understanding.  Seb QUEVEDO, Clinic RN   [facility]

## 2025-06-12 ENCOUNTER — OFFICE VISIT (OUTPATIENT)
Dept: PALLIATIVE MEDICINE | Facility: CLINIC | Age: 60
End: 2025-06-12
Attending: NURSE PRACTITIONER
Payer: COMMERCIAL

## 2025-06-12 VITALS — SYSTOLIC BLOOD PRESSURE: 123 MMHG | DIASTOLIC BLOOD PRESSURE: 81 MMHG | HEART RATE: 65 BPM

## 2025-06-12 DIAGNOSIS — M54.16 LUMBAR RADICULAR PAIN: Primary | ICD-10-CM

## 2025-06-12 DIAGNOSIS — F41.9 ANXIETY: ICD-10-CM

## 2025-06-12 DIAGNOSIS — G89.29 CHRONIC MIDLINE LOW BACK PAIN WITHOUT SCIATICA: ICD-10-CM

## 2025-06-12 DIAGNOSIS — R60.0 PERIPHERAL EDEMA: ICD-10-CM

## 2025-06-12 DIAGNOSIS — M54.50 CHRONIC MIDLINE LOW BACK PAIN WITHOUT SCIATICA: ICD-10-CM

## 2025-06-12 DIAGNOSIS — I87.2 EDEMA OF LOWER EXTREMITY DUE TO PERIPHERAL VENOUS INSUFFICIENCY: ICD-10-CM

## 2025-06-12 DIAGNOSIS — M79.89 SWELLING OF LOWER EXTREMITY: ICD-10-CM

## 2025-06-12 ASSESSMENT — PAIN SCALES - GENERAL: PAINLEVEL_OUTOF10: SEVERE PAIN (9)

## 2025-06-12 NOTE — PROGRESS NOTES
[facility]         Date of Visit: Jun 12, 2025     CHIEF COMPLAINT:   Chief Complaint   Patient presents with    Pain       Subjective   SUBJECTIVE    INTERVAL HISTORY:   Amira Scherer is a 60 year old female seen for INITIAL EVALUATION on 9/12/24; last seen for FOLLOW UP on 5/13/25.  They are returning today for chronic low back pain w.o sciatica.         Recommendations/ Plan at the last visit included:  Visit discussion: Amira Scherer is seen in follow up today at the pain clinic for lumbar radiculopathy and chronic lower extremity pain.  Patient is very difficult to communicate with throughout the encounter.  She is frequently pacing through the exam room.  I finally needed to ask her to sit down because she was standing over my shoulder while I was trying to document at the computer.  Patient is very interruptive throughout the encounter; frequently requiring redirection in conversation.  It is very difficult to ask questions, as patient will often speak over this writer during the encounter.  Patient was redirected in conversation several times and continued to interrupt and speak over this writer.  I finally advised the patient that this was not an effective encounter as she was unable to sit and have reasonable conversation.  We discussed potential benefit of working with a pain psychologist to help with both anxiety and her perception of pain.  She states that Dr. Carbajal told her that her anxiety was causing her to find more pain in her body.  She stated that she thought that was ridiculous.  I actually did validate this concern as part of the mind-body connection.  The more she focuses on finding pain in her body, the more pain she will find.       I do believe there is a significant psychiatric component to patient's ongoing pain.  However, in light of lumbar MRI results from 9/26/2024, she does have a's circumferential disc bulge at L5-S1.  This may be contributing to  the pain she is feeling in her lower extremities.  I did attempt to discuss potential benefit of an L5-S1 LESI.  Patient again interrupted throughout this discussion demanding to know why she had not been told that she could have the injection in [place].  Patient was advised that she has not been into see the pain management program since October 2024.  At the time we had previously discussed LESI, there was no provider performing interventional pain injections in the [place] location.  At this time, we do have a provider available.  Patient escalates into another cycle of anxiety with this information.       Patient is demanding to know what medication options she has available.  Unfortunately, time for addressing this was exhausted and I told the patient she would need to return for a 60-minute appointment in the future to complete this exam.  Patient stated that I was 15 minutes into her appointment time.  I reminded the patient that the appointment time is scheduled for 30 minutes and at this point in our discussion, I was now 35 minutes into our allotted point appointment time.  Patient was advised that this does set me back 20 minutes into the next patient's appointment time.  I am unable to communicate with this patient in a reasonable 30-minute timeframe.  Patient was told that she would need to come in for 60-minute appointments if we were going to move forward in any direction under pain management.  Patient attempted to negotiate the amount of time required for future appointments asking if she could have a 30-minute appointment if she could keep her mouth shut.  I will not see this patient for anything less than 30 minutes per appointment time.       Patient was also advised that she will be expected to engage in reasonable conversation.  If she continues with argumentative and interruptive pattern in communication, then appointments will be ended and she will be asked to leave the clinic.  At this  "time, all medications are managed by her primary care provider.  I have no intention of transferring controlled substance prescribing to the pain management program.  She is at doses that are reasonable to be managed under her primary care provider.  Patient was advised that she is currently under a controlled substance agreement with her primary care provider.  If patient would like to return to address interventional pain options and/or other medication alternatives then she can schedule a 60-minute appointment to return.  Patient was also advised that if she does continue with the pain management program, she will be expected to follow-up every 3 months irrespective of her opinion on the validity of this requirement.  I did offer patient a referral to vascular medicine to address lower extremity swelling and varicose veins.  She states that Dr. Carbajal had previously mentioned that.  Appointment time was ended and patient was advised to reschedule to complete this visit.      PLAN:    Continue Current Treatment Plan with:   - Existing medications, as prescribed by Primary Care    Return to Clinic:   -  60-minute In-Person Appointment with Gita Allen, KELSEY, APRN, FNQUOC-C in 2-4 weeks, or sooner if needed    Future Considerations:   Discussed possible referral to Vascular Medicine to address lower extremity swelling and varicose veins.   May consider L5-S1 LESI       Interval history from last visit on 5/13/25:   Pain score today: Severe Pain (7)   Reports \"my back doesn't hurt as much as my legs.\"  Has varicose veins and slight edema bilaterally; feels \"my veins are excruciating\".  Swelling in lower extremities feels better when laying down.   Very anxious about pain levels today.  States \"I have done everything that you've told me to do.\"   Has difficulty describing location of radicular low back pain.   States the Pain Mgmt clinic requirement of appointment frequency every 3 months \"Is ridiculous\".   States " "Dr. Carbajal told her she needed to schedule this appointment; demands to know why she needs to be here today.           Since the last visit, Amira BINA Scherer reports:   Pain score today: Severe Pain (9)   Pain rating: intensity ranges from 3/10 to 10/10 on a 0-10 scale.   Continues with severe lower leg pain; \"I can feel my veins ripping apart in my body\".  Has swelling bilateral lower extremities; swelling above sock line.   Cannot get out of the house due to pain.   States ongoing transportation issues are a barrier.  Asks why she has to see doctors in the office and can't have ongoing telephone visits.  Reports past history of compression stocking use; does not wear anymore  \"Never sits down\"  Leg pain and swelling improve when laying down.   Fixates on past right leg injury as the cause of pain from varicose veins  Denies that varicose veins are source of leg pain.       REVIEW OF SYSTEMS:   ROS: 10 point ROS neg other than the symptoms noted above in the HPI.     The patient otherwise denies red flag symptoms, such as: thunderclap headache, bowel or bladder incontinence, parasthesias, weakness, saddle anesthesia, unintentional weight loss, or fever/chills/sweats.     Medical History: Any changes in medical history since they were last seen? No    Medications and Allergies reviewed. Yes     Review of [place] Prescription Monitoring Program ():  reviewed on 6/12/25. No concern for abuse or misuse of controlled medications based on this report. Controlled substances prescribed in the past 6-12 months include: (Pregabalin 50mg, )     Current MME: 7.5-15 / day    Current PDMP reportable controlled substance medications, if any, are being prescribed by: PCP   Primary Care Provider is Alana Carbajal          Objective    OBJECTIVE:    Physical Exam  Vitals:    06/12/25 1356   BP: 123/81   Pulse: 65        Appearance:   A&O. Patient is appropriate.   Patient is in NAD.      HHENT:  Normocephalic, atraumatic. " Eyes without conjunctival injection or jaundice. Neck supple. No obvious neck masses.     Pulmonary:  Normal effort; no cough or audible wheeze      Skin: No obvious rash, lesions, or petechiae of exposed skin.    Neuro: Cranial nerves grossly intact.  Mentation and speech appropriate for age.     Psych:  Alert, without lethargy or stupor. Speech fluent. Appropriate affect. Mood normal. Able to follow commands without difficulty. Normal mood, judgement and behavior.        Gait pattern:   Patient has an antalgic gait pattern:YES  Gait favors the neither side.  Mobility and/or assistive devices? NO        MSK:  Location: bilateral lower extremities  Inspection:  hyperpigmentation of skin and tortuous varicose veins/ spider veins bilaterally from distal knees to mid foot; moderate peripheral edema, worse at sock line.   ROM:  normal   Strength: normal   Sensation:  no allodynia; non tender         Diagnostic Tests/ Imaging/ Labs resulted since last visit:   none      Assessment & Plan      VISIT DIAGNOSIS:   1. Lumbar radicular pain (Primary)  - Adult Pain Clinic Follow-Up Order    2. Chronic midline low back pain without sciatica  - Adult Pain Clinic Follow-Up Order    3. Swelling of lower extremity  - Adult Pain Clinic Follow-Up Order  - Vascular Medicine Referral; Future    4. Peripheral edema    5. Edema of lower extremity due to peripheral venous insufficiency  - Vascular Medicine Referral; Future    6. Anxiety      ASSESSMENT:   Visit discussion: Amira Scherer is seen in follow up today at the pain clinic for chronic bilateral leg pain.  Patient's anxiety profoundly impacts her pain experience.  On physical exam today, patient is willing to take off shoes and socks.  Of note is significant spider banding and torturous varicose veins around the foot and ankle region.  She does have indications of peripheral vascular changes with hyperpigmentation of the skin.  Swelling is present around the sock line toward  the ankles.  Attempted to educate patient on the impact of poor peripheral circulation as contributing factors to ongoing aching and throbbing leg pain.  Patient continues to change her answer as to whether she has pain from peripheral swelling versus from her right leg injury.  She continues to fixate on the injury of her right leg as source of all of her pain problems.  Quite frankly, peripheral vascular insufficiency may or may not be related to her injury.  However, it does not impact current status with peripheral circulation and edema.      Attempted to educate patient on the potential benefits of vascular medicine referral.  Patient expresses several attempts to obtain assistance through insurance and Co. based programs.  She states that she will not dissipate and ride programs.  Then she states that she has been turned down or denied from services or has waited for extended periods of time with no assessment for services.  Patient's story changes frequently through the entire 63-minute encounter.  I finally addressed directly with the patient that she puts up a barrier to every possible solution for her pain.  She continues to reject possible solutions such as assessment by vascular medicine.  At the very least, I did encourage patient to start wearing compression stockings bilaterally.  I also encouraged her to try to elevate or lift legs or lay down for 10 minutes every hour.  Patient tries to negotiate to do this every 2 hours.  Quite frankly, any elevation of the feet during the day would be beneficial.    Patient then asks why she needs to keep returning to pain management.  At that point, I did indicate that I am not sure why she continues to return as she is not open to any suggestions that we have to offer.  Patient then responds that there is not enough time to address her questions because I am too slow.  However, patient often redirects conversation into loops and circles of logic that are  difficult to redirect.  At this time, I have made it very clear to the patient that she cannot participate or return to pain management if she is not willing to come for in person visits.  She is very upset that telephone visits are not an option.  I have made it clear that that is not an option through pain management.  If she had access to video visits, this would be a solution.  Patient does not have access to technology or devices.      I am hopeful that she will follow-up with vascular medicine.  I think it is beneficial for her to appear in office for actual assessment of her complaints.  She does get defensive when I ask when she had most recently seen her primary care provider.  I stated that I believe it is very important for her to see providers in person so they can actually see and assess her complaints and problems.  It is very difficult to nearly impossible to get a true picture of her health condition by telephone.  That is why telephone visits will not be permitted through pain management.  At this time, I have advised patient to go to vascular medicine.  I doubt she will follow through with this referral.  However, if she does work with vascular medicine and has no improvement in leg pain after addressing peripheral vascular disease, then she can return to pain management.  However, there is little benefit through pain management at this time if she is unwilling to engage in any reasonable recommendations.  She is very terrified and fearful about the possibility of an lumbar epidural steroid injection.  I told her that she is not going to be forced to have an injection and at this point I would not recommend it based on her anxiety.  Patient was not advised to reschedule for pain management.  I will reach out to her primary care provider with this update.  Ultimately, I think patient needs a psychiatrist to assist with her ongoing anxiety and its impact on her perception of pain.  Unfortunately,  "effective communication and shared decision making are nearly impossible throughout this in previous encounters.       PLAN:    Continue Current Treatment Plan with:   - Existing medications, as prescribed by Primary Care        New Referrals:   - REFERRAL to Vascular Medicine for lower extremity swelling       Return to Clinic:   -  No additional follow-up with Pain Management Program.  Return to your referring provider for ongoing care.      Future Considerations:   Start wearing an \"over the calf\" light compression stocking on both legs from the time you wake up to going to bed. Use a light compression (10-18).        ___________________________________________________________________    BILLING TIME DOCUMENTATION:   TOTAL TIME includes:   Time spent preparing to see the patient: 3 minutes (reviewing records and tests)  Time spend face to face with the patient: 63 minutes  Time spent ordering tests, medications, procedures and referrals: 0 minutes  Time spent Referring and communicating with other healthcare professionals: 0 minutes  Documenting clinical information in Epic: 5 minutes    The total TIME spent on this patient on the day of the appointment was 71 minutes.     ___________________________________________________________________    Review of Electronic Chart: Today I have also reviewed available medical information in the patient's medical record at [facility] ([facility]) and Care Everywhere (if available), including relevant provider notes, laboratory work, and imaging.     Gita Allen, APRN, DNP, FNP-C   [facility]   "

## 2025-06-12 NOTE — PATIENT INSTRUCTIONS
"PATIENT INSTRUCTIONS:     I am recommending a multidisciplinary treatment plan to help this patient better manage her pain. The following recommendations were given to the patient. Diagnosis, treatment options, risks, benefits, and alternatives were discussed; all questions were answered. Self-care instructions were given. The patient expressed understanding of the plan for management.   Continue Current Treatment Plan with:   - Existing medications, as prescribed by Primary Care        New Referrals:   - REFERRAL to Vascular Medicine for lower extremity swelling       Return to Clinic:   -  No additional follow-up with Pain Management Program.  Return to your referring provider for ongoing care.      Future Considerations:   Start wearing an \"over the calf\" light compression stocking on both legs from the time you wake up to going to bed. Use a light compression (10-18).      ----------------------------------------------------------------  Clinic Number:  [phone]   Call with any questions about your care and for scheduling assistance.   Calls are returned Monday through Friday between 8 AM and 4:30 PM. We usually get back to you within 2 business days depending on the issue/request.    If we are prescribing your medications:  For opioid medication refills, call the clinic or send a Ring[employer] message 7 days in advance.  Please include:  Name of requested medication  Name of the pharmacy.  For non-opioid medications, call your pharmacy directly to request a refill. Please allow 3-4 days to be processed.   Per [place] State Law:  All controlled substance prescriptions must be filled within 30 days of being written.    For those controlled substances allowing refills, pickup must occur within 30 days of last fill.      We believe regular attendance is key to your success in our program!    Any time you are unable to keep your appointment we ask that you call us at least 24 hours in advance to cancel.This will allow us " to offer the appointment time to another patient.   Multiple missed appointments may lead to dismissal from the clinic.

## 2025-06-13 ENCOUNTER — TELEPHONE (OUTPATIENT)
Dept: PALLIATIVE MEDICINE | Facility: CLINIC | Age: 60
End: 2025-06-13
Payer: COMMERCIAL

## 2025-06-13 NOTE — TELEPHONE ENCOUNTER
[facility] Call Center    Phone Message    May a detailed message be left on voicemail: yes     Reason for Call: Pt forgot to tell Gita something when she saw her yesterday.  It's in regards to her let pain.  She said the leg pain feels like being on a boat on water and that leads to balance issues.  She said that she doesn't need a call back.  She was just making Gita aware of this.

## 2025-06-16 NOTE — TELEPHONE ENCOUNTER
"FYI: Per call from pt on Friday 6/13/25     \"Pt forgot to tell Gita something when she saw her yesterday.  It's in regards to her leg pain.  She said the leg pain feels like being on a boat on water and that leads to balance issues.  She said that she doesn't need a call back.  She was just making Gita aware of this.\"    Routing to provider for review    Becky Greer RN    "

## 2025-06-19 DIAGNOSIS — K59.01 SLOW TRANSIT CONSTIPATION: ICD-10-CM

## 2025-06-19 RX ORDER — POLYETHYLENE GLYCOL 3350 17 G/17G
17 POWDER, FOR SOLUTION ORAL 3 TIMES DAILY
Qty: 510 G | Refills: 3 | Status: SHIPPED | OUTPATIENT
Start: 2025-06-19

## 2025-06-25 ENCOUNTER — VIRTUAL VISIT (OUTPATIENT)
Dept: FAMILY MEDICINE | Facility: CLINIC | Age: 60
End: 2025-06-25
Payer: COMMERCIAL

## 2025-06-25 DIAGNOSIS — J34.89 NASAL CONGESTION WITH RHINORRHEA: ICD-10-CM

## 2025-06-25 DIAGNOSIS — F11.90 CHRONIC, CONTINUOUS USE OF OPIOIDS: ICD-10-CM

## 2025-06-25 DIAGNOSIS — G90.50 RSD (REFLEX SYMPATHETIC DYSTROPHY): Primary | ICD-10-CM

## 2025-06-25 DIAGNOSIS — R68.2 DRY MOUTH: ICD-10-CM

## 2025-06-25 DIAGNOSIS — R20.2 PARESTHESIAS: ICD-10-CM

## 2025-06-25 DIAGNOSIS — R09.81 NASAL CONGESTION WITH RHINORRHEA: ICD-10-CM

## 2025-06-25 DIAGNOSIS — G89.4 CHRONIC PAIN SYNDROME: ICD-10-CM

## 2025-06-25 PROCEDURE — 98014 SYNCH AUDIO-ONLY EST MOD 30: CPT | Performed by: FAMILY MEDICINE

## 2025-06-25 RX ORDER — LORATADINE 10 MG/1
10 TABLET ORAL DAILY
Qty: 90 TABLET | Refills: 3 | Status: SHIPPED | OUTPATIENT
Start: 2025-06-25

## 2025-06-25 NOTE — PROGRESS NOTES
Amira is a 60 year old who is being evaluated via a billable telephone visit.    What phone number would you like to be contacted at? [phone]  How would you like to obtain your AVS? Mail a copy  Originating Location (pt. Location): Home    Distant Location (provider location):  On-site  Telephone visit completed due to the patient did not have access to video, while the distant provider did.    Assessment & Plan     RSD (reflex sympathetic dystrophy)  Cont with pain management    Chronic, continuous use of opioids - F11.9      Nasal congestion with rhinorrhea  Take   - loratadine (CLARITIN) 10 MG tablet; Take 1 tablet (10 mg) by mouth daily.    Dry mouth  Try for dry mouth  - artificial saliva (BIOTENE DRY MOUTHWASH) LIQD liquid; Swish and spit 5 mLs in mouth 4 times daily.                Subjective   Amira is a 60 year old, presenting for the following health issues:  Recheck Medication        6/4/2025     5:15 PM   Additional Questions   Roomed by Maddie ARIAS CMA     HPI      I am I could believe that okay so I am I could believe that okay so weird that been taking the Lyrica for a while now it takes a while to build up is that any of them at all hello a that work but anyway Friday with like thinking I got okay with it does not have something to do with something else I do not know all of a sudden on Saturday I got the word kind of feeling stomachache thing it reminded me so much that when the ibuprofen think I was like what if that is okay do not have any and I was able to block some I had a full meal with Friday by the way pain is like so Saturday night on Friday and Sunday which were and I cannot I cannot take this I want a meal I do not have much luck to my like so I remember that antinausea I think doing maybe do not really remember but at either urgent care or emergency room I do not know one of the doctors prescribed the little antinausea pill number that he threw in the bathroom I nearly threw them in  the garbage because I did not think I you know I read them again took 1 and do not really even remember what happened back then so I took 1 Saturday or Sunday night and you know it but is able to eat a little bit more and then yesterday and we were waiting the Lyrica what is giving you stomach good like touching my belly would be the tender you make afternoon because my belly and the inside just like the ibuprofen it feels like I have not taken any ibuprofen it feels like my insides feel really bruised and sore really similar to how my stomach felt when we realized I ibuprofen was the culprit.  So I did that on that was my stomach felt when we realized by ibuprofen were the culprit.  So I did that on that was felt awful and I could not eat then I figured maybe I better not eat some kind I think I could not really go to the bathroom either and sometimes the you tell me with my surgeries and stuff that better not to put anything you if that you know she needs a bowel rest it would be is that what it is which she is feeling constipation or what ever so I mean I had I did have a cup of a cup of MiraLAX after chicken broth with MiraLAX and I it it tasted good but I even had another 1 because at times I take more than 1 you told me I could get an in today for knowledge of today's I manage you sleep a little bit in the middle of the night and I got up and I did have a little relieved going to the bathroom asked me to go with okay so I am like okay tramadol and I got a get up now and get you to get on my sleep and move around and I cannot take that about my first 100 that I would take an pregabalin takes that I am in a different Dr. Carbajal no I think I am taking a day off because I think it might have something to do with my coming and I did not take it an hour later I felt so bad about it I do not know take it 1 hour later I took it and it was only like maybe an hour and 20 minutes and now I have the stomachache back if  you know so I do not know I will have to try again tomorrow if the same thing happens but things kind of weird that she did this for quite a while I things we are doing but I will tell you this so yeah I am hoping that you know your doctor you can give me your best educated yeah there because I do not know what to do but I do I can tell you there and I II can I can also tell you that I do not started when I started Lyrica but I do not remember ever having a problem taking tramadol and you know reporting any stomach problems you are oxycodone or anything giving you trouble but as time goes on maybe it does Ionil but I did have is I take the trip to tramadol in the 1 101st thing you need to start my day and I will say that I have had the most dull pain a little very tolerable in my stomach so the doctor start what I started taking Lyrica I do not remember things so I mean everything I mean I did not have that anything p.o. maybe I just have that dull pain and I want what ever is going on is being I am not been to worry about it as long as it does not hurt is very tolerable I did have a dull pain in my stomach I would you know did not write it is anything I did got it out of my hand and noticed that every time I took pills but I cannot remember if it was when I started Lyrica are not so now I am happy really bummed out here because what is it is the Lyrica what the heck of my again if you know Dr. Carbajal I hurt myself so bad I never asked for pain pills it was never offered to me I do not even know what pain deal are for the most part so      The above is just a short verbatim one sided conversation from this     Went up to the 150 and now has some stomach issues . ? Increased dosing   Will have her take the 100 100 and 150   She is having a hard time grasping the concept of the decreased dosing.   The zofran has helped  and she will decrease the dose     Next appointment in person   She will try   She has been  feeling that she might have some allergies lots of runny nose and   Dry mouth             Objective           Vitals:  No vitals were obtained today due to virtual visit.    Physical Exam   General: Alert and no distress //Respiratory: No audible wheeze, cough, or shortness of breath // Psychiatric:  tangential, perseverating anxiouos             Phone call duration: 30 minutes  Signed Electronically by: Alana Carbajal MD

## 2025-06-26 ENCOUNTER — TELEPHONE (OUTPATIENT)
Dept: FAMILY MEDICINE | Facility: CLINIC | Age: 60
End: 2025-06-26

## 2025-06-26 DIAGNOSIS — K59.01 SLOW TRANSIT CONSTIPATION: ICD-10-CM

## 2025-06-26 RX ORDER — LACTULOSE 10 G/15ML
SOLUTION ORAL
Qty: 946 ML | Refills: 0 | Status: SHIPPED | OUTPATIENT
Start: 2025-06-26

## 2025-06-26 RX ORDER — OXYCODONE HYDROCHLORIDE 5 MG/1
5 TABLET ORAL DAILY PRN
Qty: 45 TABLET | Refills: 0 | Status: SHIPPED | OUTPATIENT
Start: 2025-06-26

## 2025-06-27 NOTE — TELEPHONE ENCOUNTER
Medication Question or Refill        What medication are you calling about (include dose and sig)?: Oxycodone    Preferred Pharmacy:       [facility] - [place], [place] - [address] [place] [id number]  Phone: [phone] Fax: [phone]      Controlled Substance Agreement on file:   CSA -- Patient Level:    CSA: None found at the patient level.       Who prescribed the medication?: Alana Carbajal    Do you need a refill? Yes    When did you use the medication last? 6/26/25    Patient offered an appointment? No    Do you have any questions or concerns?  Yes: Pt would like if you haven't already, to please fill her Oxycodone.       Okay to leave a detailed message?: Yes at Home number on file [phone] (home)

## 2025-07-02 RX ORDER — FLUORIDE TOOTHPASTE
5 TOOTHPASTE DENTAL 4 TIMES DAILY
Qty: 237 ML | Refills: 3 | Status: SHIPPED | OUTPATIENT
Start: 2025-07-02

## 2025-07-16 ENCOUNTER — TELEPHONE (OUTPATIENT)
Dept: FAMILY MEDICINE | Facility: CLINIC | Age: 60
End: 2025-07-16
Payer: COMMERCIAL

## 2025-07-16 DIAGNOSIS — R20.2 PARESTHESIAS: ICD-10-CM

## 2025-07-16 DIAGNOSIS — F11.90 CHRONIC, CONTINUOUS USE OF OPIOIDS: Primary | ICD-10-CM

## 2025-07-16 DIAGNOSIS — G89.4 CHRONIC PAIN SYNDROME: ICD-10-CM

## 2025-07-16 DIAGNOSIS — G90.50 RSD (REFLEX SYMPATHETIC DYSTROPHY): ICD-10-CM

## 2025-07-16 RX ORDER — OXYCODONE HYDROCHLORIDE 5 MG/1
5 TABLET ORAL DAILY PRN
Qty: 45 TABLET | Refills: 0 | Status: SHIPPED | OUTPATIENT
Start: 2025-07-25

## 2025-07-22 DIAGNOSIS — K59.01 SLOW TRANSIT CONSTIPATION: ICD-10-CM

## 2025-07-22 RX ORDER — POLYETHYLENE GLYCOL 3350 17 G/17G
17 POWDER, FOR SOLUTION ORAL 3 TIMES DAILY
Qty: 510 G | Refills: 6 | Status: SHIPPED | OUTPATIENT
Start: 2025-07-22

## 2025-08-07 ENCOUNTER — VIRTUAL VISIT (OUTPATIENT)
Dept: FAMILY MEDICINE | Facility: CLINIC | Age: 60
End: 2025-08-07
Payer: COMMERCIAL

## 2025-08-07 DIAGNOSIS — F60.9 PERSONALITY DISORDER IN ADULT (H): Chronic | ICD-10-CM

## 2025-08-07 DIAGNOSIS — R20.2 PARESTHESIAS: ICD-10-CM

## 2025-08-07 DIAGNOSIS — G89.4 CHRONIC PAIN SYNDROME: ICD-10-CM

## 2025-08-07 DIAGNOSIS — K59.01 SLOW TRANSIT CONSTIPATION: Primary | ICD-10-CM

## 2025-08-07 PROCEDURE — 98014 SYNCH AUDIO-ONLY EST MOD 30: CPT | Performed by: FAMILY MEDICINE

## 2025-08-07 RX ORDER — OXYCODONE HYDROCHLORIDE 5 MG/1
5 TABLET ORAL DAILY PRN
Qty: 45 TABLET | Refills: 0 | Status: SHIPPED | OUTPATIENT
Start: 2025-08-22

## 2025-08-08 ENCOUNTER — TELEPHONE (OUTPATIENT)
Dept: FAMILY MEDICINE | Facility: CLINIC | Age: 60
End: 2025-08-08
Payer: COMMERCIAL

## 2025-08-10 DIAGNOSIS — G90.50 RSD (REFLEX SYMPATHETIC DYSTROPHY): ICD-10-CM

## 2025-08-11 RX ORDER — PREGABALIN 50 MG/1
50 CAPSULE ORAL DAILY
Qty: 90 CAPSULE | Refills: 0 | Status: SHIPPED | OUTPATIENT
Start: 2025-08-11

## 2025-08-30 ENCOUNTER — APPOINTMENT (OUTPATIENT)
Dept: CT IMAGING | Facility: CLINIC | Age: 60
End: 2025-08-30
Attending: EMERGENCY MEDICINE
Payer: COMMERCIAL

## 2025-08-30 ENCOUNTER — HOSPITAL ENCOUNTER (EMERGENCY)
Facility: CLINIC | Age: 60
Discharge: HOME | End: 2025-08-30
Attending: EMERGENCY MEDICINE | Admitting: EMERGENCY MEDICINE
Payer: COMMERCIAL

## 2025-08-30 VITALS
RESPIRATION RATE: 20 BRPM | SYSTOLIC BLOOD PRESSURE: 113 MMHG | TEMPERATURE: 98.1 F | WEIGHT: 98.4 LBS | OXYGEN SATURATION: 99 % | HEIGHT: 67 IN | HEART RATE: 80 BPM | BODY MASS INDEX: 15.44 KG/M2 | DIASTOLIC BLOOD PRESSURE: 78 MMHG

## 2025-08-30 DIAGNOSIS — R23.4 BREAST SKIN CHANGES: ICD-10-CM

## 2025-08-30 DIAGNOSIS — R07.81 RIB PAIN ON LEFT SIDE: Primary | ICD-10-CM

## 2025-08-30 LAB
ALBUMIN UR-MCNC: NEGATIVE MG/DL
AMPHETAMINES UR QL SCN: NORMAL
ANION GAP SERPL CALCULATED.3IONS-SCNC: 9 MMOL/L (ref 7–15)
APPEARANCE UR: CLEAR
BARBITURATES UR QL SCN: NORMAL
BENZODIAZ UR QL SCN: NORMAL
BILIRUB UR QL STRIP: NEGATIVE
BUN SERPL-MCNC: 6.4 MG/DL (ref 8–23)
BZE UR QL SCN: NORMAL
CALCIUM SERPL-MCNC: 9.2 MG/DL (ref 8.8–10.4)
CANNABINOIDS UR QL SCN: NORMAL
CHLORIDE SERPL-SCNC: 109 MMOL/L (ref 98–107)
COLOR UR AUTO: ABNORMAL
CREAT SERPL-MCNC: 0.48 MG/DL (ref 0.51–0.95)
EGFRCR SERPLBLD CKD-EPI 2021: >90 ML/MIN/1.73M2
ERYTHROCYTE [DISTWIDTH] IN BLOOD BY AUTOMATED COUNT: 12.8 % (ref 10–15)
FENTANYL UR QL: NORMAL
GLUCOSE SERPL-MCNC: 87 MG/DL (ref 70–99)
GLUCOSE UR STRIP-MCNC: NEGATIVE MG/DL
HCO3 SERPL-SCNC: 23 MMOL/L (ref 22–29)
HCT VFR BLD AUTO: 47 % (ref 35–47)
HGB BLD-MCNC: 15.6 G/DL (ref 11.7–15.7)
HGB UR QL STRIP: NEGATIVE
KETONES UR STRIP-MCNC: NEGATIVE MG/DL
LEUKOCYTE ESTERASE UR QL STRIP: NEGATIVE
MCH RBC QN AUTO: 34.3 PG (ref 26.5–33)
MCHC RBC AUTO-ENTMCNC: 33.2 G/DL (ref 31.5–36.5)
MCV RBC AUTO: 103.3 FL (ref 78–100)
MUCOUS THREADS #/AREA URNS LPF: PRESENT /LPF
NITRATE UR QL: NEGATIVE
OPIATES UR QL SCN: NORMAL
PCP QUAL URINE (ROCHE): NORMAL
PH UR STRIP: 5 [PH] (ref 5–7)
PLATELET # BLD AUTO: 193 10E3/UL (ref 150–450)
POTASSIUM SERPL-SCNC: 4.1 MMOL/L (ref 3.4–5.3)
RBC # BLD AUTO: 4.55 10E6/UL (ref 3.8–5.2)
RBC URINE: <1 /HPF
SODIUM SERPL-SCNC: 141 MMOL/L (ref 135–145)
SP GR UR STRIP: 1.02 (ref 1–1.03)
SQUAMOUS EPITHELIAL: 1 /HPF
UROBILINOGEN UR STRIP-MCNC: NORMAL MG/DL
WBC # BLD AUTO: 6.61 10E3/UL (ref 4–11)
WBC URINE: 1 /HPF

## 2025-08-30 PROCEDURE — 85048 AUTOMATED LEUKOCYTE COUNT: CPT | Performed by: EMERGENCY MEDICINE

## 2025-08-30 PROCEDURE — 99284 EMERGENCY DEPT VISIT MOD MDM: CPT | Performed by: EMERGENCY MEDICINE

## 2025-08-30 PROCEDURE — 82310 ASSAY OF CALCIUM: CPT | Performed by: EMERGENCY MEDICINE

## 2025-08-30 PROCEDURE — 36415 COLL VENOUS BLD VENIPUNCTURE: CPT | Performed by: EMERGENCY MEDICINE

## 2025-08-30 PROCEDURE — 80307 DRUG TEST PRSMV CHEM ANLYZR: CPT | Performed by: EMERGENCY MEDICINE

## 2025-08-30 PROCEDURE — 81001 URINALYSIS AUTO W/SCOPE: CPT | Performed by: EMERGENCY MEDICINE

## 2025-08-30 PROCEDURE — 255N000002 HC RX 255 OP 636: Performed by: EMERGENCY MEDICINE

## 2025-08-30 PROCEDURE — 250N000009 HC RX 250: Performed by: EMERGENCY MEDICINE

## 2025-08-30 PROCEDURE — 99285 EMERGENCY DEPT VISIT HI MDM: CPT | Mod: 25 | Performed by: EMERGENCY MEDICINE

## 2025-08-30 PROCEDURE — 71260 CT THORAX DX C+: CPT

## 2025-08-30 PROCEDURE — 250N000013 HC RX MED GY IP 250 OP 250 PS 637: Performed by: EMERGENCY MEDICINE

## 2025-08-30 RX ORDER — LIDOCAINE 4 G/G
1 PATCH TOPICAL ONCE
Status: DISCONTINUED | OUTPATIENT
Start: 2025-08-30 | End: 2025-08-31 | Stop reason: HOSPADM

## 2025-08-30 RX ADMIN — SODIUM CHLORIDE 53 ML: 9 INJECTION, SOLUTION INTRAVENOUS at 19:35

## 2025-08-30 RX ADMIN — IOHEXOL 50 ML: 350 INJECTION, SOLUTION INTRAVENOUS at 19:35

## 2025-08-30 RX ADMIN — LIDOCAINE 4% 1 PATCH: 40 PATCH TOPICAL at 18:54

## 2025-08-30 ASSESSMENT — ACTIVITIES OF DAILY LIVING (ADL)
ADLS_ACUITY_SCORE: 47

## 2025-09-02 ENCOUNTER — TELEPHONE (OUTPATIENT)
Dept: FAMILY MEDICINE | Facility: CLINIC | Age: 60
End: 2025-09-02
Payer: COMMERCIAL

## (undated) DEVICE — BLADE SAW SAGITTAL STRK 18X90X1.27MM HD SYS 6 6118-127-090

## (undated) DEVICE — GOWN LG DISP 9515

## (undated) DEVICE — IMM PILLOW ABDUCT HIP MED 0814-8033

## (undated) DEVICE — GLOVE PROTEXIS BLUE W/NEU-THERA 8.0  2D73EB80

## (undated) DEVICE — DEVICE RETRIEVER HEWSON 71111579

## (undated) DEVICE — GLOVE PROTEXIS W/NEU-THERA 6.5  2D73TE65

## (undated) DEVICE — ESU GROUND PAD UNIVERSAL W/O CORD

## (undated) DEVICE — SU MONOCRYL 2-0 CT-1 36" UND Y945H

## (undated) DEVICE — SU ETHIBOND 1 CT-1 30" X425H

## (undated) DEVICE — DRSG AQUACEL AG HYDROFIBER  3.5X10" 422605

## (undated) DEVICE — BONE CLEANING TIP INTERPULSE  0210-010-000

## (undated) DEVICE — SPONGE RAY-TEC 4X8" 7318

## (undated) DEVICE — PREP CHLORAPREP 26ML TINTED ORANGE  260815

## (undated) DEVICE — ESU PENCIL SMOKE EVAC W/ROCKER SWITCH 0703-047-000

## (undated) DEVICE — SOL WATER IRRIG 1000ML BOTTLE 07139-09

## (undated) DEVICE — DRAPE IOBAN LG .375X23.5" 6648EZ

## (undated) DEVICE — GLOVE PROTEXIS W/NEU-THERA 8.0  2D73TE80

## (undated) DEVICE — BONE CLEANING TIP INTERPULSE FEMORAL CANAL 0210-008-000

## (undated) DEVICE — SU ETHIBOND 1 CTX CR 8/18" CX30D

## (undated) DEVICE — DRAPE SHEET REV FOLD 3/4 9349

## (undated) DEVICE — SU PDO 1 STRATAFIX 36X36CM CTX TAPERPOINT SXPD2B405

## (undated) DEVICE — SUCTION IRR SYSTEM W/TIP INTERPULSE

## (undated) DEVICE — SOL NACL 0.9% IRRIG 3000ML BAG 07972-08

## (undated) DEVICE — SU PDO 3-0 STRATAFIX 24CM FS/FS REV CUT SXPD2B419

## (undated) DEVICE — GLOVE PROTEXIS BLUE W/NEU-THERA 6.5  2D73EB65

## (undated) DEVICE — STPL SKIN 35W 6.9MM  PXW35

## (undated) DEVICE — BONE CEMENT MIXEVAC HI VAC W/CARTRIDGE 0306-563-000

## (undated) DEVICE — GLOVE EXAM NITRILE MED

## (undated) DEVICE — GOWN IMPERVIOUS SPECIALTY XLG/XLONG 32474

## (undated) DEVICE — PACK TOTAL HIP W/POUCH RIVERSIDE LATEX FREE

## (undated) DEVICE — TAPE CLOTH ADHESIVE 3" LATEX FREE

## (undated) DEVICE — SU STRATAFIX MONOCRYL 3-0 SPIRAL PS-2 30CM SXMP1B106

## (undated) DEVICE — DRSG ABDOMINAL 07 1/2X8" 7197D

## (undated) DEVICE — DRSG XEROFORM 5X9" 8884431605

## (undated) RX ORDER — DEXAMETHASONE SODIUM PHOSPHATE 10 MG/ML
INJECTION, SOLUTION INTRAMUSCULAR; INTRAVENOUS
Status: DISPENSED
Start: 2022-02-07

## (undated) RX ORDER — PROPOFOL 10 MG/ML
INJECTION, EMULSION INTRAVENOUS
Status: DISPENSED
Start: 2022-02-07

## (undated) RX ORDER — LIDOCAINE HYDROCHLORIDE 10 MG/ML
INJECTION, SOLUTION EPIDURAL; INFILTRATION; INTRACAUDAL; PERINEURAL
Status: DISPENSED
Start: 2022-02-07

## (undated) RX ORDER — FENTANYL CITRATE 50 UG/ML
INJECTION, SOLUTION INTRAMUSCULAR; INTRAVENOUS
Status: DISPENSED
Start: 2022-02-07

## (undated) RX ORDER — FENTANYL CITRATE-0.9 % NACL/PF 10 MCG/ML
PLASTIC BAG, INJECTION (ML) INTRAVENOUS
Status: DISPENSED
Start: 2022-02-07

## (undated) RX ORDER — ONDANSETRON 2 MG/ML
INJECTION INTRAMUSCULAR; INTRAVENOUS
Status: DISPENSED
Start: 2022-02-07

## (undated) RX ORDER — MAGNESIUM SULFATE HEPTAHYDRATE 40 MG/ML
INJECTION, SOLUTION INTRAVENOUS
Status: DISPENSED
Start: 2022-02-07

## (undated) RX ORDER — CEFAZOLIN SODIUM 2 G/100ML
INJECTION, SOLUTION INTRAVENOUS
Status: DISPENSED
Start: 2022-02-07

## (undated) RX ORDER — TRANEXAMIC ACID 650 MG/1
TABLET ORAL
Status: DISPENSED
Start: 2022-02-07

## (undated) RX ORDER — GLYCOPYRROLATE 0.2 MG/ML
INJECTION, SOLUTION INTRAMUSCULAR; INTRAVENOUS
Status: DISPENSED
Start: 2022-02-07